# Patient Record
Sex: FEMALE | Race: WHITE | NOT HISPANIC OR LATINO | Employment: FULL TIME | URBAN - METROPOLITAN AREA
[De-identification: names, ages, dates, MRNs, and addresses within clinical notes are randomized per-mention and may not be internally consistent; named-entity substitution may affect disease eponyms.]

---

## 2017-01-09 ENCOUNTER — ALLSCRIPTS OFFICE VISIT (OUTPATIENT)
Dept: OTHER | Facility: OTHER | Age: 52
End: 2017-01-09

## 2017-01-09 DIAGNOSIS — Z12.31 ENCOUNTER FOR SCREENING MAMMOGRAM FOR MALIGNANT NEOPLASM OF BREAST: ICD-10-CM

## 2017-01-10 ENCOUNTER — GENERIC CONVERSION - ENCOUNTER (OUTPATIENT)
Dept: OTHER | Facility: OTHER | Age: 52
End: 2017-01-10

## 2017-05-15 ENCOUNTER — GENERIC CONVERSION - ENCOUNTER (OUTPATIENT)
Dept: OTHER | Facility: OTHER | Age: 52
End: 2017-05-15

## 2017-06-02 ENCOUNTER — ALLSCRIPTS OFFICE VISIT (OUTPATIENT)
Dept: OTHER | Facility: OTHER | Age: 52
End: 2017-06-02

## 2017-06-06 ENCOUNTER — GENERIC CONVERSION - ENCOUNTER (OUTPATIENT)
Dept: OTHER | Facility: OTHER | Age: 52
End: 2017-06-06

## 2017-06-06 LAB
A/G RATIO (HISTORICAL): 1.2 (CALC) (ref 1–2.5)
ALBUMIN SERPL BCP-MCNC: 3.8 G/DL (ref 3.6–5.1)
ALP SERPL-CCNC: 80 U/L (ref 33–130)
ALT SERPL W P-5'-P-CCNC: 9 U/L (ref 6–29)
AST SERPL W P-5'-P-CCNC: 11 U/L (ref 10–35)
BASOPHILS # BLD AUTO: 0.6 %
BASOPHILS # BLD AUTO: 42 CELLS/UL (ref 0–200)
BILIRUB SERPL-MCNC: 0.3 MG/DL (ref 0.2–1.2)
BUN SERPL-MCNC: 15 MG/DL (ref 7–25)
BUN/CREA RATIO (HISTORICAL): NORMAL (CALC) (ref 6–22)
CALCIUM (ADJUSTED FOR ALBUMIN) (HISTORICAL): 9.4 MG/DL (CALC) (ref 8.6–10.2)
CALCIUM SERPL-MCNC: 9 MG/DL (ref 8.6–10.4)
CHLORIDE SERPL-SCNC: 107 MMOL/L (ref 98–110)
CO2 SERPL-SCNC: 25 MMOL/L (ref 20–31)
CREAT SERPL-MCNC: 0.92 MG/DL (ref 0.5–1.05)
DEPRECATED RDW RBC AUTO: 15.2 % (ref 11–15)
EGFR AFRICAN AMERICAN (HISTORICAL): 84 ML/MIN/1.73M2
EGFR-AMERICAN CALC (HISTORICAL): 72 ML/MIN/1.73M2
EOSINOPHIL # BLD AUTO: 2.9 %
EOSINOPHIL # BLD AUTO: 203 CELLS/UL (ref 15–500)
ERYTHROCYTE SEDIMENTATION RATE (HISTORICAL): 11 MM/H
EST. AVERAGE GLUCOSE BLD GHB EST-MCNC: 100 (CALC)
EST. AVERAGE GLUCOSE BLD GHB EST-MCNC: 5.5 (CALC)
GAMMA GLOBULIN (HISTORICAL): 3.2 G/DL (CALC) (ref 1.9–3.7)
GLUCOSE (HISTORICAL): 79 MG/DL (ref 65–99)
HBA1C MFR BLD HPLC: 5.1 % OF TOTAL HGB
HCT VFR BLD AUTO: 36.3 % (ref 35–45)
HEPATITIS C ANTIBODY (HISTORICAL): NORMAL
HGB BLD-MCNC: 11.8 G/DL (ref 11.7–15.5)
LYME IGG/IGM AB (HISTORICAL): <0.9 INDEX
LYMPHOCYTES # BLD AUTO: 1939 CELLS/UL (ref 850–3900)
LYMPHOCYTES # BLD AUTO: 27.7 %
MCH RBC QN AUTO: 28.9 PG (ref 27–33)
MCHC RBC AUTO-ENTMCNC: 32.4 G/DL (ref 32–36)
MCV RBC AUTO: 89.3 FL (ref 80–100)
MONOCYTES # BLD AUTO: 378 CELLS/UL (ref 200–950)
MONOCYTES (HISTORICAL): 5.4 %
NEUTROPHILS # BLD AUTO: 4438 CELLS/UL (ref 1500–7800)
NEUTROPHILS # BLD AUTO: 63.4 %
PLATELET # BLD AUTO: 282 THOUSAND/UL (ref 140–400)
PMV BLD AUTO: 8.8 FL (ref 7.5–12.5)
POTASSIUM SERPL-SCNC: 3.7 MMOL/L (ref 3.5–5.3)
RBC # BLD AUTO: 4.06 MILLION/UL (ref 3.8–5.1)
SIGNAL TO CUT-OFF (HISTORICAL): 0.09
SODIUM SERPL-SCNC: 139 MMOL/L (ref 135–146)
TOTAL PROTEIN (HISTORICAL): 7 G/DL (ref 6.1–8.1)
TSH SERPL DL<=0.05 MIU/L-ACNC: 4.34 MIU/L
WBC # BLD AUTO: 7 THOUSAND/UL (ref 3.8–10.8)

## 2017-08-07 ENCOUNTER — ALLSCRIPTS OFFICE VISIT (OUTPATIENT)
Dept: OTHER | Facility: OTHER | Age: 52
End: 2017-08-07

## 2017-11-29 ENCOUNTER — ALLSCRIPTS OFFICE VISIT (OUTPATIENT)
Dept: OTHER | Facility: OTHER | Age: 52
End: 2017-11-29

## 2017-12-12 ENCOUNTER — GENERIC CONVERSION - ENCOUNTER (OUTPATIENT)
Dept: PERINATAL CARE | Facility: MEDICAL CENTER | Age: 52
End: 2017-12-12

## 2017-12-15 NOTE — PROGRESS NOTES
Assessment    1  Acute upper respiratory infection (465 9) (J06 9)    Plan  Acute upper respiratory infection    · LevoFLOXacin 500 MG Oral Tablet (Levaquin); TAKE 1 TABLET DAILY   · Promethazine-Codeine 6 25-10 MG/5ML Oral Syrup; TAKE 5 ML EVERY 4 TO 6  HOURS AS NEEDED FOR COUGH  Influenza vaccine needed    · Stop: Fluzone Quadrivalent Intramuscular Suspension    Discussion/Summary    41-year-old female here today for acute upper respiratory symptoms  Patient to start Levaquin once daily for 10 days for suspected acute sinusitis  Patient advised to start Mucinex DM during the day to help with mucus and cough in addition to a daily antihistamine and fluticasone nasal spray that should help to clear up postnasal drip and mucus  Promethazine with codeine prescription provided today before leaving the office to help with cough at night  Rest and fluids advised  Patient to get treatment at least a week and call the office should symptoms persist or worsen despite treatment  Possible side effects of new medications were reviewed with the patient/guardian today  The treatment plan was reviewed with the patient/guardian  The patient/guardian understands and agrees with the treatment plan      Chief Complaint  Pt presents for cough, fatigue, body aches, sob, and sweats x2d  Pt states she took Theraflu, Mucinex, Robitussin, and Tylenol w/ little relief  History of Present Illness  HPI: 46y/o female here today for 2-3 days of URI sxs  Coughing persistent, shaking, tickle in throat  Chest tightness, congestion  slight nasal congestion, PND  she has tried theraflu, robitussin, robitussin, vicks  Review of Systems    Constitutional: as noted in HPI    ENT: as noted in HPI  Cardiovascular: no complaints of slow or fast heart rate, no chest pain, no palpitations, no leg claudication or lower extremity edema  Active Problems    1  Abnormal fasting glucose (790 29) (R73 01)   2  Anxiety (300 00) (F41 9)   3  Bilateral lower extremity edema (782 3) (R60 0)   4  Changing skin lesion (709 9) (L98 9)   5  Encounter for routine gynecological examination (V72 31) (Z01 419)   6  Encounter for screening mammogram for malignant neoplasm of breast (V76 12)   (Z12 31)   7  Esophagitis, reflux (530 11) (K21 0)   8  Gastroesophageal reflux disease, esophagitis presence not specified (530 81) (K21 9)   9  Medial epicondylitis of left elbow (726 31) (M77 02)   10  Need for hepatitis C screening test (V73 89) (Z11 59)   11  Persistent headaches (784 0) (R51)   12  Screening for colon cancer (V76 51) (Z12 11)   13  Screening for malignant neoplasm of cervix (V76 2) (Z12 4)   14  Vitamin D deficiency (268 9) (E55 9)    Past Medical History    1  History of Acute lymphadenitis (683) (L04 9)   2  History of Acute sinusitis (461 9) (J01 90)   3  History of Chest pain (786 50) (R07 9)   4  History of Chest wall pain (786 52) (R07 89)   5  History of Costochondritis (733 6) (M94 0)   6  History of Cough (786 2) (R05)   7  History of Cough (786 2) (R05)   8  History of Dog bite (879 8,E906 0) (W54  0XXA)   9  History of Easy bruising (782 9) (R23 8)   10  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)   11  History of Flu vaccine need (V04 81) (Z23)   12  History of Gastroenteritis (558 9) (K52 9)   13  History of Herpes zoster (053 9) (B02 9)   14  History of acute bronchitis (V12 69) (Z87 09)   15  History of acute bronchitis (V12 69) (Z87 09)   16  History of acute sinusitis (V12 69) (Z87 09)   17  History of acute sinusitis (V12 69) (Z87 09)   18  History of chest pain (V13 89) (Z87 898)   19  History of chronic bronchitis (V12 69) (Z87 09)   20  History of fatigue (V13 89) (Z87 898)   21  History of migraine (V12 49) (Z86 69)   22  History of migraine with aura (V12 49) (Z86 69)   23  History of shortness of breath (V13 89) (Z87 898)   24  History of toxoplasmosis (V12 09) (Z86 19)   25   History of Left ear pain (388 70) (H92 02)   26  History of Left shoulder pain (719 41) (M25 512)   27  History of Multiple joint pain (719 49) (M25 50)   28  History of Poison ivy (692 6) (L23 7)   29  History of Vaginal candidiasis (112 1) (B37 3)   30  History of Visit for screening mammogram (V76 12) (Z12 31)   31  History of Visit for screening mammogram (V76 12) (Z12 31)    Family History  Family History    1  Family history of Coronary Artery Disease (V17 49)    Social History    · Never A Smoker   · Social alcohol use (Z78 9)  The social history was reviewed and updated today  Current Meds   1  ALPRAZolam 0 5 MG Oral Tablet; Take 1 tablet by mouth at bedtime; Therapy: 73WEY9250 to (Evaluate:15Mar2017)  Requested for: 01YWH9783; Last   Rx:09Jan2017 Ordered   2  Bufferin Low Dose 81 MG Oral Tablet; Take 1 tablet daily; Therapy: 37JCA3190 to Recorded   3  Citalopram Hydrobromide 10 MG Oral Tablet; Take 1 tablet daily; Therapy: 93JUS9773 to Recorded   4  Econazole Nitrate 1 % External Cream; APPLY AND GENTLY MASSAGE INTO   AFFECTED AREA(S) TWICE DAILY; Therapy: 52Nwe4605 to (Last Rx:62Qmw6030)  Requested for: 26Qhl8174 Ordered   5  Furosemide 20 MG Oral Tablet; TAKE 1 TABLET DAILY; Therapy: 79RSJ8788 to (Evaluate:81Gtd1918)  Requested for: 53Sja5088; Last   Rx:83Rqz8261 Ordered   6  Naratriptan HCl - 2 5 MG Oral Tablet; TAKE 1 TABLET AT ONSET OF HEADACHE, MAY   REPEAT ONCE IN 4 HOURS; Therapy: 76RMB6210 to (Evaluate:64Bst7895) Recorded   7  PriLOSEC OTC 20 MG Oral Tablet Delayed Release; Take 1 Capsule 30 Min Before   Beakfast daily; Therapy: 58PRH4642 to (Evaluate:19Rqk8197)  Requested for: 87Xcy5096; Last   Rx:55Tue4576 Ordered   8  Rizatriptan Benzoate 10 MG Oral Tablet; TAKE 1 TABLET AT ONSET OF HEADACHE  MAY REPEAT EVERY 2 HOURS AS NEEDED  MAXIMUM 3 TABLETS IN 24 HOURS; Therapy: (Recorded:07Jun2016) to Recorded   9  Vitamin D 2000 UNIT Oral Capsule; take 1 capsule daily;    Therapy: 23VAT1362 to Recorded    The medication list was reviewed and updated today  Allergies    1  Cephalexin CAPS   2  Imitrex TABS    3  Adhesive Tape    Vitals   Recorded: [id number] 11:44AM   Temperature 98 8 F, Tympanic   Heart Rate 89   Pulse Quality Normal   Respiration Quality Normal   Respiration 17   Systolic 532, LUE, Sitting   Diastolic 80, LUE, Sitting   Height 5 ft    Weight 214 lb 3 oz   BMI Calculated 41 83   BSA Calculated 1 92   O2 Saturation 99   LMP Menopause   Pain Scale 0     Physical Exam    Constitutional   General appearance: Abnormal   acutely ill, comfortable, overweight, appears tired and appearance reflects stated age  Eyes   Conjunctiva and lids: No swelling, erythema or discharge  Ears, Nose, Mouth, and Throat   External inspection of ears and nose: Normal     Otoscopic examination: Tympanic membranes translucent with normal light reflex  Canals patent without erythema  Nasal mucosa, septum, and turbinates: Abnormal   There was a mucoid discharge from both nares  The bilateral nasal mucosa was boggy, edematous and red  Oropharynx: Abnormal   PND  Pulmonary   Respiratory effort: Abnormal   occasional dry hacking cough  Auscultation of lungs: Clear to auscultation  Cardiovascular   Auscultation of heart: Normal rate and rhythm, normal S1 and S2, without murmurs  Lymphatic   Palpation of lymph nodes in neck: Abnormal   bilateral submandibular node enlargement  Psychiatric   Orientation to person, place, and time: Normal     Mood and affect: Normal     Additional Exam:  vitals reviewed          Signatures   Electronically signed by : Brian Reyes, [facility]; Nov 29 2017 12:45PM EST                       (Author)

## 2018-01-02 ENCOUNTER — ALLSCRIPTS OFFICE VISIT (OUTPATIENT)
Dept: OTHER | Facility: OTHER | Age: 53
End: 2018-01-02

## 2018-01-02 DIAGNOSIS — Z12.31 ENCOUNTER FOR SCREENING MAMMOGRAM FOR MALIGNANT NEOPLASM OF BREAST: ICD-10-CM

## 2018-01-02 DIAGNOSIS — J20.9 ACUTE BRONCHITIS: ICD-10-CM

## 2018-01-10 NOTE — MISCELLANEOUS
Message  Return to work or school:   Leticia Castillo is under my professional care  She was seen in my office on 11/29/2017   She is able to return to work on  11/30/2017       JEANNA Duran        Signatures   Electronically signed by : Mir Martinez MA; Nov 29 2017 12:12PM EST                       (Author)

## 2018-01-11 NOTE — MISCELLANEOUS
Message  Return to work or school:   Mario Singh is under my professional care  She was seen in my office on 1/19/16       OUT OF WORK 1/19/16  IHAB ABDELAAL DO  Signatures   Electronically signed by :  Dionisio Shelton, ; Jan 19 2016 11:17AM EST                       (Author)

## 2018-01-11 NOTE — RESULT NOTES
Verified Results  MAMMO SCREENING BILATERAL W 3D & CAD 05Ssd2643 06:54AM [place] [facility]    Order Number: [id number]   TW Order Number: [id number]     Test Name Result Flag Reference   MAMMO SCREENING BILATERAL W 3D & CAD (Report)     Patient History:   Patient is postmenopausal, has history of endometrial cancer at    age 24, and had previous chemotherapy at age 24  No known family history of cancer  Patient has never smoked  Patient's BMI is 37 4  Reason for exam: screening (asymptomatic)  Mammo Screening Bilateral W DBT and CAD: August 13, 2016 - Check    In #: [de-identified]   2D/3D Procedure   3D views: Bilateral MLO view(s) were taken  2D views: Bilateral CC view(s) were taken  Technologist: SHIRLEY Pozo (R)(M)   Prior study comparison: November 25, 2014, bilateral digital    screening mammogram performed at [address] [place] [address]  December 23, 2008, digital bilateral screening    mammogram, performed at [facility]  The breast tissue is heterogeneously dense, potentially limiting    the sensitivity of mammography  Patient risk, included in this    report, assists in determining the appropriate screening regimen    (such as 3-D mammography or the inclusion of automated breast    ultrasound or MRI)  3-D mammography may also remain indicated as    screening  A combination of mediolateral oblique 3-D tomographic slices as    well as standard two-dimensional orthogonal images were obtained  No dominant soft tissue mass, architectural distortion or    suspicious calcifications are noted in either breast   The skin    and nipple contours are within normal limits  No evidence of malignancy  No significant changes when compared with prior studies  ASSESSMENT: BiRad:1 - Negative     Recommendation:   Routine screening mammogram of both breasts in 1 year  A    reminder letter will be scheduled  8-10% of cancers will be missed on mammography  Management of a    palpable abnormality must be based on clinical grounds  Patients    will be notified of their results via letter from our facility  Accredited by [employer] of Radiology and FDA       Transcription Location: SHIRLEY Lee 98: [id number]     Risk Value(s):   Tyrer-Cuzick 10 Year: 0 969%, Tyrer-Cuzicece Lifetime: 4 069%,    Myriad Table: 1 5%, MALLIKA 5 Year: 0 8%, NCI Lifetime: 7 0%   Signed by:   Ashlie Patiño MD   8/14/16

## 2018-01-12 VITALS
WEIGHT: 210 LBS | HEART RATE: 84 BPM | OXYGEN SATURATION: 99 % | TEMPERATURE: 99.7 F | HEIGHT: 60 IN | DIASTOLIC BLOOD PRESSURE: 80 MMHG | SYSTOLIC BLOOD PRESSURE: 112 MMHG | BODY MASS INDEX: 41.23 KG/M2

## 2018-01-13 VITALS
SYSTOLIC BLOOD PRESSURE: 118 MMHG | DIASTOLIC BLOOD PRESSURE: 80 MMHG | RESPIRATION RATE: 17 BRPM | HEART RATE: 89 BPM | WEIGHT: 214.19 LBS | BODY MASS INDEX: 42.05 KG/M2 | OXYGEN SATURATION: 99 % | TEMPERATURE: 98.8 F | HEIGHT: 60 IN

## 2018-01-13 VITALS
RESPIRATION RATE: 16 BRPM | TEMPERATURE: 98.7 F | HEART RATE: 73 BPM | WEIGHT: 213.56 LBS | BODY MASS INDEX: 40.32 KG/M2 | HEIGHT: 61 IN | OXYGEN SATURATION: 98 % | DIASTOLIC BLOOD PRESSURE: 90 MMHG | SYSTOLIC BLOOD PRESSURE: 122 MMHG

## 2018-01-13 VITALS
SYSTOLIC BLOOD PRESSURE: 124 MMHG | HEART RATE: 83 BPM | WEIGHT: 212.56 LBS | OXYGEN SATURATION: 99 % | HEIGHT: 60 IN | DIASTOLIC BLOOD PRESSURE: 82 MMHG | BODY MASS INDEX: 41.73 KG/M2 | TEMPERATURE: 98.5 F | RESPIRATION RATE: 17 BRPM

## 2018-01-13 NOTE — CONSULTS
Assessment    1  Shortness of breath (786 05) (R06 02)   2  Cough (786 2) (R05)   3  Hypoxia (799 02) (R09 02)    Plan  Cough, Shortness of breath    · SPIROMETRY W/O BRONCHO- POC; Status:Active; Requested GVF:83AMF3024;    Perform: In Office; Due:10Jun2016;Ordered; Today; For:Cough, Shortness of breath; Ordered By:Andrea Vallecillo; Shortness of breath    · ECHO COMPLETE WITH CONTRAST IF INDICATED, TTE / TRANSTHORACIC;  Status:Need Information - Financial Authorization; Requested for:WED JUNE 8TH @  [facility] ;    Perform:[facility]; Order Comments:Evaluate RV and LV function and estimate systolic PA pressure for possible pulmonary hypertension ; Last Updated By:Barela, Nella Gaucher; 6/7/2016 2:15:17 PM;Ordered; For:Shortness of breath; Ordered By:Andrea Vallecillo;   · Six Minute Walk Test - POC; Status:Complete - Retrospective Authorization;   Done:  88LVC3860   Perform: In Office; Due:12Jun2016; Last Updated Jennifer Code; 6/7/2016 1:44:44 PM;Ordered; For:Shortness of breath; Ordered By:Andrea Vallecillo;   · Follow-up visit in 2 months Evaluation and Treatment  Follow-up  Status: Hold For -  Scheduling  Requested for: 95MOB7186   Ordered; For: Shortness of breath; Ordered By: Teofilo Angelucci Performed:  Due: 01RIF9297    Results/Data  Results Free Text Form [facility]:   Results   Other 6 minute walk test was done today in the office and the patient started with a heart rate of 84 bpm and O2 saturation 98%  On the fourth minute the patient's heart rate went up to one 10 bpm and O2 saturation dropped to 88%  In the fifth metatarsal heart rate went up to one 14 bpm and O2 saturation dropped to 85%  At that point patient was placed on 2 L oxygen and her oxygen saturations remained at %  Chest X-ray Chest x-ray is reviewed on the [facility] system and it showed no active pulmonary disease     PFT Results v2:     Spirometry:   Post Bronchodilator Spirometry:   Lung Volumes:   DLCO:    PFT Interpretation:   Spirometry done today in the office showed normal airflow and vital capacity  Discussion/Summary  Discussion Summary:   The patient's shortness of breath and hypoxia on exertion is concerning for possible pulmonary hypertension  Her lower extremity edema which she has noticed over the last few weeks is most likely secondary to right heart failure  This has improved with Lasix  I have ordered a 2-D echo to evaluate the RV and LV functions and estimated systolic pulmonary artery pressure  The patient has already been done scheduled for stress echo to have canceled after discussing the condition with her primary care physician Dr Drake Wilkes  Her cough most likely unrelated to the shortness of breath on exertion  Most likely it is secondary to postnasal dripping  For this reason I have started her on fexofenadine 180 mg and fluticasone nasal spray 2 sprays to each nostril once a day  Since her hypoxia was at the end of her 6 minute walk test on the patient is currently not active I will withhold ordering the oxygen until further evaluation is done  The patient will contact me after the 2-D echo is done to go over the results and then order any further diagnostics  Tentatively have scheduled her for a follow-up visit in 2 months  Active Problems    · Acute bronchitis (466 0) (J20 9)   · Anxiety (300 00) (F41 9)   · Bilateral lower extremity edema (782 3) (R60 0)   · Chest pain (786 50) (R07 9)   · Chest wall pain (786 52) (R07 89)   · Chronic bronchitis (491 9) (J42)   · Esophagitis, reflux (530 11) (K21 0)   · Medial epicondylitis of left elbow (726 31) (M77 02)   · Shortness of breath (786 05) (R06 02)   · Toxoplasmosis (130 9) (B58 9)   · Vitamin D deficiency (268 9) (E55 9)    History of Present Illness  HPI: The patient is 75-year-old woman who is here for evaluation of cough and shortness of breath   Her symptoms started about the end of November of last year when she noticed shortness of breath on exertion  The shortness of breath on exertion worsened over time  Over the last few weeks she had noticed lower extremity edema  The patient also had a cough started around the same time in November  Over time it has worsened  The Cough is random and it starts as a tickle  There is no specific triggers to it  It is mainly dry and she doesn't bring up any sputum  Denies hemoptysis  She has no chest pain or palpitations  Her cough also occurs at night  She snores but not loud  She denies any daytime hypersomnolence  She visited the emergency room about a week ago and she was prescribed albuterol  After that she saw her primary care physician who prescribed Lasix and that improved the lower extremity edema  The patient shortness of breath on exertion did not improve with the Lasix  Since the beginning of her symptoms the patient was treated with 3 different courses of antibiotics as well as different inhalers without significant change in her symptoms  The patient is  and lives with her   She never smoked  She has no pets  Review of Systems  Complete-Female - Pulm:   Constitutional: No fever, no chills, feels well, no tiredness, no recent weight gain or weight loss  Eyes: no complaints of vision problems  ENT: no rhinitis, no PND, no epistaxis  Cardiovascular: no palpitations, no chest pain  Respiratory: as noted in HPI  Gastrointestinal: no complaints of esophageal reflux, no abdominal pain  Genitourinary: no dysuria  Musculoskeletal: no arthralgias, no joint swelling, no myalgias  Integumentary: no rash, no lesions  Neurological: no headache, no fainting, no weakness  Psychiatric: no anxiety, no depression  Hematologic/Lymphatic: - no complaints of swollen glands  Past Medical History    1  History of Acute sinusitis (461 9) (J01 90)   2  History of Chest pain (786 50) (R07 9)   3  History of Costochondritis (733 6) (M94 0)   4   History of Cough (786 2) (R05)   5  History of Dog bite (879 8,E906 0) (W54  0XXA)   6  History of Easy bruising (782 9) (R23 8)   7  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)   8  History of Flu vaccine need (V04 81) (Z23)   9  History of Gastroenteritis (558 9) (K52 9)   10  History of Herpes zoster (053 9) (B02 9)   11  History of acute bronchitis (V12 69) (Z87 09)   12  History of acute sinusitis (V12 69) (Z87 09)   13  History of acute sinusitis (V12 69) (Z87 09)   14  History of fatigue (V13 89) (Z87 898)   15  History of migraine (V12 49) (Z86 69)   16  History of migraine with aura (V12 49) (Z86 69)   17  History of toxoplasmosis (V12 09) (Z86 19)   18  History of Left ear pain (388 70) (H92 02)   19  History of Left shoulder pain (719 41) (M25 512)   20  History of Multiple joint pain (719 49) (M25 50)   21  History of Poison ivy (692 6) (L23 7)   22  History of Screening for colon cancer (V76 51) (Z12 11)   23  History of Vaginal candidiasis (112 1) (B37 3)   24  History of Visit for screening mammogram (V76 12) (Z12 31)   25  History of Visit for screening mammogram (H23 39) (Z12 31)  Active Problems And Past Medical History Reviewed: The active problems and past medical history were reviewed and updated today  Family History  Family History    1  Family history of Coronary Artery Disease (V17 49)  Family History Reviewed: The family history was reviewed and updated today  Social History    · Never A Smoker   · Social alcohol use (Z78 9)  Social History Reviewed: The social history was reviewed and updated today  Current Meds   1  ALPRAZolam 0 5 MG Oral Tablet; Take 1 tablet by mouth at bedtime; Therapy: 48YEW7631 to (Evaluate:10Jun2016)  Requested for: 11Apr2016; Last   Rx:63Qgo5752 Ordered   2  Bufferin Low Dose 81 MG Oral Tablet; Take 1 tablet daily; Therapy: 91NAE4247 to Recorded   3  Citalopram Hydrobromide 10 MG Oral Tablet; TAKE 2 TABLETS DAILY;    Therapy: 15DGD4212 to (Evaluate:38Ohj6845) Recorded   4  Naratriptan HCl - 2 5 MG Oral Tablet; TAKE 1 TABLET AT ONSET OF HEADACHE, MAY   REPEAT ONCE IN 4 HOURS; Therapy: 01BCU5892 to (Evaluate:99Wrd7511) Recorded   5  PriLOSEC OTC 20 MG Oral Tablet Delayed Release; Take 1 Capsule 30 Min Before   Beakfast daily; Therapy: 42FOL0947 to (Evaluate:30Jun2016); Last Rx:20Tnu7537 Ordered   6  Rizatriptan Benzoate 10 MG Oral Tablet; TAKE 1 TABLET AT ONSET OF HEADACHE  MAY   REPEAT EVERY 2 HOURS AS NEEDED  MAXIMUM 3 TABLETS IN 24 HOURS; Therapy: (Recorded:07Jun2016) to Recorded   7  Sulfamethoxazole-Trimethoprim 800-160 MG Oral Tablet; TAKE 1 TABLET TWICE DAILY   UNTIL FINISHED with food; Therapy: 63MVY2172 to (Last Rx:15Cua6386)  Requested for: 39ZVV9315 Ordered   8  Symbicort 160-4 5 MCG/ACT Inhalation Aerosol; INHALE 2 PUFFS TWICE DAILY  RINSE   MOUTH AFTER USE; Therapy: 66UQZ6146 to (Last Rx:43Rjp3671) Ordered   9  Topiramate 100 MG Oral Tablet; TAKE 1 TABLET TWICE DAILY; Therapy: 55LID3107 to Recorded   10  Vitamin D 2000 UNIT Oral Capsule; take 1 capsule daily; Therapy: 78EAR2407 to Recorded  Medication List Reviewed: The medication list was reviewed and updated today  Allergies    1  Cephalexin CAPS   2  Imitrex TABS    3  Adhesive Tape    Vitals  Vital Signs [Data Includes: Current Encounter]    Recorded: 81ZED0152 01:02PM   Temperature 98 3 F   Heart Rate 80   Respiration 16   Systolic 283   Diastolic 80   Height 5 ft 0 2 in   Weight 208 lb 2 08 oz   BMI Calculated 40 38   BSA Calculated 1 9   O2 Saturation 98, RA     Physical Exam    Constitutional   General appearance: No acute distress, well appearing and well nourished  Ears, Nose, Mouth, and Throat   Nasal mucosa, septum, and turbinates: Normal without edema or erythema  Lips, teeth, and gums: Normal, good dentition  Oropharynx: Normal with no erythema, edema, exudate or lesions  Neck   Neck: Supple, symmetric, trachea midline, no masses      Jugular veins: Normal     Pulmonary   Auscultation of lungs: Clear to auscultation, no rales, no crackles, no wheezing  Cardiovascular   Auscultation of heart: Normal rate and rhythm, normal S1 and S2, no murmurs  Examination of extremities for edema and/or varicosities: Normal     Abdomen   Abdomen: Soft, non-tender  Lymphatic   Palpation of lymph nodes in neck: No lymphadenopathy  Musculoskeletal   Gait and station: Normal     Digits and nails: Normal without clubbing or cyanosis  Neurologic   Mental Status: Normal  Not confused, no evidence of dementia, good comprehension, good concentration  Skin   Skin and subcutaneous tissue: Limited exam shows no rash  Psychiatric   Orientation to person, place and time: Normal     Mood and affect: Normal        Signatures   Electronically signed by :  Terry Lucas MD; Jun 7 2016  2:19PM EST                       (Author)

## 2018-01-13 NOTE — RESULT NOTES
Message   Please call patient, informed her that her recent echo was normal   Please confirm that she is following up with her cardiologist   Thank you     Verified Results  ECHO STRESS TEST W CONTRAST IF INDICATED [id number] 07:53AM Aurora Arteaga Order Number: [id number]     Test Name Result Flag Reference   ECHO STRESS TEST W CONTRAST IF INDICATED (Report)     [address] [address]  [facility] [address]   [phone]     Exercise Stress Echocardiography     Study date: 2016     Patient: Lanis Bamberger   MR number: [id number]   Account number: [de-identified]   : 1965   Age: 46 years   Gender: Female   Study date: 2016   Status: Outpatient   Location: [facility]   Height: 60 in   Weight: 207 lb   BP: 124// 80 mmHg     Indications: Detection of coronary artery disease  Diagnosis: R07 9 - Chest pain, unspecified     Sonographer: SANTIAGO Torres   Primary Physician: Loco Guillaume PA-C   Referring Physician: Leggett DO Marcela   Group: [facility]   RN: Ysabel Ontiveros RN BSN   Report Prepared By: Ysabel Ontiveros RN BSN   Interpreting Physician: Joey Medrano DO     IMPRESSIONS:   Normal study after maximal exercise  No evidence of exercise induced hypoxia  Normal chamber sizes with ECG or echocardiographic evidence of myocardial   ischemia  Normal blood pressure and heart rate response to exercise  Left   ventricular systolic function was normal      SUMMARY     STRESS RESULTS:   Duration of exercise was 6 min  Maximal work rate was 7 METs  Maximal heart rate during stress was 160 bpm ( 94 % of maximal predicted heart   rate)  Target heart rate was achieved  There was no chest pain during stress  ECG CONCLUSIONS:   The stress ECG was negative for ischemia  BASELINE:   There were no regional wall motion abnormalities  Estimated left ventricular ejection fraction was 60 %        PEAK STRESS:   There were no regional wall motion abnormalities  ECHO CONCLUSIONS:   There was no echocardiographic evidence for stress-induced ischemia  HISTORY: The patient is a 46year old  female  Chest pain status:   chest pain, associated with dyspnea  Other symptoms: decreased effort   tolerance  Coronary artery disease risk factors: family history of coronary   artery disease  Cardiovascular history: none significant  Co-morbidity:   obesity  Medications: a diuretic and aspirin  PHYSICAL EXAM: Baseline physical exam screening: normal lung exam      REST ECG: Normal sinus rhythm  Normal baseline ECG  PROCEDURE: The study was performed in the echo lab  The study was performed in   the [address] [place] [address]  Treadmill exercise testing was performed,   using the Jakub protocol  Stress and rest echocardiographic evaluation was   performed from multiple acoustic windows for evaluation of ventricular function  JAKUB PROTOCOL:   HR bpm SBP mmHg DBP mmHg Symptoms   Baseline 81 124 80 none   Stage 1 129 146 80 none   Stage 2 160 152 80 severe dyspnea, fatigue   Recovery 1 108 120 82 mild dyspnea, dizziness   Recovery 2 104 110 76 none     MEDICATIONS GIVEN: No medications or fluids given  STRESS RESULTS: 02 sat at rest 99% and at peak stress 100%  Duration of   exercise was 6 min  The patient exercised to protocol stage 2  Maximal work   rate was 7 METs  Maximal heart rate during stress was 160 bpm ( 94 % of maximal   predicted heart rate)  Target heart rate was achieved  The heart rate response   to stress was normal  Maximal systolic blood pressure during stress was 152   mmHg  There was normal resting blood pressure with an appropriate response to   stress  The rate-pressure product for the peak heart rate and blood pressure   was 17750  There was no chest pain during stress   The stress test was   terminated due to achievement of maximal (symptom limited) exercise,   achievement of target heart rate, and severe dyspnea  ECG CONCLUSIONS: The stress ECG was negative for ischemia  There were no stress   arrhythmias or conduction abnormalities  STRESS 2D ECHO RESULTS:     BASELINE: There were no regional wall motion abnormalities  Left ventricular   size was normal  Overall left ventricular systolic function was normal    Estimated left ventricular ejection fraction was 60 %   PEAK STRESS: There were no regional wall motion abnormalities  There was an   appropriate reduction in left ventricular size  There was an appropriate   augmentation in LV function  ECHO CONCLUSIONS: There was no echocardiographic evidence for stress-induced   ischemia  The image quality was good       Prepared and electronically signed by     Roger Vargas DO   Signed 27-Jul-2016 13:56:16

## 2018-01-14 NOTE — RESULT NOTES
Verified Results  (Q) CBC (INCLUDES DIFF/PLT) (REFL) [id number] 07:09AM [facility]     Test Name Result Flag Reference   WHITE BLOOD CELL COUNT 7 0 Thousand/uL  3 8-10 8   RED BLOOD CELL COUNT 4 06 Million/uL  3 80-5 10   HEMOGLOBIN 11 8 g/dL  11 7-15 5   HEMATOCRIT 36 3 %  35 0-45 0   MCV 89 3 fL  80 0-100 0   MCH 28 9 pg  27 0-33 0   MCHC 32 4 g/dL  32 0-36 0   RDW 15 2 % H 11 0-15 0   PLATELET COUNT 352 Thousand/uL  140-400   MPV 8 8 fL  7 5-12 5   ABSOLUTE NEUTROPHILS 4438 cells/uL  2687-2927   ABSOLUTE LYMPHOCYTES 1939 cells/uL  850-3900   ABSOLUTE MONOCYTES 378 cells/uL  200-950   ABSOLUTE EOSINOPHILS 203 cells/uL     ABSOLUTE BASOPHILS 42 cells/uL  0-200   NEUTROPHILS 63 4 %     LYMPHOCYTES 27 7 %     MONOCYTES 5 4 %     EOSINOPHILS 2 9 %     BASOPHILS 0 6 %       (Q) COMPREHENSIVE METABOLIC PNL W/ADJUSTED CALCIUM [id number] 07:09AM [facility]     Test Name Result Flag Reference   GLUCOSE 79 mg/dL  65-99   Fasting reference interval   UREA NITROGEN (BUN) 15 mg/dL  7-25   CREATININE 0 92 mg/dL  0 50-1 05   For patients >52years of age, the reference limit  for Creatinine is approximately 13% higher for people  identified as -American  eGFR NON-AFR   AMERICAN 72 mL/min/1 73m2  > OR = 60   eGFR AFRICAN AMERICAN 84 mL/min/1 73m2  > OR = 60   BUN/CREATININE RATIO   9-70   NOT APPLICABLE (calc)   SODIUM 139 mmol/L  135-146   POTASSIUM 3 7 mmol/L  3 5-5 3   CHLORIDE 107 mmol/L     CARBON DIOXIDE 25 mmol/L  20-31   CALCIUM 9 0 mg/dL  8 6-10 4   CALCIUM (ADJUSTED FOR$ALBUMIN) 9 4 mg/dL (calc)  8 6-10 2   PROTEIN, TOTAL 7 0 g/dL  6 1-8 1   ALBUMIN 3 8 g/dL  3 6-5 1   GLOBULIN 3 2 g/dL (calc)  1 9-3 7   ALBUMIN/GLOBULIN RATIO 1 2 (calc)  1 0-2 5   BILIRUBIN, TOTAL 0 3 mg/dL  0 2-1 2   ALKALINE PHOSPHATASE 80 U/L     AST 11 U/L  10-35   ALT 9 U/L  6-29     (Q) HEMOGLOBIN A1c WITH eAG [id number] 07:09AM [facility]   REPORT COMMENT:  FASTING:YES     Test Name Result Flag Reference   HEMOGLOBIN A1c 5 1 % of total Hgb  <5 7   For the purpose of screening for the presence of  diabetes:     <5 7%       Consistent with the absence of diabetes  5 7-6 4%    Consistent with increased risk for diabetes              (prediabetes)  > or =6 5%  Consistent with diabetes     This assay result is consistent with a decreased risk  of diabetes  Currently, no consensus exists regarding use of  hemoglobin A1c for diagnosis of diabetes in children  According to American Diabetes Association (ADA)  guidelines, hemoglobin A1c <7 0% represents optimal  control in non-pregnant diabetic patients  Different  metrics may apply to specific patient populations  Standards of Medical Care in Diabetes(ADA)  eAG (mg/dL) 100 (calc)     eAG (mmol/L) 5 5 (calc)       (Q) HEPATITIS C ANTIBODY [id number] 07:09AM [facility]     Test Name Result Flag Reference   HEPATITIS C ANTIBODY NON-REACTIVE  NON-REACTIVE   SIGNAL TO CUT-OFF 0 09  <1 00     (Q) TSH, 3RD GENERATION W/REFLEX TO FT4 [id number] 07:09AM [facility]     Test Name Result Flag Reference   TSH W/REFLEX TO FT4 4 34 mIU/L     Reference Range                         > or = 20 Years  0 40-4 50                              Pregnancy Ranges            First trimester    0 26-2 66            Second trimester   0 55-2 73            Third trimester    0 43-2 91     (Q) SED RATE BY NILS [place] [id number] 07:09AM [facility]     Test Name Result Flag Reference   SED RATE BY MODIFIEDROBBI 11 mm/h  < OR = 30     (Q) LYME DISEASE AB, TOTAL W/REFL WB (IGG, IGM) [id number] 07:09AM [facility]     Test Name Result Flag Reference   LYME AB SCREEN <0 90 index     Index                Interpretation                     -----                --------------                     < 0 90               Negative                     0  90-1 09            Equivocal                     > 1 09               Positive      As recommended by the Food and Drug Administration   (FDA), all samples with positive or equivocal   results in a Borrelia burgdorferi antibody screen  will be tested using a blot method  Positive or   equivocal screening test results should not be   interpreted as truly positive until verified as such   using a supplemental assay (e g , B  burgdorferi blot)  The screening test and/or blot for B  burgdorferi   antibodies may be falsely negative in early stages  of Lyme disease, including the period when erythema   migrans is apparent

## 2018-01-16 ENCOUNTER — APPOINTMENT (OUTPATIENT)
Dept: RADIOLOGY | Facility: MEDICAL CENTER | Age: 53
End: 2018-01-16
Payer: COMMERCIAL

## 2018-01-16 ENCOUNTER — ALLSCRIPTS OFFICE VISIT (OUTPATIENT)
Dept: OTHER | Facility: OTHER | Age: 53
End: 2018-01-16

## 2018-01-16 DIAGNOSIS — J20.9 ACUTE BRONCHITIS: ICD-10-CM

## 2018-01-16 PROCEDURE — 71046 X-RAY EXAM CHEST 2 VIEWS: CPT

## 2018-01-16 NOTE — PROGRESS NOTES
Assessment    1  Migraine headache (346 90) (G43 909)   2  Acute bronchitis (466 0) (J20 9)    Plan  Acute bronchitis    · Levofloxacin 500 MG Oral Tablet; TAKE 1 TABLET DAILY   · Promethazine-Codeine 6 25-10 MG/5ML Oral Syrup; TAKE 5 ML BY MOUTH AT  BEDTIME AS NEEDED  Anxiety    · ALPRAZolam 0 5 MG Oral Tablet; Take 1 tablet by mouth at bedtime    Discussion/Summary    Pt is a 49 yo F  1  Acute Bronchitis - Start supportive care  Increase fluids and rest  Start tx with Levaquin 500mg Qd for 7 days  she was also given Promethazine with codeine to take QHS  2  Migraine HA - Sx appear stable  Pt has been f/u with Neurology  Continue with tx of Topamax and Alprazolam    The patient was counseled regarding instructions for management, patient and family education, importance of compliance with treatment  Chief Complaint    1  Cold Symptoms  Patient c/o dry cough, SOB x 1 5 months  Would also like refill of her Xanax  History of Present Illness  Cold Symptoms:   Cali Thibodeaux presents with complaints of gradual onset of constant episodes of moderate cold symptoms  Episodes started 1 month ago  Her symptoms are caused by no known event  Symptoms are not improved by cough suppressants  Associated symptoms include nasal congestion and dry cough, but no runny nose, no scratchy throat, no sore throat, no productive cough, no facial pressure, no facial pain, no plugged ear(s), no ear pain, no fatigue, no weakness, no nausea, no vomiting, no fever and no chills  Headache, Migraine (Brief): The patient is being seen for a routine clinic follow-up of migraine headache  The patient is currently asymptomatic  No associated symptoms are reported  Current treatment includes triptans and And taking Alprazolam  By report, there is good compliance with treatment, good tolerance of treatment and good symptom control  Review of Systems    Constitutional: feeling poorly and feeling tired, but no fever and no chills  ENT: no sore throat and no nasal discharge  Cardiovascular: no chest pain and no palpitations  Respiratory: cough and shortness of breath during exertion  Breasts: no breast swelling and no breast itching  Gastrointestinal: no abdominal pain, no nausea and no diarrhea  Genitourinary: no dysuria and no incontinence  Musculoskeletal: no arthralgias and no myalgias  Integumentary: no rashes and no skin lesions  Neurological: no headache and no numbness  Active Problems    1  Anxiety (300 00) (F41 9)   2  Esophagitis, reflux (530 11) (K21 0)   3  Left shoulder pain (719 41) (M25 512)   4  Medial epicondylitis of left elbow (726 31) (M77 02)   5  Migraine headache (346 90) (G43 909)   6  Toxoplasmosis (130 9) (B58 9)   7  Vitamin D deficiency (268 9) (E55 9)    Past Medical History    1  History of Acute sinusitis (461 9) (J01 90)   2  History of Chest pain (786 50) (R07 9)   3  History of Costochondritis (733 6) (M94 0)   4  History of Cough (786 2) (R05)   5  History of Cough (786 2) (R05)   6  History of Dog bite (879 8,E906 0) (T14 8,W54  0XXA)   7  History of Easy bruising (782 9) (R23 8)   8  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)   9  History of Flu vaccine need (V04 81) (Z23)   10  History of Gastroenteritis (558 9) (K52 9)   11  History of Herpes zoster (053 9) (B02 9)   12  History of acute bronchitis (V12 69) (Z87 09)   13  History of acute sinusitis (V12 69) (Z87 09)   14  History of acute sinusitis (V12 69) (Z87 09)   15  History of fatigue (V13 89) (Z87 898)   16  History of migraine with aura (V12 49) (Z86 69)   17  History of toxoplasmosis (V12 09) (Z86 19)   18  History of Left ear pain (388 70) (H92 02)   19  History of Multiple joint pain (719 49) (M25 50)   20  History of Poison ivy (692 6) (L23 7)   21  History of Vaginal candidiasis (112 1) (B37 3)   22  History of Visit for screening mammogram (V76 12) (Z12 31)   23   History of Visit for screening mammogram (V76 12) (Z12 31)  Active Problems And Past Medical History Reviewed: The active problems and past medical history were reviewed and updated today  Family History    1  Family history of Coronary Artery Disease (V17 49)  Family History Reviewed: The family history was reviewed and updated today  Social History    · Never A Smoker   · Social alcohol use (F10 99)  The social history was reviewed and updated today  The social history was reviewed and is unchanged  Surgical History  Surgical History Reviewed: The surgical history was reviewed and updated today  Current Meds   1  ALPRAZolam 0 5 MG Oral Tablet; Take 1 tablet by mouth at bedtime; Therapy: 89ZWY6615 to (Evaluate:11Jan2016)  Requested for: 58VZJ9574; Last   Rx:12Nov2015 Ordered   2  Bufferin Low Dose 81 MG Oral Tablet; Take 1 tablet daily; Therapy: 83QBD5828 to Recorded   3  Citalopram Hydrobromide 10 MG Oral Tablet; Therapy: 48SZI1660 to (Evaluate:14Xqw1911) Recorded   4  Naratriptan HCl - 2 5 MG Oral Tablet; Therapy: 31XXE1202 to (Evaluate:49Cqi7519) Recorded   5  PriLOSEC 20 MG Oral Capsule Delayed Release; TAKE 1 CAPSULE Daily; Therapy: 59GNX8280 to Recorded   6  Rizatriptan Benzoate 10 MG Oral Tablet Recorded   7  Topiramate 100 MG Oral Tablet; TAKE 1 TABLET TWICE DAILY; Therapy: 80YZE1066 to Recorded   8  Vitamin D 2000 UNIT Oral Capsule; take 1 capsule daily; Therapy: 38CAE7467 to Recorded    The medication list was reviewed and updated today  Allergies    1  Cephalexin CAPS   2  Imitrex TABS    3  Adhesive Tape    Vitals   Recorded: [id number] 10:52AM   Temperature 98 2 F   Heart Rate 82   Systolic 894   Diastolic 70   Height 5 ft    Weight 202 lb    BMI Calculated 39 45   BSA Calculated 1 86   O2 Saturation 97, RA     Physical Exam    Constitutional   General appearance: No acute distress, well appearing and well nourished  Eyes   Conjunctiva and lids: No swelling, erythema or discharge  Pupils and irises: Equal, round and reactive to light  Ears, Nose, Mouth, and Throat   External inspection of ears and nose: Normal     Otoscopic examination: Tympanic membranes translucent with normal light reflex  Canals patent without erythema  Nasal mucosa, septum, and turbinates: Normal without edema or erythema  Oropharynx: Normal with no erythema, edema, exudate or lesions  Pulmonary   Respiratory effort: No increased work of breathing or signs of respiratory distress  Auscultation of lungs: Clear to auscultation  Cardiovascular   Auscultation of heart: Normal rate and rhythm, normal S1 and S2, without murmurs  Examination of extremities for edema and/or varicosities: Normal     Abdomen   Abdomen: Non-tender, no masses  Liver and spleen: No hepatomegaly or splenomegaly  Lymphatic   Palpation of lymph nodes in neck: No lymphadenopathy  Musculoskeletal   Gait and station: Normal     Inspection/palpation of joints, bones, and muscles: Normal     Neurologic   Cranial nerves: Cranial nerves 2-12 intact  Sensation: No sensory loss  Psychiatric   Orientation to person, place, and time: Normal     Mood and affect: Normal          Signatures   Electronically signed by :  Tino Hodge DO; Jan 19 2016 11:17AM EST                       (Author)

## 2018-01-18 NOTE — RESULT NOTES
Verified Results  * XR CHEST PA & LATERAL [id number] 12:05PM Yonathan Cummings Order Number: [id number]     Test Name Result Flag Reference   XR CHEST PA & LATERAL (Report)     CHEST - DUAL ENERGY     INDICATION: Cough, congestion, trouble breathing, weakness since January 2016     COMPARISON: 7/18/2009     VIEWS: PA (including soft tissue/bone algorithms) and lateral projections; 4 images     FINDINGS:        Cardiomediastinal silhouette appears unremarkable  The lungs are clear  No pneumothorax or pleural effusion  Visualized osseous structures appear within normal limits for the patient's age  IMPRESSION:     No active pulmonary disease         Workstation performed: [id number]     Signed by:   Jean Claude Michael MD   5/23/16

## 2018-01-18 NOTE — MISCELLANEOUS
Message  Return to work or school:   Davis Morales is under my professional care  She was seen in my office on 09/28/2016   She is able to return to work on  09/29/2016 09/27-09/28/2016  Joseline Forbes        Signatures   Electronically signed by : Ray Hale, ; Sep 28 2016  3:59PM EST                       (Author)

## 2018-01-23 VITALS
TEMPERATURE: 98.3 F | WEIGHT: 211.44 LBS | HEIGHT: 60 IN | RESPIRATION RATE: 15 BRPM | BODY MASS INDEX: 41.51 KG/M2 | HEART RATE: 78 BPM | OXYGEN SATURATION: 99 % | SYSTOLIC BLOOD PRESSURE: 126 MMHG | DIASTOLIC BLOOD PRESSURE: 88 MMHG

## 2018-01-23 VITALS
BODY MASS INDEX: 40.46 KG/M2 | RESPIRATION RATE: 19 BRPM | WEIGHT: 214.31 LBS | TEMPERATURE: 98.7 F | HEIGHT: 61 IN | SYSTOLIC BLOOD PRESSURE: 138 MMHG | OXYGEN SATURATION: 99 % | DIASTOLIC BLOOD PRESSURE: 98 MMHG | HEART RATE: 100 BPM

## 2018-01-23 NOTE — MISCELLANEOUS
Message  Return to work or school:   Fred Bateman is under my professional care  She was seen in my office on 01/16/2018   She is able to return to work on  01/17/2018      Out Of Work 1/16/2018  Rick Barroso DO        Signatures   Electronically signed by : Debbie Blair, ; Jan 16 2018  4:33PM EST                       (Author)

## 2018-01-23 NOTE — PROGRESS NOTES
Assessment   1  Acute bronchitis (466 0) (J20 9)    Plan   Acute bronchitis    · Benzonatate 200 MG Oral Capsule; TAKE 1 CAPSULE 3 times daily PRN cough   · * XR CHEST PA & LATERAL; Status:Resulted - Requires Verification;   Done: 89JSF1073    05:31PM    Discussion/Summary      Patient is a 14-year-old white female with symptoms of bronchitis off and on since October  She has been on antibiotics previous to this  I gave her prescription for azithromycin and benzonatate  She does have a Symbicort inhaler  I did order a chest x-ray  Possible side effects of new medications were reviewed with the patient/guardian today  The treatment plan was reviewed with the patient/guardian  The patient/guardian understands and agrees with the treatment plan      Chief Complaint   Pt presents for a follow up to her upper respiratory infection  Pt was seen 11/29/17 and 1/2/18 in our office for this  History of Present Illness   Patient continues with cough since end of Novebmer  Was seen and treated as viral infection - cough medicine with cough, inhaler - symptoms continued and she was treated with Doxycycline and Prednisone  Review of Systems        Constitutional: as noted in HPI       ENT: as noted in HPI  Cardiovascular: No complaints of slow heart rate, no fast heart rate, no chest pain, no palpitations, no leg claudication, no lower extremity edema  Respiratory: as noted in HPI  Gastrointestinal: No complaints of abdominal pain, no constipation, no nausea or vomiting, no diarrhea, no bloody stools  Active Problems   1  Abnormal fasting glucose (790 29) (R73 01)   2  Acute upper respiratory infection (465 9) (J06 9)   3  Anxiety (300 00) (F41 9)   4  Bilateral lower extremity edema (782 3) (R60 0)   5  Cervical cancer screening (V76 2) (Z12 4)   6  Changing skin lesion (709 9) (L98 9)   7  Encounter for routine gynecological examination (V72 31) (Z01 419)   8   Encounter for screening mammogram for malignant neoplasm of breast (V76 12)     (Z12 31)   9  Esophagitis, reflux (530 11) (K21 0)   10  Gastroesophageal reflux disease, esophagitis presence not specified (530 81) (K21 9)   11  Influenza vaccine needed (V04 81) (Z23)   12  Medial epicondylitis of left elbow (726 31) (M77 02)   13  Need for hepatitis C screening test (V73 89) (Z11 59)   14  Persistent headaches (784 0) (R51)   15  Screening for colon cancer (V76 51) (Z12 11)   16  Screening for malignant neoplasm of cervix (V76 2) (Z12 4)   17  Vitamin D deficiency (268 9) (E55 9)    Past Medical History   1  History of Acute lymphadenitis (683) (L04 9)   2  History of Acute sinusitis (461 9) (J01 90)   3  History of Chest pain (786 50) (R07 9)   4  History of Chest wall pain (786 52) (R07 89)   5  History of Costochondritis (733 6) (M94 0)   6  History of Cough (786 2) (R05)   7  History of Cough (786 2) (R05)   8  History of Dog bite (879 8,E906 0) (W54  0XXA)   9  History of Easy bruising (782 9) (R23 8)   10  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)   11  History of Flu vaccine need (V04 81) (Z23)   12  History of Gastroenteritis (558 9) (K52 9)   13  History of Herpes zoster (053 9) (B02 9)   14  History of acute bronchitis (V12 69) (Z87 09)   15  History of acute bronchitis (V12 69) (Z87 09)   16  History of acute sinusitis (V12 69) (Z87 09)   17  History of acute sinusitis (V12 69) (Z87 09)   18  History of chest pain (V13 89) (Z87 898)   19  History of chronic bronchitis (V12 69) (Z87 09)   20  History of fatigue (V13 89) (Z87 898)   21  History of migraine (V12 49) (Z86 69)   22  History of migraine with aura (V12 49) (Z86 69)   23  History of shortness of breath (V13 89) (Z87 898)   24  History of toxoplasmosis (V12 09) (Z86 19)   25  History of Left ear pain (388 70) (H92 02)   26  History of Left shoulder pain (719 41) (M25 512)   27  History of Multiple joint pain (719 49) (M25 50)   28   History of Poison ivy (692 6) (L23 7)   29  History of Vaginal candidiasis (112 1) (B37 3)   30  History of Visit for screening mammogram (V76 12) (Z12 31)   31  History of Visit for screening mammogram (V76 12) (Z12 31)     The active problems and past medical history were reviewed and updated today  Family History   Family History    1  Family history of Coronary Artery Disease (V17 49)    Social History    · Never A Smoker   · Social alcohol use (Z78 9)  The social history was reviewed and updated today  The social history was reviewed and is unchanged  Current Meds    1  ALPRAZolam 0 5 MG Oral Tablet; Take 1 tablet by mouth at bedtime; Therapy: 06VGQ8938 to (Evaluate:15Mar2017)  Requested for: 69VOT6024; Last     Rx:09Jan2017 Ordered   2  Bufferin Low Dose 81 MG Oral Tablet; Take 1 tablet daily; Therapy: 13UZQ6575 to Recorded   3  Citalopram Hydrobromide 10 MG Oral Tablet; Take 1 tablet daily; Therapy: 49FAA5996 to Recorded   4  Econazole Nitrate 1 % External Cream; APPLY AND GENTLY MASSAGE INTO AFFECTED     AREA(S) TWICE DAILY; Therapy: 19Jqy9342 to (Last Rx:48Ufz6709)  Requested for: 58Ncj2397 Ordered   5  Furosemide 20 MG Oral Tablet; TAKE 1 TABLET DAILY; Therapy: 54IUQ7881 to (Evaluate:69Kuf5849)  Requested for: 90Dvh0163; Last     Rx:81Nvk0388 Ordered   6  Naratriptan HCl - 2 5 MG Oral Tablet; TAKE 1 TABLET AT ONSET OF HEADACHE, MAY     REPEAT ONCE IN 4 HOURS; Therapy: 30HXE4832 to (Evaluate:70Boo1412) Recorded   7  PriLOSEC OTC 20 MG Oral Tablet Delayed Release; Take 1 Capsule 30 Min Before     Beakfast daily; Therapy: 03QQS1267 to (Evaluate:56Zti7538)  Requested for: 90Jpb8174; Last     Rx:07Aug2017 Ordered   8  Promethazine-Codeine 6 25-10 MG/5ML Oral Syrup; TAKE 5 ML EVERY 4 TO 6 HOURS     AS NEEDED FOR COUGH; Therapy: 78DIS0271 to (Evaluate:28Hpp0308); Last Rx:29Nov2017 Ordered   9  Rizatriptan Benzoate 10 MG Oral Tablet; TAKE 1 TABLET AT ONSET OF HEADACHE   MAY     REPEAT EVERY 2 HOURS AS NEEDED  MAXIMUM 3 TABLETS IN 24 HOURS; Therapy: (Recorded:97Pjh0359) to Recorded   10  Vitamin D 2000 UNIT Oral Capsule; take 1 capsule daily; Therapy: 34QBE3501 to Recorded     The medication list was reviewed and updated today  Allergies   1  Cephalexin CAPS   2  Imitrex TABS  3  Adhesive Tape    Vitals   Vital Signs    Recorded: 13JIV2156 04:09PM   Temperature 98 7 F, Tympanic   Heart Rate 100   Respiration Quality Wheezing   Respiration 19   Systolic 990, LUE, Sitting   Diastolic 98, LUE, Sitting   Height 5 ft 1 in   Weight 214 lb 5 oz   BMI Calculated 40 49   BSA Calculated 1 95   O2 Saturation 99, RA   Pain Scale 10     Physical Exam        Ears, Nose, Mouth, and Throat      External inspection of ears and nose: Normal        Otoscopic examination: Tympanic membranes translucent with normal light reflex  Canals patent without erythema  Oropharynx: Abnormal  -- clear post nasal drainage  Pulmonary      Respiratory effort: No increased work of breathing or signs of respiratory distress  Auscultation of lungs: Abnormal  -- Coarse upper airway sounds  Cardiovascular      Auscultation of heart: Normal rate and rhythm, normal S1 and S2, without murmurs  Lymphatic      Palpation of lymph nodes in neck: No lymphadenopathy  Musculoskeletal      Gait and station: Normal        Psychiatric      Orientation to person, place, and time: Normal        Mood and affect: Normal           Results/Data   * XR CHEST PA & LATERAL 75ATC2397 05:31PM Francy Richar Order Number: [id number]      Test Name Result Flag Reference   XR CHEST PA & LATERAL (Report)     CHEST - DUAL ENERGY           INDICATION: Acute bronchitis  Patient states right anterior chest pain and cough since November             COMPARISON: 5/23/2016, report CT chest 7/18/2009           VIEWS: PA (including soft tissue/bone algorithms) and lateral projections           IMAGES: 5           FINDINGS: Cardiomediastinal silhouette appears unremarkable  The lungs are clear  No pneumothorax or pleural effusion  Degenerative changes of the spine  Surgical clips left neck and right upper quadrant  IMPRESSION:           No active pulmonary disease                  Workstation performed: [id number]           Signed by:      Micki Ramires DO      1/17/18        Signatures    Electronically signed by : Collette Lin, DO; Jan 22 2018  6:37AM EST                       (Author)

## 2018-01-23 NOTE — MISCELLANEOUS
Message  Return to work or school:   Hollie Myers is under my professional care  She was seen in my office on 01/02/2018   She is able to return to work on  01/04/2018      Out of work 01/02/218, 01/03/2018  Christi Rocha        Signatures   Electronically signed by : Elijah Sandifer, MA; Jan 2 2018 12:44PM EST                       (Author)

## 2018-01-24 NOTE — RESULT NOTES
Verified Results  * XR CHEST PA & LATERAL 71BEW6699 05:31PM Rand Done Order Number: [id number]     Test Name Result Flag Reference   XR CHEST PA & LATERAL (Report)     CHEST - DUAL ENERGY     INDICATION: Acute bronchitis  Patient states right anterior chest pain and cough since November  COMPARISON: 5/23/2016, report CT chest 7/18/2009     VIEWS: PA (including soft tissue/bone algorithms) and lateral projections     IMAGES: 5     FINDINGS:        Cardiomediastinal silhouette appears unremarkable  The lungs are clear  No pneumothorax or pleural effusion  Degenerative changes of the spine  Surgical clips left neck and right upper quadrant  IMPRESSION:     No active pulmonary disease         Workstation performed: [id number]     Signed by:   Debbie King DO   1/17/18       Plan  Acute bronchitis    · Benzonatate 200 MG Oral Capsule; TAKE 1 CAPSULE 3 times daily PRN cough   · * XR CHEST PA & LATERAL; Status:Complete;   Done: 53CKE0225 05:31PM

## 2018-02-05 ENCOUNTER — TRANSCRIBE ORDERS (OUTPATIENT)
Dept: ADMINISTRATIVE | Facility: HOSPITAL | Age: 53
End: 2018-02-05

## 2018-02-05 DIAGNOSIS — R05.9 COUGH: Primary | ICD-10-CM

## 2018-02-14 ENCOUNTER — TELEPHONE (OUTPATIENT)
Dept: FAMILY MEDICINE CLINIC | Facility: CLINIC | Age: 53
End: 2018-02-14

## 2018-02-14 NOTE — TELEPHONE ENCOUNTER
Pt would like to go back on xanax or something different  Would you write a rx for her  Her  has appt with you tomorrow and Munir Cortes will be with him She said you had a conversation with her about the xanax before  We can [facility] with your answer

## 2018-02-14 NOTE — TELEPHONE ENCOUNTER
Please call pt, since it is jackie's appt and I want to make sure we have enough time to spend with them both if anything has to be addressed, I ask that she schedule an appt to discuss further   thanks

## 2018-02-20 ENCOUNTER — OFFICE VISIT (OUTPATIENT)
Dept: FAMILY MEDICINE CLINIC | Facility: CLINIC | Age: 53
End: 2018-02-20
Payer: COMMERCIAL

## 2018-02-20 VITALS
SYSTOLIC BLOOD PRESSURE: 126 MMHG | WEIGHT: 213.06 LBS | HEART RATE: 80 BPM | BODY MASS INDEX: 40.22 KG/M2 | RESPIRATION RATE: 18 BRPM | TEMPERATURE: 98.1 F | OXYGEN SATURATION: 96 % | HEIGHT: 61 IN | DIASTOLIC BLOOD PRESSURE: 84 MMHG

## 2018-02-20 DIAGNOSIS — F41.1 GENERALIZED ANXIETY DISORDER: Primary | ICD-10-CM

## 2018-02-20 DIAGNOSIS — G47.00 INSOMNIA, UNSPECIFIED TYPE: ICD-10-CM

## 2018-02-20 DIAGNOSIS — K21.9 GASTROESOPHAGEAL REFLUX DISEASE, ESOPHAGITIS PRESENCE NOT SPECIFIED: Primary | ICD-10-CM

## 2018-02-20 PROBLEM — M54.81 BILATERAL OCCIPITAL NEURALGIA: Status: ACTIVE | Noted: 2017-05-15

## 2018-02-20 PROCEDURE — 99214 OFFICE O/P EST MOD 30 MIN: CPT | Performed by: PHYSICIAN ASSISTANT

## 2018-02-20 RX ORDER — FUROSEMIDE 20 MG/1
20 TABLET ORAL DAILY
COMMUNITY
End: 2018-04-14 | Stop reason: SDUPTHER

## 2018-02-20 RX ORDER — TOPIRAMATE 100 MG/1
1 TABLET, FILM COATED ORAL DAILY
COMMUNITY
Start: 2017-10-31 | End: 2019-10-10 | Stop reason: SDUPTHER

## 2018-02-20 RX ORDER — ALUMINUM ZIRCONIUM TRICHLOROHYDREX GLY 0.19 G/G
STICK TOPICAL
Qty: 28 TABLET | Refills: 5 | Status: SHIPPED | OUTPATIENT
Start: 2018-02-20 | End: 2018-05-22 | Stop reason: ALTCHOICE

## 2018-02-20 RX ORDER — BUTALBITAL, ACETAMINOPHEN AND CAFFEINE 50; 325; 40 MG/1; MG/1; MG/1
1 TABLET ORAL EVERY 6 HOURS
COMMUNITY
Start: 2016-09-23

## 2018-02-20 RX ORDER — MULTIVIT-MIN/IRON/FOLIC ACID/K 18-600-40
1 CAPSULE ORAL DAILY
COMMUNITY
Start: 2013-03-04

## 2018-02-20 RX ORDER — CITALOPRAM 40 MG/1
40 TABLET ORAL DAILY
Qty: 30 TABLET | Refills: 5 | Status: SHIPPED | OUTPATIENT
Start: 2018-02-20 | End: 2018-10-02 | Stop reason: SDUPTHER

## 2018-02-20 RX ORDER — CITALOPRAM 20 MG/1
20 TABLET ORAL DAILY
Qty: 14 TABLET | Refills: 0 | Status: SHIPPED | OUTPATIENT
Start: 2018-02-20 | End: 2018-05-22

## 2018-02-20 RX ORDER — OMEPRAZOLE 20 MG/1
1 TABLET, DELAYED RELEASE ORAL DAILY
COMMUNITY
Start: 2013-05-20 | End: 2018-05-22 | Stop reason: CLARIF

## 2018-02-20 RX ORDER — ALPRAZOLAM 0.5 MG/1
0.5 TABLET ORAL 2 TIMES DAILY PRN
Qty: 30 TABLET | Refills: 0 | Status: SHIPPED | OUTPATIENT
Start: 2018-02-20 | End: 2019-06-06 | Stop reason: ALTCHOICE

## 2018-02-20 NOTE — PROGRESS NOTES
Assessment/Plan:      Diagnoses and all orders for this visit:    Generalized anxiety disorder  -     citalopram (CeleXA) 20 mg tablet; Take 1 tablet (20 mg total) by mouth daily for 14 days  -     citalopram (CeleXA) 40 mg tablet; Take 1 tablet (40 mg total) by mouth daily  -     ALPRAZolam (XANAX) 0 5 mg tablet; Take 1 tablet (0 5 mg total) by mouth 2 (two) times a day as needed for anxiety or sleep    Insomnia, unspecified type    Other orders  -     aspirin 81 MG tablet; Take 81 mg by mouth daily  -     butalbital-acetaminophen-caffeine (FIORICET,ESGIC) -40 mg per tablet; Take 1 tablet by mouth every 6 (six) hours  -     Cholecalciferol 1000 units capsule; Take 1 tablet by mouth daily  -     furosemide (LASIX) 20 mg tablet; Take 20 mg by mouth daily  -     omeprazole (PRILOSEC OTC) 20 MG tablet; Take 1 tablet by mouth daily  -     Cholecalciferol (VITAMIN D) 2000 units CAPS; Take 1 capsule by mouth daily  -     topiramate (TOPAMAX) 100 mg tablet; Take 1 tablet by mouth daily        63-year-old female here today for discussion of her anxiety  She has been following with Neurology for years for headaches and a cyst on her forehead causing her pain  They are also treating her for anxiety she states with citalopram and was also giving her Xanax because she was having a hard time sleeping  She states she has been trying to get hold of Neurology for over a month and has been off of her meds for even longer  With all that she has been dealing with with her 's health and everything on her plate she has been having severe anxiety, panic attacks and insomnia  She had been doing very well on citalopram and she states even her aches  And pains were much improved  Now she also feels pain in her legs, knees and elbows    At this time I will restart her on citalopram 20 mg for 2 weeks and increase to 40 mg  I will also restart her on her Xanax until her citalopram improves though I did educate patient on the appropriate use of Xanax and that it should be used as needed and not daily  My hope is patient will not need to rely on it as much as the citalopram starts working better for her  I also discussed with her the possibility of seeing a therapist which I do think may help especially since she states that her  has been very demanding and not easy to talk to with his own health problems going on  I gave her contact information for local therapist Una Youngblood and she states that she will definitely consider  She has also been having a lot of aches and pains that I am uncertain if it is arthritis  She states that she used to see Orthopedic in the past because her bones were not growing correctly in her legs  She does have tenderness in her right knee  I offered her to see Orthopedics though she states that being that her pain had been much improved when her anxiety was better on citalopram she wishes to wait to see how she does  I will see her back in about 3 months time for recheck, she will call sooner with any problems  Chief Complaint   Patient presents with    Anxiety     panic attacks x 2 weeks      Subjective:     Patient ID: Charlie Rivera is a 46 y o  female     46y/o female here today for anxiety  She states her anxiety has been severe and she ran out of celexa  Was prescribed by neuro but cannot get a hold of them  She has bene off of med for past month  She was also given xanax that she took prn  Her anxiety is much worse with her 's condition and she is taking a lot on  She is getting more headaches  Review of Systems   Constitutional: Negative  Respiratory: Negative  Cardiovascular: Negative  Neurological: Positive for headaches  Psychiatric/Behavioral:        As in HPI         Objective:     Physical Exam   Constitutional: She is oriented to person, place, and time  She appears well-developed  obese   Neck: Neck supple     Cardiovascular: Normal rate, regular rhythm, normal heart sounds and intact distal pulses  Pulmonary/Chest: Effort normal and breath sounds normal    Neurological: She is alert and oriented to person, place, and time  Psychiatric: Her mood appears anxious  Her affect is angry  She is not aggressive  Thought content is not paranoid and not delusional  She exhibits a depressed mood  She expresses no homicidal and no suicidal ideation         Vitals:    02/20/18 1647   BP: 126/84   BP Location: Left arm   Patient Position: Sitting   Cuff Size: Standard   Pulse: 80   Resp: 18   Temp: 98 1 °F (36 7 °C)   TempSrc: Tympanic   SpO2: 96%   Weight: 96 6 kg (213 lb 1 oz)   Height: 5' 1" (1 549 m)

## 2018-04-14 DIAGNOSIS — R60.0 BILATERAL LOWER EXTREMITY EDEMA: Primary | ICD-10-CM

## 2018-04-16 RX ORDER — FUROSEMIDE 20 MG/1
TABLET ORAL
Qty: 30 TABLET | Refills: 5 | Status: SHIPPED | OUTPATIENT
Start: 2018-04-16 | End: 2018-05-29 | Stop reason: SDUPTHER

## 2018-05-18 RX ORDER — OMEPRAZOLE 20 MG/1
CAPSULE, DELAYED RELEASE ORAL
COMMUNITY
Start: 2018-03-25 | End: 2019-07-08 | Stop reason: SDUPTHER

## 2018-05-18 RX ORDER — RIZATRIPTAN BENZOATE 10 MG/1
10 TABLET ORAL ONCE AS NEEDED
COMMUNITY
Start: 2018-04-05 | End: 2019-09-03 | Stop reason: SDUPTHER

## 2018-05-22 ENCOUNTER — OFFICE VISIT (OUTPATIENT)
Dept: FAMILY MEDICINE CLINIC | Facility: CLINIC | Age: 53
End: 2018-05-22
Payer: COMMERCIAL

## 2018-05-22 VITALS
SYSTOLIC BLOOD PRESSURE: 124 MMHG | HEART RATE: 84 BPM | RESPIRATION RATE: 18 BRPM | BODY MASS INDEX: 40.72 KG/M2 | WEIGHT: 215.7 LBS | OXYGEN SATURATION: 99 % | TEMPERATURE: 97.4 F | HEIGHT: 61 IN | DIASTOLIC BLOOD PRESSURE: 72 MMHG

## 2018-05-22 DIAGNOSIS — G47.00 INSOMNIA, UNSPECIFIED TYPE: ICD-10-CM

## 2018-05-22 DIAGNOSIS — R11.0 NAUSEA: ICD-10-CM

## 2018-05-22 DIAGNOSIS — Z13.0 SCREENING FOR DEFICIENCY ANEMIA: ICD-10-CM

## 2018-05-22 DIAGNOSIS — Z13.29 SCREENING FOR THYROID DISORDER: ICD-10-CM

## 2018-05-22 DIAGNOSIS — Z13.220 SCREENING FOR LIPID DISORDERS: ICD-10-CM

## 2018-05-22 DIAGNOSIS — R60.0 BILATERAL LOWER EXTREMITY EDEMA: ICD-10-CM

## 2018-05-22 DIAGNOSIS — Z13.1 SCREENING FOR DIABETES MELLITUS (DM): ICD-10-CM

## 2018-05-22 DIAGNOSIS — R53.83 FATIGUE, UNSPECIFIED TYPE: ICD-10-CM

## 2018-05-22 DIAGNOSIS — F41.1 GENERALIZED ANXIETY DISORDER: Primary | ICD-10-CM

## 2018-05-22 DIAGNOSIS — R55 SYNCOPE, UNSPECIFIED SYNCOPE TYPE: ICD-10-CM

## 2018-05-22 PROCEDURE — 99214 OFFICE O/P EST MOD 30 MIN: CPT | Performed by: PHYSICIAN ASSISTANT

## 2018-05-22 NOTE — PROGRESS NOTES
Assessment/Plan:    Gastroesophageal reflux disease  Stable on omeprazole, though c/o nausea after meals  Advised GI f/u - she will call    Generalized anxiety disorder  Much improved since being restarted in citalopram 40mg, now rarely needing xanax  Insomnia  Stable on Topamax, improved sleep with citalopram    Bilateral lower extremity edema  Stable on lasix  Diagnoses and all orders for this visit:    Generalized anxiety disorder    Insomnia, unspecified type    Screening for diabetes mellitus (DM)  -     Comprehensive metabolic panel; Future  -     Comprehensive metabolic panel    Screening for deficiency anemia  -     CBC and differential; Future  -     Iron; Future  -     CBC and differential  -     Iron    Screening for lipid disorders  -     Lipid panel; Future  -     Lipid panel    Fatigue, unspecified type  -     CBC and differential; Future  -     Comprehensive metabolic panel; Future  -     Lipid panel; Future  -     TSH, 3rd generation with T4 reflex; Future  -     Iron; Future  -     CBC and differential  -     Comprehensive metabolic panel  -     Lipid panel  -     TSH, 3rd generation with T4 reflex  -     Iron    Syncope, unspecified syncope type  -     CBC and differential; Future  -     Comprehensive metabolic panel; Future  -     Lipid panel; Future  -     TSH, 3rd generation with T4 reflex; Future  -     Iron; Future  -     CBC and differential  -     Comprehensive metabolic panel  -     Lipid panel  -     TSH, 3rd generation with T4 reflex  -     Iron    Screening for thyroid disorder  -     TSH, 3rd generation with T4 reflex; Future  -     TSH, 3rd generation with T4 reflex    Bilateral lower extremity edema    Nausea        80-year-old female presenting today for anxiety follow-up  Since starting the citalopram again she has noticed significant improvement in her mood  She has rarely needed the Xanax and is tolerating citalopram well    She unfortunately has reported nausea for a while now that is mainly after eating  No vomiting and no reflux type symptoms  She has no abdominal pain  She will continue PPI that she has been compliant taking but I did advise she follow-up with her gastroenterologist for this for further discussion and evaluation, possibly EGD  She also noted an episode of syncope after pass sometime in April  She has had 2 other episode of dizziness with change in position and resolves after sitting  Unclear any allergy, blood pressure and pulse are fine  Uncertain if it could be from low blood sugar dehydration or from the heat  She is overdue for fasting blood work so I gave her a script for CBC as well as iron to rule out anemia, CMP and lipids in addition to a TSH for fatigue but she also reports  She will continue all medications as prescribed  We will see her back in a few months for recheck but will call her with blood work sooner when available  I also discussed unhealthy weight and advise increasing water intake as well as routine exercise and frequent, small meals high in protein with lots of fruits and vegetables  Chief Complaint   Patient presents with    Follow-up       Subjective:      Patient ID: Yonatan Chavis is a 46 y o  female     46y/o female here today for f/u to anxiety  She states citalopram has been helping her significantly since starting, doesn't feel as anxious, not crying all the time, doesn't feel boxed in  Less emotional and irritable  She is sleeping well  She is having some head pain near where her cyst is, following neurology  She uses xanax 1-2 x since last appt 3 months ago  She states she had an episode of syncope about 2 months ago, came to a few seconds after passing out, had some amnesia  Cannot attribute to any triggers  States was cutting grass a few minutes prior to syncope  Has had 2 episodes of dizziness and lightheadedness since but no syncope       She also reports some nausea that has been for a while, noted after her meals  She is taking her PPI daily  No vomiting  No reflux or heartburn noted  No abdominal pain  The following portions of the patient's history were reviewed and updated as appropriate: allergies, current medications, past family history, past medical history, past social history, past surgical history and problem list     Review of Systems   Constitutional: Negative  Respiratory: Negative  Cardiovascular: Negative  Gastrointestinal:        As in HPI   Genitourinary: Negative  Neurological: Positive for syncope  Psychiatric/Behavioral:        Improvement with emotions, anxiety and depression         Objective:      /72 (BP Location: Left arm, Patient Position: Sitting, Cuff Size: Large)   Pulse 84   Temp (!) 97 4 °F (36 3 °C) (Tympanic)   Resp 18   Ht 5' 0 5" (1 537 m)   Wt 97 8 kg (215 lb 11 2 oz)   SpO2 99%   Breastfeeding? No   BMI 41 43 kg/m²          Physical Exam   Constitutional: She is oriented to person, place, and time  She appears well-developed  Morbid obesity   Neck: Neck supple  Normal carotid pulses present  Carotid bruit is not present  Cardiovascular: Normal rate, regular rhythm, normal heart sounds and normal pulses  Pulmonary/Chest: Effort normal and breath sounds normal    Musculoskeletal:   Gross normal movement and appearance of joints and muscles   Neurological: She is alert and oriented to person, place, and time  Skin: Skin is intact  Psychiatric: She has a normal mood and affect  Vitals reviewed

## 2018-05-29 ENCOUNTER — OFFICE VISIT (OUTPATIENT)
Dept: FAMILY MEDICINE CLINIC | Facility: CLINIC | Age: 53
End: 2018-05-29
Payer: COMMERCIAL

## 2018-05-29 VITALS
HEART RATE: 83 BPM | BODY MASS INDEX: 40.55 KG/M2 | DIASTOLIC BLOOD PRESSURE: 86 MMHG | TEMPERATURE: 98.3 F | SYSTOLIC BLOOD PRESSURE: 130 MMHG | WEIGHT: 214.8 LBS | HEIGHT: 61 IN | OXYGEN SATURATION: 99 %

## 2018-05-29 DIAGNOSIS — R60.0 BILATERAL LOWER EXTREMITY EDEMA: ICD-10-CM

## 2018-05-29 PROCEDURE — 99213 OFFICE O/P EST LOW 20 MIN: CPT | Performed by: PHYSICIAN ASSISTANT

## 2018-05-29 RX ORDER — FUROSEMIDE 40 MG/1
40 TABLET ORAL DAILY
Qty: 14 TABLET | Refills: 0 | Status: SHIPPED | OUTPATIENT
Start: 2018-05-29 | End: 2018-07-06 | Stop reason: SDUPTHER

## 2018-05-29 RX ORDER — DIPHENOXYLATE HYDROCHLORIDE AND ATROPINE SULFATE 2.5; .025 MG/1; MG/1
1 TABLET ORAL DAILY
COMMUNITY

## 2018-05-29 NOTE — PROGRESS NOTES
Assessment/Plan:      Diagnoses and all orders for this visit:    Bilateral lower extremity edema  -     furosemide (LASIX) 40 mg tablet; Take 1 tablet (40 mg total) by mouth daily for 14 days Then restart 20mg tablets    Other orders  -     multivitamin (THERAGRAN) TABS; Take 1 tablet by mouth daily        51-year-old female presenting today for worsen swelling in her feet and ankles over the weekend  She has no other symptoms and I do believe this most likely is just straight forward edema  She has been taking 20 mg of Lasix  She did see improvement in the swelling from the weekend and I did   See comparison pictures and indeed it has  She has not gained any weight since last appointment and is actually lost a lb  Her exam is relatively unremarkable aside from some nonpitting edema in her pretibial regions but her feet and ankles have improved  Her pedal pulses are palpable bilaterally  I will increase her Lasix to 40 mg once daily for 1-2 weeks and then she is to return to the 20 mg  I educated patient about the importance of improving circulation and preventing prolonged sedentary standing or sitting that can cause a buildup of fluid in her legs and feet  I also advised trying to refrain from being in heat and humidity for long periods in addition to watching her sodium intake which both can also provoke fluid retention  We will see how she does through the course of the rest of the week and she is to call or follow up with any new, persistent or worsening symptoms  Chief Complaint   Patient presents with    Leg Swelling     x 4 days    Hand Swelling       Subjective:     Patient ID: Glenda Canavan is a 46 y o  female     48y/o female here today for worsening swelling in ankles and feet over weekend  No ETOH  States did travel a lot over weekend  States is slightly better, ankles still sore  No redness or discoloration of skin  No CP or SOB   She has been taking 20mg lasix compliantly  Review of Systems   Constitutional: Negative  Respiratory: Negative  Cardiovascular: Positive for leg swelling  Negative for chest pain  Gastrointestinal: Negative  Genitourinary: Negative  Neurological: Negative  Psychiatric/Behavioral: Negative  The following portions of the patient's history were reviewed and updated as appropriate: allergies, current medications, past family history, past medical history, past social history, past surgical history and problem list       Objective:     Physical Exam   Constitutional: She is oriented to person, place, and time  She appears well-developed  Morbidly obese   Neck: Neck supple  Normal carotid pulses present  Carotid bruit is not present  Cardiovascular: Normal rate, regular rhythm, normal heart sounds and normal pulses  B/L LE's appearing mildly swollen at feet and ankles, slightly worse pretibial regions without pitting  Areas are sore  Pedal pulses are palpable B/L  Pt showed me picture of initial swelling and current findings seem improved compared to pictures  Neurological: She is alert and oriented to person, place, and time  Psychiatric: She has a normal mood and affect  Vitals reviewed        Vitals:    05/29/18 1008   BP: 130/86   BP Location: Left arm   Patient Position: Sitting   Cuff Size: Large   Pulse: 83   Temp: 98 3 °F (36 8 °C)   TempSrc: Tympanic   SpO2: 99%   Weight: 97 4 kg (214 lb 12 8 oz)   Height: 5' 1" (1 549 m)

## 2018-05-29 NOTE — LETTER
May 29, 2018     Patient: Ji Rivas   YOB: 1965   Date of Visit: 5/29/2018       To Whom it May Concern:    Mecca Piper is under my professional care  She was seen in my office on 5/29/2018  She may return to work on 5/30/18  If you have any questions or concerns, please don't hesitate to call           Sincerely,          Otis Tiwari PA-C        CC: No Recipients

## 2018-06-12 ENCOUNTER — TELEPHONE (OUTPATIENT)
Dept: FAMILY MEDICINE CLINIC | Facility: CLINIC | Age: 53
End: 2018-06-12

## 2018-06-12 NOTE — TELEPHONE ENCOUNTER
Pt came into the office and dropped off FMLA paper work for you to fill out  Pt did review and sign the disability form policy  I placed this with the paper work  She also requested a copy of the paper work be mailed to her  Thank you!

## 2018-06-16 LAB
ALBUMIN SERPL-MCNC: 3.7 G/DL (ref 3.6–5.1)
ALBUMIN/GLOB SERPL: 1.1 (CALC) (ref 1–2.5)
ALP SERPL-CCNC: 78 U/L (ref 33–130)
ALT SERPL-CCNC: 9 U/L (ref 6–29)
AST SERPL-CCNC: 14 U/L (ref 10–35)
BASOPHILS # BLD AUTO: 38 CELLS/UL (ref 0–200)
BASOPHILS NFR BLD AUTO: 0.5 %
BILIRUB SERPL-MCNC: 0.3 MG/DL (ref 0.2–1.2)
BUN SERPL-MCNC: 12 MG/DL (ref 7–25)
BUN/CREAT SERPL: NORMAL (CALC) (ref 6–22)
CALCIUM SERPL-MCNC: 9.1 MG/DL (ref 8.6–10.4)
CHLORIDE SERPL-SCNC: 106 MMOL/L (ref 98–110)
CHOLEST SERPL-MCNC: 221 MG/DL
CHOLEST/HDLC SERPL: 2.6 (CALC)
CO2 SERPL-SCNC: 24 MMOL/L (ref 20–31)
CREAT SERPL-MCNC: 0.98 MG/DL (ref 0.5–1.05)
EOSINOPHIL # BLD AUTO: 113 CELLS/UL (ref 15–500)
EOSINOPHIL NFR BLD AUTO: 1.5 %
ERYTHROCYTE [DISTWIDTH] IN BLOOD BY AUTOMATED COUNT: 13.7 % (ref 11–15)
GLOBULIN SER CALC-MCNC: 3.4 G/DL (CALC) (ref 1.9–3.7)
GLUCOSE SERPL-MCNC: 78 MG/DL (ref 65–99)
HCT VFR BLD AUTO: 35.7 % (ref 35–45)
HDLC SERPL-MCNC: 85 MG/DL
HGB BLD-MCNC: 12 G/DL (ref 11.7–15.5)
IRON SERPL-MCNC: 76 MCG/DL (ref 45–160)
LDLC SERPL CALC-MCNC: 120 MG/DL (CALC)
LYMPHOCYTES # BLD AUTO: 2198 CELLS/UL (ref 850–3900)
LYMPHOCYTES NFR BLD AUTO: 29.3 %
MCH RBC QN AUTO: 30.2 PG (ref 27–33)
MCHC RBC AUTO-ENTMCNC: 33.6 G/DL (ref 32–36)
MCV RBC AUTO: 89.7 FL (ref 80–100)
MONOCYTES # BLD AUTO: 585 CELLS/UL (ref 200–950)
MONOCYTES NFR BLD AUTO: 7.8 %
NEUTROPHILS # BLD AUTO: 4568 CELLS/UL (ref 1500–7800)
NEUTROPHILS NFR BLD AUTO: 60.9 %
NONHDLC SERPL-MCNC: 136 MG/DL (CALC)
PLATELET # BLD AUTO: 320 THOUSAND/UL (ref 140–400)
PMV BLD REES-ECKER: 10.7 FL (ref 7.5–12.5)
POTASSIUM SERPL-SCNC: 4.6 MMOL/L (ref 3.5–5.3)
PROT SERPL-MCNC: 7.1 G/DL (ref 6.1–8.1)
RBC # BLD AUTO: 3.98 MILLION/UL (ref 3.8–5.1)
SL AMB EGFR AFRICAN AMERICAN: 77 ML/MIN/1.73M2
SL AMB EGFR NON AFRICAN AMERICAN: 66 ML/MIN/1.73M2
SODIUM SERPL-SCNC: 137 MMOL/L (ref 135–146)
TRIGL SERPL-MCNC: 67 MG/DL
TSH SERPL-ACNC: 3.67 MIU/L
WBC # BLD AUTO: 7.5 THOUSAND/UL (ref 3.8–10.8)

## 2018-07-06 ENCOUNTER — TELEPHONE (OUTPATIENT)
Dept: FAMILY MEDICINE CLINIC | Facility: CLINIC | Age: 53
End: 2018-07-06

## 2018-07-06 DIAGNOSIS — R60.0 BILATERAL LOWER EXTREMITY EDEMA: ICD-10-CM

## 2018-07-06 RX ORDER — FUROSEMIDE 40 MG/1
40 TABLET ORAL DAILY
Qty: 14 TABLET | Refills: 0 | Status: SHIPPED | OUTPATIENT
Start: 2018-07-06 | End: 2018-11-27

## 2018-07-06 NOTE — TELEPHONE ENCOUNTER
Patient came in stating that she would like to go back on Lasix 40 mg tablet, she said she feels her hands and legs swelling up more, now that she is back on the 20 mg   If it is possible she would like the prescription to go to [facility]

## 2018-07-06 NOTE — TELEPHONE ENCOUNTER
Please tell pt 40mg sent into pharm once daily for 2 weeks  Swelling is common with heat and humidity  She needs to reduce salt intake as well  Elevate legs when she can  If she is on her feet all day should consider compression stockings

## 2018-07-31 ENCOUNTER — OFFICE VISIT (OUTPATIENT)
Dept: FAMILY MEDICINE CLINIC | Facility: CLINIC | Age: 53
End: 2018-07-31
Payer: COMMERCIAL

## 2018-07-31 VITALS
HEART RATE: 88 BPM | WEIGHT: 217.8 LBS | TEMPERATURE: 98 F | DIASTOLIC BLOOD PRESSURE: 84 MMHG | BODY MASS INDEX: 41.15 KG/M2 | SYSTOLIC BLOOD PRESSURE: 132 MMHG | OXYGEN SATURATION: 99 %

## 2018-07-31 DIAGNOSIS — S29.011A MUSCLE STRAIN OF CHEST WALL, INITIAL ENCOUNTER: ICD-10-CM

## 2018-07-31 DIAGNOSIS — R07.89 ATYPICAL CHEST PAIN: Primary | ICD-10-CM

## 2018-07-31 DIAGNOSIS — S29.012A STRAIN OF MID-BACK, INITIAL ENCOUNTER: ICD-10-CM

## 2018-07-31 PROCEDURE — 99214 OFFICE O/P EST MOD 30 MIN: CPT | Performed by: PHYSICIAN ASSISTANT

## 2018-07-31 PROCEDURE — 93000 ELECTROCARDIOGRAM COMPLETE: CPT | Performed by: PHYSICIAN ASSISTANT

## 2018-07-31 RX ORDER — FUROSEMIDE 20 MG/1
TABLET ORAL
COMMUNITY
Start: 2018-07-16 | End: 2019-03-26 | Stop reason: SDUPTHER

## 2018-07-31 RX ORDER — CYCLOBENZAPRINE HCL 10 MG
10 TABLET ORAL 3 TIMES DAILY PRN
Qty: 30 TABLET | Refills: 0 | Status: SHIPPED | OUTPATIENT
Start: 2018-07-31 | End: 2018-11-27

## 2018-07-31 RX ORDER — MELOXICAM 15 MG/1
15 TABLET ORAL DAILY
Qty: 14 TABLET | Refills: 0 | Status: SHIPPED | OUTPATIENT
Start: 2018-07-31 | End: 2018-11-27

## 2018-07-31 RX ORDER — TOPIRAMATE 100 MG/1
TABLET, FILM COATED ORAL
COMMUNITY
Start: 2018-07-16 | End: 2018-07-31 | Stop reason: SDUPTHER

## 2018-07-31 RX ORDER — LORATADINE 10 MG/1
10 TABLET ORAL DAILY
COMMUNITY
End: 2019-03-26 | Stop reason: SDUPTHER

## 2018-07-31 NOTE — PROGRESS NOTES
Assessment/Plan:      Diagnoses and all orders for this visit:    Atypical chest pain  -     POCT ECG    Muscle strain of chest wall, initial encounter  -     cyclobenzaprine (FLEXERIL) 10 mg tablet; Take 1 tablet (10 mg total) by mouth 3 (three) times a day as needed for muscle spasms  -     meloxicam (MOBIC) 15 mg tablet; Take 1 tablet (15 mg total) by mouth daily With food    Strain of mid-back, initial encounter  -     cyclobenzaprine (FLEXERIL) 10 mg tablet; Take 1 tablet (10 mg total) by mouth 3 (three) times a day as needed for muscle spasms  -     meloxicam (MOBIC) 15 mg tablet; Take 1 tablet (15 mg total) by mouth daily With food    Other orders  -     loratadine (CLARITIN) 10 mg tablet; Take 10 mg by mouth daily        Patient is a 80-year-old female presenting today for right-sided chest wall and right-sided lower rib pain into the right back as well as some wheezing in her throat and chest pressure for the past several hours  Started this morning after she was getting up and eating  She has no GI symptoms  Pain seems to be exacerbated with deep breaths or movement  No specific trauma but was cutting grass  The day prior  The pain is once again reproducible with movement, deep breaths on auscultation of lungs as well as even with light palpation suspecting a musculoskeletal cause  An EKG was performed in the office and was normal   Her respiratory, cardiovascular and GI exams were all benign  At this time I will have patient rest   I will start her on meloxicam once a day with food in addition to Flexeril t i d  With caution as it may cause drowsiness  She was advised to apply moist heat to the affected areas to relax muscles    I did advise that she monitor her symptoms closely and if any symptoms worsen, she become short of breath or any other concerning symptoms arise she should go to the ER call the office/ follow-up immediately for further evaluation and workup  Chief Complaint   Patient presents with    Chest Pain     R side, radiates into back    Fatigue     x 1 day       Subjective:     Patient ID: Chary Lainez is a 48 y o  female     48y/o female here today for multiple sxs since this AM  She started with right anterior torso pain this Am after getting ready for work and eating piece of toast  Pain located under right breast and into her right mid back, right chest as well as into her right neck, feels squeezing  Extremely painful to move  The more she moves the more it hurts  Pain is better leaning back on hair  No pain directly in chest but feels tight on the lung, she states  Feels wheezing in throat  Cough just started, dry  Feels tired  She did mow the lawn yesterday  Review of Systems   Constitutional: Positive for fatigue  HENT: Negative  Respiratory:        As in HPI   Cardiovascular: Positive for chest pain (as described in HPI)  Gastrointestinal: Negative  Genitourinary: Negative  Musculoskeletal:        As in HPI   Skin: Negative  Neurological: Negative  Psychiatric/Behavioral: Negative  The following portions of the patient's history were reviewed and updated as appropriate: allergies, current medications, past family history, past medical history, past social history, past surgical history and problem list       Objective:     Physical Exam   Constitutional: She is oriented to person, place, and time  She appears well-developed  She appears distressed (appears comfortable at rest, appears uncomfortable with movement)  HENT:   Right Ear: Tympanic membrane and ear canal normal    Left Ear: Tympanic membrane and ear canal normal    Nose: Nose normal    Mouth/Throat: Oropharynx is clear and moist    Neck: Neck supple  Normal carotid pulses present  Muscular tenderness (right) present   Carotid bruit is not present  Cardiovascular: Normal rate, regular rhythm and normal heart sounds  Pulmonary/Chest: Effort normal and breath sounds normal    Pt experiencing right sided right chest pain with deep inspiration   Abdominal:   Abdominal obesity   Musculoskeletal:   Severe chest wall tenderness even to light palpation mainly right side under breast, right upper chest wall and right mid back  No palpable abnormalities  Chest and lower rib pain on right also exacerbated with any movement   Neurological: She is alert and oriented to person, place, and time  Skin: Skin is intact  Psychiatric: She has a normal mood and affect  Vitals reviewed        Vitals:    07/31/18 1441   BP: 132/84   BP Location: Left arm   Patient Position: Sitting   Pulse: 88   Temp: 98 °F (36 7 °C)   TempSrc: Tympanic   SpO2: 99%   Weight: 98 8 kg (217 lb 12 8 oz)

## 2018-09-13 ENCOUNTER — TELEPHONE (OUTPATIENT)
Dept: FAMILY MEDICINE CLINIC | Facility: CLINIC | Age: 53
End: 2018-09-13

## 2018-09-13 NOTE — TELEPHONE ENCOUNTER
Pt called the office she is requesting if you could please increase her water pill  She is having a hard time walking and breathing due to she feels bloated in her chest and she feels like she has water gain  No active chest pain or tightness  Pt is asking if you can please send her script to the [facility] in [place]  Pt is at work please call [phone] extension [phone] once rx is sent

## 2018-09-13 NOTE — TELEPHONE ENCOUNTER
She is already on a higher dose and I do not feel that would be an appropriate solution to her sxs   She needs a 30min appt to assess her sxs with someone

## 2018-09-13 NOTE — TELEPHONE ENCOUNTER
I called and left a detailed message on her cell phone consent ok advising her that Shaka Flowers recommends she come in and be seen for an appointment  At this time she is on a high dose and feels that is not a appropriate solution

## 2018-09-17 ENCOUNTER — TRANSCRIBE ORDERS (OUTPATIENT)
Dept: ADMINISTRATIVE | Facility: HOSPITAL | Age: 53
End: 2018-09-17

## 2018-09-17 DIAGNOSIS — R10.13 ABDOMINAL PAIN, EPIGASTRIC: ICD-10-CM

## 2018-09-17 DIAGNOSIS — R10.31 ABDOMINAL PAIN, RIGHT LOWER QUADRANT: ICD-10-CM

## 2018-09-17 DIAGNOSIS — R10.11 ABDOMINAL PAIN, RIGHT UPPER QUADRANT: Primary | ICD-10-CM

## 2018-09-17 DIAGNOSIS — R14.0 ABDOMINAL DISTENTION: ICD-10-CM

## 2018-09-17 DIAGNOSIS — E66.3 SEVERELY OVERWEIGHT: ICD-10-CM

## 2018-09-17 DIAGNOSIS — R10.12 ABDOMINAL PAIN, LEFT UPPER QUADRANT: ICD-10-CM

## 2018-09-17 DIAGNOSIS — K21.9 GASTROESOPHAGEAL REFLUX DISEASE, ESOPHAGITIS PRESENCE NOT SPECIFIED: ICD-10-CM

## 2018-09-20 ENCOUNTER — HOSPITAL ENCOUNTER (OUTPATIENT)
Dept: ULTRASOUND IMAGING | Facility: HOSPITAL | Age: 53
Discharge: HOME | End: 2018-09-20
Payer: COMMERCIAL

## 2018-09-20 DIAGNOSIS — E66.3 SEVERELY OVERWEIGHT: ICD-10-CM

## 2018-09-20 DIAGNOSIS — R14.0 ABDOMINAL DISTENTION: ICD-10-CM

## 2018-09-20 DIAGNOSIS — R10.12 ABDOMINAL PAIN, LEFT UPPER QUADRANT: ICD-10-CM

## 2018-09-20 DIAGNOSIS — R10.11 ABDOMINAL PAIN, RIGHT UPPER QUADRANT: ICD-10-CM

## 2018-09-20 DIAGNOSIS — R10.13 ABDOMINAL PAIN, EPIGASTRIC: ICD-10-CM

## 2018-09-20 DIAGNOSIS — R10.31 ABDOMINAL PAIN, RIGHT LOWER QUADRANT: ICD-10-CM

## 2018-09-20 DIAGNOSIS — K21.9 GASTROESOPHAGEAL REFLUX DISEASE, ESOPHAGITIS PRESENCE NOT SPECIFIED: ICD-10-CM

## 2018-09-20 PROCEDURE — 76830 TRANSVAGINAL US NON-OB: CPT

## 2018-09-20 PROCEDURE — 76856 US EXAM PELVIC COMPLETE: CPT

## 2018-09-20 PROCEDURE — 76700 US EXAM ABDOM COMPLETE: CPT

## 2018-09-24 DIAGNOSIS — K21.9 GASTROESOPHAGEAL REFLUX DISEASE, ESOPHAGITIS PRESENCE NOT SPECIFIED: ICD-10-CM

## 2018-09-24 RX ORDER — OMEPRAZOLE 20 MG/1
CAPSULE, DELAYED RELEASE ORAL
Qty: 28 CAPSULE | Refills: 5 | Status: SHIPPED | OUTPATIENT
Start: 2018-09-24 | End: 2018-11-27 | Stop reason: SDUPTHER

## 2018-10-02 DIAGNOSIS — F41.1 GENERALIZED ANXIETY DISORDER: ICD-10-CM

## 2018-10-02 RX ORDER — CITALOPRAM 40 MG/1
40 TABLET ORAL DAILY
Qty: 30 TABLET | Refills: 5 | Status: SHIPPED | OUTPATIENT
Start: 2018-10-02 | End: 2019-07-08 | Stop reason: SDUPTHER

## 2018-10-17 ENCOUNTER — OFFICE VISIT (OUTPATIENT)
Dept: FAMILY MEDICINE CLINIC | Facility: CLINIC | Age: 53
End: 2018-10-17
Payer: COMMERCIAL

## 2018-10-17 VITALS
SYSTOLIC BLOOD PRESSURE: 126 MMHG | OXYGEN SATURATION: 98 % | WEIGHT: 221.1 LBS | HEART RATE: 87 BPM | HEIGHT: 60 IN | RESPIRATION RATE: 17 BRPM | BODY MASS INDEX: 43.41 KG/M2 | TEMPERATURE: 98.4 F | DIASTOLIC BLOOD PRESSURE: 86 MMHG

## 2018-10-17 DIAGNOSIS — L02.92 BOIL: ICD-10-CM

## 2018-10-17 DIAGNOSIS — L91.8 INFLAMED SKIN TAG: Primary | ICD-10-CM

## 2018-10-17 PROCEDURE — 3008F BODY MASS INDEX DOCD: CPT | Performed by: PHYSICIAN ASSISTANT

## 2018-10-17 PROCEDURE — 99213 OFFICE O/P EST LOW 20 MIN: CPT | Performed by: PHYSICIAN ASSISTANT

## 2018-10-17 PROCEDURE — 11300 SHAVE SKIN LESION 0.5 CM/<: CPT | Performed by: PHYSICIAN ASSISTANT

## 2018-10-17 RX ORDER — SULFAMETHOXAZOLE AND TRIMETHOPRIM 800; 160 MG/1; MG/1
1 TABLET ORAL EVERY 12 HOURS SCHEDULED
Qty: 20 TABLET | Refills: 0 | Status: SHIPPED | OUTPATIENT
Start: 2018-10-17 | End: 2018-10-27

## 2018-10-17 NOTE — PROGRESS NOTES
Assessment/Plan:      Diagnoses and all orders for this visit:    Inflamed skin tag    Boil  -     sulfamethoxazole-trimethoprim (BACTRIM DS) 800-160 mg per tablet; Take 1 tablet by mouth every 12 (twelve) hours for 10 days      54y/o female here today for 2 skin lesions she is concerned with  She has an inflamed skin lesion appearing as a skin tag  With verbal consent, risk/benefits discussed  Procedure performed as below, prepped with betadine to cleanse, using sterile 15 blade and foreps  Pt tolerated procedure well  Area was cleaned, silver nitrate used to cauterize  Still with some bleeding - pt on aspirin and no lido w/ epi available  Area covered with triple abx ointment, gauze and large bandaid  Wound care instructions discussed  F/u if needed  No bx sent since skin tag suspected  Pt also has a dime sized red raised area just under left axilla, appearing as a boil  Will have pt continue warm moist compresses and will add 10 days of bactrim  She can f/u at our office or with derm if sxs persist     Chief Complaint   Patient presents with    Mass     Pt has 2 lumps one under L armpit and the other on L side back for about 1 month        Subjective:     Patient ID: Darwin Valero is a 48 y o  female     48y/o female here today for 2 skin lesions:    #1: inflamed ski tag middle of back, red, painful/itchy, gets irritated when clothing rubs  #2: red lump under left axilla  Presents x 1 week  Red, painful  Did drain some white discharge earlier in onset  Tried warm compresses, didn't help  Review of Systems   Constitutional: Negative  Skin:        As in HPI         The following portions of the patient's history were reviewed and updated as appropriate: allergies, current medications, past family history, past medical history, past social history, past surgical history and problem list       Objective:     Physical Exam   Constitutional: She appears well-developed  No distress     Skin: Lesion #1: mid back with red, swollen skin tag appearing lesion with slight redness surrounding  Lesion #2: inferior to left axilla with dime sized red, raised soft lump, tender, slightly warm to touch  No induration  No inflammation or hardness palpated below the lesion  Vitals reviewed        Vitals:    10/17/18 1333   BP: 126/86   BP Location: Left arm   Patient Position: Sitting   Cuff Size: Large   Pulse: 87   Resp: 17   Temp: 98 4 °F (36 9 °C)   TempSrc: Temporal   SpO2: 98%   Weight: 100 kg (221 lb 1 6 oz)   Height: 5' (1 524 m)     Shave lesion  Date/Time: 10/17/2018 2:53 PM  Performed by: Meagan Penaloza  Authorized by: Maegan Penaloza     Number of Lesions: 1  Lesion 1:     Body area: trunk    Trunk location: back    Initial size (mm): 4    Final defect size (mm): 4    Malignancy: benign lesion      Destruction method: shave removal      Cosmetic: no       Comments:  Suspected inflamed skin tag

## 2018-11-27 ENCOUNTER — OFFICE VISIT (OUTPATIENT)
Dept: FAMILY MEDICINE CLINIC | Facility: CLINIC | Age: 53
End: 2018-11-27
Payer: COMMERCIAL

## 2018-11-27 VITALS
HEIGHT: 60 IN | DIASTOLIC BLOOD PRESSURE: 82 MMHG | HEART RATE: 87 BPM | WEIGHT: 221.6 LBS | SYSTOLIC BLOOD PRESSURE: 128 MMHG | BODY MASS INDEX: 43.51 KG/M2 | TEMPERATURE: 98 F | RESPIRATION RATE: 17 BRPM | OXYGEN SATURATION: 97 %

## 2018-11-27 DIAGNOSIS — G43.909 MIGRAINE WITHOUT STATUS MIGRAINOSUS, NOT INTRACTABLE, UNSPECIFIED MIGRAINE TYPE: ICD-10-CM

## 2018-11-27 DIAGNOSIS — G89.29 CHRONIC LEFT EAR PAIN: ICD-10-CM

## 2018-11-27 DIAGNOSIS — H92.02 CHRONIC LEFT EAR PAIN: ICD-10-CM

## 2018-11-27 DIAGNOSIS — K21.9 GASTROESOPHAGEAL REFLUX DISEASE, ESOPHAGITIS PRESENCE NOT SPECIFIED: Primary | ICD-10-CM

## 2018-11-27 DIAGNOSIS — R60.0 BILATERAL LOWER EXTREMITY EDEMA: ICD-10-CM

## 2018-11-27 DIAGNOSIS — E55.9 VITAMIN D DEFICIENCY: ICD-10-CM

## 2018-11-27 DIAGNOSIS — H93.12 TINNITUS OF LEFT EAR: ICD-10-CM

## 2018-11-27 DIAGNOSIS — F41.1 GENERALIZED ANXIETY DISORDER: ICD-10-CM

## 2018-11-27 DIAGNOSIS — M62.838 MUSCLE SPASM OF BOTH LOWER LEGS: ICD-10-CM

## 2018-11-27 PROCEDURE — 99214 OFFICE O/P EST MOD 30 MIN: CPT | Performed by: PHYSICIAN ASSISTANT

## 2018-11-27 RX ORDER — CYCLOBENZAPRINE HCL 10 MG
10 TABLET ORAL 3 TIMES DAILY PRN
Qty: 30 TABLET | Refills: 2 | Status: SHIPPED | OUTPATIENT
Start: 2018-11-27 | End: 2019-06-06 | Stop reason: ALTCHOICE

## 2018-11-27 NOTE — ASSESSMENT & PLAN NOTE
She has not had any issues with migraine lately, doing well on topiramate 100mg  Tried lower dose but migraines came back  fioricet and maxalt prn  Sees dr Fela Tafoya neurology

## 2018-11-27 NOTE — ASSESSMENT & PLAN NOTE
Stable on lasix daily  Swelling stable, slightly worse in RLE   Does not wear compression stockings, advised she start

## 2018-11-27 NOTE — PROGRESS NOTES
Assessment/Plan:    Gastroesophageal reflux disease  Symptomatic  She has EGD and colonoscopy scheduled for 11/30  Migraine  She has not had any issues with migraine lately, doing well on topiramate 100mg  Tried lower dose but migraines came back  fioricet and maxalt prn  Sees dr Rosa Hair neurology  Vitamin D deficiency  Takes supplement 2000units daily    Bilateral lower extremity edema  Stable on lasix daily  Swelling stable, slightly worse in RLE  Does not wear compression stockings, advised she start    Generalized anxiety disorder  Stable on celexa daily and xanax prn       Diagnoses and all orders for this visit:    Gastroesophageal reflux disease, esophagitis presence not specified    Migraine without status migrainosus, not intractable, unspecified migraine type    Vitamin D deficiency    Muscle spasm of both lower legs  -     cyclobenzaprine (FLEXERIL) 10 mg tablet; Take 1 tablet (10 mg total) by mouth 3 (three) times a day as needed for muscle spasms    Chronic left ear pain  -     Ambulatory Referral to Otolaryngology; Future    Tinnitus of left ear  -     Ambulatory Referral to Otolaryngology; Future    Bilateral lower extremity edema    Generalized anxiety disorder        Patient is a 66-year-old female presenting today for follow-up of her chronic conditions as listed above with plan  Her GERD has been uncontrolled she has been having abdominal pain so she will have an EGD performed within the upcoming weeks  Her migraines have been stable on Topamax daily and Maxalt/ butalbital as needed, followed by [facility]  She continues on vitamin-D supplementation as well  She does report some leg muscle spasms which do not sound neurological and do not sound consistent with claudication  I will try her on Flexeril as needed and see how she does though I did advise weight loss as well as gentle stretches to the legs as it seems to be related from standing on her feet a lot during the day  She does report chronic ear pain and ringing in her left ear for a while now without any visible abnormality on the exam   I will refer her to ENT for further evaluation  Edema stable on 20 mg of Lasix  She will follow up with me in 4-6 months, sooner with any concerns  Chief Complaint   Patient presents with    Follow-up     6 MONTH CHECK     Lethargy     X 1 month pt can sleep all day   Leg Pain     pt still having leg pain        Subjective:      Patient ID: Neal Goodwin is a 48 y o  female     50y/o female here today for med check of her chronic conditions  She has an EGD scheduled for ongoing issues with abdominal pain and GERD despite medication  She continues with some cramping and spasm in her lower legs  She gets this a lot after work specially with being on her feet for a while  She also has ringing in her left ear which is an ongoing issue  She is taking her vitamin supplements for vitamin D  She continues following with [facility] for her migraines which are stable on Topamax  Maxalt or butalbital as needed  Anxiety stable   On Celexa daily and Xanax p r n  Rigo Po She has heard some good news about her 's health and moving for with his treatments so that certainly does help her stress in a positive manner  Her edema is much better than  What it was and she is back on Lasix 20 mg  The following portions of the patient's history were reviewed and updated as appropriate: allergies, current medications, past family history, past medical history, past social history, past surgical history and problem list     Review of Systems   Constitutional: Negative  HENT: Positive for tinnitus  Respiratory: Negative  Cardiovascular:        As in HPI   Gastrointestinal:        As in HPI   Genitourinary: Negative  Neurological: Positive for headaches (stable)  Psychiatric/Behavioral: Negative            Objective:      /82 (BP Location: Left arm, Patient Position: Sitting, Cuff Size: Large)   Pulse 87   Temp 98 °F (36 7 °C) (Tympanic)   Resp 17   Ht 5' (1 524 m)   Wt 101 kg (221 lb 9 6 oz)   SpO2 97%   Breastfeeding? No   BMI 43 28 kg/m²          Physical Exam   Constitutional: She is oriented to person, place, and time  She appears well-developed  No distress  Morbid obesity BMI 43   HENT:   Right Ear: Hearing, tympanic membrane and ear canal normal    Left Ear: Hearing, tympanic membrane and ear canal normal    Neck: Neck supple  Normal carotid pulses present  Carotid bruit is not present  Cardiovascular: Normal rate, regular rhythm and normal heart sounds  Pulses:       Dorsalis pedis pulses are 1+ on the right side, and 1+ on the left side  1+ ankle edema B/L nonpitting   Pulmonary/Chest: Effort normal and breath sounds normal    Abdominal: Normal appearance and bowel sounds are normal  There is no tenderness  abd obesity   Musculoskeletal:    Lower extremities appearing grossly normal   Normal movements without any range of motion limitation or obvious pain  No visible swelling of the joints  Lymphadenopathy:     She has no cervical adenopathy  Neurological: She is alert and oriented to person, place, and time  Psychiatric: She has a normal mood and affect  Vitals reviewed

## 2018-11-29 ENCOUNTER — ANESTHESIA EVENT (OUTPATIENT)
Dept: GASTROENTEROLOGY | Facility: HOSPITAL | Age: 53
End: 2018-11-29
Payer: COMMERCIAL

## 2018-11-30 ENCOUNTER — HOSPITAL ENCOUNTER (OUTPATIENT)
Facility: HOSPITAL | Age: 53
Setting detail: OUTPATIENT SURGERY
Discharge: HOME | End: 2018-11-30
Attending: INTERNAL MEDICINE | Admitting: INTERNAL MEDICINE
Payer: COMMERCIAL

## 2018-11-30 ENCOUNTER — ANESTHESIA (OUTPATIENT)
Dept: GASTROENTEROLOGY | Facility: HOSPITAL | Age: 53
End: 2018-11-30
Payer: COMMERCIAL

## 2018-11-30 ENCOUNTER — TELEPHONE (OUTPATIENT)
Dept: FAMILY MEDICINE CLINIC | Facility: CLINIC | Age: 53
End: 2018-11-30

## 2018-11-30 ENCOUNTER — APPOINTMENT (OUTPATIENT)
Dept: RADIOLOGY | Facility: HOSPITAL | Age: 53
End: 2018-11-30
Payer: COMMERCIAL

## 2018-11-30 VITALS
OXYGEN SATURATION: 95 % | WEIGHT: 221 LBS | DIASTOLIC BLOOD PRESSURE: 90 MMHG | RESPIRATION RATE: 24 BRPM | BODY MASS INDEX: 43.39 KG/M2 | HEIGHT: 60 IN | SYSTOLIC BLOOD PRESSURE: 134 MMHG | TEMPERATURE: 99.1 F | HEART RATE: 86 BPM

## 2018-11-30 PROCEDURE — 94640 AIRWAY INHALATION TREATMENT: CPT

## 2018-11-30 PROCEDURE — 71045 X-RAY EXAM CHEST 1 VIEW: CPT

## 2018-11-30 RX ORDER — LIDOCAINE HYDROCHLORIDE 20 MG/ML
INJECTION, SOLUTION EPIDURAL; INFILTRATION; INTRACAUDAL; PERINEURAL AS NEEDED
Status: DISCONTINUED | OUTPATIENT
Start: 2018-11-30 | End: 2018-11-30 | Stop reason: SURG

## 2018-11-30 RX ORDER — PROPOFOL 10 MG/ML
INJECTION, EMULSION INTRAVENOUS AS NEEDED
Status: DISCONTINUED | OUTPATIENT
Start: 2018-11-30 | End: 2018-11-30 | Stop reason: SURG

## 2018-11-30 RX ORDER — ALBUTEROL SULFATE 2.5 MG/3ML
2.5 SOLUTION RESPIRATORY (INHALATION) ONCE
Status: COMPLETED | OUTPATIENT
Start: 2018-11-30 | End: 2018-11-30

## 2018-11-30 RX ORDER — SODIUM CHLORIDE 9 MG/ML
125 INJECTION, SOLUTION INTRAVENOUS CONTINUOUS
Status: DISCONTINUED | OUTPATIENT
Start: 2018-11-30 | End: 2018-11-30 | Stop reason: HOSPADM

## 2018-11-30 RX ORDER — KETOROLAC TROMETHAMINE 30 MG/ML
15 INJECTION, SOLUTION INTRAMUSCULAR; INTRAVENOUS ONCE
Status: COMPLETED | OUTPATIENT
Start: 2018-11-30 | End: 2018-11-30

## 2018-11-30 RX ORDER — ALBUTEROL SULFATE 2.5 MG/3ML
2.5 SOLUTION RESPIRATORY (INHALATION) ONCE AS NEEDED
Status: DISCONTINUED | OUTPATIENT
Start: 2018-11-30 | End: 2018-11-30 | Stop reason: HOSPADM

## 2018-11-30 RX ORDER — IPRATROPIUM BROMIDE AND ALBUTEROL SULFATE 2.5; .5 MG/3ML; MG/3ML
3 SOLUTION RESPIRATORY (INHALATION) ONCE
Status: DISCONTINUED | OUTPATIENT
Start: 2018-11-30 | End: 2018-11-30

## 2018-11-30 RX ADMIN — LIDOCAINE HYDROCHLORIDE 3 ML: 20 INJECTION, SOLUTION EPIDURAL; INFILTRATION; INTRACAUDAL; PERINEURAL at 10:39

## 2018-11-30 RX ADMIN — SODIUM CHLORIDE 125 ML/HR: 0.9 INJECTION, SOLUTION INTRAVENOUS at 12:16

## 2018-11-30 RX ADMIN — PROPOFOL 50 MG: 10 INJECTION, EMULSION INTRAVENOUS at 10:42

## 2018-11-30 RX ADMIN — KETOROLAC TROMETHAMINE 15 MG: 30 INJECTION, SOLUTION INTRAMUSCULAR at 11:55

## 2018-11-30 RX ADMIN — SODIUM CHLORIDE 125 ML/HR: 0.9 INJECTION, SOLUTION INTRAVENOUS at 10:01

## 2018-11-30 RX ADMIN — PROPOFOL 50 MG: 10 INJECTION, EMULSION INTRAVENOUS at 10:40

## 2018-11-30 RX ADMIN — PROPOFOL 200 MG: 10 INJECTION, EMULSION INTRAVENOUS at 10:39

## 2018-11-30 RX ADMIN — ALBUTEROL SULFATE 2.5 MG: 2.5 SOLUTION RESPIRATORY (INHALATION) at 11:23

## 2018-11-30 NOTE — ANESTHESIA POSTPROCEDURE EVALUATION
Post-Op Assessment Note      CV Status:  Stable    Mental Status:  Alert and awake    Hydration Status:  Euvolemic    PONV Controlled:  Controlled    Airway Patency:  Patent    Post Op Vitals Reviewed: Yes          Staff: Anesthesiologist       Comments: pts breathing greatly improved  Vitals stable             BP      Temp      Pulse     Resp     SpO2

## 2018-11-30 NOTE — ANESTHESIA PREPROCEDURE EVALUATION
Review of Systems/Medical History  Patient summary reviewed    No history of anesthetic complications     Cardiovascular  Exercise tolerance (METS): >4,     Pulmonary  Negative pulmonary ROS        GI/Hepatic    GERD poorly controlled,        Negative  ROS        Endo/Other  Negative endo/other ROS      GYN    Hysterectomy,        Hematology  Negative hematology ROS      Musculoskeletal    Comment: Bilateral lower extremity swelling      Neurology    Headaches (migraines),    Psychology   Anxiety,              Physical Exam    Airway    Mallampati score: II  TM Distance: >3 FB  Neck ROM: full     Dental   No notable dental hx     Cardiovascular  Rhythm: regular, Rate: normal,     Pulmonary  Breath sounds clear to auscultation,     Other Findings        Anesthesia Plan  ASA Score- 2     Anesthesia Type- IV sedation with anesthesia with ASA Monitors  Additional Monitors:   Airway Plan:         Plan Factors-Patient not instructed to abstain from smoking on day of procedure  Patient did not smoke on day of surgery  Induction- intravenous  Postoperative Plan-     Informed Consent- Anesthetic plan and risks discussed with patient

## 2018-11-30 NOTE — TELEPHONE ENCOUNTER
Patient called the office, patient states that she went to have a colonoscopy and some things went wrong  Patient would really appreciate a call from you

## 2018-11-30 NOTE — DISCHARGE INSTRUCTIONS
Rest today  Avoid anything requiring mental alertness (for example,using machinery, ladders, making important decisions or signing documents)  No alcohol or driving for 12 hours  Call our office at [phone] with any problems--such as unexpected abdomenal pain or bleeding-- or any questions or concerns  Results as discussed    Followup in our office as planned  We will review what options are available to us now  Call before then in the event of any problems  Upper Endoscopy   WHAT YOU NEED TO KNOW:   An upper endoscopy is also called an upper gastrointestinal (GI) endoscopy, or an esophagogastroduodenoscopy (EGD)  You may feel bloated, gassy, or have some abdominal discomfort after your procedure  Your throat may be sore for 24 to 36 hours  You may burp or pass gas from air that is still inside your body  DISCHARGE INSTRUCTIONS:   Call 911 if:   · You have sudden chest pain or trouble breathing  Seek care immediately if:   · You feel dizzy or faint  · You have trouble swallowing  · You have severe throat pain  · Your bowel movements are very dark or black  · Your abdomen is hard and firm and you have severe pain  · You vomit blood  Contact your healthcare provider if:   · You feel full or bloated and cannot burp or pass gas  · You have not had a bowel movement for 3 days after your procedure  · You have neck pain  · You have a fever or chills  · You have nausea or are vomiting  · You have a rash or hives  · You have questions or concerns about your endoscopy  Relieve a sore throat:  Suck on throat lozenges or crushed ice  Gargle with a small amount of warm salt water  Mix 1 teaspoon of salt and 1 cup of warm water to make salt water  Relieve gas and discomfort from bloating:  Lie on your right side with a heating pad on your abdomen  Take short walks to help pass gas  Eat small meals until bloating is relieved    Rest after your procedure:  Do not drive or make important decisions until the day after your procedure  Return to your normal activity as directed  You can usually return to work the day after your procedure  Follow up with your healthcare provider as directed:  Write down your questions so you remember to ask them during your visits  © 2017 [employer] Information is for End User's use only and may not be sold, redistributed or otherwise used for commercial purposes  All illustrations and images included in CareNotes® are the copyrighted property of [employer] , [employer]  or [employer] Miner  The above information is an  only  It is not intended as medical advice for individual conditions or treatments  Talk to your doctor, nurse or pharmacist before following any medical regimen to see if it is safe and effective for you

## 2018-11-30 NOTE — PROGRESS NOTES
Chest X-ray results reviewed and discussed with the patient  No acute pulmonary process noted  Pt reports breathing as improved  Tolerated PO  Discussed the event with the patient  Pt reports that she has noticed worsening of shortness of breath with ambulation, going up stairs  She does take lasix for leg swelling  Discussed with the patient making an appointment with her primary care doctor to discuss this change in symptoms  Discussed with the patient that if patient starts to feel worsening shortness of breath, increased leg swelling, fevers, chills, chest discomfort or any other concerning symptoms to call her PCP and/or go to an emergency room for further evaluation  Pt and her spouse expressed understanding  Vitals stable  Dr Any Adams

## 2018-11-30 NOTE — OP NOTE
OPERATIVE REPORT  PATIENT NAME: Aida De La Cruz    :  1965  MRN: [id number]  Pt Location: [facility] RO[facility] 01    SURGERY DATE: 2018    Surgeon(s) and Role:     * Kyleigh Snell MD - Primary    Preop Diagnosis:  Encounter for screening for malignant neoplasm of colon [Z12 11]  Abdominal distension (gaseous) [R14 0]  Gastro-esophageal reflux disease without esophagitis [K21 9]  Right upper quadrant pain [R10 11]  Left upper quadrant pain [R10 12]    Post-Op Diagnosis Codes:     * Encounter for screening for malignant neoplasm of colon [Z12 11]     * Abdominal distension (gaseous) [R14 0]     * Gastro-esophageal reflux disease without esophagitis [K21 9]     * Right upper quadrant pain [R10 11]     * Left upper quadrant pain [R10 12]    Procedure(s) (LRB):  EGD (N/A)    Specimen(s):  * No specimens in log *    Estimated Blood Loss:   Minimal    Drains:       Anesthesia Type:   IV Sedation with Anesthesia    Operative Indications:  Encounter for screening for malignant neoplasm of colon [Z12 11]  Abdominal distension (gaseous) [R14 0]  Gastro-esophageal reflux disease without esophagitis [K21 9]  Right upper quadrant pain [R10 11]  Left upper quadrant pain [R10 12]      Operative Findings:  See below    Complications:   None    Procedure and Technique:  The gastroscope was introduced without difficulty  It passed with ease into the stomach  Initially examination of the duodenum was entirely normal bulb and second portion  Most of the stomach was examined and was normal   However at this point she began to have respiratory coughing and anesthesia requested the endoscope be removed so close examination of the esophagus and the rest of the stomach was not able to be done  Anesthesia also felt the colonoscopy should not be done today even without further anesthesia  As this event was not related directly to the endoscopies it is not listed as a postoperative complication  Anesthesia plans further care now to include chest xray and any other studies needed     I was present for the entire procedure    Patient Disposition:  hemodynamically stable    SIGNATURE: Hugo Gimenez MD  DATE: November 30, 2018  TIME: 11:06 AM

## 2018-11-30 NOTE — DISCHARGE SUMMARY
Discharge Summary - Kristin Figueroa 48 y o  female MRN: [id number]    Unit/Bed#: [facility] Encounter: [id number]    Admission Date: 11/30/2018     Admitting Diagnosis: Encounter for screening for malignant neoplasm of colon [Z12 11]  Abdominal distension (gaseous) [R14 0]  Gastro-esophageal reflux disease without esophagitis [K21 9]  Right upper quadrant pain [R10 11]  Left upper quadrant pain [R10 12]        Procedures Performed: No orders of the defined types were placed in this encounter  Complications: none    Discharge Diagnosis: uncertain    Condition at Discharge: good     Discharge instructions/Information to patient and family:   See after visit summary for information provided to patient and family  Provisions for Follow-Up Care:  See after visit summary for information related to follow-up care and any pertinent home health orders  Disposition: See After Visit Summary for discharge disposition information  Planned Readmission: No      Discharge Medications:  See after visit summary for reconciled discharge medications provided to patient and family

## 2018-11-30 NOTE — TELEPHONE ENCOUNTER
I called spoke to patient  She states that they told her during the EGD she aspirated and they stop the EGD  She had a lot of coughing and wheezing  Patient became frustrated and then put her  on the phone when I talked to  States she had a chest x-ray which did not show any pneumonia and the anesthesiologist had mentioned CHF because  They mention that she was on Lasix  I am not aware of patient having CHF so I am uncertain as to why the would mention that  Patient's  states that they asked if they could help her and why they could not take care of it since they are doctors but they told patient and  to contact PCP who could direct admit her  I reviewed the anesthesiologist note who documented that she was stable with normal vitals  Patient's  states that she coughed 1 since being home but otherwise has been okay aside from being very groggy  I do not see any evidence in the anesthesiologist report that there is concerned that she needs to be direct admitted so I am very confused by this  I discussed the case with Dr Vilma Cazares who will be on tomorrow and I will have her be seen by him at 12 noon   and patient are aware of the appointment tomorrow  I discussed  With them the need to immediately go to the ER with any worsening symptoms since we are not quite sure the extent of what is going on since we had no communication with anesthesia or GI on this issue  They expressed full understanding

## 2018-12-01 ENCOUNTER — OFFICE VISIT (OUTPATIENT)
Dept: FAMILY MEDICINE CLINIC | Facility: CLINIC | Age: 53
End: 2018-12-01
Payer: COMMERCIAL

## 2018-12-01 VITALS
WEIGHT: 219.5 LBS | TEMPERATURE: 98.7 F | OXYGEN SATURATION: 98 % | BODY MASS INDEX: 43.09 KG/M2 | RESPIRATION RATE: 16 BRPM | DIASTOLIC BLOOD PRESSURE: 86 MMHG | HEART RATE: 74 BPM | HEIGHT: 60 IN | SYSTOLIC BLOOD PRESSURE: 138 MMHG

## 2018-12-01 DIAGNOSIS — R06.00 DYSPNEA, UNSPECIFIED TYPE: ICD-10-CM

## 2018-12-01 DIAGNOSIS — G47.30 SLEEP APNEA, UNSPECIFIED TYPE: ICD-10-CM

## 2018-12-01 DIAGNOSIS — T17.908D ASPIRATION INTO RESPIRATORY TRACT, SUBSEQUENT ENCOUNTER: Primary | ICD-10-CM

## 2018-12-01 PROCEDURE — 1036F TOBACCO NON-USER: CPT | Performed by: FAMILY MEDICINE

## 2018-12-01 PROCEDURE — 99214 OFFICE O/P EST MOD 30 MIN: CPT | Performed by: FAMILY MEDICINE

## 2018-12-01 PROCEDURE — 3008F BODY MASS INDEX DOCD: CPT | Performed by: FAMILY MEDICINE

## 2018-12-02 NOTE — PROGRESS NOTES
Assessment/Plan:    Patient appears to have suffered no ongoing injury from her aspiration event yesterday during her EGD  She does however have symptoms which predates her aspiration which include unexplained dyspnea  We will check an echocardiogram to evaluate her for cardiac source  She also does have snoring and apneic episodes  She relates in fact that the anesthesiologist noted to her that she appeared to have apneic episodes when placed under sedation for her EGD  Will check a sleep study to rule out sleep apnea  Diagnoses and all orders for this visit:    Aspiration into respiratory tract, subsequent encounter    Sleep apnea, unspecified type  -     Home Study; Future    Dyspnea, unspecified type  -     Echo complete with contrast if indicated; Future          Subjective:      Patient ID: Ever Giles is a 48 y o  female  Patient presents in follow-up from an episode which occurred yesterday during an elective EGD  Apparently while under anesthesia for the procedure the patient had an aspiration episode  Procedure was aborted  She was placed on oxygen had chest x-ray and given IV fluids  Her x-ray chest x-ray was normal and aside from throat irritation and coughing she had no symptoms and was discharged home  She returns to the office today in follow-up from those events  Overall she denies fever chills or excessive coughing  She does relate however that she had been having some coughing and chest symptoms 4 weeks preceding her EGD  She had dyspnea of unexplained origin  She denies wheezing or chest pain  The following portions of the patient's history were reviewed and updated as appropriate: allergies, current medications, past family history, past medical history, past social history, past surgical history and problem list     Review of Systems   Constitutional: Negative  Respiratory: Positive for cough and shortness of breath  Cardiovascular: Negative  Gastrointestinal: Negative  Genitourinary: Negative  Musculoskeletal: Negative  Psychiatric/Behavioral: Negative  Objective:      /86 (BP Location: Left arm, Patient Position: Sitting, Cuff Size: Adult)   Pulse 74   Temp 98 7 °F (37 1 °C) (Tympanic)   Resp 16   Ht 5' (1 524 m)   Wt 99 6 kg (219 lb 8 oz)   SpO2 98%   BMI 42 87 kg/m²          Physical Exam   Constitutional: She is oriented to person, place, and time  She appears well-developed and well-nourished  No distress  HENT:   Head: Normocephalic and atraumatic  Eyes: Pupils are equal, round, and reactive to light  Conjunctivae are normal  Right eye exhibits no discharge  Neck: Normal range of motion  No thyromegaly present  Cardiovascular: Normal rate and regular rhythm  Pulmonary/Chest: Effort normal and breath sounds normal  No respiratory distress  Lymphadenopathy:     She has no cervical adenopathy  Neurological: She is alert and oriented to person, place, and time  Skin: Skin is warm and dry  She is not diaphoretic  Psychiatric: She has a normal mood and affect  Her behavior is normal  Judgment and thought content normal    Nursing note and vitals reviewed

## 2018-12-03 ENCOUNTER — DOCUMENTATION (OUTPATIENT)
Dept: FAMILY MEDICINE CLINIC | Facility: CLINIC | Age: 53
End: 2018-12-03

## 2018-12-03 DIAGNOSIS — R06.00 DYSPNEA, UNSPECIFIED TYPE: Primary | ICD-10-CM

## 2018-12-03 NOTE — PROGRESS NOTES
STARTING 1/1/9 [facility] WILL NEED PRIOR AUTH  I WOULD NEED TO CHECK AND SEE IF HOME STUDIES NEED APPROVAL AT THAT TIME ALSO  I LM FOR PT TO TRY AND GET APPTS BEFORE January

## 2018-12-04 ENCOUNTER — TELEPHONE (OUTPATIENT)
Dept: FAMILY MEDICINE CLINIC | Facility: CLINIC | Age: 53
End: 2018-12-04

## 2018-12-04 NOTE — TELEPHONE ENCOUNTER
Pt stopped in the ofc to let us know about up coming appts she is going to have her echo 12/13/18  before next gastro appt 12/20/18  But her sleep study wont be until next year 1/30/19 is that ok to wait that long

## 2018-12-05 ENCOUNTER — TELEPHONE (OUTPATIENT)
Dept: SLEEP CENTER | Facility: CLINIC | Age: 53
End: 2018-12-05

## 2018-12-05 NOTE — TELEPHONE ENCOUNTER
----- Message from Carrie Rosales DO sent at 12/5/2018 11:30 AM EST -----  Chart reviewed  Study approved  Schedule HST   ----- Message -----  From: Bakari Parker  Sent: 12/4/2018   8:24 AM  To: Carrie Rosales DO    This sleep study needs approval      If approved please sign and return to clerical pool  If denied please include reasons why  Also provide alternative testing if warranted  Please sign and return to clerical pool

## 2018-12-07 DIAGNOSIS — R60.0 BILATERAL LOWER EXTREMITY EDEMA: ICD-10-CM

## 2018-12-07 RX ORDER — FUROSEMIDE 20 MG/1
TABLET ORAL
Qty: 30 TABLET | Refills: 5 | Status: SHIPPED | OUTPATIENT
Start: 2018-12-07 | End: 2019-10-02 | Stop reason: SDUPTHER

## 2018-12-13 ENCOUNTER — HOSPITAL ENCOUNTER (OUTPATIENT)
Dept: NON INVASIVE DIAGNOSTICS | Facility: HOSPITAL | Age: 53
Discharge: HOME | End: 2018-12-13
Payer: COMMERCIAL

## 2018-12-13 DIAGNOSIS — R06.00 DYSPNEA, UNSPECIFIED TYPE: ICD-10-CM

## 2018-12-13 PROCEDURE — 93306 TTE W/DOPPLER COMPLETE: CPT | Performed by: INTERNAL MEDICINE

## 2018-12-13 PROCEDURE — 93306 TTE W/DOPPLER COMPLETE: CPT

## 2019-01-08 ENCOUNTER — TELEPHONE (OUTPATIENT)
Dept: FAMILY MEDICINE CLINIC | Facility: CLINIC | Age: 54
End: 2019-01-08

## 2019-01-15 ENCOUNTER — HOSPITAL ENCOUNTER (OUTPATIENT)
Dept: MAMMOGRAPHY | Facility: MEDICAL CENTER | Age: 54
Discharge: HOME | End: 2019-01-15
Payer: COMMERCIAL

## 2019-01-15 ENCOUNTER — TELEPHONE (OUTPATIENT)
Dept: FAMILY MEDICINE CLINIC | Facility: CLINIC | Age: 54
End: 2019-01-15

## 2019-01-15 VITALS — BODY MASS INDEX: 43.34 KG/M2 | WEIGHT: 215 LBS | HEIGHT: 59 IN

## 2019-01-15 DIAGNOSIS — Z12.31 ENCOUNTER FOR SCREENING MAMMOGRAM FOR MALIGNANT NEOPLASM OF BREAST: ICD-10-CM

## 2019-01-15 PROCEDURE — 77063 BREAST TOMOSYNTHESIS BI: CPT

## 2019-01-15 PROCEDURE — 77067 SCR MAMMO BI INCL CAD: CPT

## 2019-01-15 NOTE — TELEPHONE ENCOUNTER
Jj Naranjo called she is scheduled for her mammogram tonight she requested we fax over her script/ order and or she could pick it up on her way over  I informed pt whatever is easier on her  Pt prefers that we print it out and she swing into the office before her appointment  I did inform pt it is up front she just needs to let us know that she is picking up her mammo order has it scheduled for tonight  Patient's mammogram script is up front ready for  alphabetized by her last name

## 2019-03-13 ENCOUNTER — OFFICE VISIT (OUTPATIENT)
Dept: FAMILY MEDICINE CLINIC | Facility: CLINIC | Age: 54
End: 2019-03-13
Payer: COMMERCIAL

## 2019-03-13 VITALS
WEIGHT: 221 LBS | HEART RATE: 99 BPM | OXYGEN SATURATION: 99 % | RESPIRATION RATE: 18 BRPM | DIASTOLIC BLOOD PRESSURE: 84 MMHG | SYSTOLIC BLOOD PRESSURE: 122 MMHG | BODY MASS INDEX: 44.55 KG/M2 | TEMPERATURE: 101.4 F | HEIGHT: 59 IN

## 2019-03-13 DIAGNOSIS — J40 BRONCHITIS: Primary | ICD-10-CM

## 2019-03-13 DIAGNOSIS — J11.1 INFLUENZA: ICD-10-CM

## 2019-03-13 PROCEDURE — 99213 OFFICE O/P EST LOW 20 MIN: CPT | Performed by: FAMILY MEDICINE

## 2019-03-13 PROCEDURE — 3008F BODY MASS INDEX DOCD: CPT | Performed by: FAMILY MEDICINE

## 2019-03-13 PROCEDURE — 1036F TOBACCO NON-USER: CPT | Performed by: FAMILY MEDICINE

## 2019-03-13 RX ORDER — OSELTAMIVIR PHOSPHATE 75 MG/1
75 CAPSULE ORAL EVERY 12 HOURS SCHEDULED
Qty: 10 CAPSULE | Refills: 0 | Status: SHIPPED | OUTPATIENT
Start: 2019-03-13 | End: 2019-03-18

## 2019-03-13 RX ORDER — DOXYCYCLINE HYCLATE 100 MG/1
100 CAPSULE ORAL EVERY 12 HOURS SCHEDULED
Qty: 20 CAPSULE | Refills: 0 | Status: SHIPPED | OUTPATIENT
Start: 2019-03-13 | End: 2019-03-23

## 2019-03-13 RX ORDER — PROMETHAZINE HYDROCHLORIDE AND CODEINE PHOSPHATE 6.25; 1 MG/5ML; MG/5ML
5 SYRUP ORAL EVERY 4 HOURS PRN
Qty: 120 ML | Refills: 0 | Status: SHIPPED | OUTPATIENT
Start: 2019-03-13 | End: 2019-03-26

## 2019-03-13 NOTE — PROGRESS NOTES
[facility]      NAME: Neal Goodwin  AGE: 48 y o  SEX: female  : 1965   MRN: [id number]    DATE: 3/13/2019  TIME: 6:56 PM    Assessment and Plan     Problem List Items Addressed This Visit     None      Visit Diagnoses     Bronchitis    -  Primary    Relevant Medications    promethazine-codeine (PHENERGAN WITH CODEINE) 6 25-10 mg/5 mL syrup    doxycycline hyclate (VIBRAMYCIN) 100 mg capsule    Influenza        Relevant Medications    oseltamivir (TAMIFLU) 75 mg capsule              Return to office in:  P r n  Chief Complaint     Chief Complaint   Patient presents with    Cold Like Symptoms     cough,fever, HA, earache, bodyache x 2 days       History of Present Illness     Patient complains of upper respiratory symptoms including severe cough headache bodyaches fever and chills which began approximately 24 hours ago  She is taking over-the-counter cold remedies and ibuprofen for her symptoms  The following portions of the patient's history were reviewed and updated as appropriate: allergies, current medications, past family history, past medical history, past social history, past surgical history and problem list     Review of Systems   Review of Systems   Constitutional: Positive for fever  HENT: Positive for congestion, sinus pressure and sinus pain  Respiratory: Positive for cough  Cardiovascular: Negative  Gastrointestinal: Negative  Genitourinary: Negative  Musculoskeletal: Positive for myalgias  Psychiatric/Behavioral: Negative          Active Problem List     Patient Active Problem List   Diagnosis    Bilateral lower extremity edema    Bilateral occipital neuralgia    Fibromyalgia    Gastroesophageal reflux disease    Insomnia    Migraine    Vitamin D deficiency    Generalized anxiety disorder       Objective   /84 (BP Location: Left arm, Patient Position: Sitting, Cuff Size: Adult)   Pulse 99   Temp (!) 101 4 °F (38 6 °C) (Tympanic) Resp 18   Ht 4' 11 06" (1 5 m)   Wt 100 kg (221 lb)   SpO2 99%   BMI 44 55 kg/m²     Physical Exam   Constitutional: She is oriented to person, place, and time  She appears well-developed and well-nourished  No distress  HENT:   Head: Normocephalic and atraumatic  Eyes: Pupils are equal, round, and reactive to light  Conjunctivae are normal  Right eye exhibits no discharge  Neck: Normal range of motion  No thyromegaly present  Cardiovascular: Normal rate and regular rhythm  Pulmonary/Chest: Effort normal  No respiratory distress  Coarse breath sounds with few scattered rhonchi   Lymphadenopathy:     She has no cervical adenopathy  Neurological: She is alert and oriented to person, place, and time  Skin: Skin is warm  She is diaphoretic  Psychiatric: She has a normal mood and affect  Her behavior is normal  Judgment and thought content normal    Nursing note and vitals reviewed          Current Medications     Current Outpatient Medications:     ALPRAZolam (XANAX) 0 5 mg tablet, Take 1 tablet (0 5 mg total) by mouth 2 (two) times a day as needed for anxiety or sleep, Disp: 30 tablet, Rfl: 0    aspirin 81 MG tablet, Take 81 mg by mouth daily, Disp: , Rfl:     butalbital-acetaminophen-caffeine (FIORICET,ESGIC) -40 mg per tablet, Take 1 tablet by mouth every 6 (six) hours, Disp: , Rfl:     Cholecalciferol (VITAMIN D) 2000 units CAPS, Take 1 capsule by mouth daily, Disp: , Rfl:     citalopram (CeleXA) 40 mg tablet, Take 1 tablet (40 mg total) by mouth daily, Disp: 30 tablet, Rfl: 5    cyclobenzaprine (FLEXERIL) 10 mg tablet, Take 1 tablet (10 mg total) by mouth 3 (three) times a day as needed for muscle spasms, Disp: 30 tablet, Rfl: 2    furosemide (LASIX) 20 mg tablet, , Disp: , Rfl:     furosemide (LASIX) 20 mg tablet, take 1 tablet by mouth once daily, Disp: 30 tablet, Rfl: 5    loratadine (CLARITIN) 10 mg tablet, Take 10 mg by mouth daily, Disp: , Rfl:     multivitamin (THERAGRAN) TABS, Take 1 tablet by mouth daily, Disp: , Rfl:     omeprazole (PriLOSEC) 20 mg delayed release capsule, , Disp: , Rfl:     rizatriptan (MAXALT) 10 MG tablet, , Disp: , Rfl:     topiramate (TOPAMAX) 100 mg tablet, Take 1 tablet by mouth daily, Disp: , Rfl:     doxycycline hyclate (VIBRAMYCIN) 100 mg capsule, Take 1 capsule (100 mg total) by mouth every 12 (twelve) hours for 10 days, Disp: 20 capsule, Rfl: 0    oseltamivir (TAMIFLU) 75 mg capsule, Take 1 capsule (75 mg total) by mouth every 12 (twelve) hours for 5 days, Disp: 10 capsule, Rfl: 0    promethazine-codeine (PHENERGAN WITH CODEINE) 6 25-10 mg/5 mL syrup, Take 5 mL by mouth every 4 (four) hours as needed for cough, Disp: 120 mL, Rfl: 0    Health Maintenance     Health Maintenance   Topic Date Due    BMI: Followup Plan  07/10/1983    Pneumococcal PPSV23 Highest Risk Adult (1 of 3 - PCV13) 07/10/1984    DTaP,Tdap,and Td Vaccines (1 - Tdap) 06/30/2014    INFLUENZA VACCINE  09/01/2019 (Originally 7/1/2018)    PAP SMEAR  05/22/2019    Depression Screening PHQ  07/31/2019    BMI: Adult  01/15/2020    CRC Screening: Colonoscopy  11/30/2023    Hepatitis C Screening  Completed    HEPATITIS B VACCINES  Aged Out     Immunization History   Administered Date(s) Administered    Td (adult), adsorbed 06/29/2014       [facility]

## 2019-03-13 NOTE — LETTER
March 13, 2019     Patient: Dona Adams   YOB: 1965   Date of Visit: 3/13/2019       To Whom it May Concern:    Enrike Willett is under my professional care  She was seen in my office on 3/13/2019  She may return to work on 3/18  If you have any questions or concerns, please don't hesitate to call           Sincerely,          Yousuf Jones DO        CC: No Recipients

## 2019-03-26 ENCOUNTER — OFFICE VISIT (OUTPATIENT)
Dept: FAMILY MEDICINE CLINIC | Facility: CLINIC | Age: 54
End: 2019-03-26
Payer: COMMERCIAL

## 2019-03-26 VITALS
RESPIRATION RATE: 16 BRPM | OXYGEN SATURATION: 96 % | HEART RATE: 84 BPM | TEMPERATURE: 97.8 F | HEIGHT: 59 IN | BODY MASS INDEX: 44.47 KG/M2 | DIASTOLIC BLOOD PRESSURE: 80 MMHG | WEIGHT: 220.6 LBS | SYSTOLIC BLOOD PRESSURE: 124 MMHG

## 2019-03-26 DIAGNOSIS — J30.9 ALLERGIC RHINITIS, UNSPECIFIED SEASONALITY, UNSPECIFIED TRIGGER: ICD-10-CM

## 2019-03-26 DIAGNOSIS — J20.9 ACUTE BRONCHITIS, UNSPECIFIED ORGANISM: Primary | ICD-10-CM

## 2019-03-26 PROCEDURE — 94640 AIRWAY INHALATION TREATMENT: CPT | Performed by: PHYSICIAN ASSISTANT

## 2019-03-26 PROCEDURE — 99213 OFFICE O/P EST LOW 20 MIN: CPT | Performed by: PHYSICIAN ASSISTANT

## 2019-03-26 PROCEDURE — 3008F BODY MASS INDEX DOCD: CPT | Performed by: PHYSICIAN ASSISTANT

## 2019-03-26 PROCEDURE — 1036F TOBACCO NON-USER: CPT | Performed by: PHYSICIAN ASSISTANT

## 2019-03-26 RX ORDER — LORATADINE 10 MG/1
10 TABLET ORAL DAILY
Qty: 30 TABLET | Refills: 5 | Status: SHIPPED | OUTPATIENT
Start: 2019-03-26 | End: 2020-02-03 | Stop reason: SDUPTHER

## 2019-03-26 RX ORDER — ALBUTEROL SULFATE 90 UG/1
2 AEROSOL, METERED RESPIRATORY (INHALATION) EVERY 6 HOURS PRN
Qty: 1 INHALER | Refills: 0 | Status: SHIPPED | OUTPATIENT
Start: 2019-03-26

## 2019-03-26 RX ORDER — PREDNISONE 10 MG/1
TABLET ORAL
Qty: 21 TABLET | Refills: 0 | Status: SHIPPED | OUTPATIENT
Start: 2019-03-26 | End: 2019-06-06 | Stop reason: ALTCHOICE

## 2019-03-26 RX ORDER — ALBUTEROL SULFATE 2.5 MG/3ML
2.5 SOLUTION RESPIRATORY (INHALATION) ONCE
Status: COMPLETED | OUTPATIENT
Start: 2019-03-26 | End: 2019-03-26

## 2019-03-26 RX ADMIN — ALBUTEROL SULFATE 2.5 MG: 2.5 SOLUTION RESPIRATORY (INHALATION) at 09:35

## 2019-03-26 NOTE — LETTER
March 26, 2019     Patient: Meron Mock   YOB: 1965   Date of Visit: 3/26/2019       To Whom it May Concern:    London Hood is under my professional care  She was seen in my office on 3/26/2019  She may return to work on 3/26/19  If you have any questions or concerns, please don't hesitate to call           Sincerely,          Noreen Montoya PA-C        CC: No Recipients

## 2019-03-26 NOTE — PROGRESS NOTES
Assessment/Plan:      Diagnoses and all orders for this visit:    Acute bronchitis, unspecified organism  -     predniSONE 10 mg tablet; 6,5,4,3,2,1  -     albuterol (VENTOLIN HFA) 90 mcg/act inhaler; Inhale 2 puffs every 6 (six) hours as needed for wheezing or shortness of breath  -     albuterol inhalation solution 2 5 mg    Allergic rhinitis, unspecified seasonality, unspecified trigger  -     loratadine (CLARITIN) 10 mg tablet; Take 1 tablet (10 mg total) by mouth daily    Other orders  -     Mini neb      patient is a 80-year-old female presenting today for persistent lower respiratory symptoms  She was recently treated a few weeks ago with a course of doxycycline as well as promethazine with codeine for bronchitis  She states she is feeling better but still with tightness in her chest, shortness of breath and a persistent cough and raspy voice  Patient was given a nebulizer treatment in the office today with slight improvement reported by patient subjectively and it seems as though it has loosened up some of the phlegm with slightly louder breath sounds, less wheezing  I will treat symptoms today with a course of prednisone with potential side effects discussed as well as Ventolin rescue inhaler  She was instructed to continue Ventolin and should restart her loratadine as well  I do not feel another antibiotic is necessary at present but patient was advised to monitor symptoms and should call or follow up with persistent or worsening symptoms despite treatment  Chief Complaint   Patient presents with   Amna Kristan Like Symptoms     patient present c/o cough, sore throat, nasal congestion, SOB for almost a month       Subjective:     Patient ID: Verna Churchill is a 48 y o  female     48y/o female here today for persistent Uri sxs  Reports feeling better overall, was on doxycycline 3/13 and prometh w/ codeine  States she had chest tightness, chest congestion and coughing fits   She does have some PND, no nasal sxs or earache  Sore throat and laryngitis  Review of Systems   Constitutional: Positive for fatigue  Negative for fever  HENT: Positive for postnasal drip  Respiratory: Positive for cough, chest tightness and shortness of breath  Negative for wheezing  Cardiovascular: Negative  Gastrointestinal: Positive for diarrhea  Genitourinary: Negative  Neurological: Negative  Psychiatric/Behavioral: Negative  The following portions of the patient's history were reviewed and updated as appropriate: allergies, current medications, past family history, past medical history, past social history, past surgical history and problem list       Objective:     Physical Exam   Constitutional: She is oriented to person, place, and time  She appears well-developed  No distress  Obesity BMI 43 89   HENT:   Head: Normocephalic  Right Ear: Tympanic membrane and ear canal normal    Left Ear: Tympanic membrane and ear canal normal    Nose: Nose normal    Mouth/Throat: Oropharynx is clear and moist    Neck: Neck supple  Cardiovascular: Normal rate, regular rhythm and normal heart sounds  Pulmonary/Chest: Effort normal  She has decreased breath sounds  She has wheezes  Lymphadenopathy:     She has no cervical adenopathy  Neurological: She is alert and oriented to person, place, and time  Psychiatric: She has a normal mood and affect  Vitals reviewed        Vitals:    03/26/19 0834   BP: 124/80   BP Location: Left arm   Patient Position: Sitting   Cuff Size: Large   Pulse: 84   Resp: 16   Temp: 97 8 °F (36 6 °C)   TempSrc: Tympanic   SpO2: 96%   Weight: 100 kg (220 lb 9 6 oz)   Height: 4' 11 45" (1 51 m)     Mini neb  Performed by: Dory Leventhal, PA-C  Authorized by: Dory Leventhal, PA-C     Number of treatments:  1  Treatment 1:   Pre-Procedure     Symptoms:  Shortness of breath and cough    Lung Sounds:  Decreased, wheezing    HR:  84    RR:  16    SP02:  96% on RA    Medication Administered:  Albuterol 2 5 mg  Post-Procedure     Symptoms:  Cough    Lung sounds:  Still with faint exp wheezing but BS louder, loose cough     HR:  Not assessed    RR:  No assessed    SP02:  Not assessed

## 2019-06-06 ENCOUNTER — OFFICE VISIT (OUTPATIENT)
Dept: FAMILY MEDICINE CLINIC | Facility: CLINIC | Age: 54
End: 2019-06-06
Payer: COMMERCIAL

## 2019-06-06 VITALS
TEMPERATURE: 98.8 F | OXYGEN SATURATION: 98 % | DIASTOLIC BLOOD PRESSURE: 70 MMHG | HEIGHT: 59 IN | HEART RATE: 89 BPM | BODY MASS INDEX: 44.03 KG/M2 | WEIGHT: 218.4 LBS | SYSTOLIC BLOOD PRESSURE: 122 MMHG

## 2019-06-06 DIAGNOSIS — K21.9 GASTROESOPHAGEAL REFLUX DISEASE, ESOPHAGITIS PRESENCE NOT SPECIFIED: Primary | ICD-10-CM

## 2019-06-06 DIAGNOSIS — H57.12 EYE PAIN, LEFT: ICD-10-CM

## 2019-06-06 DIAGNOSIS — R32 URINARY INCONTINENCE, UNSPECIFIED TYPE: ICD-10-CM

## 2019-06-06 DIAGNOSIS — Z13.6 SCREENING FOR CARDIOVASCULAR CONDITION: ICD-10-CM

## 2019-06-06 DIAGNOSIS — G43.909 MIGRAINE WITHOUT STATUS MIGRAINOSUS, NOT INTRACTABLE, UNSPECIFIED MIGRAINE TYPE: ICD-10-CM

## 2019-06-06 DIAGNOSIS — F41.1 GENERALIZED ANXIETY DISORDER: ICD-10-CM

## 2019-06-06 DIAGNOSIS — R60.0 BILATERAL LOWER EXTREMITY EDEMA: ICD-10-CM

## 2019-06-06 DIAGNOSIS — Z13.1 SCREENING FOR DIABETES MELLITUS: ICD-10-CM

## 2019-06-06 DIAGNOSIS — R10.2 VAGINAL PAIN: ICD-10-CM

## 2019-06-06 DIAGNOSIS — Z13.220 SCREENING FOR LIPID DISORDERS: ICD-10-CM

## 2019-06-06 DIAGNOSIS — Z13.29 SCREENING FOR THYROID DISORDER: ICD-10-CM

## 2019-06-06 PROCEDURE — 3008F BODY MASS INDEX DOCD: CPT | Performed by: NURSE PRACTITIONER

## 2019-06-06 PROCEDURE — 99214 OFFICE O/P EST MOD 30 MIN: CPT | Performed by: NURSE PRACTITIONER

## 2019-06-12 ENCOUNTER — APPOINTMENT (OUTPATIENT)
Dept: LAB | Facility: CLINIC | Age: 54
End: 2019-06-12
Payer: COMMERCIAL

## 2019-06-12 DIAGNOSIS — Z13.1 SCREENING FOR DIABETES MELLITUS: ICD-10-CM

## 2019-06-12 DIAGNOSIS — Z13.220 SCREENING FOR LIPID DISORDERS: ICD-10-CM

## 2019-06-12 DIAGNOSIS — Z13.6 SCREENING FOR CARDIOVASCULAR CONDITION: ICD-10-CM

## 2019-06-12 DIAGNOSIS — Z13.29 SCREENING FOR THYROID DISORDER: ICD-10-CM

## 2019-06-12 LAB
ALBUMIN SERPL BCP-MCNC: 3.4 G/DL (ref 3.5–5)
ALP SERPL-CCNC: 81 U/L (ref 46–116)
ALT SERPL W P-5'-P-CCNC: 22 U/L (ref 12–78)
ANION GAP SERPL CALCULATED.3IONS-SCNC: 5 MMOL/L (ref 4–13)
AST SERPL W P-5'-P-CCNC: 15 U/L (ref 5–45)
BASOPHILS # BLD AUTO: 0.03 THOUSANDS/ΜL (ref 0–0.1)
BASOPHILS NFR BLD AUTO: 0 % (ref 0–1)
BILIRUB SERPL-MCNC: 0.18 MG/DL (ref 0.2–1)
BUN SERPL-MCNC: 14 MG/DL (ref 5–25)
CALCIUM SERPL-MCNC: 8.8 MG/DL (ref 8.3–10.1)
CHLORIDE SERPL-SCNC: 111 MMOL/L (ref 100–108)
CHOLEST SERPL-MCNC: 195 MG/DL (ref 50–200)
CO2 SERPL-SCNC: 26 MMOL/L (ref 21–32)
CREAT SERPL-MCNC: 0.86 MG/DL (ref 0.6–1.3)
EOSINOPHIL # BLD AUTO: 0.18 THOUSAND/ΜL (ref 0–0.61)
EOSINOPHIL NFR BLD AUTO: 3 % (ref 0–6)
ERYTHROCYTE [DISTWIDTH] IN BLOOD BY AUTOMATED COUNT: 14.4 % (ref 11.6–15.1)
GFR SERPL CREATININE-BSD FRML MDRD: 77 ML/MIN/1.73SQ M
GLUCOSE P FAST SERPL-MCNC: 67 MG/DL (ref 65–99)
HCT VFR BLD AUTO: 37.8 % (ref 34.8–46.1)
HDLC SERPL-MCNC: 71 MG/DL (ref 40–60)
HGB BLD-MCNC: 12 G/DL (ref 11.5–15.4)
IMM GRANULOCYTES # BLD AUTO: 0.02 THOUSAND/UL (ref 0–0.2)
IMM GRANULOCYTES NFR BLD AUTO: 0 % (ref 0–2)
LDLC SERPL CALC-MCNC: 114 MG/DL (ref 0–100)
LYMPHOCYTES # BLD AUTO: 2.47 THOUSANDS/ΜL (ref 0.6–4.47)
LYMPHOCYTES NFR BLD AUTO: 35 % (ref 14–44)
MCH RBC QN AUTO: 29.8 PG (ref 26.8–34.3)
MCHC RBC AUTO-ENTMCNC: 31.7 G/DL (ref 31.4–37.4)
MCV RBC AUTO: 94 FL (ref 82–98)
MONOCYTES # BLD AUTO: 0.52 THOUSAND/ΜL (ref 0.17–1.22)
MONOCYTES NFR BLD AUTO: 7 % (ref 4–12)
NEUTROPHILS # BLD AUTO: 3.93 THOUSANDS/ΜL (ref 1.85–7.62)
NEUTS SEG NFR BLD AUTO: 55 % (ref 43–75)
NONHDLC SERPL-MCNC: 124 MG/DL
NRBC BLD AUTO-RTO: 0 /100 WBCS
PLATELET # BLD AUTO: 290 THOUSANDS/UL (ref 149–390)
PMV BLD AUTO: 11.1 FL (ref 8.9–12.7)
POTASSIUM SERPL-SCNC: 4.3 MMOL/L (ref 3.5–5.3)
PROT SERPL-MCNC: 7.3 G/DL (ref 6.4–8.2)
RBC # BLD AUTO: 4.03 MILLION/UL (ref 3.81–5.12)
SODIUM SERPL-SCNC: 142 MMOL/L (ref 136–145)
T4 FREE SERPL-MCNC: 0.76 NG/DL (ref 0.76–1.46)
TRIGL SERPL-MCNC: 52 MG/DL
TSH SERPL DL<=0.05 MIU/L-ACNC: 4.72 UIU/ML (ref 0.36–3.74)
WBC # BLD AUTO: 7.15 THOUSAND/UL (ref 4.31–10.16)

## 2019-06-12 PROCEDURE — 84443 ASSAY THYROID STIM HORMONE: CPT

## 2019-06-12 PROCEDURE — 80061 LIPID PANEL: CPT

## 2019-06-12 PROCEDURE — 36415 COLL VENOUS BLD VENIPUNCTURE: CPT

## 2019-06-12 PROCEDURE — 80053 COMPREHEN METABOLIC PANEL: CPT

## 2019-06-12 PROCEDURE — 85025 COMPLETE CBC W/AUTO DIFF WBC: CPT

## 2019-06-12 PROCEDURE — 84439 ASSAY OF FREE THYROXINE: CPT

## 2019-07-08 DIAGNOSIS — K21.9 GASTROESOPHAGEAL REFLUX DISEASE, ESOPHAGITIS PRESENCE NOT SPECIFIED: Primary | ICD-10-CM

## 2019-07-08 DIAGNOSIS — R60.0 BILATERAL LOWER EXTREMITY EDEMA: ICD-10-CM

## 2019-07-08 DIAGNOSIS — F41.1 GENERALIZED ANXIETY DISORDER: ICD-10-CM

## 2019-07-08 RX ORDER — CITALOPRAM 40 MG/1
40 TABLET ORAL DAILY
Qty: 30 TABLET | Refills: 5 | Status: SHIPPED | OUTPATIENT
Start: 2019-07-08 | End: 2019-10-10 | Stop reason: SDUPTHER

## 2019-07-08 RX ORDER — OMEPRAZOLE 20 MG/1
20 CAPSULE, DELAYED RELEASE ORAL DAILY
Qty: 30 CAPSULE | Refills: 1 | Status: SHIPPED | OUTPATIENT
Start: 2019-07-08 | End: 2019-10-02 | Stop reason: SDUPTHER

## 2019-09-03 ENCOUNTER — OFFICE VISIT (OUTPATIENT)
Dept: FAMILY MEDICINE CLINIC | Facility: CLINIC | Age: 54
End: 2019-09-03
Payer: COMMERCIAL

## 2019-09-03 VITALS
TEMPERATURE: 98.5 F | BODY MASS INDEX: 44.07 KG/M2 | OXYGEN SATURATION: 98 % | HEART RATE: 96 BPM | SYSTOLIC BLOOD PRESSURE: 128 MMHG | HEIGHT: 59 IN | WEIGHT: 218.6 LBS | DIASTOLIC BLOOD PRESSURE: 90 MMHG | RESPIRATION RATE: 16 BRPM

## 2019-09-03 DIAGNOSIS — H00.015 HORDEOLUM EXTERNUM LEFT LOWER EYELID: Primary | ICD-10-CM

## 2019-09-03 DIAGNOSIS — H10.13 ALLERGIC CONJUNCTIVITIS OF BOTH EYES: ICD-10-CM

## 2019-09-03 DIAGNOSIS — G43.909 MIGRAINE WITHOUT STATUS MIGRAINOSUS, NOT INTRACTABLE, UNSPECIFIED MIGRAINE TYPE: ICD-10-CM

## 2019-09-03 PROCEDURE — 99213 OFFICE O/P EST LOW 20 MIN: CPT | Performed by: PHYSICIAN ASSISTANT

## 2019-09-03 PROCEDURE — 3008F BODY MASS INDEX DOCD: CPT | Performed by: PHYSICIAN ASSISTANT

## 2019-09-03 RX ORDER — CROMOLYN SODIUM 40 MG/ML
1 SOLUTION/ DROPS OPHTHALMIC 4 TIMES DAILY
Qty: 10 ML | Refills: 1 | Status: SHIPPED | OUTPATIENT
Start: 2019-09-03

## 2019-09-03 RX ORDER — RIZATRIPTAN BENZOATE 10 MG/1
10 TABLET ORAL ONCE AS NEEDED
Qty: 9 TABLET | Refills: 1 | Status: SHIPPED | OUTPATIENT
Start: 2019-09-03 | End: 2020-02-10 | Stop reason: SDUPTHER

## 2019-09-03 RX ORDER — OFLOXACIN 3 MG/ML
1 SOLUTION/ DROPS OPHTHALMIC 4 TIMES DAILY
Qty: 5 ML | Refills: 0 | Status: SHIPPED | OUTPATIENT
Start: 2019-09-03 | End: 2021-04-13 | Stop reason: ALTCHOICE

## 2019-09-03 NOTE — PROGRESS NOTES
Assessment/Plan:    1  Hordeolum externum left lower eyelid  Warm compresses throughout the day  Use ofloxacin for 5 days  Strongly encouraged patient to follow up with her eye doctor  Patient agreeable  Follow-up as needed  - ofloxacin (OCUFLOX) 0 3 % ophthalmic solution; Administer 1 drop into the left eye 4 (four) times a day  Dispense: 5 mL; Refill: 0    2  Allergic conjunctivitis of both eyes  Possibly allergy related  Start cromolyn eyedrops  Avoid irritants  - cromolyn (OPTICROM) 4 % ophthalmic solution; Administer 1 drop to both eyes 4 (four) times a day  Dispense: 10 mL; Refill: 1    3  Migraine without status migrainosus, not intractable, unspecified migraine type  Refill given  - rizatriptan (MAXALT) 10 MG tablet; Take 1 tablet (10 mg total) by mouth once as needed for migraine  Dispense: 9 tablet; Refill: 1    No problem-specific Assessment & Plan notes found for this encounter  Patient Active Problem List   Diagnosis    Bilateral lower extremity edema    Bilateral occipital neuralgia    Fibromyalgia    Gastroesophageal reflux disease    Insomnia    Migraine    Restless leg    Vitamin D deficiency    Generalized anxiety disorder     Subjective:      Patient ID: Josette Cabot is a 47 y o  female  Patient is here complaining of left eye irritation for 2 weeks  States it is gradually getting worse  Admits to swelling of her lower lid  States she does have a stye of her upper lid  States that 1 is small and no longer painful but did not go away completely  Her eye does get watery  Admits to itching and scratching  Admits to redness  Denies discharge  Admits to watery eyes bilateral   She states this has been ongoing for 2-3 months  Sometimes she did wake up with crusted eyelids  She wears glasses but no contacts  Denies any visual changes  Denies double vision  Denies blurry vision  She has not been to the eye doctor in approximately 2 years    Denies cold or flu-like symptoms  She does state she has seasonal allergies  Currently taking Claritin  The following portions of the patient's history were reviewed and updated as appropriate: allergies, current medications, past family history, past medical history, past social history, past surgical history and problem list     Review of Systems   Constitutional: Negative for appetite change, chills, fatigue and fever  HENT: Positive for congestion and sneezing  Negative for ear discharge, ear pain, nosebleeds, postnasal drip, rhinorrhea, sinus pressure, sinus pain, sore throat and trouble swallowing  Eyes: Positive for redness and itching  Negative for photophobia, pain, discharge and visual disturbance  Respiratory: Negative for cough, chest tightness, shortness of breath and wheezing  Cardiovascular: Negative for chest pain, palpitations and leg swelling  Gastrointestinal: Negative for abdominal pain, constipation, diarrhea, nausea and vomiting  Musculoskeletal: Negative for arthralgias and myalgias  Skin: Negative for rash  Allergic/Immunologic: Positive for environmental allergies  Neurological: Negative for dizziness, light-headedness and headaches  Hematological: Negative for adenopathy  Objective:      /90 (BP Location: Left arm, Patient Position: Sitting, Cuff Size: Large)   Pulse 96   Temp 98 5 °F (36 9 °C) (Tympanic)   Resp 16   Ht 4' 11 45" (1 51 m)   Wt 99 2 kg (218 lb 9 6 oz)   LMP 03/18/1997 (Exact Date)   SpO2 98%   BMI 43 49 kg/m²          Physical Exam   Constitutional: She is oriented to person, place, and time  She appears well-developed and well-nourished  HENT:   Head: Normocephalic and atraumatic     Right Ear: Hearing, tympanic membrane, external ear and ear canal normal    Left Ear: Hearing, tympanic membrane, external ear and ear canal normal    Nose: Nose normal    Mouth/Throat: Uvula is midline, oropharynx is clear and moist and mucous membranes are normal  Eyes: Pupils are equal, round, and reactive to light  EOM are normal  Left eye exhibits hordeolum (Lower)  Right conjunctiva is not injected  Right conjunctiva has no hemorrhage  Left conjunctiva is injected  Left conjunctiva has no hemorrhage  Small pedunculated papule left upper eyelid and eyelashes   Neck: Normal range of motion  Neck supple  Cardiovascular: Normal rate, regular rhythm, normal heart sounds and intact distal pulses  Pulmonary/Chest: Effort normal and breath sounds normal    Musculoskeletal: Normal range of motion  Neurological: She is alert and oriented to person, place, and time  Skin: Skin is warm and dry  Psychiatric: She has a normal mood and affect  Her behavior is normal  Judgment and thought content normal    Nursing note and vitals reviewed        Current Outpatient Medications on File Prior to Visit   Medication Sig Dispense Refill    albuterol (VENTOLIN HFA) 90 mcg/act inhaler Inhale 2 puffs every 6 (six) hours as needed for wheezing or shortness of breath 1 Inhaler 0    aspirin 81 MG tablet Take 81 mg by mouth daily      Cholecalciferol (VITAMIN D) 2000 units CAPS Take 1 capsule by mouth daily      citalopram (CeleXA) 40 mg tablet Take 1 tablet (40 mg total) by mouth daily 30 tablet 5    furosemide (LASIX) 20 mg tablet take 1 tablet by mouth once daily 30 tablet 5    loratadine (CLARITIN) 10 mg tablet Take 1 tablet (10 mg total) by mouth daily 30 tablet 5    multivitamin (THERAGRAN) TABS Take 1 tablet by mouth daily      omeprazole (PriLOSEC) 20 mg delayed release capsule Take 1 capsule (20 mg total) by mouth daily 30 capsule 1    topiramate (TOPAMAX) 100 mg tablet Take 1 tablet by mouth daily      [DISCONTINUED] rizatriptan (MAXALT) 10 MG tablet Take 10 mg by mouth once as needed for migraine       butalbital-acetaminophen-caffeine (FIORICET,ESGIC) -40 mg per tablet Take 1 tablet by mouth every 6 (six) hours       No current facility-administered medications on file prior to visit

## 2019-10-02 DIAGNOSIS — R60.0 BILATERAL LOWER EXTREMITY EDEMA: ICD-10-CM

## 2019-10-02 DIAGNOSIS — K21.9 GASTROESOPHAGEAL REFLUX DISEASE, ESOPHAGITIS PRESENCE NOT SPECIFIED: ICD-10-CM

## 2019-10-02 RX ORDER — FUROSEMIDE 20 MG/1
20 TABLET ORAL DAILY
Qty: 30 TABLET | Refills: 5 | Status: SHIPPED | OUTPATIENT
Start: 2019-10-02 | End: 2020-02-17 | Stop reason: SDUPTHER

## 2019-10-02 RX ORDER — OMEPRAZOLE 20 MG/1
20 CAPSULE, DELAYED RELEASE ORAL DAILY
Qty: 30 CAPSULE | Refills: 1 | Status: SHIPPED | OUTPATIENT
Start: 2019-10-02 | End: 2020-01-03

## 2019-10-08 ENCOUNTER — TELEPHONE (OUTPATIENT)
Dept: FAMILY MEDICINE CLINIC | Facility: CLINIC | Age: 54
End: 2019-10-08

## 2019-10-08 NOTE — TELEPHONE ENCOUNTER
Patient stopped into the office    Her rx's for Prilosec and Lasix were called in to [facility], but she also needs :    topiramate (TOPAMAX) 100 mg tablet, take 1 tablet by mouth daily    Citalopram (celexa) 40 mg tablet, take 1 tablet by mouth daily  30 tablets    [id number]    Thanks      **I don't see Topamax in her chart since 2017, but she said she has been on it forever

## 2019-10-10 DIAGNOSIS — F41.1 GENERALIZED ANXIETY DISORDER: ICD-10-CM

## 2019-10-10 DIAGNOSIS — G43.909 MIGRAINE WITHOUT STATUS MIGRAINOSUS, NOT INTRACTABLE, UNSPECIFIED MIGRAINE TYPE: Primary | ICD-10-CM

## 2019-10-10 RX ORDER — CITALOPRAM 40 MG/1
40 TABLET ORAL DAILY
Qty: 30 TABLET | Refills: 5 | Status: SHIPPED | OUTPATIENT
Start: 2019-10-10 | End: 2020-02-17 | Stop reason: SDUPTHER

## 2019-10-10 RX ORDER — TOPIRAMATE 100 MG/1
100 TABLET, FILM COATED ORAL DAILY
Qty: 30 TABLET | Refills: 5 | Status: SHIPPED | OUTPATIENT
Start: 2019-10-10 | End: 2020-06-07

## 2019-11-14 ENCOUNTER — TELEPHONE (OUTPATIENT)
Dept: FAMILY MEDICINE CLINIC | Facility: CLINIC | Age: 54
End: 2019-11-14

## 2019-11-14 NOTE — TELEPHONE ENCOUNTER
LMOM for patient to call the office and let us know if she either went outside of [facility] for [place] or no longer needs order to be removed

## 2020-01-02 ENCOUNTER — APPOINTMENT (OUTPATIENT)
Dept: RADIOLOGY | Facility: CLINIC | Age: 55
End: 2020-01-02
Payer: COMMERCIAL

## 2020-01-02 ENCOUNTER — OFFICE VISIT (OUTPATIENT)
Dept: FAMILY MEDICINE CLINIC | Facility: CLINIC | Age: 55
End: 2020-01-02
Payer: COMMERCIAL

## 2020-01-02 VITALS
DIASTOLIC BLOOD PRESSURE: 78 MMHG | OXYGEN SATURATION: 99 % | BODY MASS INDEX: 43.57 KG/M2 | RESPIRATION RATE: 17 BRPM | HEART RATE: 79 BPM | WEIGHT: 219 LBS | TEMPERATURE: 97.1 F | SYSTOLIC BLOOD PRESSURE: 126 MMHG

## 2020-01-02 DIAGNOSIS — Z13.220 SCREENING FOR LIPID DISORDERS: ICD-10-CM

## 2020-01-02 DIAGNOSIS — Z13.6 SCREENING FOR CARDIOVASCULAR CONDITION: ICD-10-CM

## 2020-01-02 DIAGNOSIS — G89.29 CHRONIC PAIN OF RIGHT KNEE: Primary | ICD-10-CM

## 2020-01-02 DIAGNOSIS — M25.561 CHRONIC PAIN OF RIGHT KNEE: ICD-10-CM

## 2020-01-02 DIAGNOSIS — R79.89 ELEVATED TSH: ICD-10-CM

## 2020-01-02 DIAGNOSIS — M25.561 CHRONIC PAIN OF RIGHT KNEE: Primary | ICD-10-CM

## 2020-01-02 DIAGNOSIS — G89.29 CHRONIC PAIN OF RIGHT KNEE: ICD-10-CM

## 2020-01-02 DIAGNOSIS — Z13.1 SCREENING FOR DIABETES MELLITUS: ICD-10-CM

## 2020-01-02 PROCEDURE — 99213 OFFICE O/P EST LOW 20 MIN: CPT | Performed by: NURSE PRACTITIONER

## 2020-01-02 PROCEDURE — 73564 X-RAY EXAM KNEE 4 OR MORE: CPT

## 2020-01-02 RX ORDER — LIDOCAINE 50 MG/G
1 PATCH TOPICAL DAILY
Qty: 30 PATCH | Refills: 0 | Status: SHIPPED | OUTPATIENT
Start: 2020-01-02

## 2020-01-02 RX ORDER — MELOXICAM 15 MG/1
15 TABLET ORAL DAILY
Qty: 30 TABLET | Refills: 1 | Status: SHIPPED | OUTPATIENT
Start: 2020-01-02 | End: 2021-04-13 | Stop reason: ALTCHOICE

## 2020-01-02 NOTE — PROGRESS NOTES
[facility]    ASSESSMENT AND PLAN     1  Chronic pain of right knee  C/O ongoing chronic right knee pain  Worsening over the last 2-3 weeks  Assessment today as below  Obtain x-ray  Rx today for meloxicam and Lidoderm patches  Dose and use reviewed  Avoid other NSAIDs  Continue ice/heat  Wrap for stability  Contact once x-ray resulted with further plan/treatment as indicated  Possible candidate for joint injection  And/or referral to Ortho    - XR knee 4+ vw right injury; Future  - meloxicam (MOBIC) 15 mg tablet; Take 1 tablet (15 mg total) by mouth daily  Dispense: 30 tablet; Refill: 1  - lidocaine (LIDODERM) 5 %; Apply 1 patch topically daily Apply in am and  remove in pm  12 hour on, 12 hour off  Dispense: 30 patch; Refill: 0      NOTE:  Due for lab work and 6 month checkup  Lab orders placed today  SUBJECTIVE       Patient ID: Marleni Garcia is a 47 y o  female  Chief Complaint   Patient presents with    R knee pain     x 3 weeks  States pain is getting worse  Has taken Motrin,Aleve and heating pad with no relief       HISTORY OF PRESENT ILLNESS    Presents today with complaint of knee pain  She has had the symptoms intermittently for the last several years, but notes it worsening for the last few weeks  Pain is aching in quanlity  Sharp and shooting at times  Admits to having arthritis in both knees  She did have a steroid injection years ago  States it did relieve the pain for quite some time  The following portions of the patient's history were reviewed and updated as appropriate: allergies, current medications, past family history, past medical history, past social history, past surgical history and problem list     REVIEW OF SYSTEMS  Review of Systems   Musculoskeletal: Positive for arthralgias (Right knee), gait problem ( secondary to right knee pain) and joint swelling         OBJECTIVE      VITAL SIGNS  /78 (BP Location: Left arm, Patient Position: Sitting, Cuff Size: Adult)   Pulse 79   Temp (!) 97 1 °F (36 2 °C) (Tympanic)   Resp 17   Wt 99 3 kg (219 lb)   LMP 03/18/1997 (Exact Date)   SpO2 99%   Breastfeeding?  No   BMI 43 57 kg/m²     CURRENT MEDICATIONS    Current Outpatient Medications:     albuterol (VENTOLIN HFA) 90 mcg/act inhaler, Inhale 2 puffs every 6 (six) hours as needed for wheezing or shortness of breath, Disp: 1 Inhaler, Rfl: 0    aspirin 81 MG tablet, Take 81 mg by mouth daily, Disp: , Rfl:     butalbital-acetaminophen-caffeine (FIORICET,ESGIC) -40 mg per tablet, Take 1 tablet by mouth every 6 (six) hours, Disp: , Rfl:     Cholecalciferol (VITAMIN D) 2000 units CAPS, Take 1 capsule by mouth daily, Disp: , Rfl:     citalopram (CeleXA) 40 mg tablet, Take 1 tablet (40 mg total) by mouth daily, Disp: 30 tablet, Rfl: 5    cromolyn (OPTICROM) 4 % ophthalmic solution, Administer 1 drop to both eyes 4 (four) times a day, Disp: 10 mL, Rfl: 1    furosemide (LASIX) 20 mg tablet, Take 1 tablet (20 mg total) by mouth daily, Disp: 30 tablet, Rfl: 5    loratadine (CLARITIN) 10 mg tablet, Take 1 tablet (10 mg total) by mouth daily, Disp: 30 tablet, Rfl: 5    multivitamin (THERAGRAN) TABS, Take 1 tablet by mouth daily, Disp: , Rfl:     ofloxacin (OCUFLOX) 0 3 % ophthalmic solution, Administer 1 drop into the left eye 4 (four) times a day, Disp: 5 mL, Rfl: 0    omeprazole (PriLOSEC) 20 mg delayed release capsule, Take 1 capsule (20 mg total) by mouth daily, Disp: 30 capsule, Rfl: 1    rizatriptan (MAXALT) 10 MG tablet, Take 1 tablet (10 mg total) by mouth once as needed for migraine, Disp: 9 tablet, Rfl: 1    topiramate (TOPAMAX) 100 mg tablet, Take 1 tablet (100 mg total) by mouth daily, Disp: 30 tablet, Rfl: 5    lidocaine (LIDODERM) 5 %, Apply 1 patch topically daily Apply in am and  remove in pm  12 hour on, 12 hour off, Disp: 30 patch, Rfl: 0    meloxicam (MOBIC) 15 mg tablet, Take 1 tablet (15 mg total) by mouth daily, Disp: 30 tablet, Rfl: 1      PHYSICAL EXAMINATION   Physical Exam   Constitutional: She is oriented to person, place, and time  Vital signs are normal  She appears well-developed and well-nourished  Musculoskeletal:        Right knee: She exhibits decreased range of motion and swelling  She exhibits no ecchymosis and no erythema  Tenderness found  Lateral joint line tenderness noted  Distal pulses intact  Neurological: She is alert and oriented to person, place, and time  Psychiatric: She has a normal mood and affect  Thought content normal    Nursing note and vitals reviewed

## 2020-01-03 DIAGNOSIS — K21.9 GASTROESOPHAGEAL REFLUX DISEASE, ESOPHAGITIS PRESENCE NOT SPECIFIED: ICD-10-CM

## 2020-01-03 RX ORDER — OMEPRAZOLE 20 MG/1
CAPSULE, DELAYED RELEASE ORAL
Qty: 30 CAPSULE | Refills: 0 | Status: SHIPPED | OUTPATIENT
Start: 2020-01-03 | End: 2020-01-31

## 2020-01-09 ENCOUNTER — TELEPHONE (OUTPATIENT)
Dept: FAMILY MEDICINE CLINIC | Facility: CLINIC | Age: 55
End: 2020-01-09

## 2020-01-09 NOTE — TELEPHONE ENCOUNTER
[facility],  [address] DENIED THROUGH [facility]  DX USED IS NOT AN FDA COVERED DIAGNOSIS  I DID LM FOR PT TO CALL BACK IF SHE NEEDS SOMETHING ELSE SENT TO THE PHARMACY

## 2020-01-31 DIAGNOSIS — K21.9 GASTROESOPHAGEAL REFLUX DISEASE, ESOPHAGITIS PRESENCE NOT SPECIFIED: ICD-10-CM

## 2020-01-31 RX ORDER — OMEPRAZOLE 20 MG/1
CAPSULE, DELAYED RELEASE ORAL
Qty: 30 CAPSULE | Refills: 0 | Status: SHIPPED | OUTPATIENT
Start: 2020-01-31 | End: 2020-02-17 | Stop reason: SDUPTHER

## 2020-02-03 DIAGNOSIS — J30.9 ALLERGIC RHINITIS, UNSPECIFIED SEASONALITY, UNSPECIFIED TRIGGER: ICD-10-CM

## 2020-02-03 RX ORDER — LORATADINE 10 MG/1
10 TABLET ORAL DAILY
Qty: 30 TABLET | Refills: 1 | Status: SHIPPED | OUTPATIENT
Start: 2020-02-03 | End: 2020-04-14 | Stop reason: SDUPTHER

## 2020-02-03 NOTE — TELEPHONE ENCOUNTER
LOV 6/6/19    No future visits I did call pt to schedule a 6m check with our office  Last fill 1/3/20  ra loratadine 10 mg tab  Take 1 tab by mouth daily    #30/R-5

## 2020-02-10 DIAGNOSIS — G43.909 MIGRAINE WITHOUT STATUS MIGRAINOSUS, NOT INTRACTABLE, UNSPECIFIED MIGRAINE TYPE: ICD-10-CM

## 2020-02-10 RX ORDER — RIZATRIPTAN BENZOATE 10 MG/1
10 TABLET ORAL ONCE AS NEEDED
Qty: 9 TABLET | Refills: 1 | Status: SHIPPED | OUTPATIENT
Start: 2020-02-10 | End: 2020-06-07

## 2020-02-10 NOTE — TELEPHONE ENCOUNTER
Pt needs a new script for    Rizatriptan (maxalt) 10 mg tab   Dose 10 mg  Qty 9 tab       [facility]

## 2020-02-17 ENCOUNTER — OFFICE VISIT (OUTPATIENT)
Dept: FAMILY MEDICINE CLINIC | Facility: CLINIC | Age: 55
End: 2020-02-17
Payer: COMMERCIAL

## 2020-02-17 VITALS
SYSTOLIC BLOOD PRESSURE: 118 MMHG | OXYGEN SATURATION: 98 % | BODY MASS INDEX: 43.09 KG/M2 | TEMPERATURE: 98.6 F | DIASTOLIC BLOOD PRESSURE: 76 MMHG | WEIGHT: 219.5 LBS | HEIGHT: 60 IN | RESPIRATION RATE: 17 BRPM | HEART RATE: 85 BPM

## 2020-02-17 DIAGNOSIS — R60.0 BILATERAL LOWER EXTREMITY EDEMA: ICD-10-CM

## 2020-02-17 DIAGNOSIS — F41.1 GENERALIZED ANXIETY DISORDER: Primary | ICD-10-CM

## 2020-02-17 DIAGNOSIS — G43.909 MIGRAINE WITHOUT STATUS MIGRAINOSUS, NOT INTRACTABLE, UNSPECIFIED MIGRAINE TYPE: ICD-10-CM

## 2020-02-17 DIAGNOSIS — L72.9 SCALP CYST: ICD-10-CM

## 2020-02-17 DIAGNOSIS — K21.9 GASTROESOPHAGEAL REFLUX DISEASE, ESOPHAGITIS PRESENCE NOT SPECIFIED: ICD-10-CM

## 2020-02-17 DIAGNOSIS — G25.81 RESTLESS LEG: ICD-10-CM

## 2020-02-17 DIAGNOSIS — Z12.31 ENCOUNTER FOR SCREENING MAMMOGRAM FOR BREAST CANCER: ICD-10-CM

## 2020-02-17 PROCEDURE — 99214 OFFICE O/P EST MOD 30 MIN: CPT | Performed by: NURSE PRACTITIONER

## 2020-02-17 PROCEDURE — 3008F BODY MASS INDEX DOCD: CPT | Performed by: NURSE PRACTITIONER

## 2020-02-17 PROCEDURE — 1036F TOBACCO NON-USER: CPT | Performed by: NURSE PRACTITIONER

## 2020-02-17 RX ORDER — ROPINIROLE 0.25 MG/1
0.25 TABLET, FILM COATED ORAL
Qty: 60 TABLET | Refills: 0 | Status: SHIPPED | OUTPATIENT
Start: 2020-02-17 | End: 2021-04-13 | Stop reason: ALTCHOICE

## 2020-02-17 RX ORDER — FUROSEMIDE 20 MG/1
20 TABLET ORAL DAILY
Qty: 30 TABLET | Refills: 5 | Status: SHIPPED | OUTPATIENT
Start: 2020-02-17 | End: 2020-11-04 | Stop reason: SDUPTHER

## 2020-02-17 RX ORDER — CITALOPRAM 40 MG/1
40 TABLET ORAL DAILY
Qty: 30 TABLET | Refills: 5 | Status: SHIPPED | OUTPATIENT
Start: 2020-02-17 | End: 2020-07-13 | Stop reason: SDUPTHER

## 2020-02-17 RX ORDER — OMEPRAZOLE 20 MG/1
20 CAPSULE, DELAYED RELEASE ORAL DAILY
Qty: 30 CAPSULE | Refills: 5 | Status: SHIPPED | OUTPATIENT
Start: 2020-02-17 | End: 2020-11-04 | Stop reason: SDUPTHER

## 2020-02-17 NOTE — PROGRESS NOTES
[address]    Patient presents today for six-month checkup  Physical assessment and plan today as below  Health maintenance reviewed  She is due for her 6 month lab work  Orders have been in place since January  Encouraged compliance  Up-to-date with dental and vision  Overdue for gyn  Note history of endometrial adenocarcinoma  S/p chemo  Due for mammo  Last 01/2019  Patient states she does not have time to take care of herself, as she takes care of her  all of his health needs  Long discussion today  She appears overwhelmed at times  Discussed therapy, as an outlet for her  She declines at this time  Will continue to monitor closely  Will contact her once her labs are resulted with further plan/treatment as indicated  Otherwise return in 6 months  Sooner if needed    ASSESSMENT AND PLAN       1  Generalized anxiety disorder  Appears to be doing relatively well on citalopram 40 daily  States she does get overwhelmed at times with the dizziness of her day-to-day life  Her  has many chronic conditions, and she needs to do most of the house responsibilities as well as assist him with his healthcare needs  We did discuss she may benefit from therapy  She declines at this time, but we will continue to revisit at her subsequent checkups  - citalopram (CeleXA) 40 mg tablet; Take 1 tablet (40 mg total) by mouth daily  Dispense: 30 tablet; Refill: 5    2  Gastroesophageal reflux disease, esophagitis presence not specified  Appears well controlled today on omeprazole 20 mg daily  No change to treatment at this time    - omeprazole (PriLOSEC) 20 mg delayed release capsule; Take 1 capsule (20 mg total) by mouth daily  Dispense: 30 capsule; Refill: 5    3  Bilateral lower extremity edema  Continues with Lasix 20 mg  Admits to not taking daily  Appears to be controlling her lower extremity edema  Will continue same treatment at this time    She is overdue for CMP to assess electrolytes  Encouraged compliance    - furosemide (LASIX) 20 mg tablet; Take 1 tablet (20 mg total) by mouth daily  Dispense: 30 tablet; Refill: 5    4  Scalp cyst  Cyst noted on scalp, left parietal region  States it has been getting larger  Denies pain but does complain of occasional itching  Recommend to Dermatology for possible biopsy    - Ambulatory referral to Dermatology; Future    5  Restless leg  Symptoms consistent with restless leg  Options reviewed  Rx for Requip, low-dose at bedtime  Trial for symptom relief  Patient to let me know its effectiveness    - rOPINIRole (REQUIP) 0 25 mg tablet; Take 1 tablet (0 25 mg total) by mouth daily at bedtime  Dispense: 60 tablet; Refill: 0    6  Encounter for screening mammogram for breast cancer  Order placed    - Mammo screening bilateral w 3d & cad; Future      7  Migraine without status migrainosus, not intractable, unspecified migraine type  Appears to be well managed on Topamax  Last Neurology 2018  Continue same treatment without change at this time       SUBJECTIVE       Patient ID: Felecia Saavedra is a 47 y o  female  Chief Complaint   Patient presents with    Follow-up     6m check up to chronic conditions with no recent labs        HISTORY OF PRESENT ILLNESS    Presents today for six-month checkup  The following portions of the patient's history were reviewed and updated as appropriate: allergies, current medications, past family history, past medical history, past social history, past surgical history and problem list     REVIEW OF SYSTEMS  Review of Systems   Constitutional: Negative  HENT: Negative  Respiratory: Negative  Cardiovascular: Negative  Gastrointestinal: Negative  Genitourinary: Negative  Musculoskeletal:        Restless legs at HS   Neurological: Negative  Psychiatric/Behavioral: Negative          OBJECTIVE      VITAL SIGNS  /76 (BP Location: Left arm, Patient Position: Sitting, Cuff Size: Large)   Pulse 85   Temp 98 6 °F (37 °C) (Tympanic)   Resp 17   Ht 5' 0 24" (1 53 m)   Wt 99 6 kg (219 lb 8 oz)   LMP 03/18/1997 (Exact Date)   SpO2 98%   Breastfeeding No   BMI 42 53 kg/m²     CURRENT MEDICATIONS    Current Outpatient Medications:     albuterol (VENTOLIN HFA) 90 mcg/act inhaler, Inhale 2 puffs every 6 (six) hours as needed for wheezing or shortness of breath, Disp: 1 Inhaler, Rfl: 0    aspirin 81 MG tablet, Take 81 mg by mouth daily, Disp: , Rfl:     butalbital-acetaminophen-caffeine (FIORICET,ESGIC) -40 mg per tablet, Take 1 tablet by mouth every 6 (six) hours, Disp: , Rfl:     Cholecalciferol (VITAMIN D) 2000 units CAPS, Take 1 capsule by mouth daily, Disp: , Rfl:     citalopram (CeleXA) 40 mg tablet, Take 1 tablet (40 mg total) by mouth daily, Disp: 30 tablet, Rfl: 5    cromolyn (OPTICROM) 4 % ophthalmic solution, Administer 1 drop to both eyes 4 (four) times a day, Disp: 10 mL, Rfl: 1    furosemide (LASIX) 20 mg tablet, Take 1 tablet (20 mg total) by mouth daily, Disp: 30 tablet, Rfl: 5    lidocaine (LIDODERM) 5 %, Apply 1 patch topically daily Apply in am and  remove in pm  12 hour on, 12 hour off, Disp: 30 patch, Rfl: 0    loratadine (CLARITIN) 10 mg tablet, Take 1 tablet (10 mg total) by mouth daily, Disp: 30 tablet, Rfl: 1    meloxicam (MOBIC) 15 mg tablet, Take 1 tablet (15 mg total) by mouth daily, Disp: 30 tablet, Rfl: 1    multivitamin (THERAGRAN) TABS, Take 1 tablet by mouth daily, Disp: , Rfl:     ofloxacin (OCUFLOX) 0 3 % ophthalmic solution, Administer 1 drop into the left eye 4 (four) times a day, Disp: 5 mL, Rfl: 0    omeprazole (PriLOSEC) 20 mg delayed release capsule, Take 1 capsule (20 mg total) by mouth daily, Disp: 30 capsule, Rfl: 5    rizatriptan (MAXALT) 10 MG tablet, Take 1 tablet (10 mg total) by mouth once as needed for migraine, Disp: 9 tablet, Rfl: 1    topiramate (TOPAMAX) 100 mg tablet, Take 1 tablet (100 mg total) by mouth daily, Disp: 30 tablet, Rfl: 5    rOPINIRole (REQUIP) 0 25 mg tablet, Take 1 tablet (0 25 mg total) by mouth daily at bedtime, Disp: 60 tablet, Rfl: 0      PHYSICAL EXAMINATION   Physical Exam   Constitutional: She is oriented to person, place, and time  Vital signs are normal  She appears well-developed and well-nourished  HENT:   Head:       Right Ear: Tympanic membrane and ear canal normal    Left Ear: Tympanic membrane and ear canal normal    Nose: Nose normal    Neck: Carotid bruit is not present  Cardiovascular: Normal rate, regular rhythm and normal heart sounds  Negative lower extremity edema today   Pulmonary/Chest: Effort normal and breath sounds normal  No respiratory distress  Lymphadenopathy:        Head (right side): No submandibular and no tonsillar adenopathy present  Head (left side): No submandibular and no tonsillar adenopathy present  She has no cervical adenopathy  Neurological: She is alert and oriented to person, place, and time  Psychiatric: She has a normal mood and affect  Thought content normal    Nursing note and vitals reviewed

## 2020-04-14 DIAGNOSIS — J30.9 ALLERGIC RHINITIS, UNSPECIFIED SEASONALITY, UNSPECIFIED TRIGGER: ICD-10-CM

## 2020-04-14 RX ORDER — LORATADINE 10 MG/1
10 TABLET ORAL DAILY
Qty: 30 TABLET | Refills: 1 | Status: SHIPPED | OUTPATIENT
Start: 2020-04-14 | End: 2020-07-13 | Stop reason: SDUPTHER

## 2020-06-07 DIAGNOSIS — G43.909 MIGRAINE WITHOUT STATUS MIGRAINOSUS, NOT INTRACTABLE, UNSPECIFIED MIGRAINE TYPE: ICD-10-CM

## 2020-06-07 RX ORDER — RIZATRIPTAN BENZOATE 10 MG/1
TABLET ORAL
Qty: 9 TABLET | Refills: 1 | Status: SHIPPED | OUTPATIENT
Start: 2020-06-07 | End: 2020-11-24 | Stop reason: SDUPTHER

## 2020-06-07 RX ORDER — TOPIRAMATE 100 MG/1
TABLET, FILM COATED ORAL
Qty: 30 TABLET | Refills: 5 | Status: SHIPPED | OUTPATIENT
Start: 2020-06-07 | End: 2021-01-26

## 2020-07-13 DIAGNOSIS — F41.1 GENERALIZED ANXIETY DISORDER: ICD-10-CM

## 2020-07-13 DIAGNOSIS — J30.9 ALLERGIC RHINITIS, UNSPECIFIED SEASONALITY, UNSPECIFIED TRIGGER: ICD-10-CM

## 2020-07-14 RX ORDER — LORATADINE 10 MG/1
10 TABLET ORAL DAILY
Qty: 30 TABLET | Refills: 1 | Status: SHIPPED | OUTPATIENT
Start: 2020-07-14

## 2020-07-14 RX ORDER — CITALOPRAM 40 MG/1
40 TABLET ORAL DAILY
Qty: 30 TABLET | Refills: 5 | Status: SHIPPED | OUTPATIENT
Start: 2020-07-14 | End: 2021-03-01 | Stop reason: SDUPTHER

## 2020-10-01 ENCOUNTER — DOCUMENTATION (OUTPATIENT)
Dept: FAMILY MEDICINE CLINIC | Facility: CLINIC | Age: 55
End: 2020-10-01

## 2020-11-04 DIAGNOSIS — K21.9 GASTROESOPHAGEAL REFLUX DISEASE: ICD-10-CM

## 2020-11-04 DIAGNOSIS — R60.0 BILATERAL LOWER EXTREMITY EDEMA: ICD-10-CM

## 2020-11-04 RX ORDER — OMEPRAZOLE 20 MG/1
20 CAPSULE, DELAYED RELEASE ORAL DAILY
Qty: 30 CAPSULE | Refills: 5 | Status: SHIPPED | OUTPATIENT
Start: 2020-11-04 | End: 2021-06-16

## 2020-11-04 RX ORDER — FUROSEMIDE 20 MG/1
20 TABLET ORAL DAILY
Qty: 30 TABLET | Refills: 5 | Status: SHIPPED | OUTPATIENT
Start: 2020-11-04 | End: 2021-06-16

## 2020-11-05 ENCOUNTER — TELEMEDICINE (OUTPATIENT)
Dept: FAMILY MEDICINE CLINIC | Facility: CLINIC | Age: 55
End: 2020-11-05
Payer: COMMERCIAL

## 2020-11-05 DIAGNOSIS — J06.9 UPPER RESPIRATORY TRACT INFECTION, UNSPECIFIED TYPE: Primary | ICD-10-CM

## 2020-11-05 PROCEDURE — 99213 OFFICE O/P EST LOW 20 MIN: CPT | Performed by: NURSE PRACTITIONER

## 2020-11-05 PROCEDURE — 1036F TOBACCO NON-USER: CPT | Performed by: NURSE PRACTITIONER

## 2020-11-05 RX ORDER — DOXYCYCLINE HYCLATE 100 MG/1
100 CAPSULE ORAL EVERY 12 HOURS SCHEDULED
Qty: 20 CAPSULE | Refills: 0 | Status: SHIPPED | OUTPATIENT
Start: 2020-11-05 | End: 2020-11-15

## 2020-11-24 DIAGNOSIS — G43.909 MIGRAINE WITHOUT STATUS MIGRAINOSUS, NOT INTRACTABLE, UNSPECIFIED MIGRAINE TYPE: ICD-10-CM

## 2020-11-24 RX ORDER — RIZATRIPTAN BENZOATE 10 MG/1
10 TABLET ORAL ONCE AS NEEDED
Qty: 9 TABLET | Refills: 1 | Status: SHIPPED | OUTPATIENT
Start: 2020-11-24 | End: 2021-03-29 | Stop reason: SDUPTHER

## 2021-01-26 DIAGNOSIS — G43.909 MIGRAINE WITHOUT STATUS MIGRAINOSUS, NOT INTRACTABLE, UNSPECIFIED MIGRAINE TYPE: ICD-10-CM

## 2021-01-26 RX ORDER — TOPIRAMATE 100 MG/1
TABLET, FILM COATED ORAL
Qty: 30 TABLET | Refills: 5 | Status: SHIPPED | OUTPATIENT
Start: 2021-01-26 | End: 2021-08-25

## 2021-03-01 DIAGNOSIS — F41.1 GENERALIZED ANXIETY DISORDER: ICD-10-CM

## 2021-03-01 RX ORDER — CITALOPRAM 40 MG/1
40 TABLET ORAL DAILY
Qty: 30 TABLET | Refills: 5 | Status: SHIPPED | OUTPATIENT
Start: 2021-03-01 | End: 2021-10-08 | Stop reason: SDUPTHER

## 2021-03-29 DIAGNOSIS — G43.909 MIGRAINE WITHOUT STATUS MIGRAINOSUS, NOT INTRACTABLE, UNSPECIFIED MIGRAINE TYPE: ICD-10-CM

## 2021-03-29 RX ORDER — RIZATRIPTAN BENZOATE 10 MG/1
10 TABLET ORAL ONCE AS NEEDED
Qty: 9 TABLET | Refills: 1 | Status: SHIPPED | OUTPATIENT
Start: 2021-03-29 | End: 2021-08-25 | Stop reason: SDUPTHER

## 2021-04-13 ENCOUNTER — OFFICE VISIT (OUTPATIENT)
Dept: FAMILY MEDICINE CLINIC | Facility: CLINIC | Age: 56
End: 2021-04-13
Payer: COMMERCIAL

## 2021-04-13 VITALS
HEIGHT: 59 IN | HEART RATE: 85 BPM | SYSTOLIC BLOOD PRESSURE: 138 MMHG | TEMPERATURE: 99.5 F | OXYGEN SATURATION: 99 % | DIASTOLIC BLOOD PRESSURE: 92 MMHG | WEIGHT: 230.4 LBS | BODY MASS INDEX: 46.45 KG/M2

## 2021-04-13 DIAGNOSIS — L72.9 SCALP CYST: Primary | ICD-10-CM

## 2021-04-13 DIAGNOSIS — F41.1 GENERALIZED ANXIETY DISORDER: ICD-10-CM

## 2021-04-13 DIAGNOSIS — G43.909 MIGRAINE WITHOUT STATUS MIGRAINOSUS, NOT INTRACTABLE, UNSPECIFIED MIGRAINE TYPE: ICD-10-CM

## 2021-04-13 DIAGNOSIS — Z13.6 SCREENING FOR CARDIOVASCULAR CONDITION: ICD-10-CM

## 2021-04-13 DIAGNOSIS — Z12.31 ENCOUNTER FOR SCREENING MAMMOGRAM FOR BREAST CANCER: ICD-10-CM

## 2021-04-13 DIAGNOSIS — L98.9 SKIN LESION: ICD-10-CM

## 2021-04-13 DIAGNOSIS — Z13.220 SCREENING FOR LIPID DISORDERS: ICD-10-CM

## 2021-04-13 DIAGNOSIS — R79.89 ELEVATED TSH: ICD-10-CM

## 2021-04-13 DIAGNOSIS — Z13.1 SCREENING FOR DIABETES MELLITUS: ICD-10-CM

## 2021-04-13 DIAGNOSIS — R60.0 BILATERAL LOWER EXTREMITY EDEMA: ICD-10-CM

## 2021-04-13 DIAGNOSIS — K21.9 GASTROESOPHAGEAL REFLUX DISEASE, UNSPECIFIED WHETHER ESOPHAGITIS PRESENT: ICD-10-CM

## 2021-04-13 PROCEDURE — 99214 OFFICE O/P EST MOD 30 MIN: CPT | Performed by: NURSE PRACTITIONER

## 2021-04-13 PROCEDURE — 11102 TANGNTL BX SKIN SINGLE LES: CPT | Performed by: NURSE PRACTITIONER

## 2021-04-13 PROCEDURE — 88304 TISSUE EXAM BY PATHOLOGIST: CPT | Performed by: PATHOLOGY

## 2021-04-13 PROCEDURE — 3725F SCREEN DEPRESSION PERFORMED: CPT | Performed by: NURSE PRACTITIONER

## 2021-04-14 NOTE — PROGRESS NOTES
[facility]    ASSESSMENT AND PLAN     1  Scalp cyst  Large cyst, forehead  Present for years  Referral to Derm in 2020  She did not follow up at that time, but would like to now  C/O occasional "tingling" feeling  Pain to touch at times  Referral given today  Encouraged follow up  - Ambulatory referral to Dermatology; Future    2  Encounter for screening mammogram for breast cancer  Over due  (2019) Encouraged compliance    - Mammo screening bilateral w 3d & cad; Future    3  Migraine without status migrainosus, not intractable, unspecified migraine type  Stable  Managed with Topamax and prn   Rizatriptan  Does not need refills today  Continue to monitor    4  Screening for cardiovascular condition   overdue for routine lab work  Last in 2019  Fasting orders placed today  Encouraged compliance    - CBC and differential; Future    5  Screening for lipid disorders    - Lipid panel; Future    6  Screening for diabetes mellitus    - Comprehensive metabolic panel; Future    7  Elevated TSH    - TSH, 3rd generation with Free T4 reflex; Future    8  BMI 45 0-49 9, adult (HCC)   diet and exercise reviewedBMI Counseling: Body mass index is 45 83 kg/m²  The BMI is above normal  Nutrition recommendations include decreasing portion sizes, decreasing fast food intake, consuming healthier snacks, increasing intake of lean protein and reducing intake of saturated and trans fat  Exercise recommendations include exercising 3-5 times per week  Depression Screening and Follow-up Plan: Patient's depression screening was positive with a PHQ-2 score of 1  Clincally patient does not have depression  No treatment is required  9  Skin lesion   lesion consistent with skin tag removed from right anterior torso  Tolerated well without issue  See below  Pt aware insurance may not pay    - Tissue Exam    10  Bilateral lower extremity edema    Continues with Lasix 20 p r n  Corinda Husain for lower extremity edema  Well overdue for CMP/potassium check  Encouraged today  No refill needed    11  Generalized anxiety disorder     Stable  Continues on Celexa 40  No change in treatment at this time  Denies depression    12  Gastroesophageal reflux disease, unspecified whether esophagitis present   stable  Appears well controlled on omeprazole 20  No refill needed today  Will continue to monitor    Due for annual physical with next follow up 6 months    SUBJECTIVE       Patient ID: Crow Swann is a 54 y o  female  Chief Complaint   Patient presents with    Cyst     Cyst on forehead- she states it is painful and is causing numbness    Skin Problem     Interested in having skin tags removed from right torso       HISTORY OF PRESENT ILLNESS     Presents today for routine checkup  Overdue  Skin concerns today: Cyst on forehead  Itchy skin tag on right torso        The following portions of the patient's history were reviewed and updated as appropriate: allergies, current medications, past family history, past medical history, past social history, past surgical history and problem list     REVIEW OF SYSTEMS  Review of Systems   Constitutional: Negative  HENT: Negative  Respiratory: Negative  Cardiovascular: Negative  Gastrointestinal: Negative  Genitourinary: Negative  Skin:        Cyst  Skin tags   Neurological: Negative  Psychiatric/Behavioral: Negative          OBJECTIVE      VITAL SIGNS  /92 (BP Location: Left arm, Patient Position: Sitting, Cuff Size: Adult)   Pulse 85   Temp 99 5 °F (37 5 °C)   Ht 4' 11 45" (1 51 m)   Wt 105 kg (230 lb 6 4 oz)   LMP 03/18/1997 (Exact Date)   SpO2 99%   BMI 45 83 kg/m²     CURRENT MEDICATIONS    Current Outpatient Medications:     albuterol (VENTOLIN HFA) 90 mcg/act inhaler, Inhale 2 puffs every 6 (six) hours as needed for wheezing or shortness of breath, Disp: 1 Inhaler, Rfl: 0    aspirin 81 MG tablet, Take 81 mg by mouth daily, Disp: , Rfl:   Cholecalciferol (VITAMIN D) 2000 units CAPS, Take 1 capsule by mouth daily, Disp: , Rfl:     citalopram (CeleXA) 40 mg tablet, Take 1 tablet (40 mg total) by mouth daily, Disp: 30 tablet, Rfl: 5    furosemide (LASIX) 20 mg tablet, Take 1 tablet (20 mg total) by mouth daily, Disp: 30 tablet, Rfl: 5    loratadine (CLARITIN) 10 mg tablet, Take 1 tablet (10 mg total) by mouth daily, Disp: 30 tablet, Rfl: 1    multivitamin (THERAGRAN) TABS, Take 1 tablet by mouth daily, Disp: , Rfl:     omeprazole (PriLOSEC) 20 mg delayed release capsule, Take 1 capsule (20 mg total) by mouth daily, Disp: 30 capsule, Rfl: 5    rizatriptan (MAXALT) 10 MG tablet, Take 1 tablet (10 mg total) by mouth once as needed for migraine for up to 1 dose May repeat in 2 hours if needed, Disp: 9 tablet, Rfl: 1    topiramate (TOPAMAX) 100 mg tablet, take 1 tablet by mouth once daily, Disp: 30 tablet, Rfl: 5    butalbital-acetaminophen-caffeine (FIORICET,ESGIC) -40 mg per tablet, Take 1 tablet by mouth every 6 (six) hours, Disp: , Rfl:     cromolyn (OPTICROM) 4 % ophthalmic solution, Administer 1 drop to both eyes 4 (four) times a day (Patient not taking: Reported on 4/13/2021), Disp: 10 mL, Rfl: 1    lidocaine (LIDODERM) 5 %, Apply 1 patch topically daily Apply in am and  remove in pm  12 hour on, 12 hour off (Patient not taking: Reported on 4/13/2021), Disp: 30 patch, Rfl: 0      PHYSICAL EXAMINATION   Physical Exam  Vitals signs and nursing note reviewed  Constitutional:       General: She is not in acute distress  Appearance: Normal appearance  She is well-developed  She is obese  She is not ill-appearing  HENT:      Head: Normocephalic          Right Ear: Tympanic membrane, ear canal and external ear normal       Left Ear: Tympanic membrane, ear canal and external ear normal       Nose: Nose normal       Mouth/Throat:      Mouth: Mucous membranes are moist    Eyes:      Conjunctiva/sclera: Conjunctivae normal  Pupils: Pupils are equal, round, and reactive to light  Neck:      Vascular: No carotid bruit  Cardiovascular:      Rate and Rhythm: Normal rate and regular rhythm  Pulmonary:      Effort: Pulmonary effort is normal  No respiratory distress  Breath sounds: Normal breath sounds and air entry  Abdominal:       Musculoskeletal:      Right lower leg: No edema  Left lower leg: No edema  Lymphadenopathy:      Cervical: No cervical adenopathy  Skin:     General: Skin is warm and dry  Neurological:      General: No focal deficit present  Mental Status: She is alert and oriented to person, place, and time  Psychiatric:         Attention and Perception: Attention normal          Mood and Affect: Mood normal          Speech: Speech normal          Behavior: Behavior normal          Thought Content: Thought content normal          Lesion Destruction    Date/Time: 4/13/2021 5:48 PM  Performed by: PASHA Pope  Authorized by: PASHA Pope   Universal Protocol:  Consent: Verbal consent obtained  Procedure Details - Lesion Destruction:     Number of Lesions:  1  Lesion 1:     Body area:  Trunk    Trunk location:  Abdomen    Initial size (mm):  0 5    Final defect size (mm):  0    Malignancy: benign lesion      Destruction method: surgical removal       Cleansed with betadine  Lidocaine/epi injected  Shave biopsy removal of skin lesion  Minimal bleeding  Silver stick  Abx oint/bandaide applied  Tolerated well with no issue    Bx: skin tag

## 2021-04-15 ENCOUNTER — APPOINTMENT (OUTPATIENT)
Dept: LAB | Facility: CLINIC | Age: 56
End: 2021-04-15
Payer: COMMERCIAL

## 2021-04-15 ENCOUNTER — TELEPHONE (OUTPATIENT)
Dept: FAMILY MEDICINE CLINIC | Facility: CLINIC | Age: 56
End: 2021-04-15

## 2021-04-15 DIAGNOSIS — Z13.1 SCREENING FOR DIABETES MELLITUS: ICD-10-CM

## 2021-04-15 DIAGNOSIS — Z13.6 SCREENING FOR CARDIOVASCULAR CONDITION: ICD-10-CM

## 2021-04-15 DIAGNOSIS — R79.89 ELEVATED TSH: ICD-10-CM

## 2021-04-15 DIAGNOSIS — Z13.220 SCREENING FOR LIPID DISORDERS: ICD-10-CM

## 2021-04-15 LAB
ALBUMIN SERPL BCP-MCNC: 3.4 G/DL (ref 3.5–5)
ALP SERPL-CCNC: 93 U/L (ref 46–116)
ALT SERPL W P-5'-P-CCNC: 16 U/L (ref 12–78)
ANION GAP SERPL CALCULATED.3IONS-SCNC: 5 MMOL/L (ref 4–13)
AST SERPL W P-5'-P-CCNC: 12 U/L (ref 5–45)
BASOPHILS # BLD AUTO: 0.04 THOUSANDS/ΜL (ref 0–0.1)
BASOPHILS NFR BLD AUTO: 1 % (ref 0–1)
BILIRUB SERPL-MCNC: 0.54 MG/DL (ref 0.2–1)
BUN SERPL-MCNC: 14 MG/DL (ref 5–25)
CALCIUM ALBUM COR SERPL-MCNC: 9.5 MG/DL (ref 8.3–10.1)
CALCIUM SERPL-MCNC: 9 MG/DL (ref 8.3–10.1)
CHLORIDE SERPL-SCNC: 107 MMOL/L (ref 100–108)
CHOLEST SERPL-MCNC: 205 MG/DL (ref 50–200)
CO2 SERPL-SCNC: 26 MMOL/L (ref 21–32)
CREAT SERPL-MCNC: 1 MG/DL (ref 0.6–1.3)
EOSINOPHIL # BLD AUTO: 0.14 THOUSAND/ΜL (ref 0–0.61)
EOSINOPHIL NFR BLD AUTO: 2 % (ref 0–6)
ERYTHROCYTE [DISTWIDTH] IN BLOOD BY AUTOMATED COUNT: 14.4 % (ref 11.6–15.1)
GFR SERPL CREATININE-BSD FRML MDRD: 64 ML/MIN/1.73SQ M
GLUCOSE P FAST SERPL-MCNC: 70 MG/DL (ref 65–99)
HCT VFR BLD AUTO: 37 % (ref 34.8–46.1)
HDLC SERPL-MCNC: 74 MG/DL
HGB BLD-MCNC: 11.9 G/DL (ref 11.5–15.4)
IMM GRANULOCYTES # BLD AUTO: 0.03 THOUSAND/UL (ref 0–0.2)
IMM GRANULOCYTES NFR BLD AUTO: 0 % (ref 0–2)
LDLC SERPL CALC-MCNC: 118 MG/DL (ref 0–100)
LYMPHOCYTES # BLD AUTO: 2.69 THOUSANDS/ΜL (ref 0.6–4.47)
LYMPHOCYTES NFR BLD AUTO: 35 % (ref 14–44)
MCH RBC QN AUTO: 29.7 PG (ref 26.8–34.3)
MCHC RBC AUTO-ENTMCNC: 32.2 G/DL (ref 31.4–37.4)
MCV RBC AUTO: 92 FL (ref 82–98)
MONOCYTES # BLD AUTO: 0.57 THOUSAND/ΜL (ref 0.17–1.22)
MONOCYTES NFR BLD AUTO: 7 % (ref 4–12)
NEUTROPHILS # BLD AUTO: 4.26 THOUSANDS/ΜL (ref 1.85–7.62)
NEUTS SEG NFR BLD AUTO: 55 % (ref 43–75)
NONHDLC SERPL-MCNC: 131 MG/DL
NRBC BLD AUTO-RTO: 0 /100 WBCS
PLATELET # BLD AUTO: 272 THOUSANDS/UL (ref 149–390)
PMV BLD AUTO: 11.1 FL (ref 8.9–12.7)
POTASSIUM SERPL-SCNC: 3.9 MMOL/L (ref 3.5–5.3)
PROT SERPL-MCNC: 7.4 G/DL (ref 6.4–8.2)
RBC # BLD AUTO: 4.01 MILLION/UL (ref 3.81–5.12)
SODIUM SERPL-SCNC: 138 MMOL/L (ref 136–145)
TRIGL SERPL-MCNC: 64 MG/DL
TSH SERPL DL<=0.05 MIU/L-ACNC: 3.68 UIU/ML (ref 0.36–3.74)
WBC # BLD AUTO: 7.73 THOUSAND/UL (ref 4.31–10.16)

## 2021-04-15 PROCEDURE — 36415 COLL VENOUS BLD VENIPUNCTURE: CPT

## 2021-04-15 PROCEDURE — 85025 COMPLETE CBC W/AUTO DIFF WBC: CPT

## 2021-04-15 PROCEDURE — 80053 COMPREHEN METABOLIC PANEL: CPT

## 2021-04-15 PROCEDURE — 84443 ASSAY THYROID STIM HORMONE: CPT

## 2021-04-15 PROCEDURE — 80061 LIPID PANEL: CPT

## 2021-04-15 NOTE — TELEPHONE ENCOUNTER
----- Message from [address] [place] [address] sent at 4/15/2021 12:57 PM EDT -----  Please call patient regarding recent lab work  Within normal limits    Would recommend recheck at six-month follow-up

## 2021-04-20 ENCOUNTER — OFFICE VISIT (OUTPATIENT)
Dept: FAMILY MEDICINE CLINIC | Facility: CLINIC | Age: 56
End: 2021-04-20
Payer: COMMERCIAL

## 2021-04-20 VITALS
OXYGEN SATURATION: 99 % | DIASTOLIC BLOOD PRESSURE: 92 MMHG | BODY MASS INDEX: 46 KG/M2 | SYSTOLIC BLOOD PRESSURE: 118 MMHG | HEIGHT: 59 IN | TEMPERATURE: 98.2 F | HEART RATE: 79 BPM | WEIGHT: 228.2 LBS

## 2021-04-20 DIAGNOSIS — L08.9 SKIN INFECTION: Primary | ICD-10-CM

## 2021-04-20 DIAGNOSIS — G47.00 INSOMNIA, UNSPECIFIED TYPE: ICD-10-CM

## 2021-04-20 PROCEDURE — 3008F BODY MASS INDEX DOCD: CPT | Performed by: NURSE PRACTITIONER

## 2021-04-20 PROCEDURE — 99213 OFFICE O/P EST LOW 20 MIN: CPT | Performed by: NURSE PRACTITIONER

## 2021-04-20 PROCEDURE — 1036F TOBACCO NON-USER: CPT | Performed by: NURSE PRACTITIONER

## 2021-04-20 RX ORDER — DOXYCYCLINE HYCLATE 100 MG/1
100 CAPSULE ORAL EVERY 12 HOURS SCHEDULED
Qty: 14 CAPSULE | Refills: 0 | Status: SHIPPED | OUTPATIENT
Start: 2021-04-20 | End: 2021-04-27

## 2021-04-20 NOTE — PROGRESS NOTES
[facility]    ASSESSMENT AND PLAN     1  Skin infection  Will treat today with course doxy  Prophylactic  Local wound care: cleanse daily, apply triple abx oint, DSD  Avoid adhesive tape (Topical reaction)  Use paper tape only  Monitor for worsening s/s infection  Follow up if needed    - doxycycline hyclate (VIBRAMYCIN) 100 mg capsule; Take 1 capsule (100 mg total) by mouth every 12 (twelve) hours for 7 days  Dispense: 14 capsule; Refill: 0    2  Insomnia, unspecified type  Difficulty sleeping lately  Lengthy conversation   struggling with health  Likely stress related  Compliant with Celexa 40  Will trial Melatonin  Follow up if ineffective  Will explore alternate options            SUBJECTIVE       Patient ID: Zuhair Roman is a 54 y o  female  Chief Complaint   Patient presents with    Cellulitis     Skin infection from skin tag removal       HISTORY OF PRESENT ILLNESS    Presents today with skin concern  Recently had 2 skin tags removed  Area tender, red and inflamed  Trying to keep covered, but gauze moves and then her bra rubs area  Weeps at times  Area surrounding is red and blistered  Used Band-Aid that she was allergic too  [place] going to mountains and has concern infection will worsen        The following portions of the patient's history were reviewed and updated as appropriate: allergies, current medications, past family history, past medical history, past social history, past surgical history and problem list     REVIEW OF SYSTEMS  Review of Systems   Skin: Positive for wound         OBJECTIVE      VITAL SIGNS  /92 (BP Location: Left arm, Patient Position: Sitting, Cuff Size: Adult)   Pulse 79   Temp 98 2 °F (36 8 °C)   Ht 4' 11 45" (1 51 m)   Wt 104 kg (228 lb 3 2 oz)   LMP 03/18/1997 (Exact Date)   SpO2 99%   BMI 45 40 kg/m²     CURRENT MEDICATIONS    Current Outpatient Medications:     albuterol (VENTOLIN HFA) 90 mcg/act inhaler, Inhale 2 puffs every 6 (six) hours as needed for wheezing or shortness of breath, Disp: 1 Inhaler, Rfl: 0    aspirin 81 MG tablet, Take 81 mg by mouth daily, Disp: , Rfl:     butalbital-acetaminophen-caffeine (FIORICET,ESGIC) -40 mg per tablet, Take 1 tablet by mouth every 6 (six) hours, Disp: , Rfl:     Cholecalciferol (VITAMIN D) 2000 units CAPS, Take 1 capsule by mouth daily, Disp: , Rfl:     citalopram (CeleXA) 40 mg tablet, Take 1 tablet (40 mg total) by mouth daily, Disp: 30 tablet, Rfl: 5    furosemide (LASIX) 20 mg tablet, Take 1 tablet (20 mg total) by mouth daily, Disp: 30 tablet, Rfl: 5    loratadine (CLARITIN) 10 mg tablet, Take 1 tablet (10 mg total) by mouth daily, Disp: 30 tablet, Rfl: 1    multivitamin (THERAGRAN) TABS, Take 1 tablet by mouth daily, Disp: , Rfl:     omeprazole (PriLOSEC) 20 mg delayed release capsule, Take 1 capsule (20 mg total) by mouth daily, Disp: 30 capsule, Rfl: 5    rizatriptan (MAXALT) 10 MG tablet, Take 1 tablet (10 mg total) by mouth once as needed for migraine for up to 1 dose May repeat in 2 hours if needed, Disp: 9 tablet, Rfl: 1    topiramate (TOPAMAX) 100 mg tablet, take 1 tablet by mouth once daily, Disp: 30 tablet, Rfl: 5    cromolyn (OPTICROM) 4 % ophthalmic solution, Administer 1 drop to both eyes 4 (four) times a day (Patient not taking: Reported on 4/13/2021), Disp: 10 mL, Rfl: 1    doxycycline hyclate (VIBRAMYCIN) 100 mg capsule, Take 1 capsule (100 mg total) by mouth every 12 (twelve) hours for 7 days, Disp: 14 capsule, Rfl: 0    lidocaine (LIDODERM) 5 %, Apply 1 patch topically daily Apply in am and  remove in pm  12 hour on, 12 hour off (Patient not taking: Reported on 4/13/2021), Disp: 30 patch, Rfl: 0      PHYSICAL EXAMINATION   Physical Exam  Vitals signs and nursing note reviewed  Constitutional:       General: She is not in acute distress  Appearance: Normal appearance  She is not ill-appearing  Pulmonary:      Effort: No respiratory distress     Abdominal: Comments: 2 small open areas - prior skin tag removal  Tender  Red/inflamed  Blistered area roughly 3-8  No drainage    Skin:     General: Skin is warm and dry  Neurological:      Mental Status: She is alert and oriented to person, place, and time     Psychiatric:         Attention and Perception: Attention normal          Mood and Affect: Mood normal          Speech: Speech normal          Behavior: Behavior normal

## 2021-06-10 ENCOUNTER — HOSPITAL ENCOUNTER (OUTPATIENT)
Dept: MAMMOGRAPHY | Facility: MEDICAL CENTER | Age: 56
Discharge: HOME | End: 2021-06-10
Payer: COMMERCIAL

## 2021-06-10 VITALS — BODY MASS INDEX: 44.76 KG/M2 | HEIGHT: 60 IN | WEIGHT: 228 LBS

## 2021-06-10 DIAGNOSIS — Z12.31 ENCOUNTER FOR SCREENING MAMMOGRAM FOR BREAST CANCER: ICD-10-CM

## 2021-06-10 PROCEDURE — 77067 SCR MAMMO BI INCL CAD: CPT

## 2021-06-10 PROCEDURE — 77063 BREAST TOMOSYNTHESIS BI: CPT

## 2021-06-16 DIAGNOSIS — R60.0 BILATERAL LOWER EXTREMITY EDEMA: ICD-10-CM

## 2021-06-16 DIAGNOSIS — K21.9 GASTROESOPHAGEAL REFLUX DISEASE: ICD-10-CM

## 2021-06-16 RX ORDER — OMEPRAZOLE 20 MG/1
CAPSULE, DELAYED RELEASE ORAL
Qty: 30 CAPSULE | Refills: 5 | Status: SHIPPED | OUTPATIENT
Start: 2021-06-16 | End: 2022-01-24 | Stop reason: SDUPTHER

## 2021-06-16 RX ORDER — FUROSEMIDE 20 MG/1
TABLET ORAL
Qty: 30 TABLET | Refills: 5 | Status: SHIPPED | OUTPATIENT
Start: 2021-06-16 | End: 2022-01-24 | Stop reason: SDUPTHER

## 2021-08-25 DIAGNOSIS — G43.909 MIGRAINE WITHOUT STATUS MIGRAINOSUS, NOT INTRACTABLE, UNSPECIFIED MIGRAINE TYPE: ICD-10-CM

## 2021-08-25 RX ORDER — TOPIRAMATE 100 MG/1
TABLET, FILM COATED ORAL
Qty: 30 TABLET | Refills: 5 | Status: SHIPPED | OUTPATIENT
Start: 2021-08-25 | End: 2022-04-06

## 2021-08-25 RX ORDER — RIZATRIPTAN BENZOATE 10 MG/1
10 TABLET ORAL ONCE AS NEEDED
Qty: 9 TABLET | Refills: 2 | Status: SHIPPED | OUTPATIENT
Start: 2021-08-25 | End: 2022-04-12 | Stop reason: SDUPTHER

## 2021-10-08 DIAGNOSIS — F41.1 GENERALIZED ANXIETY DISORDER: ICD-10-CM

## 2021-10-08 RX ORDER — CITALOPRAM 40 MG/1
40 TABLET ORAL DAILY
Qty: 30 TABLET | Refills: 1 | Status: SHIPPED | OUTPATIENT
Start: 2021-10-08 | End: 2021-12-29 | Stop reason: SDUPTHER

## 2021-10-13 ENCOUNTER — OFFICE VISIT (OUTPATIENT)
Dept: FAMILY MEDICINE CLINIC | Facility: CLINIC | Age: 56
End: 2021-10-13
Payer: COMMERCIAL

## 2021-10-13 VITALS
WEIGHT: 234.6 LBS | TEMPERATURE: 98.1 F | SYSTOLIC BLOOD PRESSURE: 146 MMHG | OXYGEN SATURATION: 99 % | HEIGHT: 60 IN | BODY MASS INDEX: 46.06 KG/M2 | HEART RATE: 83 BPM | DIASTOLIC BLOOD PRESSURE: 94 MMHG

## 2021-10-13 DIAGNOSIS — G43.909 MIGRAINE WITHOUT STATUS MIGRAINOSUS, NOT INTRACTABLE, UNSPECIFIED MIGRAINE TYPE: ICD-10-CM

## 2021-10-13 DIAGNOSIS — R07.9 CHEST PAIN, UNSPECIFIED TYPE: Primary | ICD-10-CM

## 2021-10-13 DIAGNOSIS — Z13.220 SCREENING FOR LIPID DISORDERS: ICD-10-CM

## 2021-10-13 DIAGNOSIS — R03.0 ELEVATED BP WITHOUT DIAGNOSIS OF HYPERTENSION: ICD-10-CM

## 2021-10-13 DIAGNOSIS — E03.8 SUBCLINICAL HYPOTHYROIDISM: ICD-10-CM

## 2021-10-13 DIAGNOSIS — R06.02 SOB (SHORTNESS OF BREATH) ON EXERTION: ICD-10-CM

## 2021-10-13 DIAGNOSIS — F41.1 GENERALIZED ANXIETY DISORDER: ICD-10-CM

## 2021-10-13 PROCEDURE — 99214 OFFICE O/P EST MOD 30 MIN: CPT | Performed by: NURSE PRACTITIONER

## 2021-10-13 PROCEDURE — 3008F BODY MASS INDEX DOCD: CPT | Performed by: NURSE PRACTITIONER

## 2021-10-13 PROCEDURE — 1036F TOBACCO NON-USER: CPT | Performed by: NURSE PRACTITIONER

## 2021-10-14 PROCEDURE — 93000 ELECTROCARDIOGRAM COMPLETE: CPT | Performed by: NURSE PRACTITIONER

## 2021-10-15 ENCOUNTER — APPOINTMENT (OUTPATIENT)
Dept: LAB | Facility: CLINIC | Age: 56
End: 2021-10-15
Payer: COMMERCIAL

## 2021-10-15 DIAGNOSIS — Z13.220 SCREENING FOR LIPID DISORDERS: ICD-10-CM

## 2021-10-15 DIAGNOSIS — E03.8 SUBCLINICAL HYPOTHYROIDISM: ICD-10-CM

## 2021-10-15 LAB
ALBUMIN SERPL BCP-MCNC: 3.3 G/DL (ref 3.5–5)
ALP SERPL-CCNC: 110 U/L (ref 46–116)
ALT SERPL W P-5'-P-CCNC: 20 U/L (ref 12–78)
ANION GAP SERPL CALCULATED.3IONS-SCNC: 4 MMOL/L (ref 4–13)
AST SERPL W P-5'-P-CCNC: 15 U/L (ref 5–45)
BILIRUB SERPL-MCNC: 0.26 MG/DL (ref 0.2–1)
BUN SERPL-MCNC: 13 MG/DL (ref 5–25)
CALCIUM ALBUM COR SERPL-MCNC: 10 MG/DL (ref 8.3–10.1)
CALCIUM SERPL-MCNC: 9.4 MG/DL (ref 8.3–10.1)
CHLORIDE SERPL-SCNC: 108 MMOL/L (ref 100–108)
CHOLEST SERPL-MCNC: 201 MG/DL (ref 50–200)
CO2 SERPL-SCNC: 27 MMOL/L (ref 21–32)
CREAT SERPL-MCNC: 1.02 MG/DL (ref 0.6–1.3)
GFR SERPL CREATININE-BSD FRML MDRD: 62 ML/MIN/1.73SQ M
GLUCOSE P FAST SERPL-MCNC: 75 MG/DL (ref 65–99)
HDLC SERPL-MCNC: 71 MG/DL
LDLC SERPL CALC-MCNC: 118 MG/DL (ref 0–100)
NONHDLC SERPL-MCNC: 130 MG/DL
POTASSIUM SERPL-SCNC: 4 MMOL/L (ref 3.5–5.3)
PROT SERPL-MCNC: 7.6 G/DL (ref 6.4–8.2)
SODIUM SERPL-SCNC: 139 MMOL/L (ref 136–145)
TRIGL SERPL-MCNC: 59 MG/DL
TSH SERPL DL<=0.05 MIU/L-ACNC: 3.37 UIU/ML (ref 0.36–3.74)

## 2021-10-15 PROCEDURE — 36415 COLL VENOUS BLD VENIPUNCTURE: CPT

## 2021-10-15 PROCEDURE — 80053 COMPREHEN METABOLIC PANEL: CPT

## 2021-10-15 PROCEDURE — 80061 LIPID PANEL: CPT

## 2021-10-15 PROCEDURE — 84443 ASSAY THYROID STIM HORMONE: CPT

## 2021-11-03 ENCOUNTER — HOSPITAL ENCOUNTER (OUTPATIENT)
Dept: NON INVASIVE DIAGNOSTICS | Facility: HOSPITAL | Age: 56
Discharge: HOME | End: 2021-11-03
Payer: COMMERCIAL

## 2021-11-03 DIAGNOSIS — R06.02 SOB (SHORTNESS OF BREATH) ON EXERTION: ICD-10-CM

## 2021-11-03 DIAGNOSIS — R07.9 CHEST PAIN, UNSPECIFIED TYPE: ICD-10-CM

## 2021-11-03 PROCEDURE — 93350 STRESS TTE ONLY: CPT

## 2021-11-04 LAB
AORTIC VALVE MEAN VELOCITY: 8.1 M/S
AV LVOT MEAN GRADIENT: 2 MMHG
AV LVOT PEAK GRADIENT: 4 MMHG
AV MEAN GRADIENT: 3 MMHG
AV PEAK GRADIENT: 5 MMHG
BASELINE ST DEPRESSION: 0 MM
DOP CALC AO VTI: 27.38 CM
DOP CALC LVOT PEAK VEL VTI: 22.94 CM
DOP CALC LVOT PEAK VEL: 0.98 M/S
MAX HR PERCENT: 87 %
MAX HR: 164 BPM
SL CV STRESS RECOVERY BP: NORMAL MMHG
SL CV STRESS RECOVERY HR: 76 BPM
SL CV STRESS RECOVERY O2 SAT: 100 %
SL CV STRESS STAGE REACHED: 2
STRESS ANGINA INDEX: 2
STRESS BASELINE BP: NORMAL MMHG
STRESS BASELINE HR: 79 BPM
STRESS DUKE TREADMILL SCORE: -5
STRESS O2 SAT REST: 99 %
STRESS PEAK HR: 144 BPM
STRESS PERCENT HR: 87 %
STRESS POST ESTIMATED WORKLOAD: 5.2 METS
STRESS POST EXERCISE DUR MIN: 3 MIN
STRESS POST EXERCISE DUR SEC: 30 SEC
STRESS POST O2 SAT PEAK: 100 %
STRESS POST PEAK BP: NORMAL MMHG
STRESS ST DEPRESSION: 0 MM
STRESS TARGET HR: 144 BPM
TRICUSPID VALVE S': 1 CM/S

## 2021-11-04 PROCEDURE — 93351 STRESS TTE COMPLETE: CPT

## 2021-11-09 LAB
CHEST PAIN STATEMENT: NORMAL
MAX DIASTOLIC BP: 86 MMHG
MAX HEART RATE: 144 BPM
MAX PREDICTED HEART RATE: 164 BPM
MAX. SYSTOLIC BP: 192 MMHG
PROTOCOL NAME: NORMAL
TARGET HR FORMULA: NORMAL
TIME IN EXERCISE PHASE: NORMAL

## 2021-11-23 ENCOUNTER — TELEPHONE (OUTPATIENT)
Dept: FAMILY MEDICINE CLINIC | Facility: CLINIC | Age: 56
End: 2021-11-23

## 2021-11-26 ENCOUNTER — TELEPHONE (OUTPATIENT)
Dept: FAMILY MEDICINE CLINIC | Facility: CLINIC | Age: 56
End: 2021-11-26

## 2021-12-29 DIAGNOSIS — F41.1 GENERALIZED ANXIETY DISORDER: ICD-10-CM

## 2021-12-29 RX ORDER — CITALOPRAM 40 MG/1
40 TABLET ORAL DAILY
Qty: 30 TABLET | Refills: 2 | Status: SHIPPED | OUTPATIENT
Start: 2021-12-29 | End: 2022-04-06 | Stop reason: SDUPTHER

## 2022-01-03 ENCOUNTER — CONSULT (OUTPATIENT)
Dept: CARDIOLOGY CLINIC | Facility: CLINIC | Age: 57
End: 2022-01-03
Payer: COMMERCIAL

## 2022-01-03 VITALS
BODY MASS INDEX: 46.03 KG/M2 | DIASTOLIC BLOOD PRESSURE: 100 MMHG | WEIGHT: 231.8 LBS | HEART RATE: 70 BPM | SYSTOLIC BLOOD PRESSURE: 140 MMHG

## 2022-01-03 DIAGNOSIS — R07.9 CHEST PAIN, UNSPECIFIED TYPE: ICD-10-CM

## 2022-01-03 DIAGNOSIS — R06.02 SOB (SHORTNESS OF BREATH) ON EXERTION: ICD-10-CM

## 2022-01-03 DIAGNOSIS — R00.2 PALPITATIONS: Primary | ICD-10-CM

## 2022-01-03 DIAGNOSIS — I05.9 MITRAL VALVE ANNULAR CALCIFICATION: ICD-10-CM

## 2022-01-03 PROCEDURE — 99204 OFFICE O/P NEW MOD 45 MIN: CPT

## 2022-01-03 PROCEDURE — 1036F TOBACCO NON-USER: CPT

## 2022-01-03 PROCEDURE — 93000 ELECTROCARDIOGRAM COMPLETE: CPT

## 2022-01-03 RX ORDER — METOPROLOL TARTRATE 50 MG/1
50 TABLET, FILM COATED ORAL EVERY 12 HOURS SCHEDULED
Qty: 4 TABLET | Refills: 0 | Status: SHIPPED | OUTPATIENT
Start: 2022-01-03

## 2022-01-03 RX ORDER — AMLODIPINE BESYLATE 5 MG/1
5 TABLET ORAL DAILY
Qty: 90 TABLET | Refills: 3 | Status: SHIPPED | OUTPATIENT
Start: 2022-01-03

## 2022-01-03 NOTE — PROGRESS NOTES
Cardiology Consultation   MD Romaine Morales MD Azalia Burlington, DO, MD Lukas Alex DO, Sierra Gibson DO, [facility] [place]  -------------------------------------------------------------------  [facility] ORTHOPEDIC [facility]  [address]  [place], [place] [address]  [phone]  [phone]  01/03/22  Dawit Roque  YOB: 1965   MRN: [id number]      Referring Physician: Tyronne Bloch, CRNP  [address]  [address]  [place],  [address]     HPI:  I am seeing this patient in cardiology consultation for:  Chest pain and dyspnea on exertion    Dawit Roque is a 64 y o  female with   Gastroesophageal reflux disease   Bilateral lower extremity edema   Fibromyalgia   Anxiety    She presents today for initial cardiac evaluation for symptoms of chest pain and dyspnea with exertion  The symptoms have been going on now for several years  She had a recent stress test, stress echo which was negative for ischemia however her overall exercise capacity was very poor, only able to exercise for 3 minutes and 30 seconds before having to stop due to chest pain as well as dyspnea on exertion  She is obese, BMI is 46  Blood pressure has been running elevated since October, currently on no blood pressure lowering medications  She has on Lasix for lower extremity edema  She also notes some palpitations, mostly at night when she is sleeping, she feels like her heart is pounding at times to the point were she feels like its shaking the entire bed    Review of Systems   Constitutional: Negative for chills and fever  HENT: Negative for facial swelling and sore throat  Eyes: Negative for visual disturbance  Respiratory: Positive for shortness of breath  Negative for cough, chest tightness and wheezing  Cardiovascular: Positive for chest pain and palpitations  Negative for leg swelling     Gastrointestinal: Negative for abdominal pain, blood in stool, constipation, diarrhea, nausea and vomiting  Endocrine: Negative for cold intolerance and heat intolerance  Genitourinary: Negative for decreased urine volume, difficulty urinating, dysuria and hematuria  Musculoskeletal: Negative for arthralgias, back pain and myalgias  Skin: Negative for rash  Neurological: Negative for dizziness, syncope, weakness and numbness  Psychiatric/Behavioral: Negative for agitation, behavioral problems and confusion  The patient is not nervous/anxious  OBJECTIVE  Vitals:    01/03/22 1431   BP: 140/100   Pulse: 70       Physical Exam   Constitutional: awake, alert and oriented, in no acute distress, no obvious deformities, obese female  Head: Normocephalic, without obvious abnormality, atraumatic  Eyes: conjunctivae clear and moist  Sclera anicteric  No xanthelasmas  Pupils equal bilaterally  Extraocular motions are full  Ear nose mouth and throat: ears are symmetrical bilaterally, hearing appears to be equal bilaterally, no nasal discharge or epistaxis, oropharynx is clear with moist mucous membranes  Neck:  Trachea is midline, neck is supple, no thyromegaly or significant lymphadenopathy, there is full range of motion  Lungs: clear to auscultation bilaterally, no wheezes, no rales, no rhonchi, no accessory muscle use, breathing is nonlabored  Heart: regular rate and rhythm, S1, S2 normal, no murmur, no click, no rub and no gallop, no lower extremity edema  Abdomen:  Obese, soft, non-tender; bowel sounds normal; no masses,  no organomegaly  Psychiatric:  Patient is oriented to time, place, person, mood/affect is negative for depression, anxiety, agitation, appears to have appropriate insight  Skin: Skin is warm, dry, intact  No obvious rashes or lesions on exposed extremities  Nail beds are pink with no cyanosis or clubbing  EKG:  No results found for this visit on 01/03/22       The 10-year ASCVD risk score (Karen Devine et al , 2013) is: 2 1%    Values used to calculate the score:      Age: 64 years      Sex: Female      Is Non- : No      Diabetic: No      Tobacco smoker: No      Systolic Blood Pressure: 991 mmHg      Is BP treated: No      HDL Cholesterol: 71 mg/dL      Total Cholesterol: 201 mg/dL    IMPRESSION:  1  Atypical chest pain, central chest, sharp, nonexertional, with stress echo that is essentially equivocal   There is no echocardiographic evidence of stress-induced ischemia however her exercise tolerance was very poor, only able to exercise for 3 minutes and 30 seconds  2  Abnormal electrocardiogram   3  Dyspnea on exertion   4  Palpitations  5  Obesity  6  Deconditioning   7  Gastroesophageal reflux disease  8  Fibromyalgia   9  Anxiety    DISCUSSION/RECOMMENDATIONS:  Rickey Chauhan She has been having on and off chest pain over the past several years with associated dyspnea on exertion  She has abnormal electrocardiogram with poor anterior R-wave progression, possible age undetermined anterior infarct   Her stress echo was negative for stress-induced ischemia however she could barely tolerate exercising enough to get heart rate to target level  She had exaggerated heart rate response to exercise, reaching target heart rate within 3 minutes into exercise and having to stop treadmill exercise due to chest pain and shortness of breath   Given her symptoms are persisting, episodic in nature, associated with dyspnea on exertion, and with her history of new diagnosis of hypertension with elevated diastolic blood pressures, further investigation is warranted     Would evaluate further with CT coronary angiogram to rule out obstructive epicardial coronary stenosis   Plan to start amlodipine 5 milligrams today for hypertension   Gave Rx for metoprolol to be taken day of and prior to CT   Check Holter monitor given palpitations that occur at night, rule out arrhythmia   Plan for follow-up after the above tests are completed    Kw[employer] Conception Essence Carreon  --------------------------------------------------------------------------------  TREADMILL STRESS  No results found for this or any previous visit      ----------------------------------------------------------------------------------------------  NUCLEAR STRESS TEST: No results found for this or any previous visit  No results found for this or any previous visit       --------------------------------------------------------------------------------  CATH:  No results found for this or any previous visit     --------------------------------------------------------------------------------  ECHO:   Results for orders placed during the hospital encounter of 18    Echo complete with contrast if indicated    [address]    Transthoracic Echocardiogram  2D, M-mode, Doppler, and Color Doppler    Study date:  13-Dec-2018    Patient: Rohit Wilson  MR number: [id number]  Account number: [de-identified]  : 1965  Age: 48 years  Gender: Female  Status: Outpatient  Location: [address] [place] [address]  Height: 60 in  Weight: 218 5 lb  BP: 138/ 86 mmHg    Indications: Dyspnea    Diagnoses: R06 00 - Dyspnea, unspecified    Sonographer:  Juliana Carter RDCS  Primary Physician:  Aung Chowdhury DO  Referring Physician:  Almas Griffin DO  Group:  [facility]  Interpreting Physician:  Katharine Wilson MD    SUMMARY    SUMMARY:  1  Sinus rhythm  2  Good technical quality  3  Normal left ventricular systolic and diastolic function  4  Mild left ventricular cavity enlargement    LEFT VENTRICLE:  Normal left ventricular systolic function, EF 71%  Mild left ventricular cavity enlargement  Normal left ventricular wall thickness  Normal left ventricular wall motion without regional wall motion abnormalities  Normal left ventricular  diastolic function and filling pressures      LEFT ATRIUM:  Normal left atrial size     RIGHT ATRIUM:  Normal right atrial size  MITRAL VALVE:  Normal mitral valve with trace mitral regurgitation  AORTIC VALVE:  Normal tricuspid aortic valve without stenosis  TRICUSPID VALVE:  Trace tricuspid regurgitation  PULMONIC VALVE:  No pulmonic regurgitation  PULMONARY ARTERIES:  Normal pulmonary artery pressures  PERICARDIUM:  No pericardial effusion  HISTORY: PRIOR HISTORY: Patient has no history of cardiovascular disease  Risk factors: morbid obesity  PROCEDURE: The study was performed in the [facility]  This was a routine study  The transthoracic approach was used  The study included complete 2D imaging, M-mode, complete spectral Doppler, and color Doppler  The heart rate was  84 bpm, at the start of the study  This was a technically difficult study  LEFT VENTRICLE: Normal left ventricular systolic function, EF 22%  Mild left ventricular cavity enlargement  Normal left ventricular wall thickness  Normal left ventricular wall motion without regional wall motion abnormalities  Normal  left ventricular diastolic function and filling pressures  RIGHT VENTRICLE: The size was normal  Systolic function was normal  Wall thickness was normal     LEFT ATRIUM: Normal left atrial size  RIGHT ATRIUM: Normal right atrial size  MITRAL VALVE: Normal mitral valve with trace mitral regurgitation  AORTIC VALVE: Normal tricuspid aortic valve without stenosis  TRICUSPID VALVE: Trace tricuspid regurgitation  PULMONIC VALVE: No pulmonic regurgitation  PERICARDIUM: No pericardial effusion  There was no pericardial effusion  The pericardium was normal in appearance  AORTA: The root exhibited normal size  PULMONARY ARTERY: Normal pulmonary artery pressures      SYSTEM MEASUREMENT TABLES    2D  %FS: 35 91 %  Ao Diam: 3 11 cm  EDV(Teich): 125 09 ml  EF(Teich): 65 15 %  ESV(Teich): 43 6 ml  IVSd: 0 87 cm  LA Area: 11 16 cm2  LA Diam: 3 7 cm  LVEDV MOD A4C: 78 93 ml  LVEF MOD A4C: 68 69 %  LVESV MOD A4C: 24 71 ml  LVIDd: 5 12 cm  LVIDs: 3 28 cm  LVLd A4C: 7 15 cm  LVLs A4C: 5 52 cm  LVPWd: 0 9 cm  RA Area: 10 65 cm2  SV MOD A4C: 54 22 ml  SV(Teich): 81 5 ml    CW  TR Vmax: 1 92 m/s  TR maxP 8 mmHg    MM  TAPSE: 2 4 cm    PW  E': 0 14 m/s  E/E': 6 72  MV A Hugo: 1 05 m/s  MV Dec [place]: 4 01 m/s2  MV DecT: 231 54 ms  MV E Hugo: 0 93 m/s  MV E/A Ratio: 0 88  MV PHT: 67 15 ms  MVA By PHT: 3 28 cm2    [facility] Accredited [facility]chocardi[facility]    Prepared and electronically signed by    Raul Lopez MD  Signed 13-Dec-2018 16:36:37    No results found for this or any previous visit     --------------------------------------------------------------------------------  HOLTER  No results found for this or any previous visit     --------------------------------------------------------------------------------  CAROTIDS  No results found for this or any previous visit  Diagnoses and all orders for this visit:    Palpitations  -     Holter monitor; Future    Chest pain, unspecified type  -     Ambulatory referral to Cardiology  -     POCT ECG  -     CTA cardiac; Future  -     amLODIPine (NORVASC) 5 mg tablet; Take 1 tablet (5 mg total) by mouth daily  -     metoprolol tartrate (LOPRESSOR) 50 mg tablet; Take 1 tablet (50 mg total) by mouth every 12 (twelve) hours Start the day before CT scan, then take morning of and 1 hour before CT    SOB (shortness of breath) on exertion  -     Ambulatory referral to Cardiology  -     POCT ECG  -     CTA cardiac; Future  -     amLODIPine (NORVASC) 5 mg tablet; Take 1 tablet (5 mg total) by mouth daily  -     metoprolol tartrate (LOPRESSOR) 50 mg tablet;  Take 1 tablet (50 mg total) by mouth every 12 (twelve) hours Start the day before CT scan, then take morning of and 1 hour before CT    Mitral valve annular calcification  -     Ambulatory referral to Cardiology ======================================================    Past Medical History:   Diagnosis Date    Anxiety     Chest pain 08/12/2016    Chest wall pain 07/15/2016    Costochondritis 02/28/2014    Dog bite 07/29/2014    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([facility] [place] [address] )     Fluid retention     Gastroenteritis 05/06/2013    Herpes zoster 07/08/2013    History of chemotherapy 1986    Migraine 01/19/2016    Shortness of breath 08/12/2016    Toxoplasmosis 08/28/2014     Past Surgical History:   Procedure Laterality Date    CHOLECYSTECTOMY      COLONOSCOPY      ESOPHAGOGASTRODUODENOSCOPY      HYSTERECTOMY  1986    KNEE ARTHROSCOPY      left shoulder    NECK SURGERY      Lumpectomy    OOPHORECTOMY Right 1986    SC ESOPHAGOGASTRODUODENOSCOPY TRANSORAL DIAGNOSTIC N/A 11/30/2018    Procedure: EGD;  Surgeon: Hugo Gimenez MD;  Location: [facility];   Service: Gastroenterology         Medications  Current Outpatient Medications   Medication Sig Dispense Refill    aspirin 81 MG tablet Take 81 mg by mouth daily      Cholecalciferol (VITAMIN D) 2000 units CAPS Take 1 capsule by mouth daily      citalopram (CeleXA) 40 mg tablet Take 1 tablet (40 mg total) by mouth daily 30 tablet 2    furosemide (LASIX) 20 mg tablet TAKE 1 TABLET BY MOUTH DAILY 30 tablet 5    loratadine (CLARITIN) 10 mg tablet Take 1 tablet (10 mg total) by mouth daily 30 tablet 1    multivitamin (THERAGRAN) TABS Take 1 tablet by mouth daily      omeprazole (PriLOSEC) 20 mg delayed release capsule TAKE 1 CAPSULE BY MOUTH DAILY 30 capsule 5    rizatriptan (MAXALT) 10 MG tablet Take 1 tablet (10 mg total) by mouth once as needed for migraine for up to 1 dose May repeat in 2 hours if needed 9 tablet 2    topiramate (TOPAMAX) 100 mg tablet take 1 tablet by mouth once daily 30 tablet 5    albuterol (VENTOLIN HFA) 90 mcg/act inhaler Inhale 2 puffs every 6 (six) hours as needed for wheezing or shortness of breath (Patient not taking: Reported on 1/3/2022 ) 1 Inhaler 0    amLODIPine (NORVASC) 5 mg tablet Take 1 tablet (5 mg total) by mouth daily 90 tablet 3    butalbital-acetaminophen-caffeine (FIORICET,ESGIC) -40 mg per tablet Take 1 tablet by mouth every 6 (six) hours (Patient not taking: Reported on 1/3/2022 )      cromolyn (OPTICROM) 4 % ophthalmic solution Administer 1 drop to both eyes 4 (four) times a day (Patient not taking: Reported on 4/13/2021) 10 mL 1    lidocaine (LIDODERM) 5 % Apply 1 patch topically daily Apply in am and  remove in pm  12 hour on, 12 hour off (Patient not taking: Reported on 4/13/2021) 30 patch 0    metoprolol tartrate (LOPRESSOR) 50 mg tablet Take 1 tablet (50 mg total) by mouth every 12 (twelve) hours Start the day before CT scan, then take morning of and 1 hour before CT 4 tablet 0     No current facility-administered medications for this visit          Allergies   Allergen Reactions    Azithromycin GI Intolerance     Yeast infection    Imitrex [Sumatriptan] GI Intolerance    Cephalexin     Wound Dressings Rash     Adhesive tape       Social History     Socioeconomic History    Marital status: /Civil Union     Spouse name: Not on file    Number of children: Not on file    Years of education: Not on file    Highest education level: Not on file   Occupational History    Not on file   Tobacco Use    Smoking status: Never Smoker    Smokeless tobacco: Never Used   Substance and Sexual Activity    Alcohol use: Yes     Comment: Rarely    Drug use: No    Sexual activity: Not on file   Other Topics Concern    Not on file   Social History Narrative    Not on file     Social Determinants of Health     Financial Resource Strain: Not on file   Food Insecurity: Not on file   Transportation Needs: Not on file   Physical Activity: Not on file   Stress: Not on file   Social Connections: Not on file   Intimate Partner Violence: Not on file   Housing Stability: Not on file        Family History   Problem Relation Age of Onset    Coronary artery disease Family     Diabetes Mother     Heart disease Mother     Heart disease Father     No Known Problems Sister     No Known Problems Daughter     No Known Problems Maternal Grandmother     No Known Problems Maternal Grandfather     No Known Problems Paternal Grandmother     No Known Problems Paternal Grandfather     No Known Problems Sister     No Known Problems Sister     No Known Problems Daughter     No Known Problems Maternal Aunt     No Known Problems Maternal Aunt        Lab Results   Component Value Date    WBC 7 73 04/15/2021    HGB 11 9 04/15/2021    HCT 37 0 04/15/2021    MCV 92 04/15/2021     04/15/2021      Lab Results   Component Value Date    SODIUM 139 10/15/2021    K 4 0 10/15/2021     10/15/2021    CO2 27 10/15/2021    BUN 13 10/15/2021    CREATININE 1 02 10/15/2021    GLUC 78 06/15/2018    CALCIUM 9 4 10/15/2021      Lab Results   Component Value Date    HGBA1C 5 1 06/05/2017      No results found for: CHOL  Lab Results   Component Value Date    HDL 71 10/15/2021    HDL 74 04/15/2021    HDL 71 (H) 06/12/2019     Lab Results   Component Value Date    LDLCALC 118 (H) 10/15/2021    LDLCALC 118 (H) 04/15/2021    LDLCALC 114 (H) 06/12/2019     Lab Results   Component Value Date    TRIG 59 10/15/2021    TRIG 64 04/15/2021    TRIG 52 06/12/2019     No results found for: [place], [place]   Lab Results   Component Value Date    INR 0 99 06/09/2015    PROTIME 12 9 06/09/2015          Patient Active Problem List    Diagnosis Date Noted    Generalized anxiety disorder 02/20/2018    Gastroesophageal reflux disease 08/07/2017    Bilateral occipital neuralgia 05/15/2017    Restless leg 09/23/2016    Bilateral lower extremity edema 05/31/2016    Fibromyalgia 08/12/2015    Insomnia 04/01/2015    Vitamin D deficiency 05/31/2013    Migraine 01/22/2009       Portions of the record may have been created with voice recognition software   Occasional wrong word or "sound a like" substitutions may have occurred due to the inherent limitations of voice recognition software  Read the chart carefully and recognize, using context, where substitutions have occurred      Andres Posada DO, [facility] [place]  1/3/2022 3:56 PM

## 2022-01-10 ENCOUNTER — HOSPITAL ENCOUNTER (OUTPATIENT)
Dept: NON INVASIVE DIAGNOSTICS | Facility: HOSPITAL | Age: 57
Discharge: HOME | End: 2022-01-10
Payer: COMMERCIAL

## 2022-01-10 DIAGNOSIS — R00.2 PALPITATIONS: ICD-10-CM

## 2022-01-10 PROCEDURE — 93225 XTRNL ECG REC<48 HRS REC: CPT

## 2022-01-10 PROCEDURE — 93226 XTRNL ECG REC<48 HR SCAN A/R: CPT

## 2022-01-19 PROCEDURE — 93227 XTRNL ECG REC<48 HR R&I: CPT | Performed by: INTERNAL MEDICINE

## 2022-01-24 DIAGNOSIS — R60.0 BILATERAL LOWER EXTREMITY EDEMA: ICD-10-CM

## 2022-01-24 DIAGNOSIS — K21.9 GASTROESOPHAGEAL REFLUX DISEASE: ICD-10-CM

## 2022-01-24 RX ORDER — OMEPRAZOLE 20 MG/1
20 CAPSULE, DELAYED RELEASE ORAL DAILY
Qty: 30 CAPSULE | Refills: 5 | Status: SHIPPED | OUTPATIENT
Start: 2022-01-24 | End: 2022-07-18

## 2022-01-24 RX ORDER — FUROSEMIDE 20 MG/1
20 TABLET ORAL DAILY
Qty: 30 TABLET | Refills: 5 | Status: SHIPPED | OUTPATIENT
Start: 2022-01-24 | End: 2022-07-18

## 2022-01-24 NOTE — TELEPHONE ENCOUNTER
Pt is requesting a refill of Furosemide, and Omeprazole  She will run out in two days   [facility] [address]

## 2022-02-02 ENCOUNTER — OFFICE VISIT (OUTPATIENT)
Dept: CARDIOLOGY CLINIC | Facility: CLINIC | Age: 57
End: 2022-02-02
Payer: COMMERCIAL

## 2022-02-02 VITALS
DIASTOLIC BLOOD PRESSURE: 86 MMHG | HEIGHT: 60 IN | WEIGHT: 230.4 LBS | SYSTOLIC BLOOD PRESSURE: 124 MMHG | OXYGEN SATURATION: 98 % | BODY MASS INDEX: 45.23 KG/M2 | HEART RATE: 87 BPM

## 2022-02-02 DIAGNOSIS — R07.89 ATYPICAL CHEST PAIN: Primary | ICD-10-CM

## 2022-02-02 DIAGNOSIS — R06.00 DYSPNEA ON EXERTION: ICD-10-CM

## 2022-02-02 DIAGNOSIS — E66.01 CLASS 3 SEVERE OBESITY DUE TO EXCESS CALORIES WITH BODY MASS INDEX (BMI) OF 45.0 TO 49.9 IN ADULT, UNSPECIFIED WHETHER SERIOUS COMORBIDITY PRESENT (HCC): ICD-10-CM

## 2022-02-02 PROCEDURE — 99214 OFFICE O/P EST MOD 30 MIN: CPT

## 2022-02-02 PROCEDURE — 1036F TOBACCO NON-USER: CPT

## 2022-02-02 PROCEDURE — 3008F BODY MASS INDEX DOCD: CPT

## 2022-02-02 NOTE — PROGRESS NOTES
Cardiology   Brown Bale, MD Radene Sandifer, MD Lus Mustache, DO, MD Maynor Valenica DO, Shanelle Garcia DO, [facility] [place]  -------------------------------------------------------------------  [facility] ORTHOP[facility]IC \[facility] [place] [facility]\"" and Vascular Center  [address], [place] [address]  [phone]  [phone]  02/02/22  Dona Adams  YOB: 1965   MRN: [id number]      Referring Physician: Yousuf Jones DO  [address]  [address]  [facility],  [address]     HPI: Dona Adams is a 64 y o  female with:   Gastroesophageal reflux disease   Bilateral lower extremity edema   Fibromyalgia   Anxiety  Obesity, BMI 46    I had seen her earlier in January 2020 to for symptoms of chest pain and dyspnea on exertion  The symptoms have been going on for several years  She had a recent stress test which was a stress echo which was negative for ischemia however overall exercise capacity was very poor only exercised for 3 minutes and 30 seconds and had to stop due to chest pain and dyspnea on exertion  She also noted palpitations    I had ordered a coronary CT angiogram however this was not performed yet  Started amlodipine last visit 5 mg last visit  Ordered Holter which was unremarkable    She states that she feels much improved since prior visit  Her chest pain has resolved for the most part she does still note dyspnea with exertion, CT coronary angiogram scheduled for early March  Blood pressure is improved  Review of Systems   Constitutional: Negative for chills and fever  HENT: Negative for facial swelling and sore throat  Eyes: Negative for visual disturbance  Respiratory: Negative for cough, chest tightness, shortness of breath and wheezing  Cardiovascular: Positive for chest pain and palpitations  Negative for leg swelling  Gastrointestinal: Negative for abdominal pain, blood in stool, constipation, diarrhea, nausea and vomiting     Endocrine: Negative for cold intolerance and heat intolerance  Genitourinary: Negative for decreased urine volume, difficulty urinating, dysuria and hematuria  Musculoskeletal: Negative for arthralgias, back pain and myalgias  Skin: Negative for rash  Neurological: Negative for dizziness, syncope, weakness and numbness  Psychiatric/Behavioral: Negative for agitation, behavioral problems and confusion  The patient is not nervous/anxious  OBJECTIVE  Vitals:    02/02/22 1508   BP: 124/86   Pulse: 87   SpO2: 98%       Physical Exam  Constitutional: awake, alert and oriented, in no acute distress, no obvious deformities, obese female  Head: Normocephalic, without obvious abnormality, atraumatic  Eyes: conjunctivae clear and moist  Sclera anicteric  No xanthelasmas  Pupils equal bilaterally  Extraocular motions are full  Ear nose mouth and throat: ears are symmetrical bilaterally, hearing appears to be equal bilaterally, no nasal discharge or epistaxis, oropharynx is clear with moist mucous membranes  Neck:  Trachea is midline, neck is supple, no thyromegaly or significant lymphadenopathy, there is full range of motion  Lungs: clear to auscultation bilaterally, no wheezes, no rales, no rhonchi, no accessory muscle use, breathing is nonlabored  Heart: regular rate and rhythm, S1, S2 normal, no murmur, no click, no rub and no gallop, no lower extremity edema  Abdomen:  Obese, soft, non-tender; bowel sounds normal; no masses,  no organomegaly  Psychiatric:  Patient is oriented to time, place, person, mood/affect is negative for depression, anxiety, agitation, appears to have appropriate insight  Skin: Skin is warm, dry, intact  No obvious rashes or lesions on exposed extremities  Nail beds are pink with no cyanosis or clubbing  EKG:  No results found for this visit on 02/02/22  IMPRESSION:  1   Atypical chest pain, central chest, sharp, nonexertional, with stress echo that is essentially equivocal   There is no echocardiographic evidence of stress-induced ischemia however her exercise tolerance was very poor, only able to exercise for 3 minutes and 30 seconds  2  Abnormal electrocardiogram   3  Dyspnea on exertion   4  Palpitations  5  Obesity  6  Deconditioning   7  Gastroesophageal reflux disease  8  Fibromyalgia   9  Anxiety    DISCUSSION/RECOMMENDATIONS:   Her chest pain has improved with the addition of amlodipine, lowering blood pressure   Blood pressure is much improved since prior visit   Would continue with current dose amlodipine    Continue aspirin, Lasix   Plan for CT coronary angiogram in March   I asked her to check her blood pressure on a daily basis and keep a blood pressure diary   Would continue with current meds and plan for follow-up after CT coronary angiogram for review blood pressure diary as well as results of CT where further recommendations will be made thereafter    Shelbi Cruz DO, [facility] [place]  --------------------------------------------------------------------------------  TREADMILL STRESS  No results found for this or any previous visit      ----------------------------------------------------------------------------------------------  NUCLEAR STRESS TEST: No results found for this or any previous visit      No results found for this or any previous visit       --------------------------------------------------------------------------------  CATH:  No results found for this or any previous visit     --------------------------------------------------------------------------------  ECHO:   Results for orders placed during the hospital encounter of 18    Echo complete with contrast if indicated    [place] [address] "[facility] [address]    Transthoracic Echocardiogram  2D, M-mode, Doppler, and Color Doppler    Study date:  13-Dec-2018    Patient: Luna Chadwick  MR number: [id number]  Account number: [de-identified]  : 1965  Age: 48 years  Gender: Female  Status: Outpatient  Location: [facility]  Height: 60 in  Weight: 218 5 lb  BP: 138/ 86 mmHg    Indications: Dyspnea    Diagnoses: R06 00 - Dyspnea, unspecified    Sonographer:  Yuliana Xavier RDCS  Primary Physician:  Cy Kilgore DO  Referring Physician:  Zaire Barrios DO  Group:  [facility]  Interpreting Physician:  Rios Dsouza MD    SUMMARY    SUMMARY:  1  Sinus rhythm  2  Good technical quality  3  Normal left ventricular systolic and diastolic function  4  Mild left ventricular cavity enlargement    LEFT VENTRICLE:  Normal left ventricular systolic function, EF 74%  Mild left ventricular cavity enlargement  Normal left ventricular wall thickness  Normal left ventricular wall motion without regional wall motion abnormalities  Normal left ventricular  diastolic function and filling pressures  LEFT ATRIUM:  Normal left atrial size  RIGHT ATRIUM:  Normal right atrial size  MITRAL VALVE:  Normal mitral valve with trace mitral regurgitation  AORTIC VALVE:  Normal tricuspid aortic valve without stenosis  TRICUSPID VALVE:  Trace tricuspid regurgitation  PULMONIC VALVE:  No pulmonic regurgitation  PULMONARY ARTERIES:  Normal pulmonary artery pressures  PERICARDIUM:  No pericardial effusion  HISTORY: PRIOR HISTORY: Patient has no history of cardiovascular disease  Risk factors: morbid obesity  PROCEDURE: The study was performed in the [facility]  This was a routine study  The transthoracic approach was used  The study included complete 2D imaging, M-mode, complete spectral Doppler, and color Doppler  The heart rate was  84 bpm, at the start of the study  This was a technically difficult study  LEFT VENTRICLE: Normal left ventricular systolic function, EF 15%  Mild left ventricular cavity enlargement  Normal left ventricular wall thickness   Normal left ventricular wall motion without regional wall motion abnormalities  Normal  left ventricular diastolic function and filling pressures  RIGHT VENTRICLE: The size was normal  Systolic function was normal  Wall thickness was normal     LEFT ATRIUM: Normal left atrial size  RIGHT ATRIUM: Normal right atrial size  MITRAL VALVE: Normal mitral valve with trace mitral regurgitation  AORTIC VALVE: Normal tricuspid aortic valve without stenosis  TRICUSPID VALVE: Trace tricuspid regurgitation  PULMONIC VALVE: No pulmonic regurgitation  PERICARDIUM: No pericardial effusion  There was no pericardial effusion  The pericardium was normal in appearance  AORTA: The root exhibited normal size  PULMONARY ARTERY: Normal pulmonary artery pressures  SYSTEM MEASUREMENT TABLES    2D  %FS: 35 91 %  Ao Diam: 3 11 cm  EDV(Teich): 125 09 ml  EF(Teich): 65 15 %  ESV(Teich): 43 6 ml  IVSd: 0 87 cm  LA Area: 11 16 cm2  LA Diam: 3 7 cm  LVEDV MOD A4C: 78 93 ml  LVEF MOD A4C: 68 69 %  LVESV MOD A4C: 24 71 ml  LVIDd: 5 12 cm  LVIDs: 3 28 cm  LVLd A4C: 7 15 cm  LVLs A4C: 5 52 cm  LVPWd: 0 9 cm  RA Area: 10 65 cm2  SV MOD A4C: 54 22 ml  SV(Teich): 81 5 ml    CW  TR Vmax: 1 92 m/s  TR maxP 8 mmHg    MM  TAPSE: 2 4 cm    PW  E': 0 14 m/s  E/E': 6 72  MV A Hugo: 1 05 m/s  MV Dec [place]: 4 01 m/s2  MV DecT: 231 54 ms  MV E Hugo: 0 93 m/s  MV E/A Ratio: 0 88  MV PHT: 67 15 ms  MVA By PHT: 3 28 cm2    [facility]    Prepared and electronically signed by    Norma Back MD  Signed 13-Dec-2018 16:36:37    No results found for this or any previous visit     --------------------------------------------------------------------------------  HOLTER  No results found for this or any previous visit  No results found for this or any previous visit     --------------------------------------------------------------------------------  CAROTIDS  No results found for this or any previous visit  --------------------------------------------------------------------------------  There are no diagnoses linked to this encounter    ======================================================    Past Medical History:   Diagnosis Date    Anxiety     Chest pain 08/12/2016    Chest wall pain 07/15/2016    Costochondritis 02/28/2014    Dog bite 07/29/2014    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([address] )     Fluid retention     Gastroenteritis 05/06/2013    Herpes zoster 07/08/2013    History of chemotherapy 1986    Migraine 01/19/2016    Shortness of breath 08/12/2016    Toxoplasmosis 08/28/2014     Past Surgical History:   Procedure Laterality Date    CHOLECYSTECTOMY      COLONOSCOPY      ESOPHAGOGASTRODUODENOSCOPY      HYSTERECTOMY  1986    KNEE ARTHROSCOPY      left shoulder    NECK SURGERY      Lumpectomy    OOPHORECTOMY Right 1986    IA ESOPHAGOGASTRODUODENOSCOPY TRANSORAL DIAGNOSTIC N/A 11/30/2018    Procedure: EGD;  Surgeon: Bernardo Ramirez MD;  Location: [facility];   Service: Gastroenterology         Medications  Current Outpatient Medications   Medication Sig Dispense Refill    albuterol (VENTOLIN HFA) 90 mcg/act inhaler Inhale 2 puffs every 6 (six) hours as needed for wheezing or shortness of breath (Patient not taking: Reported on 1/3/2022 ) 1 Inhaler 0    amLODIPine (NORVASC) 5 mg tablet Take 1 tablet (5 mg total) by mouth daily 90 tablet 3    aspirin 81 MG tablet Take 81 mg by mouth daily      butalbital-acetaminophen-caffeine (FIORICET,ESGIC) -40 mg per tablet Take 1 tablet by mouth every 6 (six) hours (Patient not taking: Reported on 1/3/2022 )      Cholecalciferol (VITAMIN D) 2000 units CAPS Take 1 capsule by mouth daily      citalopram (CeleXA) 40 mg tablet Take 1 tablet (40 mg total) by mouth daily 30 tablet 2    cromolyn (OPTICROM) 4 % ophthalmic solution Administer 1 drop to both eyes 4 (four) times a day (Patient not taking: Reported on 4/13/2021) 10 mL 1    furosemide (LASIX) 20 mg tablet Take 1 tablet (20 mg total) by mouth daily 30 tablet 5    lidocaine (LIDODERM) 5 % Apply 1 patch topically daily Apply in am and  remove in pm  12 hour on, 12 hour off (Patient not taking: Reported on 4/13/2021) 30 patch 0    loratadine (CLARITIN) 10 mg tablet Take 1 tablet (10 mg total) by mouth daily 30 tablet 1    metoprolol tartrate (LOPRESSOR) 50 mg tablet Take 1 tablet (50 mg total) by mouth every 12 (twelve) hours Start the day before CT scan, then take morning of and 1 hour before CT 4 tablet 0    multivitamin (THERAGRAN) TABS Take 1 tablet by mouth daily      omeprazole (PriLOSEC) 20 mg delayed release capsule Take 1 capsule (20 mg total) by mouth daily 30 capsule 5    rizatriptan (MAXALT) 10 MG tablet Take 1 tablet (10 mg total) by mouth once as needed for migraine for up to 1 dose May repeat in 2 hours if needed 9 tablet 2    topiramate (TOPAMAX) 100 mg tablet take 1 tablet by mouth once daily 30 tablet 5     No current facility-administered medications for this visit          Allergies   Allergen Reactions    Azithromycin GI Intolerance     Yeast infection    Imitrex [Sumatriptan] GI Intolerance    Cephalexin     Wound Dressings Rash     Adhesive tape       Social History     Socioeconomic History    Marital status: /Civil Union     Spouse name: Not on file    Number of children: Not on file    Years of education: Not on file    Highest education level: Not on file   Occupational History    Not on file   Tobacco Use    Smoking status: Never Smoker    Smokeless tobacco: Never Used   Substance and Sexual Activity    Alcohol use: Yes     Comment: Occasionally    Drug use: No    Sexual activity: Not on file   Other Topics Concern    Not on file   Social History Narrative    Not on file     Social Determinants of Health     Financial Resource Strain: Not on file   Food Insecurity: Not on file   Transportation Needs: Not on file   Physical Activity: Not on file Stress: Not on file   Social Connections: Not on file   Intimate Partner Violence: Not on file   Housing Stability: Not on file        Family History   Problem Relation Age of Onset    Coronary artery disease Family     Diabetes Mother     Heart disease Mother     Heart disease Father     No Known Problems Sister     No Known Problems Daughter     No Known Problems Maternal Grandmother     No Known Problems Maternal Grandfather     No Known Problems Paternal Grandmother     No Known Problems Paternal Grandfather     No Known Problems Sister     No Known Problems Sister     No Known Problems Daughter     No Known Problems Maternal Aunt     No Known Problems Maternal Aunt        Lab Results   Component Value Date    WBC 7 73 04/15/2021    HGB 11 9 04/15/2021    HCT 37 0 04/15/2021    MCV 92 04/15/2021     04/15/2021      Lab Results   Component Value Date    SODIUM 139 10/15/2021    K 4 0 10/15/2021     10/15/2021    CO2 27 10/15/2021    BUN 13 10/15/2021    CREATININE 1 02 10/15/2021    GLUC 78 06/15/2018    CALCIUM 9 4 10/15/2021      Lab Results   Component Value Date    HGBA1C 5 1 06/05/2017      No results found for: CHOL  Lab Results   Component Value Date    HDL 71 10/15/2021    HDL 74 04/15/2021    HDL 71 (H) 06/12/2019     Lab Results   Component Value Date    LDLCALC 118 (H) 10/15/2021    LDLCALC 118 (H) 04/15/2021    LDLCALC 114 (H) 06/12/2019     Lab Results   Component Value Date    TRIG 59 10/15/2021    TRIG 64 04/15/2021    TRIG 52 06/12/2019     No results found for: [place], [place]   Lab Results   Component Value Date    INR 0 99 06/09/2015    PROTIME 12 9 06/09/2015          Patient Active Problem List    Diagnosis Date Noted    Generalized anxiety disorder 02/20/2018    Gastroesophageal reflux disease 08/07/2017    Bilateral occipital neuralgia 05/15/2017    Restless leg 09/23/2016    Bilateral lower extremity edema 05/31/2016    Fibromyalgia 08/12/2015    Insomnia 04/01/2015    Vitamin D deficiency 05/31/2013    Migraine 01/22/2009       Portions of the record may have been created with voice recognition software  Occasional wrong word or "sound a like" substitutions may have occurred due to the inherent limitations of voice recognition software  Read the chart carefully and recognize, using context, where substitutions have occurred      Joyce Aguilar DO, [facility] [place]  2/2/2022 3:13 PM

## 2022-02-28 NOTE — NURSING NOTE
Cardiac CTA Questionairre      Cardiac history     Reason for exam: Chest pain and Short of breath on exertion     Have you been diagnosed with heart disease? NO     Do you have a family history of heart disease? YES     Have you ever experienced chest pain? YES     Have you had a CABG, angioplasty, cardiac cath, stent placement? NO     Do you have a pacemaker or defibrillator? NO     Have you ever been diagnosed with an irregular heart beat? NO     Do you have a history of having COPD or asthma? NO     Is your asthma controlled? Do you have high blood pressure? YES     Do you have diabetes? NO      Do you have high cholesterol? NO     Do you smoke? NO    Did you ever smoke? NO     General Information     Do you take any male enhancement medications such as Viagra, Levitra, or Cialis (PDE5 inhibitors) ? NO       Due to nitrate given during test, this needs to be held for 3 days prior to testing and may be            resumed 24 hrs  Do you have allergies? Azithro, imitrex, cephalexin and tape  Allergy to intravenous contrast or dye? NO     Do you have kidney disease? NO                           Blood work done:                BUN:      CREATINE:         GFR:      Height: 4'11"                         Weight: 231     Call placed to patient to discuss upcoming appointment at [place] [address] radiology department and complete consultation and Questionnaire with patient via phone  Patient is having a Cardiac CTA  Reviewed patient's allergies, also reviewed medications  Beta blocker ordered by Cardiologist and patient aware to take per MD orders scan  Test instructions given, patient aware to hold all caffeine products for 12 hours prior (0030 )to the exam this includes coffee, decaf, tea, soda and chocolate  Also made aware to avoid solid food for 4 hours prior (0830) to exam and to increase fluids one day prior to exam unless contraindicated    Patient was instructed that they may take all medications as usual unless otherwise directed by physician  Patient instructed to not use inhalers prior to exam, if uses may need to reschedule the study to another day  Discussed the pre procedure expectations, post procedure expectations, time entailed to perform the study and also the medications that may be used in exam ( beta blocker if needed to  heart rate to under 65 beats per minute for optimal exam results and NTG given during scan for vasodilation)  Reminded patient of location and time expected for procedure, instructed to bring photo ID, insurance card and script  Informed the patient that the study will last approximately 30-45 minutes  Pt verbalizes understanding of education and instructions  My number was also supplied to patient to call if any further questions or concerns should arise pre or post procedure

## 2022-03-04 ENCOUNTER — TELEPHONE (OUTPATIENT)
Dept: CARDIOLOGY CLINIC | Facility: CLINIC | Age: 57
End: 2022-03-04

## 2022-03-04 DIAGNOSIS — R07.89 ATYPICAL CHEST PAIN: Primary | ICD-10-CM

## 2022-03-04 DIAGNOSIS — R07.9 CHEST PAIN, UNSPECIFIED TYPE: ICD-10-CM

## 2022-03-04 DIAGNOSIS — R06.00 DYSPNEA ON EXERTION: ICD-10-CM

## 2022-03-04 NOTE — TELEPHONE ENCOUNTER
Ct dept called pt needs CMP before her cardiac cta- tigertexted Dr everett to put the order in,called pt left detailed message she needs this BW before the test

## 2022-03-05 ENCOUNTER — APPOINTMENT (OUTPATIENT)
Dept: LAB | Facility: CLINIC | Age: 57
End: 2022-03-05
Payer: COMMERCIAL

## 2022-03-05 LAB
ALBUMIN SERPL BCP-MCNC: 3.3 G/DL (ref 3.5–5)
ALP SERPL-CCNC: 108 U/L (ref 46–116)
ALT SERPL W P-5'-P-CCNC: 25 U/L (ref 12–78)
ANION GAP SERPL CALCULATED.3IONS-SCNC: 4 MMOL/L (ref 4–13)
AST SERPL W P-5'-P-CCNC: 19 U/L (ref 5–45)
BILIRUB SERPL-MCNC: 0.26 MG/DL (ref 0.2–1)
BUN SERPL-MCNC: 13 MG/DL (ref 5–25)
CALCIUM ALBUM COR SERPL-MCNC: 10.1 MG/DL (ref 8.3–10.1)
CALCIUM SERPL-MCNC: 9.5 MG/DL (ref 8.3–10.1)
CHLORIDE SERPL-SCNC: 109 MMOL/L (ref 100–108)
CO2 SERPL-SCNC: 27 MMOL/L (ref 21–32)
CREAT SERPL-MCNC: 1 MG/DL (ref 0.6–1.3)
GFR SERPL CREATININE-BSD FRML MDRD: 63 ML/MIN/1.73SQ M
GLUCOSE P FAST SERPL-MCNC: 81 MG/DL (ref 65–99)
POTASSIUM SERPL-SCNC: 3.8 MMOL/L (ref 3.5–5.3)
PROT SERPL-MCNC: 7.6 G/DL (ref 6.4–8.2)
SODIUM SERPL-SCNC: 140 MMOL/L (ref 136–145)

## 2022-03-05 PROCEDURE — 80053 COMPREHEN METABOLIC PANEL: CPT

## 2022-03-05 PROCEDURE — 36415 COLL VENOUS BLD VENIPUNCTURE: CPT

## 2022-03-08 ENCOUNTER — HOSPITAL ENCOUNTER (OUTPATIENT)
Dept: RADIOLOGY | Facility: HOSPITAL | Age: 57
Discharge: HOME | End: 2022-03-08
Payer: COMMERCIAL

## 2022-03-08 VITALS
OXYGEN SATURATION: 97 % | DIASTOLIC BLOOD PRESSURE: 84 MMHG | HEART RATE: 62 BPM | SYSTOLIC BLOOD PRESSURE: 145 MMHG | RESPIRATION RATE: 22 BRPM

## 2022-03-08 DIAGNOSIS — R06.02 SOB (SHORTNESS OF BREATH) ON EXERTION: ICD-10-CM

## 2022-03-08 DIAGNOSIS — R07.9 CHEST PAIN, UNSPECIFIED TYPE: ICD-10-CM

## 2022-03-08 PROCEDURE — G1004 CDSM NDSC: HCPCS

## 2022-03-08 PROCEDURE — 75574 CT ANGIO HRT W/3D IMAGE: CPT

## 2022-03-08 RX ORDER — METOPROLOL TARTRATE 5 MG/5ML
5 INJECTION INTRAVENOUS
Status: DISCONTINUED | OUTPATIENT
Start: 2022-03-08 | End: 2022-03-09 | Stop reason: HOSPADM

## 2022-03-08 RX ORDER — NITROGLYCERIN 0.4 MG/1
0.4 TABLET SUBLINGUAL
Status: COMPLETED | OUTPATIENT
Start: 2022-03-08 | End: 2022-03-08

## 2022-03-08 RX ADMIN — METOROPROLOL TARTRATE 5 MG: 5 INJECTION, SOLUTION INTRAVENOUS at 12:58

## 2022-03-08 RX ADMIN — NITROGLYCERIN 0.4 MG: 0.4 TABLET SUBLINGUAL at 13:03

## 2022-03-08 RX ADMIN — IODIXANOL 100 ML: 320 INJECTION, SOLUTION INTRAVASCULAR at 13:13

## 2022-03-17 ENCOUNTER — TELEPHONE (OUTPATIENT)
Dept: CARDIOLOGY CLINIC | Facility: CLINIC | Age: 57
End: 2022-03-17

## 2022-03-17 NOTE — TELEPHONE ENCOUNTER
Left message on answering machine that patient has an abnormal CT result and that Dr Jerry Flores will see and speak to her at appointment on 3/31/22

## 2022-03-31 ENCOUNTER — OFFICE VISIT (OUTPATIENT)
Dept: CARDIOLOGY CLINIC | Facility: CLINIC | Age: 57
End: 2022-03-31
Payer: COMMERCIAL

## 2022-03-31 VITALS
WEIGHT: 230.8 LBS | HEIGHT: 59 IN | HEART RATE: 82 BPM | OXYGEN SATURATION: 97 % | DIASTOLIC BLOOD PRESSURE: 70 MMHG | BODY MASS INDEX: 46.53 KG/M2 | SYSTOLIC BLOOD PRESSURE: 136 MMHG

## 2022-03-31 DIAGNOSIS — I20.8 STABLE ANGINA PECTORIS (HCC): Primary | ICD-10-CM

## 2022-03-31 DIAGNOSIS — R93.89 ABNORMAL COMPUTED TOMOGRAPHY ANGIOGRAPHY (CTA): ICD-10-CM

## 2022-03-31 PROCEDURE — 99214 OFFICE O/P EST MOD 30 MIN: CPT

## 2022-03-31 PROCEDURE — 3008F BODY MASS INDEX DOCD: CPT

## 2022-03-31 NOTE — PROGRESS NOTES
Cardiology   MD Lilliana Estes MD Jarold Levy, DO, Belita Baston Mansoor, MD Issac Merlin, DO, Trina Ormond, DO, [facility] [place]  -------------------------------------------------------------------  Formerly [facility]  [address] [place], [place] [address]  [phone]  [phone]  03/31/22  Crow Swann  YOB: 1965   MRN: [id number]      Referring Physician: PASHA Mathis  [address]  [facility],  [address]     HPI: Crow Swann is a 64 y o  female with:   Gastroesophageal reflux disease   Bilateral lower extremity edema   Fibromyalgia   Anxiety  Obesity, BMI 46    I had seen her in January of 2020 for symptoms chest pain and dyspnea on exertion  The symptoms have been going on for several years but these are actually getting worse now  She had a stress echo that was negative for ischemia however her overall exercise capacity was very poor, she only exercised for 3 minutes 30 seconds and had to stop due to chest pain and dyspnea on exertion  She also noted palpitations at the time    Because of this I ordered a coronary CT angiogram as I a m  concerned about underlying coronary artery disease and ischemia being present  This did show 50-69% stenosis in the left anterior descending artery territory  Recommendation is for functional assessment  She continues to have chest pain and pressure with exertion which seems to be increasing in frequency      Coronary CT angiogram  IMPRESSION:     Mild and moderate coronary atherosclerotic disease most notably in the proximal left anterior descending coronary artery where there is a predominantly noncalcified lesion resulting in estimated 50 - 60% atherosclerotic narrowing      CAD-RADS 3     - Degree of maximal coronary stenosis = 50 - 69%  - Moderate stenosis      Management recommendations:  - Consider functional assessment   - Consider symptom guided anti-ischemic and preventative pharmacotherapy as well as risk factor modification per guideline directed care  Review of Systems   Constitutional: Negative for chills and fever  HENT: Negative for facial swelling and sore throat  Eyes: Negative for visual disturbance  Respiratory: Positive for shortness of breath  Negative for cough, chest tightness and wheezing  Cardiovascular: Positive for chest pain  Negative for palpitations and leg swelling  Gastrointestinal: Negative for abdominal pain, blood in stool, constipation, diarrhea, nausea and vomiting  Endocrine: Negative for cold intolerance and heat intolerance  Genitourinary: Negative for decreased urine volume, difficulty urinating, dysuria and hematuria  Musculoskeletal: Negative for arthralgias, back pain and myalgias  Skin: Negative for rash  Neurological: Negative for dizziness, syncope, weakness and numbness  Psychiatric/Behavioral: Negative for agitation, behavioral problems and confusion  The patient is not nervous/anxious  OBJECTIVE  There were no vitals filed for this visit  Physical Exam  Constitutional: awake, alert and oriented, in no acute distress, no obvious deformities, obese female  Head: Normocephalic, without obvious abnormality, atraumatic  Eyes: conjunctivae clear and moist  Sclera anicteric  No xanthelasmas  Pupils equal bilaterally  Extraocular motions are full  Ear nose mouth and throat: ears are symmetrical bilaterally, hearing appears to be equal bilaterally, no nasal discharge or epistaxis, oropharynx is clear with moist mucous membranes  Neck:  Trachea is midline, neck is supple, no thyromegaly or significant lymphadenopathy, there is full range of motion    Lungs: clear to auscultation bilaterally, no wheezes, no rales, no rhonchi, no accessory muscle use, breathing is nonlabored  Heart: regular rate and rhythm, S1, S2 normal, no murmur, no click, no rub and no gallop, no lower extremity edema  Abdomen: Obese, soft, non-tender; bowel sounds normal; no masses,  no organomegaly  Psychiatric:  Patient is oriented to time, place, person, mood/affect is negative for depression, anxiety, agitation, appears to have appropriate insight  Skin: Skin is warm, dry, intact  No obvious rashes or lesions on exposed extremities  Nail beds are pink with no cyanosis or clubbing  EKG:  No results found for this visit on 03/31/22  IMPRESSION:  1  Atypical chest pain, central chest, both exertional and nonexertional, with stress echo that is essentially equivocal   There is no echocardiographic evidence of stress-induced ischemia however her exercise tolerance was very poor, only able to exercise for 3 minutes and 30 seconds   CT angiogram did show moderate proximal LAD disease  Functional assessment is recommended as well as medical management coronary artery disease  2  Abnormal electrocardiogram   3  Dyspnea on exertion   4  Palpitations  5  Obesity  6  Deconditioning   7  Gastroesophageal reflux disease  8  Fibromyalgia   9  Anxiety       DISCUSSION/RECOMMENDATIONS:  Joe Nilda Given her continued chest pain and moderate proximal left anterior descending artery disease on CT angiogram would recommend the following    Continue aspirin    Continue amlodipine    Continue Lasix    Add metoprolol tartrate 25 mg daily    Check pharmacologic nuclear stress  She is unable to exercise to adequate degree on treadmill for stress testing  This will be to assess for the presence of at least moderate ischemia in the LAD territory to correlate with the possible significance of disease seen on CT angiogram   If This is positive she will need catheterization      Tram Peters DO, [facility] [place]  --------------------------------------------------------------------------------  TREADMILL STRESS  No results found for this or any previous visit      ----------------------------------------------------------------------------------------------  NUCLEAR STRESS TEST: No results found for this or any previous visit  No results found for this or any previous visit       --------------------------------------------------------------------------------  CATH:  No results found for this or any previous visit     --------------------------------------------------------------------------------  ECHO:   Results for orders placed during the hospital encounter of 18    Echo complete with contrast if indicated    Narrative  [address] [place] [address]    Transthoracic Echocardiogram  2D, M-mode, Doppler, and Color Doppler    Study date:  13-Dec-2018    Patient: Nasim Chaudhari  MR number: [id number]  Account number: [de-identified]  : 1965  Age: 48 years  Gender: Female  Status: Outpatient  Location: [address] [place] [address]  Height: 60 in  Weight: 218 5 lb  BP: 138/ 86 mmHg    Indications: Dyspnea    Diagnoses: R06 00 - Dyspnea, unspecified    Sonographer:  Elba Blandon RDCS  Primary Physician:  Ross Medina DO  Referring Physician:  Guevara Zhou DO  Group:  [facility]  Interpreting Physician:  Kalpesh Stephens MD    SUMMARY    SUMMARY:  1  Sinus rhythm  2  Good technical quality  3  Normal left ventricular systolic and diastolic function  4  Mild left ventricular cavity enlargement    LEFT VENTRICLE:  Normal left ventricular systolic function, EF 92%  Mild left ventricular cavity enlargement  Normal left ventricular wall thickness  Normal left ventricular wall motion without regional wall motion abnormalities  Normal left ventricular  diastolic function and filling pressures  LEFT ATRIUM:  Normal left atrial size  RIGHT ATRIUM:  Normal right atrial size  MITRAL VALVE:  Normal mitral valve with trace mitral regurgitation  AORTIC VALVE:  Normal tricuspid aortic valve without stenosis  TRICUSPID VALVE:  Trace tricuspid regurgitation      PULMONIC VALVE:  No pulmonic regurgitation  PULMONARY ARTERIES:  Normal pulmonary artery pressures  PERICARDIUM:  No pericardial effusion  HISTORY: PRIOR HISTORY: Patient has no history of cardiovascular disease  Risk factors: morbid obesity  PROCEDURE: The study was performed in the [facility]  This was a routine study  The transthoracic approach was used  The study included complete 2D imaging, M-mode, complete spectral Doppler, and color Doppler  The heart rate was  84 bpm, at the start of the study  This was a technically difficult study  LEFT VENTRICLE: Normal left ventricular systolic function, EF 59%  Mild left ventricular cavity enlargement  Normal left ventricular wall thickness  Normal left ventricular wall motion without regional wall motion abnormalities  Normal  left ventricular diastolic function and filling pressures  RIGHT VENTRICLE: The size was normal  Systolic function was normal  Wall thickness was normal     LEFT ATRIUM: Normal left atrial size  RIGHT ATRIUM: Normal right atrial size  MITRAL VALVE: Normal mitral valve with trace mitral regurgitation  AORTIC VALVE: Normal tricuspid aortic valve without stenosis  TRICUSPID VALVE: Trace tricuspid regurgitation  PULMONIC VALVE: No pulmonic regurgitation  PERICARDIUM: No pericardial effusion  There was no pericardial effusion  The pericardium was normal in appearance  AORTA: The root exhibited normal size  PULMONARY ARTERY: Normal pulmonary artery pressures      SYSTEM MEASUREMENT TABLES    2D  %FS: 35 91 %  Ao Diam: 3 11 cm  EDV(Teich): 125 09 ml  EF(Teich): 65 15 %  ESV(Teich): 43 6 ml  IVSd: 0 87 cm  LA Area: 11 16 cm2  LA Diam: 3 7 cm  LVEDV MOD A4C: 78 93 ml  LVEF MOD A4C: 68 69 %  LVESV MOD A4C: 24 71 ml  LVIDd: 5 12 cm  LVIDs: 3 28 cm  LVLd A4C: 7 15 cm  LVLs A4C: 5 52 cm  LVPWd: 0 9 cm  RA Area: 10 65 cm2  SV MOD A4C: 54 22 ml  SV(Teich): 81 5 ml    CW  TR Vmax: 1 92 m/s  TR maxP 8 mmHg    MM  TAPSE: 2 4 cm    PW  E': 0 14 m/s  E/E': 6 72  MV A [place]: 1 05 m/s  MV Dec [place]: 4 01 m/s2  MV DecT: 231 54 ms  MV E Hugo: 0 93 m/s  MV E/A Ratio: 0 88  MV PHT: 67 15 ms  MVA By PHT: 3 28 cm2    [facility]    Prepared and electronically signed by    Patricia Batres MD  Signed 13-Dec-2018 16:36:37    No results found for this or any previous visit     --------------------------------------------------------------------------------  HOLTER  No results found for this or any previous visit  No results found for this or any previous visit     --------------------------------------------------------------------------------  CAROTIDS  No results found for this or any previous visit      --------------------------------------------------------------------------------  There are no diagnoses linked to this encounter    ======================================================    Past Medical History:   Diagnosis Date    Anxiety     Chest pain 08/12/2016    Chest wall pain 07/15/2016    Costochondritis 02/28/2014    Dog bite 07/29/2014    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     Fluid retention     Gastroenteritis 05/06/2013    Herpes zoster 07/08/2013    History of chemotherapy 1986    Migraine 01/19/2016    Shortness of breath 08/12/2016    Toxoplasmosis 08/28/2014     Past Surgical History:   Procedure Laterality Date    CHOLECYSTECTOMY      COLONOSCOPY      ESOPHAGOGASTRODUODENOSCOPY      HYSTERECTOMY  1986    KNEE ARTHROSCOPY      left shoulder    NECK SURGERY      Lumpectomy    OOPHORECTOMY Right 1986    IL ESOPHAGOGASTRODUODENOSCOPY TRANSORAL DIAGNOSTIC N/A 11/30/2018    Procedure: EGD;  Surgeon: Rk Alonzo MD;  Location: [facility];   Service: Gastroenterology         Medications  Current Outpatient Medications   Medication Sig Dispense Refill    albuterol (VENTOLIN HFA) 90 mcg/act inhaler Inhale 2 puffs every 6 (six) hours as needed for wheezing or shortness of breath (Patient not taking: Reported on 1/3/2022 ) 1 Inhaler 0    amLODIPine (NORVASC) 5 mg tablet Take 1 tablet (5 mg total) by mouth daily 90 tablet 3    aspirin 81 MG tablet Take 81 mg by mouth daily      butalbital-acetaminophen-caffeine (FIORICET,ESGIC) -40 mg per tablet Take 1 tablet by mouth every 6 (six) hours (Patient not taking: Reported on 1/3/2022 )      Cholecalciferol (VITAMIN D) 2000 units CAPS Take 1 capsule by mouth daily      citalopram (CeleXA) 40 mg tablet Take 1 tablet (40 mg total) by mouth daily 30 tablet 2    cromolyn (OPTICROM) 4 % ophthalmic solution Administer 1 drop to both eyes 4 (four) times a day (Patient not taking: Reported on 4/13/2021) 10 mL 1    furosemide (LASIX) 20 mg tablet Take 1 tablet (20 mg total) by mouth daily 30 tablet 5    Glycerin-Polysorbate 80 (REFRESH DRY EYE THERAPY OP) Apply to eye      lidocaine (LIDODERM) 5 % Apply 1 patch topically daily Apply in am and  remove in pm  12 hour on, 12 hour off (Patient not taking: Reported on 4/13/2021) 30 patch 0    loratadine (CLARITIN) 10 mg tablet Take 1 tablet (10 mg total) by mouth daily 30 tablet 1    metoprolol tartrate (LOPRESSOR) 50 mg tablet Take 1 tablet (50 mg total) by mouth every 12 (twelve) hours Start the day before CT scan, then take morning of and 1 hour before CT (Patient not taking: Reported on 2/2/2022 ) 4 tablet 0    multivitamin (THERAGRAN) TABS Take 1 tablet by mouth daily      omeprazole (PriLOSEC) 20 mg delayed release capsule Take 1 capsule (20 mg total) by mouth daily 30 capsule 5    rizatriptan (MAXALT) 10 MG tablet Take 1 tablet (10 mg total) by mouth once as needed for migraine for up to 1 dose May repeat in 2 hours if needed 9 tablet 2    topiramate (TOPAMAX) 100 mg tablet take 1 tablet by mouth once daily 30 tablet 5     No current facility-administered medications for this visit          Allergies   Allergen Reactions    Azithromycin GI Intolerance     Yeast infection    Imitrex [Sumatriptan] GI Intolerance    Cephalexin     Wound Dressings Rash     Adhesive tape       Social History     Socioeconomic History    Marital status: /Civil Union     Spouse name: Not on file    Number of children: Not on file    Years of education: Not on file    Highest education level: Not on file   Occupational History    Not on file   Tobacco Use    Smoking status: Never Smoker    Smokeless tobacco: Never Used   Substance and Sexual Activity    Alcohol use: Yes     Comment: Occasionally    Drug use: No    Sexual activity: Not on file   Other Topics Concern    Not on file   Social History Narrative    Not on file     Social Determinants of Health     Financial Resource Strain: Not on file   Food Insecurity: Not on file   Transportation Needs: Not on file   Physical Activity: Not on file   Stress: Not on file   Social Connections: Not on file   Intimate Partner Violence: Not on file   Housing Stability: Not on file        Family History   Problem Relation Age of Onset    Coronary artery disease Family     Diabetes Mother     Heart disease Mother     Heart disease Father     No Known Problems Sister     No Known Problems Daughter     No Known Problems Maternal Grandmother     No Known Problems Maternal Grandfather     No Known Problems Paternal Grandmother     No Known Problems Paternal Grandfather     No Known Problems Sister     No Known Problems Sister     No Known Problems Daughter     No Known Problems Maternal Aunt     No Known Problems Maternal Aunt        Lab Results   Component Value Date    WBC 7 73 04/15/2021    HGB 11 9 04/15/2021    HCT 37 0 04/15/2021    MCV 92 04/15/2021     04/15/2021      Lab Results   Component Value Date    SODIUM 140 03/05/2022    K 3 8 03/05/2022     (H) 03/05/2022    CO2 27 03/05/2022    BUN 13 03/05/2022    CREATININE 1 00 03/05/2022    GLUC 78 06/15/2018    CALCIUM 9 5 03/05/2022      Lab Results Component Value Date    HGBA1C 5 1 06/05/2017      No results found for: CHOL  Lab Results   Component Value Date    HDL 71 10/15/2021    HDL 74 04/15/2021    HDL 71 (H) 06/12/2019     Lab Results   Component Value Date    LDLCALC 118 (H) 10/15/2021    LDLCALC 118 (H) 04/15/2021    LDLCALC 114 (H) 06/12/2019     Lab Results   Component Value Date    TRIG 59 10/15/2021    TRIG 64 04/15/2021    TRIG 52 06/12/2019     No results found for: [place], [place]   Lab Results   Component Value Date    INR 0 99 06/09/2015    PROTIME 12 9 06/09/2015          Patient Active Problem List    Diagnosis Date Noted    Generalized anxiety disorder 02/20/2018    Gastroesophageal reflux disease 08/07/2017    Bilateral occipital neuralgia 05/15/2017    Restless leg 09/23/2016    Bilateral lower extremity edema 05/31/2016    Fibromyalgia 08/12/2015    Insomnia 04/01/2015    Vitamin D deficiency 05/31/2013    Migraine 01/22/2009       Portions of the record may have been created with voice recognition software  Occasional wrong word or "sound a like" substitutions may have occurred due to the inherent limitations of voice recognition software  Read the chart carefully and recognize, using context, where substitutions have occurred      Yves Lunsford DO, [facility] [place]  3/31/2022 3:35 PM

## 2022-04-01 ENCOUNTER — TELEPHONE (OUTPATIENT)
Dept: CARDIOLOGY CLINIC | Facility: CLINIC | Age: 57
End: 2022-04-01

## 2022-04-01 NOTE — TELEPHONE ENCOUNTER
Pt calling here asking for restrictions pt was seen yesterday, Told the patient would not overexert self until testing is complete  Pt states that's what she thought    BORIS

## 2022-04-06 ENCOUNTER — TELEPHONE (OUTPATIENT)
Dept: CARDIOLOGY CLINIC | Facility: CLINIC | Age: 57
End: 2022-04-06

## 2022-04-06 DIAGNOSIS — F41.1 GENERALIZED ANXIETY DISORDER: ICD-10-CM

## 2022-04-06 DIAGNOSIS — G43.909 MIGRAINE WITHOUT STATUS MIGRAINOSUS, NOT INTRACTABLE, UNSPECIFIED MIGRAINE TYPE: ICD-10-CM

## 2022-04-06 RX ORDER — TOPIRAMATE 100 MG/1
TABLET, FILM COATED ORAL
Qty: 30 TABLET | Refills: 5 | Status: SHIPPED | OUTPATIENT
Start: 2022-04-06

## 2022-04-06 RX ORDER — CITALOPRAM 40 MG/1
40 TABLET ORAL DAILY
Qty: 30 TABLET | Refills: 2 | Status: SHIPPED | OUTPATIENT
Start: 2022-04-06 | End: 2022-07-06 | Stop reason: SDUPTHER

## 2022-04-06 NOTE — TELEPHONE ENCOUNTER
Called patient and told to hold her metoprolol the day before and day of scheduled stress test per Dr Pace Given instructions  Pt understood directions

## 2022-04-12 DIAGNOSIS — G43.909 MIGRAINE WITHOUT STATUS MIGRAINOSUS, NOT INTRACTABLE, UNSPECIFIED MIGRAINE TYPE: ICD-10-CM

## 2022-04-12 NOTE — TELEPHONE ENCOUNTER
Called pt, scheduled for 6m f/u 4/19  Pt requested refill of Maxalt be sent to [facility] in [place]   Thank you

## 2022-04-14 RX ORDER — RIZATRIPTAN BENZOATE 10 MG/1
10 TABLET ORAL ONCE AS NEEDED
Qty: 9 TABLET | Refills: 2 | Status: SHIPPED | OUTPATIENT
Start: 2022-04-14

## 2022-04-19 ENCOUNTER — HOSPITAL ENCOUNTER (OUTPATIENT)
Dept: NUCLEAR MEDICINE | Facility: HOSPITAL | Age: 57
Discharge: HOME | End: 2022-04-19
Payer: COMMERCIAL

## 2022-04-19 ENCOUNTER — OFFICE VISIT (OUTPATIENT)
Dept: FAMILY MEDICINE CLINIC | Facility: CLINIC | Age: 57
End: 2022-04-19
Payer: COMMERCIAL

## 2022-04-19 ENCOUNTER — HOSPITAL ENCOUNTER (OUTPATIENT)
Dept: NON INVASIVE DIAGNOSTICS | Facility: HOSPITAL | Age: 57
Discharge: HOME | End: 2022-04-19
Payer: COMMERCIAL

## 2022-04-19 VITALS
OXYGEN SATURATION: 97 % | BODY MASS INDEX: 46.81 KG/M2 | SYSTOLIC BLOOD PRESSURE: 126 MMHG | DIASTOLIC BLOOD PRESSURE: 80 MMHG | HEIGHT: 59 IN | RESPIRATION RATE: 18 BRPM | TEMPERATURE: 97.4 F | WEIGHT: 232.2 LBS | HEART RATE: 83 BPM

## 2022-04-19 DIAGNOSIS — R93.89 ABNORMAL COMPUTED TOMOGRAPHY ANGIOGRAPHY (CTA): ICD-10-CM

## 2022-04-19 DIAGNOSIS — M79.7 FIBROMYALGIA: ICD-10-CM

## 2022-04-19 DIAGNOSIS — Z13.220 SCREENING, LIPID: ICD-10-CM

## 2022-04-19 DIAGNOSIS — F41.1 GENERALIZED ANXIETY DISORDER: ICD-10-CM

## 2022-04-19 DIAGNOSIS — Z00.00 ANNUAL PHYSICAL EXAM: Primary | ICD-10-CM

## 2022-04-19 DIAGNOSIS — Z12.31 BREAST CANCER SCREENING BY MAMMOGRAM: ICD-10-CM

## 2022-04-19 DIAGNOSIS — I20.8 STABLE ANGINA PECTORIS (HCC): ICD-10-CM

## 2022-04-19 DIAGNOSIS — E03.8 SUBCLINICAL HYPOTHYROIDISM: ICD-10-CM

## 2022-04-19 DIAGNOSIS — Z13.820 SCREENING FOR OSTEOPOROSIS: ICD-10-CM

## 2022-04-19 DIAGNOSIS — R22.0 MASS OF SKIN OF HEAD: ICD-10-CM

## 2022-04-19 DIAGNOSIS — Z13.1 SCREENING FOR DIABETES MELLITUS: ICD-10-CM

## 2022-04-19 DIAGNOSIS — K21.9 GASTROESOPHAGEAL REFLUX DISEASE, UNSPECIFIED WHETHER ESOPHAGITIS PRESENT: ICD-10-CM

## 2022-04-19 DIAGNOSIS — G43.909 MIGRAINE WITHOUT STATUS MIGRAINOSUS, NOT INTRACTABLE, UNSPECIFIED MIGRAINE TYPE: ICD-10-CM

## 2022-04-19 DIAGNOSIS — H91.92 HEARING DIFFICULTY OF LEFT EAR: ICD-10-CM

## 2022-04-19 DIAGNOSIS — I05.9 MITRAL VALVE ANNULAR CALCIFICATION: ICD-10-CM

## 2022-04-19 DIAGNOSIS — H93.12 TINNITUS OF LEFT EAR: ICD-10-CM

## 2022-04-19 DIAGNOSIS — Z78.0 POST-MENOPAUSAL: ICD-10-CM

## 2022-04-19 DIAGNOSIS — Z12.11 SCREEN FOR COLON CANCER: ICD-10-CM

## 2022-04-19 DIAGNOSIS — Z13.0 SCREENING FOR DEFICIENCY ANEMIA: ICD-10-CM

## 2022-04-19 PROBLEM — I34.81 MITRAL VALVE ANNULAR CALCIFICATION: Status: ACTIVE | Noted: 2022-04-19

## 2022-04-19 LAB
BASELINE ST DEPRESSION: 0 MM
CHEST PAIN STATEMENT: NORMAL
MAX DIASTOLIC BP: 84 MMHG
MAX HEART RATE: 127 BPM
MAX PREDICTED HEART RATE: 164 BPM
MAX. SYSTOLIC BP: 140 MMHG
NUC STRESS EJECTION FRACTION: 73 %
PROTOCOL NAME: NORMAL
RATE PRESSURE PRODUCT: NORMAL
REASON FOR TERMINATION: NORMAL
SL CV REST NUCLEAR ISOTOPE DOSE: 10.9 MCI
SL CV STRESS NUCLEAR ISOTOPE DOSE: 32.3 MCI
SL CV STRESS RECOVERY BP: NORMAL MMHG
SL CV STRESS RECOVERY HR: 83 BPM
STRESS ANGINA INDEX: 1
STRESS BASELINE BP: NORMAL MMHG
STRESS BASELINE HR: 90 BPM
STRESS O2 SAT REST: 99 %
STRESS PEAK HR: 127 BPM
STRESS POST O2 SAT PEAK: 98 %
STRESS POST PEAK BP: 140 MMHG
STRESS ST DEPRESSION: 0 MM
STRESS/REST PERFUSION RATIO: 1.02
TARGET HR FORMULA: NORMAL
TIME IN EXERCISE PHASE: NORMAL

## 2022-04-19 PROCEDURE — 78452 HT MUSCLE IMAGE SPECT MULT: CPT

## 2022-04-19 PROCEDURE — 3008F BODY MASS INDEX DOCD: CPT | Performed by: NURSE PRACTITIONER

## 2022-04-19 PROCEDURE — 93016 CV STRESS TEST SUPVJ ONLY: CPT

## 2022-04-19 PROCEDURE — G1004 CDSM NDSC: HCPCS

## 2022-04-19 PROCEDURE — 1036F TOBACCO NON-USER: CPT | Performed by: NURSE PRACTITIONER

## 2022-04-19 PROCEDURE — 99396 PREV VISIT EST AGE 40-64: CPT | Performed by: NURSE PRACTITIONER

## 2022-04-19 PROCEDURE — A9502 TC99M TETROFOSMIN: HCPCS

## 2022-04-19 PROCEDURE — 93017 CV STRESS TEST TRACING ONLY: CPT

## 2022-04-19 PROCEDURE — 3725F SCREEN DEPRESSION PERFORMED: CPT | Performed by: NURSE PRACTITIONER

## 2022-04-19 PROCEDURE — 93018 CV STRESS TEST I&R ONLY: CPT

## 2022-04-19 RX ADMIN — REGADENOSON 0.4 MG: 0.08 INJECTION, SOLUTION INTRAVENOUS at 10:26

## 2022-04-19 NOTE — PROGRESS NOTES
ADULT ANNUAL [address]  GROUP    NAME: Everardo Smith  AGE: 64 y o  SEX: female  : 1965     DATE: 2022     Assessment and Plan:   Comprehensive physical exam  Past medical history and chronic conditions reviewed  Medications reconciled  Preventative care and age-appropriate screenings as below  Six-month follow-up  Return sooner with problems or concerns  Problem List Items Addressed This Visit        Digestive    Gastroesophageal reflux disease     Stable on Omeprazole 20            Endocrine    Subclinical hypothyroidism    Relevant Orders    TSH, 3rd generation with Free T4 reflex       Cardiovascular and Mediastinum    Migraine     Stable on Topiramate 100  Occasional Rizatriptan use         Mitral valve annular calcification     Currently following with cardiology - work up in progress            Other    Fibromyalgia    Generalized anxiety disorder     Stable on Citalopram 40 daily  Hearing difficulty of left ear     C/O decreased hearing left ear  Frequent ringing  Referral to audiology today for evaluation and testing as deemed appropriate         Relevant Orders    Ambulatory Referral to Audiology    Mass of skin of head     Small, hard mass noted center forehead  Present for years but pt notes it to be enlarging  Intermittent pain  Especially with her migraines  She was told it was a cyst in the past  Assess today with US         Relevant Orders    US head neck soft tissue    Post-menopausal     Hysterectomy at age 24 (left ovary remained)   Dexa today to assess bone density         Relevant Orders    DXA bone density spine hip and pelvis      Other Visit Diagnoses     Annual physical exam    -  Primary    Breast cancer screening by mammogram        Relevant Orders    Mammo screening bilateral w 3d & cad    BMI 45 0-49 9, adult ([facility] [address] )        Screening for diabetes mellitus        Relevant Orders    Comprehensive metabolic panel    Screening for deficiency anemia        Relevant Orders    CBC and differential    Screening, lipid        Relevant Orders    Lipid panel    Screening for osteoporosis        Relevant Orders    DXA bone density spine hip and pelvis    Screen for colon cancer        Relevant Orders    Ambulatory referral for colonoscopy    Tinnitus of left ear        Relevant Orders    Ambulatory Referral to Audiology          Immunizations and preventive care screenings were discussed with patient today  Appropriate education was printed on patient's after visit summary  Counseling:  Alcohol/drug use: discussed moderation in alcohol intake, the recommendations for healthy alcohol use, and avoidance of illicit drug use  Dental Health: discussed importance of regular tooth brushing, flossing, and dental visits  Injury prevention: discussed safety/seat belts, safety helmets, smoke detectors, carbon dioxide detectors, and smoking near bedding or upholstery  Sexual health: discussed sexually transmitted diseases, partner selection, use of condoms, avoidance of unintended pregnancy, and contraceptive alternatives  · Exercise: the importance of regular exercise/physical activity was discussed  Recommend exercise 3-5 times per week for at least 30 minutes  BMI Counseling: Body mass index is 46 9 kg/m²  The BMI is above normal  Nutrition recommendations include decreasing portion sizes, moderation in carbohydrate intake and reducing intake of saturated and trans fat  Exercise recommendations include moderate physical activity 150 minutes/week  Rationale for BMI follow-up plan is due to patient being overweight or obese  Healthy lifestyle dicussed  Encouraged daily exercise - as tolerated      Depression Screening and Follow-up Plan: Patient was screened for depression during today's encounter  They screened negative with a PHQ-2 score of 2  Return in about 6 months (around 10/19/2022) for Recheck  Chief Complaint:     Chief Complaint   Patient presents with    Follow-up      History of Present Illness:     Adult Annual Physical   Patient here for a comprehensive physical exam  The patient reports no problems  Diet and Physical Activity  · Diet/Nutrition: well balanced diet  · Exercise: no formal exercise and unable to tolerate at this time  Cardiology work up in progress  Depression Screening  PHQ-2/9 Depression Screening    Little interest or pleasure in doing things: 0 - not at all  Feeling down, depressed, or hopeless: 2 - more than half the days  PHQ-2 Score: 2  PHQ-2 Interpretation: Negative depression screen       General Health  · Sleep: sleeps well, snores loudly and experiences daytime sleepiness  May need sleep study  Reassess at next follow up  · Hearing: decreased - left  · Vision: no vision problems, goes for regular eye exams, most recent eye exam >1 year ago, wears glasses and glasess just updated  · Dental: no routine dental visits  Home care daily  No tooth pain  Gums clean/intact  But do recommend professional dental cleanings  Note importance especially in light of cardiac concerns  /GYN Health  · Patient is: likely post menopausal    · Last menstrual period: hysterectomy age 24 (uterine Cancer)  · Contraceptive method: none  · No recent Women's health care  · Mammo 6/2021 - orders placed today  · Recommend self breast exams  · Dexa order in  · Recommend daily Vitamins: calcium/Vitamin D     Review of Systems:     Review of Systems   Constitutional: Negative  HENT: Negative  Hearing loss: decreased left  Eyes: Negative  Respiratory: Negative  Cardiovascular: Negative  Gastrointestinal: Negative  Genitourinary: Negative  Musculoskeletal: Negative  Skin: Negative  Neurological: Negative  Psychiatric/Behavioral: Negative         Past Medical History:     Past Medical History:   Diagnosis Date    Anxiety     Chest pain 08/12/2016    Chest wall pain 07/15/2016    Costochondritis 02/28/2014    Dog bite 07/29/2014    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([facility] [address] )     Fluid retention     Gastroenteritis 05/06/2013    Herpes zoster 07/08/2013    History of chemotherapy 1986    Migraine 01/19/2016    Shortness of breath 08/12/2016    Toxoplasmosis 08/28/2014      Past Surgical History:     Past Surgical History:   Procedure Laterality Date    CHOLECYSTECTOMY      COLONOSCOPY      ESOPHAGOGASTRODUODENOSCOPY      HYSTERECTOMY  1986    KNEE ARTHROSCOPY      left shoulder    NECK SURGERY      Lumpectomy    OOPHORECTOMY Right 1986    SD ESOPHAGOGASTRODUODENOSCOPY TRANSORAL DIAGNOSTIC N/A 11/30/2018    Procedure: EGD;  Surgeon: Judy Rivera MD;  Location: [facility];   Service: Gastroenterology      Social History:     Social History     Socioeconomic History    Marital status: /Civil Union     Spouse name: None    Number of children: None    Years of education: None    Highest education level: None   Occupational History    None   Tobacco Use    Smoking status: Never Smoker    Smokeless tobacco: Never Used   Substance and Sexual Activity    Alcohol use: Yes     Comment: Occasionally    Drug use: No    Sexual activity: None   Other Topics Concern    None   Social History Narrative    None     Social Determinants of Health     Financial Resource Strain: Not on file   Food Insecurity: Not on file   Transportation Needs: Not on file   Physical Activity: Not on file   Stress: Not on file   Social Connections: Not on file   Intimate Partner Violence: Not on file   Housing Stability: Not on file      Family History:     Family History   Problem Relation Age of Onset    Coronary artery disease Family     Diabetes Mother     Heart disease Mother     Heart disease Father     No Known Problems Sister     No Known Problems Daughter     No Known Problems Maternal Grandmother     No Known Problems Maternal Grandfather     No Known Problems Paternal Grandmother     No Known Problems Paternal Grandfather     No Known Problems Sister     No Known Problems Sister     No Known Problems Daughter     No Known Problems Maternal Aunt     No Known Problems Maternal Aunt       Current Medications:     Current Outpatient Medications   Medication Sig Dispense Refill    albuterol (VENTOLIN HFA) 90 mcg/act inhaler Inhale 2 puffs every 6 (six) hours as needed for wheezing or shortness of breath 1 Inhaler 0    amLODIPine (NORVASC) 5 mg tablet Take 1 tablet (5 mg total) by mouth daily 90 tablet 3    aspirin 81 MG tablet Take 81 mg by mouth daily      Cholecalciferol (VITAMIN D) 2000 units CAPS Take 1 capsule by mouth daily      citalopram (CeleXA) 40 mg tablet Take 1 tablet (40 mg total) by mouth daily 30 tablet 2    furosemide (LASIX) 20 mg tablet Take 1 tablet (20 mg total) by mouth daily 30 tablet 5    Glycerin-Polysorbate 80 (REFRESH DRY EYE THERAPY OP) Apply to eye      loratadine (CLARITIN) 10 mg tablet Take 1 tablet (10 mg total) by mouth daily 30 tablet 1    metoprolol tartrate (LOPRESSOR) 25 mg tablet Take 1 tablet (25 mg total) by mouth every 12 (twelve) hours (Patient taking differently: Take 25 mg by mouth in the morning  ) 180 tablet 3    multivitamin (THERAGRAN) TABS Take 1 tablet by mouth daily      omeprazole (PriLOSEC) 20 mg delayed release capsule Take 1 capsule (20 mg total) by mouth daily 30 capsule 5    rizatriptan (MAXALT) 10 MG tablet Take 1 tablet (10 mg total) by mouth once as needed for migraine for up to 1 dose May repeat in 2 hours if needed 9 tablet 2    topiramate (TOPAMAX) 100 mg tablet take 1 tablet by mouth once daily 30 tablet 5    butalbital-acetaminophen-caffeine (FIORICET,ESGIC) -40 mg per tablet Take 1 tablet by mouth every 6 (six) hours   (Patient not taking: Reported on 4/19/2022 )      cromolyn (OPTICROM) 4 % ophthalmic solution Administer 1 drop to both eyes 4 (four) times a day (Patient not taking: Reported on 4/19/2022 ) 10 mL 1    lidocaine (LIDODERM) 5 % Apply 1 patch topically daily Apply in am and  remove in pm  12 hour on, 12 hour off (Patient not taking: Reported on 3/31/2022 ) 30 patch 0    metoprolol tartrate (LOPRESSOR) 50 mg tablet Take 1 tablet (50 mg total) by mouth every 12 (twelve) hours Start the day before CT scan, then take morning of and 1 hour before CT (Patient not taking: Reported on 3/31/2022 ) 4 tablet 0     No current facility-administered medications for this visit  Allergies: Allergies   Allergen Reactions    Azithromycin GI Intolerance     Yeast infection    Imitrex [Sumatriptan] GI Intolerance    Cephalexin     Wound Dressings Rash     Adhesive tape      Physical Exam:     /80 (BP Location: Left arm, Patient Position: Sitting)   Pulse 83   Temp (!) 97 4 °F (36 3 °C) (Tympanic)   Resp 18   Ht 4' 11" (1 499 m)   Wt 105 kg (232 lb 3 2 oz)   LMP 03/18/1997 (Exact Date)   SpO2 97%   BMI 46 90 kg/m²     Physical Exam  Vitals and nursing note reviewed  Constitutional:       General: She is not in acute distress  Appearance: Normal appearance  She is well-developed and well-groomed  She is not ill-appearing  HENT:      Head: Normocephalic and atraumatic  Right Ear: Tympanic membrane, ear canal and external ear normal       Left Ear: Tympanic membrane, ear canal and external ear normal       Nose: Nose normal       Mouth/Throat:      Mouth: Mucous membranes are moist    Eyes:      Extraocular Movements: Extraocular movements intact  Conjunctiva/sclera: Conjunctivae normal       Pupils: Pupils are equal, round, and reactive to light  Neck:      Thyroid: No thyromegaly  Vascular: No carotid bruit  Cardiovascular:      Rate and Rhythm: Normal rate and regular rhythm  Pulses:           Posterior tibial pulses are 2+ on the right side and 2+ on the left side  Heart sounds: Normal heart sounds     Pulmonary: Effort: Pulmonary effort is normal  No respiratory distress  Breath sounds: Normal breath sounds and air entry  Musculoskeletal:      Cervical back: Full passive range of motion without pain  Right lower leg: No edema  Left lower leg: No edema  Lymphadenopathy:      Cervical: No cervical adenopathy  Skin:     General: Skin is warm and dry  Neurological:      General: No focal deficit present  Mental Status: She is alert and oriented to person, place, and time  Cranial Nerves: Cranial nerves are intact  Sensory: Sensation is intact  Motor: Motor function is intact  Coordination: Coordination is intact  Gait: Gait is intact  Psychiatric:         Attention and Perception: Attention normal          Mood and Affect: Mood normal          Speech: Speech normal          Behavior: Behavior normal          Thought Content:  Thought content normal          Cognition and Memory: Cognition normal          Judgment: Judgment normal           PASHA Salas  [address] [facility]

## 2022-04-19 NOTE — ASSESSMENT & PLAN NOTE
Small, hard mass noted center forehead  Present for years but pt notes it to be enlarging  Intermittent pain  Especially with her migraines   She was told it was a cyst in the past  Assess today with US

## 2022-04-19 NOTE — ASSESSMENT & PLAN NOTE
C/O decreased hearing left ear  Frequent ringing   Referral to audiology today for evaluation and testing as deemed appropriate

## 2022-04-19 NOTE — PATIENT INSTRUCTIONS

## 2022-04-20 ENCOUNTER — TELEPHONE (OUTPATIENT)
Dept: ADMINISTRATIVE | Facility: OTHER | Age: 57
End: 2022-04-20

## 2022-04-20 NOTE — TELEPHONE ENCOUNTER
Upon review of the In Basket request and the patient's chart, initial outreach has been made via fax, please see Contacts section for details  Sent 3 fax attempts to different locations - to obtain OP report - if not use US to exclude PAP    Thank you  Gracia Meredith MA   Received Fax From [facility] - do not keep records after 10 years

## 2022-04-20 NOTE — TELEPHONE ENCOUNTER
----- Message from Fransisca Alexander sent at 4/19/2022  3:13 PM EDT -----  Regarding: Care Gap Exclusion  04/19/22 3:13 PM    Hello, our patient Jolie Morales has had total abdominal hysterectomy in 1986 completed/performed  Please assist in obtaining the exclusion documentation by reviewing US done 9/2018      Thank you,  Jay Arteaga MA  [facility]

## 2022-04-20 NOTE — LETTER
Procedure Request Form: Hysterectomy 1986      Date Requested: 22  Patient: Loraine Winston  Patient : 1965   Referring Provider: Peggy Schaefer, ALISSONNP        Date of Procedure ______________________________       The above patient has informed us that they have completed their   most recent Hysterectomy 1986 at your facility  Please complete   this form and attach all corresponding procedure reports/results  Comments __________________________________________________________  ____________________________________________________________________  ____________________________________________________________________  ____________________________________________________________________    Facility Completing Procedure _________________________________________    Form Completed By (print name) _______________________________________      Signature __________________________________________________________      These reports are needed for  compliance  Please fax this completed form and a copy of the procedure report to our office located at [place] [address] as soon as possible to [phone] [facility]: Phone [phone]    We thank you for your assistance in treating our mutual patient

## 2022-04-20 NOTE — LETTER
Procedure Request Form: Hysterectomy 1986      Date Requested: 22  Patient: July Radha  Patient : 1965   Referring Provider: PASHA Acuna        Date of Procedure ______________________________       The above patient has informed us that they have completed their   most recent Hysterectomy 1986 at your facility  Please complete   this form and attach all corresponding procedure reports/results  Comments __________________________________________________________  ____________________________________________________________________  ____________________________________________________________________  ____________________________________________________________________    Facility Completing Procedure _________________________________________    Form Completed By (print name) _______________________________________      Signature __________________________________________________________      These reports are needed for  compliance  Please fax this completed form and a copy of the procedure report to our office located at [place] [address] as soon as possible to [phone] [facility]: Phone [phone]    We thank you for your assistance in treating our mutual patient

## 2022-04-25 ENCOUNTER — APPOINTMENT (OUTPATIENT)
Dept: LAB | Facility: CLINIC | Age: 57
End: 2022-04-25
Payer: COMMERCIAL

## 2022-04-25 DIAGNOSIS — Z13.220 SCREENING, LIPID: ICD-10-CM

## 2022-04-25 DIAGNOSIS — Z13.0 SCREENING FOR DEFICIENCY ANEMIA: ICD-10-CM

## 2022-04-25 DIAGNOSIS — Z13.1 SCREENING FOR DIABETES MELLITUS: ICD-10-CM

## 2022-04-25 DIAGNOSIS — E03.8 SUBCLINICAL HYPOTHYROIDISM: ICD-10-CM

## 2022-04-25 LAB
ALBUMIN SERPL BCP-MCNC: 3.5 G/DL (ref 3.5–5)
ALP SERPL-CCNC: 108 U/L (ref 46–116)
ALT SERPL W P-5'-P-CCNC: 23 U/L (ref 12–78)
ANION GAP SERPL CALCULATED.3IONS-SCNC: 3 MMOL/L (ref 4–13)
AST SERPL W P-5'-P-CCNC: 22 U/L (ref 5–45)
BASOPHILS # BLD AUTO: 0.03 THOUSANDS/ΜL (ref 0–0.1)
BASOPHILS NFR BLD AUTO: 0 % (ref 0–1)
BILIRUB SERPL-MCNC: 0.36 MG/DL (ref 0.2–1)
BUN SERPL-MCNC: 12 MG/DL (ref 5–25)
CALCIUM SERPL-MCNC: 9.6 MG/DL (ref 8.3–10.1)
CHLORIDE SERPL-SCNC: 108 MMOL/L (ref 100–108)
CHOLEST SERPL-MCNC: 206 MG/DL
CO2 SERPL-SCNC: 28 MMOL/L (ref 21–32)
CREAT SERPL-MCNC: 1.07 MG/DL (ref 0.6–1.3)
EOSINOPHIL # BLD AUTO: 0.14 THOUSAND/ΜL (ref 0–0.61)
EOSINOPHIL NFR BLD AUTO: 2 % (ref 0–6)
ERYTHROCYTE [DISTWIDTH] IN BLOOD BY AUTOMATED COUNT: 14.8 % (ref 11.6–15.1)
GFR SERPL CREATININE-BSD FRML MDRD: 58 ML/MIN/1.73SQ M
GLUCOSE P FAST SERPL-MCNC: 74 MG/DL (ref 65–99)
HCT VFR BLD AUTO: 40.1 % (ref 34.8–46.1)
HDLC SERPL-MCNC: 76 MG/DL
HGB BLD-MCNC: 12.5 G/DL (ref 11.5–15.4)
IMM GRANULOCYTES # BLD AUTO: 0.03 THOUSAND/UL (ref 0–0.2)
IMM GRANULOCYTES NFR BLD AUTO: 0 % (ref 0–2)
LDLC SERPL CALC-MCNC: 118 MG/DL (ref 0–100)
LYMPHOCYTES # BLD AUTO: 2.8 THOUSANDS/ΜL (ref 0.6–4.47)
LYMPHOCYTES NFR BLD AUTO: 36 % (ref 14–44)
MCH RBC QN AUTO: 29.3 PG (ref 26.8–34.3)
MCHC RBC AUTO-ENTMCNC: 31.2 G/DL (ref 31.4–37.4)
MCV RBC AUTO: 94 FL (ref 82–98)
MONOCYTES # BLD AUTO: 0.57 THOUSAND/ΜL (ref 0.17–1.22)
MONOCYTES NFR BLD AUTO: 7 % (ref 4–12)
NEUTROPHILS # BLD AUTO: 4.29 THOUSANDS/ΜL (ref 1.85–7.62)
NEUTS SEG NFR BLD AUTO: 55 % (ref 43–75)
NONHDLC SERPL-MCNC: 130 MG/DL
NRBC BLD AUTO-RTO: 0 /100 WBCS
PLATELET # BLD AUTO: 285 THOUSANDS/UL (ref 149–390)
PMV BLD AUTO: 11 FL (ref 8.9–12.7)
POTASSIUM SERPL-SCNC: 3.6 MMOL/L (ref 3.5–5.3)
PROT SERPL-MCNC: 8 G/DL (ref 6.4–8.2)
RBC # BLD AUTO: 4.27 MILLION/UL (ref 3.81–5.12)
SODIUM SERPL-SCNC: 139 MMOL/L (ref 136–145)
TRIGL SERPL-MCNC: 61 MG/DL
TSH SERPL DL<=0.05 MIU/L-ACNC: 4.3 UIU/ML (ref 0.45–4.5)
WBC # BLD AUTO: 7.86 THOUSAND/UL (ref 4.31–10.16)

## 2022-04-25 PROCEDURE — 80061 LIPID PANEL: CPT

## 2022-04-25 PROCEDURE — 85025 COMPLETE CBC W/AUTO DIFF WBC: CPT

## 2022-04-25 PROCEDURE — 80053 COMPREHEN METABOLIC PANEL: CPT

## 2022-04-25 PROCEDURE — 84443 ASSAY THYROID STIM HORMONE: CPT

## 2022-04-25 PROCEDURE — 36415 COLL VENOUS BLD VENIPUNCTURE: CPT

## 2022-04-25 NOTE — TELEPHONE ENCOUNTER
As a follow-up, a second attempt has been made for outreach via fax, please see Contacts section for details      Thank you  Beverlyn Severin, MA   2nd attempt to two offices that I have not heard back from

## 2022-05-02 NOTE — TELEPHONE ENCOUNTER
As a final attempt, a third outreach has been made via fax  Please see Contacts section for details  This encounter will be closed and completed by end of day  Should we receive the requested information because of previous outreach attempts, the requested patient's chart will be updated appropriately      Final attempts to two facilities    Thank you  Shiloh Calderón MA

## 2022-05-03 NOTE — TELEPHONE ENCOUNTER
Upon review of the In Basket request we did not hear back from two facilities that we outreached    Any additional questions or concerns should be emailed to the Practice Liaisons via [email or web address] email, please do not reply via In Basket      Thank you  Jin Aguilera MA

## 2022-05-06 ENCOUNTER — OFFICE VISIT (OUTPATIENT)
Dept: CARDIOLOGY CLINIC | Facility: CLINIC | Age: 57
End: 2022-05-06
Payer: COMMERCIAL

## 2022-05-06 VITALS
HEART RATE: 63 BPM | DIASTOLIC BLOOD PRESSURE: 80 MMHG | SYSTOLIC BLOOD PRESSURE: 118 MMHG | BODY MASS INDEX: 46.33 KG/M2 | WEIGHT: 229.4 LBS

## 2022-05-06 DIAGNOSIS — I25.10 CORONARY ARTERY DISEASE DUE TO LIPID RICH PLAQUE: ICD-10-CM

## 2022-05-06 DIAGNOSIS — I25.83 CORONARY ARTERY DISEASE DUE TO LIPID RICH PLAQUE: ICD-10-CM

## 2022-05-06 DIAGNOSIS — I20.8 STABLE ANGINA PECTORIS (HCC): ICD-10-CM

## 2022-05-06 DIAGNOSIS — R93.89 ABNORMAL COMPUTED TOMOGRAPHY ANGIOGRAPHY (CTA): ICD-10-CM

## 2022-05-06 DIAGNOSIS — R06.02 SOB (SHORTNESS OF BREATH) ON EXERTION: Primary | ICD-10-CM

## 2022-05-06 PROCEDURE — 99214 OFFICE O/P EST MOD 30 MIN: CPT

## 2022-05-06 PROCEDURE — 1036F TOBACCO NON-USER: CPT

## 2022-05-06 RX ORDER — ISOSORBIDE MONONITRATE 30 MG/1
30 TABLET, EXTENDED RELEASE ORAL DAILY
Qty: 90 TABLET | Refills: 0 | Status: SHIPPED | OUTPATIENT
Start: 2022-05-06 | End: 2022-08-01

## 2022-05-06 NOTE — PROGRESS NOTES
Cardiology   MD Kristal Rodriguez MD Cyd Scotland, DO, [facility] MD Jerry Santoyo DO, Graham Mendez DO, [facility] [place]  -------------------------------------------------------------------  [facility]  [address], [place] [address]  [phone]  [phone]  05/06/22  Vicki Hernandez  YOB: 1965   MRN: [id number]      Referring Physician: PASHA Medina  [address]  [facility],  [address]     HPI: Vicki Hernandez is a 64 y o  female with:   Gastroesophageal reflux disease   Bilateral lower extremity edema   Fibromyalgia   Anxiety  Obesity, BMI 46     I had seen her in January of 2020 for symptoms chest pain and dyspnea on exertion  The symptoms have been going on for several years but these are actually getting worse now  She had a stress echo that was negative for ischemia however her overall exercise capacity was very poor, she only exercised for 3 minutes 30 seconds and had to stop due to chest pain and dyspnea on exertion  She also noted palpitations at the time     Because of this I ordered a coronary CT angiogram as I a m  concerned about underlying coronary artery disease and ischemia being present        This did show 50-69% stenosis in the left anterior descending artery territory  Recommendation is for functional assessment  Nuclear stress showed image artifact in the apex without evidence of reversible perfusion abnormality  She continues to have exertional shortness of breath and chest pressure  She has taken her 's nitro before and, while she got a severe headache, she states that this did improve her chest pain/pressure    Review of Systems   Constitutional: Negative for chills and fever  HENT: Negative for facial swelling and sore throat  Eyes: Negative for visual disturbance  Respiratory: Negative for cough, chest tightness, shortness of breath and wheezing  Cardiovascular: Positive for chest pain  Negative for palpitations and leg swelling  Gastrointestinal: Negative for abdominal pain, blood in stool, constipation, diarrhea, nausea and vomiting  Endocrine: Negative for cold intolerance and heat intolerance  Genitourinary: Negative for decreased urine volume, difficulty urinating, dysuria and hematuria  Musculoskeletal: Negative for arthralgias, back pain and myalgias  Skin: Negative for rash  Neurological: Negative for dizziness, syncope, weakness and numbness  Psychiatric/Behavioral: Negative for agitation, behavioral problems and confusion  The patient is not nervous/anxious  OBJECTIVE  Vitals:    05/06/22 1537   BP: 118/80   Pulse: 63       Physical Exam  Constitutional: awake, alert and oriented, in no acute distress, no obvious deformities  Head: Normocephalic, without obvious abnormality, atraumatic  Eyes: conjunctivae clear and moist  Sclera anicteric  No xanthelasmas  Pupils equal bilaterally  Extraocular motions are full  Ear nose mouth and throat: ears are symmetrical bilaterally, hearing appears to be equal bilaterally, no nasal discharge or epistaxis, oropharynx is clear with moist mucous membranes  Neck:  Trachea is midline, neck is supple, no thyromegaly or significant lymphadenopathy, there is full range of motion  Lungs: clear to auscultation bilaterally, no wheezes, no rales, no rhonchi, no accessory muscle use, breathing is nonlabored  Heart: regular rate and rhythm, S1, S2 normal, no murmur, no click, no rub and no gallop, no lower extremity edema  Abdomen: soft, non-tender; bowel sounds normal; no masses,  no organomegaly  Psychiatric:  Patient is oriented to time, place, person, mood/affect is negative for depression, anxiety, agitation, appears to have appropriate insight  Skin: Skin is warm, dry, intact  No obvious rashes or lesions on exposed extremities  Nail beds are pink with no cyanosis or clubbing      EKG:  No results found for this visit on 05/06/22  IMPRESSION:  Atypical chest pain, central chest, both exertional and nonexertional, CT angiogram did show moderate proximal LAD disease  nuclear stress with apical artifact, no definite ischemia  Abnormal electrocardiogram   Dyspnea on exertion   Palpitations  Obesity  Deconditioning   Gastroesophageal reflux disease  Fibromyalgia   Anxiety      DISCUSSION/RECOMMENDATIONS:  She continues to have symptoms of chest pain shortness of breath  She did take 1 of her 's nitros before and was improved with this   Would start Imdur 30 in addition to her current medications, she is on metoprolol and amlodipine as well   Plan for follow-up in 1-2 months to reassess symptoms on the long-acting nitro  If she has symptoms despite maximum medical therapy that are concerning for CAD ultimately may need definitive evaluation with catheterization    Darshan Padilla DO, [facility] [place]  --------------------------------------------------------------------------------  TREADMILL STRESS  No results found for this or any previous visit      ----------------------------------------------------------------------------------------------  NUCLEAR STRESS TEST: No results found for this or any previous visit  Results for orders placed during the hospital encounter of 04/19/22    NM myocardial perfusion spect (rx stress and/or rest)    Interpretation Summary    Stress ECG: No ST deviation is noted  There were no arrhythmias during recovery    The ECG was negative for ischemia  The stress ECG is negative for ischemia    Perfusion: There is a left ventricular perfusion defect that is small in size with moderate reduction in uptake present in the apical location(s) that is fixed  The defect appears to be probable artifact caused by breast attenuation    Stress Function: Left ventricular function post-stress is normal  Post-stress ejection fraction is 73 %      Stress Combined Conclusion: There is image artifact, without diagnostic evidence for perfusion abnormality       --------------------------------------------------------------------------------  CATH:  No results found for this or any previous visit     --------------------------------------------------------------------------------  ECHO:   Results for orders placed during the hospital encounter of 18    Echo complete with contrast if indicated    Narrative  [address]    Transthoracic Echocardiogram  2D, M-mode, Doppler, and Color Doppler    Study date:  13-Dec-2018    Patient: Conor Chopra  MR number: [id number]  Account number: [de-identified]  : 1965  Age: 48 years  Gender: Female  Status: Outpatient  Location: [address] [place] [address]  Height: 60 in  Weight: 218 5 lb  BP: 138/ 86 mmHg    Indications: Dyspnea    Diagnoses: R06 00 - Dyspnea, unspecified    Sonographer:  Sreekanth Jin RDCS  Primary Physician:  Jamala Vanessa DO  Referring Physician:  Olegario Nicole DO  Group:  [facility]  Interpreting Physician:  Annamae Kussmaul, MD    SUMMARY    SUMMARY:  1  Sinus rhythm  2  Good technical quality  3  Normal left ventricular systolic and diastolic function  4  Mild left ventricular cavity enlargement    LEFT VENTRICLE:  Normal left ventricular systolic function, EF 45%  Mild left ventricular cavity enlargement  Normal left ventricular wall thickness  Normal left ventricular wall motion without regional wall motion abnormalities  Normal left ventricular  diastolic function and filling pressures  LEFT ATRIUM:  Normal left atrial size  RIGHT ATRIUM:  Normal right atrial size  MITRAL VALVE:  Normal mitral valve with trace mitral regurgitation  AORTIC VALVE:  Normal tricuspid aortic valve without stenosis  TRICUSPID VALVE:  Trace tricuspid regurgitation  PULMONIC VALVE:  No pulmonic regurgitation      PULMONARY ARTERIES:  Normal pulmonary artery pressures  PERICARDIUM:  No pericardial effusion  HISTORY: PRIOR HISTORY: Patient has no history of cardiovascular disease  Risk factors: morbid obesity  PROCEDURE: [facility] in [facility]  This was a routine study  The transthoracic approach was used  The study included complete 2D imaging, M-mode, complete spectral Doppler, and color Doppler  The heart rate was  84 bpm, at the start of the study  This was a technically difficult study  LEFT VENTRICLE: Normal left ventricular systolic function, EF 05%  Mild left ventricular cavity enlargement  Normal left ventricular wall thickness  Normal left ventricular wall motion without regional wall motion abnormalities  Normal  left ventricular diastolic function and filling pressures  RIGHT VENTRICLE: The size was normal  Systolic function was normal  Wall thickness was normal     LEFT ATRIUM: Normal left atrial size  RIGHT ATRIUM: Normal right atrial size  MITRAL VALVE: Normal mitral valve with trace mitral regurgitation  AORTIC VALVE: Normal tricuspid aortic valve without stenosis  TRICUSPID VALVE: Trace tricuspid regurgitation  PULMONIC VALVE: No pulmonic regurgitation  PERICARDIUM: No pericardial effusion  There was no pericardial effusion  The pericardium was normal in appearance  AORTA: The root exhibited normal size  PULMONARY ARTERY: Normal pulmonary artery pressures      SYSTEM MEASUREMENT TABLES    2D  %FS: 35 91 %  Ao Diam: 3 11 cm  EDV(Teich): 125 09 ml  EF(Teich): 65 15 %  ESV(Teich): 43 6 ml  IVSd: 0 87 cm  LA Area: 11 16 cm2  LA Diam: 3 7 cm  LVEDV MOD A4C: 78 93 ml  LVEF MOD A4C: 68 69 %  LVESV MOD A4C: 24 71 ml  LVIDd: 5 12 cm  LVIDs: 3 28 cm  LVLd A4C: 7 15 cm  LVLs A4C: 5 52 cm  LVPWd: 0 9 cm  RA Area: 10 65 cm2  SV MOD A4C: 54 22 ml  SV(Teich): 81 5 ml    CW  TR Vmax: 1 92 m/s  TR maxP 8 mmHg    MM  TAPSE: 2 4 cm    PW  E': 0 14 m/s  E/E': 6 72  MV A Hugo: 1 05 m/s  MV [place]: 4 01 m/s2  MV DecT: 231 54 ms  MV E Hugo: 0 93 m/s  MV E/A Ratio: 0 88  MV PHT: 67 15 ms  MVA By PHT: 3 28 cm2    [employer] Accredited Echocardiography Laboratory    Prepared and electronically signed by    Victorino Martin MD  Signed 13-Dec-2018 16:36:37    No results found for this or any previous visit     --------------------------------------------------------------------------------  HOLTER  No results found for this or any previous visit  No results found for this or any previous visit     --------------------------------------------------------------------------------  CAROTIDS  No results found for this or any previous visit      --------------------------------------------------------------------------------  Diagnoses and all orders for this visit:    SOB (shortness of breath) on exertion  -     isosorbide mononitrate (IMDUR) 30 mg 24 hr tablet; Take 1 tablet (30 mg total) by mouth daily    Stable angina pectoris (HCC)  -     isosorbide mononitrate (IMDUR) 30 mg 24 hr tablet; Take 1 tablet (30 mg total) by mouth daily  -     Lipid Panel with Direct LDL reflex; Future    Abnormal computed tomography angiography (CTA)    Coronary artery disease due to lipid rich plaque  -     Lipid Panel with Direct LDL reflex;  Future     ======================================================    Past Medical History:   Diagnosis Date    Anxiety     Chest pain 08/12/2016    Chest wall pain 07/15/2016    Costochondritis 02/28/2014    Dog bite 07/29/2014    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([facility] [address] )     Fluid retention     Gastroenteritis 05/06/2013    Herpes zoster 07/08/2013    History of chemotherapy 1986    Migraine 01/19/2016    Shortness of breath 08/12/2016    Toxoplasmosis 08/28/2014     Past Surgical History:   Procedure Laterality Date    CHOLECYSTECTOMY      COLONOSCOPY      ESOPHAGOGASTRODUODENOSCOPY      HYSTERECTOMY  1986    KNEE ARTHROSCOPY      left shoulder    NECK SURGERY      Lumpectomy    OOPHORECTOMY Right 1986    TX ESOPHAGOGASTRODUODENOSCOPY TRANSORAL DIAGNOSTIC N/A 11/30/2018    Procedure: EGD;  Surgeon: Eliane Closs, MD;  Location: [facility];   Service: Gastroenterology         Medications  Current Outpatient Medications   Medication Sig Dispense Refill    albuterol (VENTOLIN HFA) 90 mcg/act inhaler Inhale 2 puffs every 6 (six) hours as needed for wheezing or shortness of breath 1 Inhaler 0    amLODIPine (NORVASC) 5 mg tablet Take 1 tablet (5 mg total) by mouth daily 90 tablet 3    aspirin 81 MG tablet Take 81 mg by mouth daily      Cholecalciferol (VITAMIN D) 2000 units CAPS Take 1 capsule by mouth daily      citalopram (CeleXA) 40 mg tablet Take 1 tablet (40 mg total) by mouth daily 30 tablet 2    furosemide (LASIX) 20 mg tablet Take 1 tablet (20 mg total) by mouth daily 30 tablet 5    Glycerin-Polysorbate 80 (REFRESH DRY EYE THERAPY OP) Apply to eye      loratadine (CLARITIN) 10 mg tablet Take 1 tablet (10 mg total) by mouth daily 30 tablet 1    metoprolol tartrate (LOPRESSOR) 25 mg tablet Take 1 tablet (25 mg total) by mouth every 12 (twelve) hours 180 tablet 3    multivitamin (THERAGRAN) TABS Take 1 tablet by mouth daily      omeprazole (PriLOSEC) 20 mg delayed release capsule Take 1 capsule (20 mg total) by mouth daily 30 capsule 5    rizatriptan (MAXALT) 10 MG tablet Take 1 tablet (10 mg total) by mouth once as needed for migraine for up to 1 dose May repeat in 2 hours if needed 9 tablet 2    topiramate (TOPAMAX) 100 mg tablet take 1 tablet by mouth once daily 30 tablet 5    butalbital-acetaminophen-caffeine (FIORICET,ESGIC) -40 mg per tablet Take 1 tablet by mouth every 6 (six) hours   (Patient not taking: Reported on 4/19/2022 )      cromolyn (OPTICROM) 4 % ophthalmic solution Administer 1 drop to both eyes 4 (four) times a day (Patient not taking: Reported on 4/19/2022 ) 10 mL 1    isosorbide mononitrate (IMDUR) 30 mg 24 hr tablet Take 1 tablet (30 mg total) by mouth daily 90 tablet 0    lidocaine (LIDODERM) 5 % Apply 1 patch topically daily Apply in am and  remove in pm  12 hour on, 12 hour off (Patient not taking: Reported on 3/31/2022 ) 30 patch 0    metoprolol tartrate (LOPRESSOR) 50 mg tablet Take 1 tablet (50 mg total) by mouth every 12 (twelve) hours Start the day before CT scan, then take morning of and 1 hour before CT (Patient not taking: Reported on 3/31/2022 ) 4 tablet 0     No current facility-administered medications for this visit          Allergies   Allergen Reactions    Azithromycin GI Intolerance     Yeast infection    Imitrex [Sumatriptan] GI Intolerance    Cephalexin     Wound Dressings Rash     Adhesive tape       Social History     Socioeconomic History    Marital status: /Civil Union     Spouse name: Not on file    Number of children: Not on file    Years of education: Not on file    Highest education level: Not on file   Occupational History    Not on file   Tobacco Use    Smoking status: Never Smoker    Smokeless tobacco: Never Used   Substance and Sexual Activity    Alcohol use: Yes     Comment: Occasionally    Drug use: No    Sexual activity: Not on file   Other Topics Concern    Not on file   Social History Narrative    Not on file     Social Determinants of Health     Financial Resource Strain: Not on file   Food Insecurity: Not on file   Transportation Needs: Not on file   Physical Activity: Not on file   Stress: Not on file   Social Connections: Not on file   Intimate Partner Violence: Not on file   Housing Stability: Not on file        Family History   Problem Relation Age of Onset    Coronary artery disease Family     Diabetes Mother     Heart disease Mother     Heart disease Father     No Known Problems Sister     No Known Problems Daughter     No Known Problems Maternal Grandmother     No Known Problems Maternal Grandfather     No Known Problems Paternal Grandmother  No Known Problems Paternal Grandfather     No Known Problems Sister     No Known Problems Sister     No Known Problems Daughter     No Known Problems Maternal Aunt     No Known Problems Maternal Aunt        Lab Results   Component Value Date    WBC 7 86 04/25/2022    HGB 12 5 04/25/2022    HCT 40 1 04/25/2022    MCV 94 04/25/2022     04/25/2022      Lab Results   Component Value Date    SODIUM 139 04/25/2022    K 3 6 04/25/2022     04/25/2022    CO2 28 04/25/2022    BUN 12 04/25/2022    CREATININE 1 07 04/25/2022    GLUC 78 06/15/2018    CALCIUM 9 6 04/25/2022      Lab Results   Component Value Date    HGBA1C 5 1 06/05/2017      No results found for: CHOL  Lab Results   Component Value Date    HDL 76 04/25/2022    HDL 71 10/15/2021    HDL 74 04/15/2021     Lab Results   Component Value Date    LDLCALC 118 (H) 04/25/2022    LDLCALC 118 (H) 10/15/2021    LDLCALC 118 (H) 04/15/2021     Lab Results   Component Value Date    TRIG 61 04/25/2022    TRIG 59 10/15/2021    TRIG 64 04/15/2021     No results found for: [place], [place]   Lab Results   Component Value Date    INR 0 99 06/09/2015    PROTIME 12 9 06/09/2015          Patient Active Problem List    Diagnosis Date Noted    Subclinical hypothyroidism 04/19/2022    Hearing difficulty of left ear 04/19/2022    Mass of skin of head 04/19/2022    Post-menopausal 04/19/2022    Mitral valve annular calcification 04/19/2022    Generalized anxiety disorder 02/20/2018    Gastroesophageal reflux disease 08/07/2017    Bilateral occipital neuralgia 05/15/2017    Restless leg 09/23/2016    Bilateral lower extremity edema 05/31/2016    Fibromyalgia 08/12/2015    Insomnia 04/01/2015    Vitamin D deficiency 05/31/2013    Migraine 01/22/2009       Portions of the record may have been created with voice recognition software  Occasional wrong word or "sound a like" substitutions may have occurred due to the inherent limitations of voice recognition software  Read the chart carefully and recognize, using context, where substitutions have occurred      Liam Gomez DO, [facility]  5/6/2022 4:55 PM

## 2022-05-12 ENCOUNTER — HOSPITAL ENCOUNTER (OUTPATIENT)
Dept: ULTRASOUND IMAGING | Facility: MEDICAL CENTER | Age: 57
Discharge: HOME | End: 2022-05-12
Payer: COMMERCIAL

## 2022-05-12 DIAGNOSIS — R22.0 MASS OF SKIN OF HEAD: ICD-10-CM

## 2022-05-12 PROCEDURE — 76536 US EXAM OF HEAD AND NECK: CPT

## 2022-05-19 DIAGNOSIS — R22.0 MASS OF SKIN OF HEAD: Primary | ICD-10-CM

## 2022-05-25 DIAGNOSIS — R22.0 MASS OF SKIN OF HEAD: Primary | ICD-10-CM

## 2022-06-09 ENCOUNTER — VBI (OUTPATIENT)
Dept: ADMINISTRATIVE | Facility: OTHER | Age: 57
End: 2022-06-09

## 2022-07-06 DIAGNOSIS — F41.1 GENERALIZED ANXIETY DISORDER: ICD-10-CM

## 2022-07-06 RX ORDER — CITALOPRAM 40 MG/1
40 TABLET ORAL DAILY
Qty: 30 TABLET | Refills: 2 | Status: SHIPPED | OUTPATIENT
Start: 2022-07-06 | End: 2022-10-04 | Stop reason: SDUPTHER

## 2022-07-06 NOTE — TELEPHONE ENCOUNTER
[address] sent fax to office requesting refill for Citalopram HBR 40 mg tablet, Quantity 30 with 2 refills

## 2022-07-12 ENCOUNTER — HOSPITAL ENCOUNTER (OUTPATIENT)
Dept: MAMMOGRAPHY | Facility: MEDICAL CENTER | Age: 57
Discharge: HOME | End: 2022-07-12
Payer: COMMERCIAL

## 2022-07-12 ENCOUNTER — HOSPITAL ENCOUNTER (OUTPATIENT)
Dept: BONE DENSITY | Facility: MEDICAL CENTER | Age: 57
Discharge: HOME | End: 2022-07-12
Payer: COMMERCIAL

## 2022-07-12 VITALS — BODY MASS INDEX: 46.22 KG/M2 | HEIGHT: 59 IN | WEIGHT: 229.28 LBS

## 2022-07-12 DIAGNOSIS — Z78.0 POST-MENOPAUSAL: ICD-10-CM

## 2022-07-12 DIAGNOSIS — Z12.31 BREAST CANCER SCREENING BY MAMMOGRAM: ICD-10-CM

## 2022-07-12 DIAGNOSIS — Z13.820 SCREENING FOR OSTEOPOROSIS: ICD-10-CM

## 2022-07-12 PROCEDURE — 77063 BREAST TOMOSYNTHESIS BI: CPT

## 2022-07-12 PROCEDURE — 77067 SCR MAMMO BI INCL CAD: CPT

## 2022-07-12 PROCEDURE — 77080 DXA BONE DENSITY AXIAL: CPT

## 2022-07-16 DIAGNOSIS — K21.9 GASTROESOPHAGEAL REFLUX DISEASE: ICD-10-CM

## 2022-07-16 DIAGNOSIS — R60.0 BILATERAL LOWER EXTREMITY EDEMA: ICD-10-CM

## 2022-07-18 RX ORDER — FUROSEMIDE 20 MG/1
TABLET ORAL
Qty: 30 TABLET | Refills: 5 | Status: SHIPPED | OUTPATIENT
Start: 2022-07-18

## 2022-07-18 RX ORDER — OMEPRAZOLE 20 MG/1
CAPSULE, DELAYED RELEASE ORAL
Qty: 30 CAPSULE | Refills: 5 | Status: SHIPPED | OUTPATIENT
Start: 2022-07-18

## 2022-07-28 ENCOUNTER — ANESTHESIA (EMERGENCY)
Dept: PERIOP | Facility: HOSPITAL | Age: 57
End: 2022-07-28
Payer: COMMERCIAL

## 2022-07-28 ENCOUNTER — APPOINTMENT (EMERGENCY)
Dept: RADIOLOGY | Facility: HOSPITAL | Age: 57
End: 2022-07-28
Payer: COMMERCIAL

## 2022-07-28 ENCOUNTER — ANESTHESIA EVENT (EMERGENCY)
Dept: PERIOP | Facility: HOSPITAL | Age: 57
End: 2022-07-28
Payer: COMMERCIAL

## 2022-07-28 ENCOUNTER — HOSPITAL ENCOUNTER (OUTPATIENT)
Facility: HOSPITAL | Age: 57
Setting detail: SURGERY ADMIT
End: 2022-07-28
Attending: UROLOGY | Admitting: UROLOGY
Payer: COMMERCIAL

## 2022-07-28 ENCOUNTER — APPOINTMENT (EMERGENCY)
Dept: CT IMAGING | Facility: HOSPITAL | Age: 57
End: 2022-07-28
Payer: COMMERCIAL

## 2022-07-28 ENCOUNTER — HOSPITAL ENCOUNTER (EMERGENCY)
Facility: HOSPITAL | Age: 57
Discharge: HOME | End: 2022-07-28
Attending: EMERGENCY MEDICINE | Admitting: UROLOGY
Payer: COMMERCIAL

## 2022-07-28 VITALS
SYSTOLIC BLOOD PRESSURE: 148 MMHG | TEMPERATURE: 96.6 F | RESPIRATION RATE: 16 BRPM | OXYGEN SATURATION: 96 % | HEART RATE: 81 BPM | DIASTOLIC BLOOD PRESSURE: 91 MMHG

## 2022-07-28 DIAGNOSIS — N13.5 URETERAL OBSTRUCTION, LEFT: Primary | ICD-10-CM

## 2022-07-28 DIAGNOSIS — N20.0 KIDNEY STONE: ICD-10-CM

## 2022-07-28 DIAGNOSIS — R07.9 CHEST PAIN: ICD-10-CM

## 2022-07-28 PROBLEM — N20.1 URETEROLITHIASIS: Status: ACTIVE | Noted: 2022-07-28

## 2022-07-28 LAB
2HR DELTA HS TROPONIN: <0 NG/L
4HR DELTA HS TROPONIN: 1 NG/L
ALBUMIN SERPL BCP-MCNC: 3.2 G/DL (ref 3.5–5)
ALP SERPL-CCNC: 102 U/L (ref 46–116)
ALT SERPL W P-5'-P-CCNC: 20 U/L (ref 12–78)
ANION GAP SERPL CALCULATED.3IONS-SCNC: 8 MMOL/L (ref 4–13)
AST SERPL W P-5'-P-CCNC: 13 U/L (ref 5–45)
BACTERIA UR QL AUTO: ABNORMAL /HPF
BASOPHILS # BLD AUTO: 0.03 THOUSANDS/ΜL (ref 0–0.1)
BASOPHILS NFR BLD AUTO: 0 % (ref 0–1)
BILIRUB SERPL-MCNC: 0.18 MG/DL (ref 0.2–1)
BILIRUB UR QL STRIP: NEGATIVE
BUN SERPL-MCNC: 15 MG/DL (ref 5–25)
CALCIUM ALBUM COR SERPL-MCNC: 9.6 MG/DL (ref 8.3–10.1)
CALCIUM SERPL-MCNC: 9 MG/DL (ref 8.3–10.1)
CARDIAC TROPONIN I PNL SERPL HS: 2 NG/L
CARDIAC TROPONIN I PNL SERPL HS: 3 NG/L
CARDIAC TROPONIN I PNL SERPL HS: <2 NG/L
CHLORIDE SERPL-SCNC: 106 MMOL/L (ref 96–108)
CLARITY UR: ABNORMAL
CO2 SERPL-SCNC: 27 MMOL/L (ref 21–32)
COLOR UR: ABNORMAL
CREAT SERPL-MCNC: 1.29 MG/DL (ref 0.6–1.3)
EOSINOPHIL # BLD AUTO: 0.06 THOUSAND/ΜL (ref 0–0.61)
EOSINOPHIL NFR BLD AUTO: 1 % (ref 0–6)
ERYTHROCYTE [DISTWIDTH] IN BLOOD BY AUTOMATED COUNT: 14.6 % (ref 11.6–15.1)
GFR SERPL CREATININE-BSD FRML MDRD: 46 ML/MIN/1.73SQ M
GLUCOSE SERPL-MCNC: 104 MG/DL (ref 65–140)
GLUCOSE UR STRIP-MCNC: NEGATIVE MG/DL
HCT VFR BLD AUTO: 35.2 % (ref 34.8–46.1)
HGB BLD-MCNC: 11.3 G/DL (ref 11.5–15.4)
HGB UR QL STRIP.AUTO: ABNORMAL
IMM GRANULOCYTES # BLD AUTO: 0.04 THOUSAND/UL (ref 0–0.2)
IMM GRANULOCYTES NFR BLD AUTO: 1 % (ref 0–2)
KETONES UR STRIP-MCNC: NEGATIVE MG/DL
LEUKOCYTE ESTERASE UR QL STRIP: NEGATIVE
LYMPHOCYTES # BLD AUTO: 1.54 THOUSANDS/ΜL (ref 0.6–4.47)
LYMPHOCYTES NFR BLD AUTO: 18 % (ref 14–44)
MCH RBC QN AUTO: 29.4 PG (ref 26.8–34.3)
MCHC RBC AUTO-ENTMCNC: 32.1 G/DL (ref 31.4–37.4)
MCV RBC AUTO: 92 FL (ref 82–98)
MONOCYTES # BLD AUTO: 0.53 THOUSAND/ΜL (ref 0.17–1.22)
MONOCYTES NFR BLD AUTO: 6 % (ref 4–12)
NEUTROPHILS # BLD AUTO: 6.18 THOUSANDS/ΜL (ref 1.85–7.62)
NEUTS SEG NFR BLD AUTO: 74 % (ref 43–75)
NITRITE UR QL STRIP: NEGATIVE
NON-SQ EPI CELLS URNS QL MICRO: ABNORMAL /HPF
NRBC BLD AUTO-RTO: 0 /100 WBCS
PH UR STRIP.AUTO: 6 [PH] (ref 4.5–8)
PLATELET # BLD AUTO: 264 THOUSANDS/UL (ref 149–390)
PMV BLD AUTO: 10 FL (ref 8.9–12.7)
POTASSIUM SERPL-SCNC: 3.9 MMOL/L (ref 3.5–5.3)
PROT SERPL-MCNC: 7.6 G/DL (ref 6.4–8.4)
PROT UR STRIP-MCNC: ABNORMAL MG/DL
RBC # BLD AUTO: 3.84 MILLION/UL (ref 3.81–5.12)
RBC #/AREA URNS AUTO: ABNORMAL /HPF
SODIUM SERPL-SCNC: 141 MMOL/L (ref 135–147)
SP GR UR STRIP.AUTO: >=1.03 (ref 1–1.03)
UROBILINOGEN UR QL STRIP.AUTO: 0.2 E.U./DL
WBC # BLD AUTO: 8.38 THOUSAND/UL (ref 4.31–10.16)
WBC #/AREA URNS AUTO: ABNORMAL /HPF

## 2022-07-28 PROCEDURE — C1758 CATHETER, URETERAL: HCPCS | Performed by: UROLOGY

## 2022-07-28 PROCEDURE — 36415 COLL VENOUS BLD VENIPUNCTURE: CPT | Performed by: PHYSICIAN ASSISTANT

## 2022-07-28 PROCEDURE — 99285 EMERGENCY DEPT VISIT HI MDM: CPT | Performed by: PHYSICIAN ASSISTANT

## 2022-07-28 PROCEDURE — 85025 COMPLETE CBC W/AUTO DIFF WBC: CPT | Performed by: PHYSICIAN ASSISTANT

## 2022-07-28 PROCEDURE — G1004 CDSM NDSC: HCPCS

## 2022-07-28 PROCEDURE — 96374 THER/PROPH/DIAG INJ IV PUSH: CPT

## 2022-07-28 PROCEDURE — 52356 CYSTO/URETERO W/LITHOTRIPSY: CPT | Performed by: UROLOGY

## 2022-07-28 PROCEDURE — 81001 URINALYSIS AUTO W/SCOPE: CPT

## 2022-07-28 PROCEDURE — 99285 EMERGENCY DEPT VISIT HI MDM: CPT

## 2022-07-28 PROCEDURE — 80053 COMPREHEN METABOLIC PANEL: CPT | Performed by: PHYSICIAN ASSISTANT

## 2022-07-28 PROCEDURE — C1769 GUIDE WIRE: HCPCS | Performed by: UROLOGY

## 2022-07-28 PROCEDURE — 93005 ELECTROCARDIOGRAM TRACING: CPT

## 2022-07-28 PROCEDURE — 96375 TX/PRO/DX INJ NEW DRUG ADDON: CPT

## 2022-07-28 PROCEDURE — 99205 OFFICE O/P NEW HI 60 MIN: CPT | Performed by: UROLOGY

## 2022-07-28 PROCEDURE — 74420 UROGRAPHY RTRGR +-KUB: CPT

## 2022-07-28 PROCEDURE — C2617 STENT, NON-COR, TEM W/O DEL: HCPCS | Performed by: UROLOGY

## 2022-07-28 PROCEDURE — 96361 HYDRATE IV INFUSION ADD-ON: CPT

## 2022-07-28 PROCEDURE — 84484 ASSAY OF TROPONIN QUANT: CPT | Performed by: PHYSICIAN ASSISTANT

## 2022-07-28 PROCEDURE — 74176 CT ABD & PELVIS W/O CONTRAST: CPT

## 2022-07-28 PROCEDURE — 82360 CALCULUS ASSAY QUANT: CPT | Performed by: UROLOGY

## 2022-07-28 DEVICE — INLAY OPTIMA URETERAL STENT W/O GUIDEWIRE
Type: IMPLANTABLE DEVICE | Site: URETHRA | Status: FUNCTIONAL
Brand: BARD® INLAY OPTIMA® URETERAL STENT

## 2022-07-28 RX ORDER — FENTANYL CITRATE/PF 50 MCG/ML
25 SYRINGE (ML) INJECTION
Status: DISCONTINUED | OUTPATIENT
Start: 2022-07-28 | End: 2022-07-28 | Stop reason: HOSPADM

## 2022-07-28 RX ORDER — ALBUTEROL SULFATE 2.5 MG/3ML
2.5 SOLUTION RESPIRATORY (INHALATION) ONCE AS NEEDED
Status: DISCONTINUED | OUTPATIENT
Start: 2022-07-28 | End: 2022-07-28 | Stop reason: HOSPADM

## 2022-07-28 RX ORDER — SODIUM CHLORIDE 9 MG/ML
INJECTION, SOLUTION INTRAVENOUS AS NEEDED
Status: DISCONTINUED | OUTPATIENT
Start: 2022-07-28 | End: 2022-07-28 | Stop reason: HOSPADM

## 2022-07-28 RX ORDER — ACETAMINOPHEN 325 MG/1
650 TABLET ORAL ONCE
Status: COMPLETED | OUTPATIENT
Start: 2022-07-28 | End: 2022-07-28

## 2022-07-28 RX ORDER — PROPOFOL 10 MG/ML
INJECTION, EMULSION INTRAVENOUS AS NEEDED
Status: DISCONTINUED | OUTPATIENT
Start: 2022-07-28 | End: 2022-07-28

## 2022-07-28 RX ORDER — LIDOCAINE HYDROCHLORIDE 20 MG/ML
INJECTION, SOLUTION EPIDURAL; INFILTRATION; INTRACAUDAL; PERINEURAL AS NEEDED
Status: DISCONTINUED | OUTPATIENT
Start: 2022-07-28 | End: 2022-07-28

## 2022-07-28 RX ORDER — LEVOFLOXACIN 500 MG/1
500 TABLET, FILM COATED ORAL EVERY 24 HOURS
Qty: 5 TABLET | Refills: 0 | Status: SHIPPED | OUTPATIENT
Start: 2022-07-28 | End: 2022-08-03

## 2022-07-28 RX ORDER — HYDROMORPHONE HCL/PF 1 MG/ML
0.5 SYRINGE (ML) INJECTION
Status: DISCONTINUED | OUTPATIENT
Start: 2022-07-28 | End: 2022-07-28 | Stop reason: HOSPADM

## 2022-07-28 RX ORDER — ROCURONIUM BROMIDE 10 MG/ML
INJECTION, SOLUTION INTRAVENOUS AS NEEDED
Status: DISCONTINUED | OUTPATIENT
Start: 2022-07-28 | End: 2022-07-28

## 2022-07-28 RX ORDER — CEFAZOLIN SODIUM 2 G/50ML
SOLUTION INTRAVENOUS AS NEEDED
Status: DISCONTINUED | OUTPATIENT
Start: 2022-07-28 | End: 2022-07-28

## 2022-07-28 RX ORDER — SODIUM CHLORIDE, SODIUM LACTATE, POTASSIUM CHLORIDE, CALCIUM CHLORIDE 600; 310; 30; 20 MG/100ML; MG/100ML; MG/100ML; MG/100ML
INJECTION, SOLUTION INTRAVENOUS CONTINUOUS PRN
Status: DISCONTINUED | OUTPATIENT
Start: 2022-07-28 | End: 2022-07-28

## 2022-07-28 RX ORDER — FENTANYL CITRATE 50 UG/ML
INJECTION, SOLUTION INTRAMUSCULAR; INTRAVENOUS AS NEEDED
Status: DISCONTINUED | OUTPATIENT
Start: 2022-07-28 | End: 2022-07-28

## 2022-07-28 RX ORDER — OXYCODONE HYDROCHLORIDE 5 MG/1
5 TABLET ORAL EVERY 4 HOURS PRN
Status: DISCONTINUED | OUTPATIENT
Start: 2022-07-28 | End: 2022-07-28 | Stop reason: HOSPADM

## 2022-07-28 RX ORDER — OXYCODONE HYDROCHLORIDE 5 MG/1
5 TABLET ORAL EVERY 6 HOURS PRN
Qty: 10 TABLET | Refills: 0 | Status: SHIPPED | OUTPATIENT
Start: 2022-07-28 | End: 2022-08-02

## 2022-07-28 RX ORDER — MAGNESIUM HYDROXIDE 1200 MG/15ML
LIQUID ORAL AS NEEDED
Status: DISCONTINUED | OUTPATIENT
Start: 2022-07-28 | End: 2022-07-28 | Stop reason: HOSPADM

## 2022-07-28 RX ORDER — PROMETHAZINE HYDROCHLORIDE 25 MG/ML
12.5 INJECTION, SOLUTION INTRAMUSCULAR; INTRAVENOUS ONCE AS NEEDED
Status: DISCONTINUED | OUTPATIENT
Start: 2022-07-28 | End: 2022-07-28 | Stop reason: HOSPADM

## 2022-07-28 RX ORDER — SODIUM CHLORIDE, SODIUM LACTATE, POTASSIUM CHLORIDE, CALCIUM CHLORIDE 600; 310; 30; 20 MG/100ML; MG/100ML; MG/100ML; MG/100ML
125 INJECTION, SOLUTION INTRAVENOUS CONTINUOUS
Status: CANCELLED | OUTPATIENT
Start: 2022-07-28

## 2022-07-28 RX ORDER — ONDANSETRON 2 MG/ML
4 INJECTION INTRAMUSCULAR; INTRAVENOUS ONCE AS NEEDED
Status: DISCONTINUED | OUTPATIENT
Start: 2022-07-28 | End: 2022-07-28 | Stop reason: HOSPADM

## 2022-07-28 RX ORDER — EPHEDRINE SULFATE 50 MG/ML
INJECTION INTRAVENOUS AS NEEDED
Status: DISCONTINUED | OUTPATIENT
Start: 2022-07-28 | End: 2022-07-28

## 2022-07-28 RX ORDER — SUCCINYLCHOLINE/SOD CL,ISO/PF 100 MG/5ML
SYRINGE (ML) INTRAVENOUS AS NEEDED
Status: DISCONTINUED | OUTPATIENT
Start: 2022-07-28 | End: 2022-07-28

## 2022-07-28 RX ORDER — ONDANSETRON 2 MG/ML
4 INJECTION INTRAMUSCULAR; INTRAVENOUS ONCE
Status: COMPLETED | OUTPATIENT
Start: 2022-07-28 | End: 2022-07-28

## 2022-07-28 RX ORDER — ONDANSETRON 2 MG/ML
INJECTION INTRAMUSCULAR; INTRAVENOUS AS NEEDED
Status: DISCONTINUED | OUTPATIENT
Start: 2022-07-28 | End: 2022-07-28

## 2022-07-28 RX ORDER — DIPHENHYDRAMINE HYDROCHLORIDE 50 MG/ML
25 INJECTION INTRAMUSCULAR; INTRAVENOUS ONCE
Status: DISCONTINUED | OUTPATIENT
Start: 2022-07-28 | End: 2022-07-28

## 2022-07-28 RX ORDER — DEXAMETHASONE SODIUM PHOSPHATE 10 MG/ML
INJECTION, SOLUTION INTRAMUSCULAR; INTRAVENOUS AS NEEDED
Status: DISCONTINUED | OUTPATIENT
Start: 2022-07-28 | End: 2022-07-28

## 2022-07-28 RX ADMIN — MORPHINE SULFATE 2 MG: 2 INJECTION, SOLUTION INTRAMUSCULAR; INTRAVENOUS at 07:52

## 2022-07-28 RX ADMIN — Medication 100 MG: at 10:22

## 2022-07-28 RX ADMIN — FENTANYL CITRATE 100 MCG: 50 INJECTION INTRAMUSCULAR; INTRAVENOUS at 10:21

## 2022-07-28 RX ADMIN — EPHEDRINE SULFATE 10 MG: 50 INJECTION, SOLUTION INTRAVENOUS at 10:34

## 2022-07-28 RX ADMIN — ACETAMINOPHEN 650 MG: 325 TABLET, FILM COATED ORAL at 06:15

## 2022-07-28 RX ADMIN — SODIUM CHLORIDE 1000 ML: 0.9 INJECTION, SOLUTION INTRAVENOUS at 07:46

## 2022-07-28 RX ADMIN — PROPOFOL 200 MG: 10 INJECTION, EMULSION INTRAVENOUS at 10:22

## 2022-07-28 RX ADMIN — ROCURONIUM BROMIDE 35 MG: 50 INJECTION, SOLUTION INTRAVENOUS at 10:37

## 2022-07-28 RX ADMIN — ONDANSETRON 4 MG: 2 INJECTION INTRAMUSCULAR; INTRAVENOUS at 07:51

## 2022-07-28 RX ADMIN — DEXAMETHASONE SODIUM PHOSPHATE 10 MG: 10 INJECTION INTRAMUSCULAR; INTRAVENOUS at 10:35

## 2022-07-28 RX ADMIN — SUGAMMADEX 300 MG: 100 INJECTION, SOLUTION INTRAVENOUS at 11:15

## 2022-07-28 RX ADMIN — MORPHINE SULFATE 2 MG: 2 INJECTION, SOLUTION INTRAMUSCULAR; INTRAVENOUS at 08:20

## 2022-07-28 RX ADMIN — CEFAZOLIN SODIUM 2000 MG: 2 SOLUTION INTRAVENOUS at 10:27

## 2022-07-28 RX ADMIN — ONDANSETRON 4 MG: 2 INJECTION INTRAMUSCULAR; INTRAVENOUS at 10:35

## 2022-07-28 RX ADMIN — LIDOCAINE HYDROCHLORIDE 60 MG: 20 INJECTION, SOLUTION EPIDURAL; INFILTRATION; INTRACAUDAL; PERINEURAL at 10:21

## 2022-07-28 RX ADMIN — ROCURONIUM BROMIDE 5 MG: 50 INJECTION, SOLUTION INTRAVENOUS at 10:21

## 2022-07-28 RX ADMIN — SODIUM CHLORIDE, SODIUM LACTATE, POTASSIUM CHLORIDE, AND CALCIUM CHLORIDE: .6; .31; .03; .02 INJECTION, SOLUTION INTRAVENOUS at 10:00

## 2022-07-28 NOTE — ED NOTES
Within a couple minutes after giving morphine 2 mg (2nd dose) pt began with chest pain/pressure,RUPAL HUSTON aware, cardiac work up started, pressure relieved approx 10-15 mins after started        Ghada Merrill, RN  07/28/22 9950

## 2022-07-28 NOTE — OP NOTE
OPERATIVE REPORT  PATIENT NAME: Jenelle Negrete    :  1965  MRN: [id number]  Pt Location: [facility] OR ROOM 02    SURGERY DATE: 2022    Surgeon(s) and Role:     Americo Maravilla MD - Primary    Preop Diagnosis:  Kidney stone [N20 0]    Post-Op Diagnosis Codes:     * Kidney stone [N20 0]    Procedure(s) (LRB):  CYSTOSCOPY URETEROSCOPY WITH LITHOTRIPSY HOLMIUM LASER, RETROGRADE PYELOGRAM AND INSERTION STENT LEFT URETERAL (Left)    Specimen(s):  ID Type Source Tests Collected by Time Destination   A : LEFT KIDNEY STONES Calculus Kidney, Left STONE ANALYSIS Flavio Adams MD 2022 1058        Estimated Blood Loss:   Minimal    Drains:  Ureteral Internal Stent Left ureter 6 Fr  (Active)   Number of days: 0       Anesthesia Type:   General    Operative Indications:  Kidney stone [N20 0]      Operative Findings:  Impacted left UPJ stone  Completely fragmented and retrieved  Proximal ureteral extravasation at conclusion  Stent left without string  Complications:   None    Procedure and Technique:  The patient was identified, brought to the operating room, and placed on the table in supine position  After induction of general anesthesia, the patient was placed in dorsal lithotomy position and prepped and draped in the usual sterile fashion  A complete formal timeout was performed  The 25 Faroese rigid cystoscope was placed per urethra and cystoscopy was performed  There was no bladder abnormality identified  The left ureteral stent was identified and cannulated with a Solo wire  A dual-lumen catheter was placed followed by a second guidewire  Only a 10 Fr ureteral access sheath could be placed  The Wiscope flexible ureteroscope was placed, as there was no available 5 3 Fr scope, and the stone was identified at the UPJ  A holmium laser fiber was placed and laser lithotripsy was performed  The stone retreated to the kidney and was followed to a mid-pole calyx    When the stone was of suitable size, a zero tip stone basket was placed and the stone fragments were retrieved  At this point, a retrograde pyelogram was performed delineating the upper urinary tract anatomy  No further filling defect identified,, however there was proximal extravasation  I evaluated with semi rigid ureteral scope and was able to ensure that the guidewire was within the true ureteral lumen and coiled in the kidney  The safety wire was backloaded into the cystoscope and a 26 cm x 6 [place] double-J stent was placed without string  The patient tolerated the procedure well and was transferred to the recovery room awake alert and in stable condition  Plan:  Patient has stent without string  Lois Chick has been extracted  Stent remained in place to allow for optimal ureteral healing  She will require cystoscopy/stent removal in office in about 4 weeks       I was present for the entire procedure    Patient Disposition:  PACU       SIGNATURE: Huber Christine MD  DATE: July 28, 2022  TIME: 11:23 AM

## 2022-07-28 NOTE — ANESTHESIA POSTPROCEDURE EVALUATION
Post-Op Assessment Note    CV Status:  Stable    Pain management: adequate     Mental Status:  Awake   Hydration Status:  Stable   PONV Controlled:  None   Airway Patency:  Patent      Post Op Vitals Reviewed: Yes      Staff: Anesthesiologist         No complications documented      /61 (07/28/22 1213)    Temp     Pulse 84 (07/28/22 1213)   Resp 15 (07/28/22 1213)    SpO2 90 % (07/28/22 1213)

## 2022-07-28 NOTE — CONSULTS
Consult - Urology   Oral Robles 1965, 62 y o  female MRN: [id number]    Unit/Bed#: ED 09 Encounter: [id number]    Ureterolithiasis  Assessment & Plan  · CT with 6 mm proximal left ureteral calculus with mild hydronephrosis  · UA macro negative for infection  · Patient is currently afebrile and VSS  No leukocytosis  Cr 1 29  Hgb 11 3  · Plan for OR today with Dr Chey Lai for cystoscopy, ureteroscopy with lithotripsy holmium laser, retrograde pyelogram and left ureteral stent insertion  · Reviewed risks and benefits of proceeding with procedure  Patient educated on possible second-stage procedure if signs of overt infection during today's surgical intervention  Reviewed postop stent colic expectations  · Informed consent signed and given to OR staff  Subjective:   Patient is a 31-year-old female who presented to the ED today with reports of left-sided flank pain that began early this morning  CT scan confirms a 6 mm proximal left ureteral calculus with mild hydronephrosis  Patient denies any previous history of nephrolithiasis  She has undergone surgical intervention the past and has tolerated anesthesia without complications  She currently reports 9/10 left flank pain with associated nausea  She denies any fevers or chills  She denies any urinary symptoms including dysuria, frequency, urgency, gross hematuria, or difficulty voiding  Review of Systems   Constitutional: Negative for chills and fever  HENT: Negative for congestion and sore throat  Respiratory: Negative for cough and shortness of breath  Cardiovascular: Negative for chest pain and palpitations  Gastrointestinal: Positive for nausea  Negative for abdominal pain and vomiting  Genitourinary: Positive for flank pain (left, 9/10)  Negative for difficulty urinating, dysuria, frequency, hematuria and urgency  Skin: Negative  Neurological: Negative for dizziness and light-headedness       Objective:  Vitals: Blood pressure 111/61, pulse 58, temperature 97 6 °F (36 4 °C), temperature source Oral, resp  rate 12, last menstrual period 1997, SpO2 94 %, not currently breastfeeding  ,There is no height or weight on file to calculate BMI  Physical Exam  Vitals reviewed  Constitutional:       General: She is not in acute distress  Appearance: Normal appearance  She is obese  She is not ill-appearing, toxic-appearing or diaphoretic  HENT:      Head: Normocephalic and atraumatic  Eyes:      Conjunctiva/sclera: Conjunctivae normal    Cardiovascular:      Rate and Rhythm: Normal rate and regular rhythm  Heart sounds: Normal heart sounds  Pulmonary:      Effort: Pulmonary effort is normal  No respiratory distress  Abdominal:      General: Bowel sounds are normal  There is no distension  Palpations: Abdomen is soft  Tenderness: There is no abdominal tenderness  There is left CVA tenderness  There is no right CVA tenderness, guarding or rebound  Musculoskeletal:         General: Normal range of motion  Cervical back: Normal range of motion  Skin:     General: Skin is warm and dry  Neurological:      General: No focal deficit present  Mental Status: She is alert and oriented to person, place, and time  Psychiatric:         Mood and Affect: Mood normal          Behavior: Behavior normal          Thought Content: Thought content normal          Judgment: Judgment normal          Imagin22: CT ABDOMEN AND PELVIS WITHOUT IV CONTRAST - LOW DOSE RENAL STONE      INDICATION:   Flank pain, kidney stone suspected  left flank pain        Flank pain  C/o left sided flank pain ongoing since 0300 this am  States radiates towards pelvis  Denies urinary symptoms       This is a 59-year-old female patient will complete 0300 hours this morning with left low back/flank pain that radiates into her abdomen  Pain does come in waves and is associated with nausea no vomiting    She does not recall injuring herself she has   never had a kidney stone before  Denies any fever chills headache blurred vision double vision cough sore throat chest pain shortness of breath  No urinary symptoms  His better or she has taken nothing for the pain  No urgency frequency or dysuria  No hematuria  Differential diagnosis includes not limited to kidney stone, musculoskeletal pain, renal colic     The patient's entire past medical history was obtained directly from either the attending emergency room physicians notes and/or the resident/physician's assistant notes in [facility]  The information was copied and pasted directly from Epic            COMPARISON:  Abdominal ultrasound September 20, 2018     TECHNIQUE:  Low radiation dose thin section CT examination of the abdomen and pelvis was performed without intravenous or oral contrast according to a protocol specifically designed to evaluate for urinary tract calculus  Axial, sagittal, and coronal 2D   reformatted images were created from the source data and submitted for interpretation  Evaluation for pathology in the abdomen and pelvis that is unrelated to urinary tract calculi is limited        Radiation dose length product (DLP) for this visit:  816 mGy-cm   This examination, like all CT scans performed in the [facility], was performed utilizing techniques to minimize radiation dose exposure, including the use of iterative   reconstruction and automated exposure control               URINARY TRACT FINDINGS:  RIGHT KIDNEY AND URETER:    No urinary tract calculi  No hydronephrosis or hydroureter            LEFT KIDNEY AND URETER:    The left kidney is mildly edematous      No renal calyceal calculi      Mild hydroureteronephrosis, up to the level of a 6 x 5 mm calculus in the proximal ureter, just below the ureteropelvic junction (series 2, image 58; series 601, image 90; series 602, image 71)  Calculus is slightly irregularly shaped    The calculus is located at the level of the superior endplate of the L2 vertebral body        Mild perinephric and periureteric inflammation, up to the level of the calculus  No perinephric fluid collections are seen      No additional ureteric calculi are seen            URINARY BLADDER:    Incompletely distended  Grossly unremarkable                  ADDITIONAL FINDINGS:  LOWER CHEST:  No clinically significant abnormality identified in the visualized lower chest            SOLID VISCERA: Limited low radiation dose noncontrast CT evaluation demonstrates no clinically significant abnormality of the imaged portions of the liver, spleen, pancreas, or adrenal glands           GALLBLADDER/BILIARY TREE:  Surgically absent            STOMACH AND BOWEL:    Evaluation of the GI tract limited due to lack of oral contrast material      Stomach incompletely distended with ingested food products and air  Hiatal hernia      No evidence of small bowel obstruction      Colon is segmentally distended with feces  Normal fecal burden in the colon  Scattered diverticula  Nothing to suggest acute diverticulitis         APPENDIX:  No findings to suggest appendicitis           ABDOMINOPELVIC CAVITY:  No ascites  No pneumoperitoneum  No lymphadenopathy         REPRODUCTIVE ORGANS:    Prior hysterectomy            ABDOMINAL WALL/INGUINAL REGIONS:  There is a small fat-containing umbilical hernia         OSSEOUS STRUCTURES:  No acute fracture or destructive osseous lesion  Spinal degenerative changes are noted  This is most pronounced L5-S1               IMPRESSION:  Mildly obstructing, slightly irregularly-shaped, 6 x 5 mm proximal left ureteric calculus just below the ureteropelvic junction  The calculus is located at the level of the superior endplate of the L2 vertebral body    Follow-up urology consultation   recommended            The study was marked in EPIC for immediate notification        Labs:  Recent Labs     07/28/22  0745   WBC 8 38     Recent Labs     07/28/22  0745   HGB 11 3*       Recent Labs     07/28/22  0745   CREATININE 1 29       Microbiology:  Component      Latest Ref Rng & Units 7/28/2022   Color, UA       Alea   Clarity, UA       Cloudy   pH, UA      4 5 - 8 0 6 0   Leukocytes, UA      Negative Negative   Nitrite, UA      Negative Negative   POCT URINE PROTEIN      Negative mg/dl 100 (2+) (A)   Glucose, UA      Negative mg/dl Negative   Ketones, UA      Negative mg/dl Negative   SL AMB POCT UROBILINOGEN      0 2, 1 0 E U /dl E U /dl 0 2   Bilirubin, UA      Negative Negative   Blood, UA      Negative Large (A)   SL AMB SPECIFIC GRAVITY-URINE      1 003 - 1 030 >=1 030    UA micro pending       History:  Social History     Socioeconomic History    Marital status: /Civil Union     Spouse name: None    Number of children: None    Years of education: None    Highest education level: None   Occupational History    None   Tobacco Use    Smoking status: Never Smoker    Smokeless tobacco: Never Used   Substance and Sexual Activity    Alcohol use: Yes     Comment: Occasionally    Drug use: No    Sexual activity: None   Other Topics Concern    None   Social History Narrative    None     Social Determinants of Health     Financial Resource Strain: Not on file   Food Insecurity: Not on file   Transportation Needs: Not on file   Physical Activity: Not on file   Stress: Not on file   Social Connections: Not on file   Intimate Partner Violence: Not on file   Housing Stability: Not on file       Past Medical History:   Diagnosis Date    Anxiety     Chest pain 08/12/2016    Chest wall pain 07/15/2016    Costochondritis 02/28/2014    Dog bite 07/29/2014    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([facility] [address] )     Fluid retention     Gastroenteritis 05/06/2013    Herpes zoster 07/08/2013    History of chemotherapy 1986    Migraine 01/19/2016    Shortness of breath 08/12/2016    Toxoplasmosis 08/28/2014     Past Surgical History:   Procedure Laterality Date    CHOLECYSTECTOMY      COLONOSCOPY      ESOPHAGOGASTRODUODENOSCOPY      HYSTERECTOMY  1986    KNEE ARTHROSCOPY      left shoulder    NECK SURGERY      Lumpectomy    OOPHORECTOMY Right 1986    OH ESOPHAGOGASTRODUODENOSCOPY TRANSORAL DIAGNOSTIC N/A 11/30/2018    Procedure: EGD;  Surgeon: Baldo Paredes MD;  Location: [facility];   Service: Gastroenterology     Family History   Problem Relation Age of Onset    Diabetes Mother     Heart disease Mother     Heart disease Father     No Known Problems Sister     No Known Problems Sister     No Known Problems Sister     No Known Problems Daughter     No Known Problems Daughter     No Known Problems Maternal Grandmother     No Known Problems Maternal Grandfather     No Known Problems Paternal Grandmother     No Known Problems Paternal Grandfather     Breast cancer Maternal Aunt         age unknown    No Known Problems Maternal Aunt     Coronary artery disease Family        [place], [place]  Date: 7/28/2022 Time: 9:29 AM

## 2022-07-28 NOTE — ED NOTES
Patient transported to OR via OR transporter    Dr Concepcion Kothari attending     Magnus Stevenson, ALBERT  07/28/22 [address] [facility] [address], [facility]  07/28/22 [id number]

## 2022-07-28 NOTE — ED NOTES
Offered pt tylenol or motrin during her wait time  Pt prefers tylenol        Katy Cabrera RN  07/28/22 [id number]

## 2022-07-28 NOTE — ED NOTES
Returned from [id number] [place] [address], [address] [place] [address]  07/28/22 2234

## 2022-07-28 NOTE — ED NOTES
Dr Arndt General anesthesia made aware of cardiac issues by [facility], also made aware of what happened with dose of Morphine       Jose Lozano RN  07/28/22 [id number]

## 2022-07-28 NOTE — ANESTHESIA PREPROCEDURE EVALUATION
Procedure:  CYSTOSCOPY URETEROSCOPY WITH LITHOTRIPSY HOLMIUM LASER, RETROGRADE PYELOGRAM AND INSERTION STENT URETERAL (Left Bladder)    Relevant Problems   CARDIO   (+) Chest pain (with IV morphine, central, resolved in 5 minutes)   (+) Migraine   (+) Mitral valve annular calcification      ENDO   (+) Subclinical hypothyroidism      GI/HEPATIC   (+) Gastroesophageal reflux disease      MUSCULOSKELETAL   (+) Fibromyalgia      NEURO/PSYCH   (+) Fibromyalgia   (+) Generalized anxiety disorder   (+) Migraine      Genitourinary   (+) Ureterolithiasis        Physical Exam    Airway    Mallampati score: III  TM Distance: <3 FB  Neck ROM: full     Dental   No notable dental hx     Cardiovascular  Rhythm: regular, Rate: normal,     Pulmonary  Breath sounds clear to auscultation,     Other Findings        Anesthesia Plan  ASA Score- 3 Emergent    Anesthesia Type- general with ASA Monitors  Additional Monitors:   Airway Plan: ETT and LMA  Plan Factors-Exercise tolerance (METS): >4 METS  Chart reviewed  Existing labs reviewed  Patient is not a current smoker  Obstructive sleep apnea risk education given perioperatively  Induction- intravenous  Postoperative Plan- Plan for postoperative opioid use  Informed Consent- Anesthetic plan and risks discussed with patient

## 2022-07-28 NOTE — ED PROVIDER NOTES
History  Chief Complaint   Patient presents with    Flank Pain     C/o left sided flank pain ongoing since 0300 this am  States radiates towards pelvis  Denies urinary symptoms  This is a 19-year-old female patient will complete 0300 hours this morning with left low back/flank pain that radiates into her abdomen  Pain does come in waves and is associated with nausea no vomiting  She does not recall injuring herself she has never had a kidney stone before  Denies any fever chills headache blurred vision double vision cough sore throat chest pain shortness of breath  No urinary symptoms  His better or she has taken nothing for the pain  No urgency frequency or dysuria  No hematuria  Differential diagnosis includes not limited to kidney stone, musculoskeletal pain, renal colic          Prior to Admission Medications   Prescriptions Last Dose Informant Patient Reported? Taking?    Cholecalciferol (VITAMIN D) 2000 units CAPS  Self Yes Yes   Sig: Take 1 capsule by mouth daily   Glycerin-Polysorbate 80 (REFRESH DRY EYE THERAPY OP)  Self Yes Yes   Sig: Apply to eye   albuterol (VENTOLIN HFA) 90 mcg/act inhaler  Self No Yes   Sig: Inhale 2 puffs every 6 (six) hours as needed for wheezing or shortness of breath   amLODIPine (NORVASC) 5 mg tablet  Self No Yes   Sig: Take 1 tablet (5 mg total) by mouth daily   aspirin 81 MG tablet  Self Yes Yes   Sig: Take 81 mg by mouth daily   butalbital-acetaminophen-caffeine (FIORICET,ESGIC) -40 mg per tablet  Self Yes No   Sig: Take 1 tablet by mouth every 6 (six) hours     Patient not taking: Reported on 4/19/2022    citalopram (CeleXA) 40 mg tablet   No Yes   Sig: Take 1 tablet (40 mg total) by mouth daily   cromolyn (OPTICROM) 4 % ophthalmic solution  Self No No   Sig: Administer 1 drop to both eyes 4 (four) times a day   Patient not taking: Reported on 4/19/2022    furosemide (LASIX) 20 mg tablet   No Yes   Sig: take 1 tablet by mouth once daily   isosorbide mononitrate (IMDUR) 30 mg 24 hr tablet   No Yes   Sig: Take 1 tablet (30 mg total) by mouth daily   lidocaine (LIDODERM) 5 %  Self No No   Sig: Apply 1 patch topically daily Apply in am and  remove in pm  12 hour on, 12 hour off   Patient not taking: Reported on 3/31/2022    loratadine (CLARITIN) 10 mg tablet  Self No Yes   Sig: Take 1 tablet (10 mg total) by mouth daily   metoprolol tartrate (LOPRESSOR) 25 mg tablet   No Yes   Sig: Take 1 tablet (25 mg total) by mouth every 12 (twelve) hours   metoprolol tartrate (LOPRESSOR) 50 mg tablet  Self No No   Sig: Take 1 tablet (50 mg total) by mouth every 12 (twelve) hours Start the day before CT scan, then take morning of and 1 hour before CT   Patient not taking: Reported on 3/31/2022    multivitamin (THERAGRAN) TABS  Self Yes Yes   Sig: Take 1 tablet by mouth daily   omeprazole (PriLOSEC) 20 mg delayed release capsule   No Yes   Sig: take 1 capsule by mouth once daily   rizatriptan (MAXALT) 10 MG tablet   No Yes   Sig: Take 1 tablet (10 mg total) by mouth once as needed for migraine for up to 1 dose May repeat in 2 hours if needed   topiramate (TOPAMAX) 100 mg tablet   No Yes   Sig: take 1 tablet by mouth once daily      Facility-Administered Medications: None       Past Medical History:   Diagnosis Date    Anxiety     Chest pain 08/12/2016    Chest wall pain 07/15/2016    Costochondritis 02/28/2014    Dog bite 07/29/2014    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([facility] [address] )     Fluid retention     Gastroenteritis 05/06/2013    Herpes zoster 07/08/2013    History of chemotherapy 1986    Migraine 01/19/2016    Shortness of breath 08/12/2016    Toxoplasmosis 08/28/2014       Past Surgical History:   Procedure Laterality Date    CHOLECYSTECTOMY      COLONOSCOPY      ESOPHAGOGASTRODUODENOSCOPY      FL RETROGRADE PYELOGRAM  7/28/2022    HYSTERECTOMY  1986    KNEE ARTHROSCOPY      left shoulder    NECK SURGERY      Lumpectomy    OOPHORECTOMY Right 1986    [place] ESOPHAGOGASTRODUODENOSCOPY TRANSORAL DIAGNOSTIC N/A 11/30/2018    Procedure: EGD;  Surgeon: Laury Watts MD;  Location: [facility]; Service: Gastroenterology       Family History   Problem Relation Age of Onset    Diabetes Mother     Heart disease Mother     Heart disease Father     No Known Problems Sister     No Known Problems Sister     No Known Problems Sister     No Known Problems Daughter     No Known Problems Daughter     No Known Problems Maternal Grandmother     No Known Problems Maternal Grandfather     No Known Problems Paternal Grandmother     No Known Problems Paternal Grandfather     Breast cancer Maternal Aunt         age unknown    No Known Problems Maternal Aunt     Coronary artery disease Family      I have reviewed and agree with the history as documented  E-Cigarette/Vaping     E-Cigarette/Vaping Substances     Social History     Tobacco Use    Smoking status: Never Smoker    Smokeless tobacco: Never Used   Substance Use Topics    Alcohol use: Yes     Comment: Occasionally    Drug use: No       Review of Systems   Constitutional: Negative for diaphoresis, fatigue and fever  HENT: Negative for congestion, ear pain, hearing loss, nosebleeds and sore throat  Eyes: Negative for photophobia, pain, discharge and visual disturbance  Respiratory: Negative for cough, choking, chest tightness, shortness of breath and wheezing  Cardiovascular: Negative for chest pain, palpitations and leg swelling  Gastrointestinal: Positive for nausea  Negative for abdominal distention, abdominal pain, diarrhea and vomiting  Endocrine: Negative for polydipsia and polyphagia  Genitourinary: Positive for flank pain  Negative for dysuria and frequency  Musculoskeletal: Positive for back pain  Negative for arthralgias, gait problem and joint swelling  Skin: Negative for color change, pallor and rash  Allergic/Immunologic: Negative for environmental allergies and food allergies  Neurological: Negative for dizziness, syncope and headaches  Psychiatric/Behavioral: Negative for agitation, behavioral problems and confusion  The patient is not nervous/anxious  All other systems reviewed and are negative  Physical Exam  Physical Exam  Vitals and nursing note reviewed  Constitutional:       General: She is not in acute distress  Appearance: Normal appearance  She is not ill-appearing, toxic-appearing or diaphoretic  HENT:      Head: Normocephalic and atraumatic  Right Ear: Tympanic membrane, ear canal and external ear normal       Left Ear: Tympanic membrane, ear canal and external ear normal       Nose: Nose normal  No congestion or rhinorrhea  Mouth/Throat:      Mouth: Mucous membranes are moist       Pharynx: Oropharynx is clear  No oropharyngeal exudate or posterior oropharyngeal erythema  Eyes:      Extraocular Movements: Extraocular movements intact  Conjunctiva/sclera: Conjunctivae normal       Pupils: Pupils are equal, round, and reactive to light  Cardiovascular:      Rate and Rhythm: Normal rate and regular rhythm  Pulmonary:      Effort: Pulmonary effort is normal  No respiratory distress  Breath sounds: Normal breath sounds  Abdominal:      General: Bowel sounds are normal       Palpations: Abdomen is soft  Tenderness: There is no abdominal tenderness  There is left CVA tenderness  Musculoskeletal:         General: Normal range of motion  Cervical back: Normal range of motion and neck supple  No rigidity or tenderness  Back:       Right lower leg: No edema  Left lower leg: No edema  Lymphadenopathy:      Cervical: No cervical adenopathy  Skin:     General: Skin is warm and dry  Capillary Refill: Capillary refill takes less than 2 seconds  Findings: No rash  Neurological:      General: No focal deficit present  Mental Status: She is alert and oriented to person, place, and time   Mental status is at baseline     Psychiatric:         Mood and Affect: Mood normal          Behavior: Behavior normal          Vital Signs  ED Triage Vitals [07/28/22 0611]   Temperature Pulse Respirations Blood Pressure SpO2   97 6 °F (36 4 °C) 59 19 (!) 199/77 93 %      Temp Source Heart Rate Source Patient Position - Orthostatic VS BP Location FiO2 (%)   Oral Monitor Sitting Right arm --      Pain Score       10 - Worst Possible Pain           Vitals:    07/28/22 1320 07/28/22 1334 07/28/22 1346 07/28/22 1406   BP: 142/64 152/68 152/66 135/70   Pulse: 85 96 81 80   Patient Position - Orthostatic VS:             Visual Acuity      ED Medications  Medications   oxyCODONE (ROXICODONE) IR tablet 5 mg (has no administration in time range)   acetaminophen (TYLENOL) tablet 650 mg (650 mg Oral Given 7/28/22 0615)   sodium chloride 0 9 % bolus 1,000 mL (0 mL Intravenous Stopped 7/28/22 0900)   morphine injection 2 mg (2 mg Intravenous Given 7/28/22 0752)   ondansetron (ZOFRAN) injection 4 mg (4 mg Intravenous Given 7/28/22 0751)   morphine injection 2 mg (2 mg Intravenous Given 7/28/22 0820)       Diagnostic Studies  Results Reviewed     Procedure Component Value Units Date/Time    HS Troponin I 4hr [[id number]]  (Normal) Collected: 07/28/22 1241    Lab Status: Final result Specimen: Blood from Arm, Left Updated: 07/28/22 1324     hs TnI 4hr 3 ng/L      Delta 4hr hsTnI 1 ng/L     HS Troponin I 2hr [[id number]]  (Normal) Collected: 07/28/22 0939    Lab Status: Final result Specimen: Blood from Arm, Right Updated: 07/28/22 1006     hs TnI 2hr <2 ng/L      Delta 2hr hsTnI <0 ng/L     Urine Microscopic [[id number]]  (Abnormal) Collected: 07/28/22 0919    Lab Status: Final result Specimen: Urine, Clean Catch Updated: 07/28/22 0944     RBC, UA Innumerable /hpf      WBC, UA 4-10 /hpf      Epithelial Cells Moderate /hpf      Bacteria, UA Innumerable /hpf     Urine Macroscopic, POC [[id number]]  (Abnormal) Collected: 07/28/22 0919    Lab Status: Final result Specimen: Urine Updated: 07/28/22 0921     Color, UA Alea     Clarity, UA Cloudy     pH, UA 6 0     Leukocytes, UA Negative     Nitrite, UA Negative     Protein,  (2+) mg/dl      Glucose, UA Negative mg/dl      Ketones, UA Negative mg/dl      Urobilinogen, UA 0 2 E U /dl      Bilirubin, UA Negative     Occult Blood, UA Large     Specific Gravity, UA >=1 030    Narrative:      CLINITEK RESULT    HS Troponin 0hr (reflex protocol) [[id number]]  (Normal) Collected: 07/28/22 0840    Lab Status: Final result Specimen: Blood from Arm, Right Updated: 07/28/22 0911     hs TnI 0hr 2 ng/L     Comprehensive metabolic panel [[id number]]  (Abnormal) Collected: 07/28/22 0745    Lab Status: Final result Specimen: Blood from Arm, Right Updated: 07/28/22 0811     Sodium 141 mmol/L      Potassium 3 9 mmol/L      Chloride 106 mmol/L      CO2 27 mmol/L      ANION GAP 8 mmol/L      BUN 15 mg/dL      Creatinine 1 29 mg/dL      Glucose 104 mg/dL      Calcium 9 0 mg/dL      Corrected Calcium 9 6 mg/dL      AST 13 U/L      ALT 20 U/L      Alkaline Phosphatase 102 U/L      Total Protein 7 6 g/dL      Albumin 3 2 g/dL      Total Bilirubin 0 18 mg/dL      eGFR 46 ml/min/1 73sq m     Narrative:      Ju[place] guidelines for Chronic Kidney Disease (CKD):     Stage 1 with normal or high GFR (GFR > 90 mL/min/1 73 square meters)    Stage 2 Mild CKD (GFR = 60-89 mL/min/1 73 square meters)    Stage 3A Moderate CKD (GFR = 45-59 mL/min/1 73 square meters)    Stage 3B Moderate CKD (GFR = 30-44 mL/min/1 73 square meters)    Stage 4 Severe CKD (GFR = 15-29 mL/min/1 73 square meters)    Stage 5 End Stage CKD (GFR <15 mL/min/1 73 square meters)  Note: GFR calculation is accurate only with a steady state creatinine    CBC and differential [[id number]]  (Abnormal) Collected: 07/28/22 0745    Lab Status: Final result Specimen: Blood from Arm, Right Updated: 07/28/22 0752     WBC 8 38 Thousand/uL      RBC 3 84 Million/uL Hemoglobin 11 3 g/dL      Hematocrit 35 2 %      MCV 92 fL      MCH 29 4 pg      MCHC 32 1 g/dL      RDW 14 6 %      MPV 10 0 fL      Platelets 423 Thousands/uL      nRBC 0 /100 WBCs      Neutrophils Relative 74 %      Immat GRANS % 1 %      Lymphocytes Relative 18 %      Monocytes Relative 6 %      Eosinophils Relative 1 %      Basophils Relative 0 %      Neutrophils Absolute 6 18 Thousands/µL      Immature Grans Absolute 0 04 Thousand/uL      Lymphocytes Absolute 1 54 Thousands/µL      Monocytes Absolute 0 53 Thousand/µL      Eosinophils Absolute 0 06 Thousand/µL      Basophils Absolute 0 03 Thousands/µL                  FL retrograde pyelogram   Final Result by aRjiv Pereira MD (07/28 1204)      Fluoroscopic guidance provided for procedure guidance  Please refer to the separate procedure notes for additional details  Workstation performed: [id number]         CT renal stone study abdomen pelvis without contrast   Final Result by Merleen Jeans, DO (07/28 5845)   Mildly obstructing, slightly irregularly-shaped, 6 x 5 mm proximal left ureteric calculus just below the ureteropelvic junction  The calculus is located at the level of the superior endplate of the L2 vertebral body  Follow-up urology consultation    recommended  The study was marked in [facility] for immediate notification  Workstation performed: [id number]                    Procedures  Procedures         ED Course  ED Course as of 07/28/22 1451   Thu Jul 28, 2022   0830 Was called to the room patient had immediate substernal chest pressure after 2nd dose of morphine without signs of allergic reaction or anaphylaxis  The chest pain lasted for approximately 3 minutes and is now resolved  Most likely secondary to morphine however will have cardiac workup  0299 Patient does have a cardiac history stating that she has a "blockage" and is being medically managed    EKG interpreted by me 56 beats per minute sinus bradycardia no ST elevation left axis     Similar to previous except axis change   2730 Spoke with Dr Laura Winter regarding the case recommends 2nd troponin 2 hours after 1st   1st 1 was less than 5 with less than 3 hours worth of pain   [phone] with Anesthesia who requested repeat troponin now before procedure             HEART Risk Score    Flowsheet Row Most Recent Value   Heart Score Risk Calculator    History 1 Filed at: 07/28/2022 0920   ECG 0 Filed at: 07/28/2022 0920   Age 1 Filed at: 07/28/2022 0920   Risk Factors 2 Filed at: 07/28/2022 0920   Troponin 0 Filed at: 07/28/2022 0920   HEART Score 4 Filed at: 07/28/2022 0920                                      MDM    Disposition  Final diagnoses:   Kidney stone   Chest pain   Ureteral obstruction, left     Time reflects when diagnosis was documented in both MDM as applicable and the Disposition within this note     Time User Action Codes Description Comment    7/28/2022  8:47 AM Dinapoli, Juwan Add [N20 0] Kidney stone     7/28/2022  9:21 AM Dinapoli, Juwan Add [R07 9] Chest pain     7/28/2022  9:57 AM Dinapoli, Juwan Add [N13 5] Ureteral obstruction, left     7/28/2022 10:58 AM Yary Check Modify [N20 0] Kidney stone     7/28/2022 11:18 AM Anahi Mast Modify [N20 0] Kidney stone     7/28/2022 11:22 AM Sherri Adriana E Modify [N20 0] Kidney stone     7/28/2022 11:22 AM Sherri Adriana E Modify [R07 9] Chest pain     7/28/2022 11:22 AM Sherri Adriana E Modify [N13 5] Ureteral obstruction, left       ED Disposition     ED Disposition   Send to OR    Condition   --    Date/Time   Thu Jul 28, 2022  9:57 AM    Comment   Sent the operating room to have kidney stone removed         Follow-up Information    None         Current Discharge Medication List      START taking these medications    Details   levofloxacin (LEVAQUIN) 500 mg tablet Take 1 tablet (500 mg total) by mouth every 24 hours for 5 days  Qty: 5 tablet, Refills: 0    Associated Diagnoses: Ureteral obstruction, left      oxyCODONE (Roxicodone) 5 immediate release tablet Take 1 tablet (5 mg total) by mouth every 6 (six) hours as needed for severe pain for up to 5 days Max Daily Amount: 20 mg  Qty: 10 tablet, Refills: 0    Associated Diagnoses: Ureteral obstruction, left         CONTINUE these medications which have NOT CHANGED    Details   albuterol (VENTOLIN HFA) 90 mcg/act inhaler Inhale 2 puffs every 6 (six) hours as needed for wheezing or shortness of breath  Qty: 1 Inhaler, Refills: 0    Associated Diagnoses: Acute bronchitis, unspecified organism      amLODIPine (NORVASC) 5 mg tablet Take 1 tablet (5 mg total) by mouth daily  Qty: 90 tablet, Refills: 3    Associated Diagnoses: Chest pain, unspecified type; SOB (shortness of breath) on exertion      aspirin 81 MG tablet Take 81 mg by mouth daily      Cholecalciferol (VITAMIN D) 2000 units CAPS Take 1 capsule by mouth daily      citalopram (CeleXA) 40 mg tablet Take 1 tablet (40 mg total) by mouth daily  Qty: 30 tablet, Refills: 2    Associated Diagnoses: Generalized anxiety disorder      furosemide (LASIX) 20 mg tablet take 1 tablet by mouth once daily  Qty: 30 tablet, Refills: 5    Associated Diagnoses: Bilateral lower extremity edema      Glycerin-Polysorbate 80 (REFRESH DRY EYE THERAPY OP) Apply to eye      isosorbide mononitrate (IMDUR) 30 mg 24 hr tablet Take 1 tablet (30 mg total) by mouth daily  Qty: 90 tablet, Refills: 0    Associated Diagnoses: SOB (shortness of breath) on exertion; Stable angina pectoris (HCC)      loratadine (CLARITIN) 10 mg tablet Take 1 tablet (10 mg total) by mouth daily  Qty: 30 tablet, Refills: 1    Associated Diagnoses: Allergic rhinitis, unspecified seasonality, unspecified trigger      !! metoprolol tartrate (LOPRESSOR) 25 mg tablet Take 1 tablet (25 mg total) by mouth every 12 (twelve) hours  Qty: 180 tablet, Refills: 3    Associated Diagnoses: Stable angina pectoris ([address] );  Abnormal computed tomography angiography (CTA)      multivitamin (THERAGRAN) TABS Take 1 tablet by mouth daily      omeprazole (PriLOSEC) 20 mg delayed release capsule take 1 capsule by mouth once daily  Qty: 30 capsule, Refills: 5    Associated Diagnoses: Gastroesophageal reflux disease      rizatriptan (MAXALT) 10 MG tablet Take 1 tablet (10 mg total) by mouth once as needed for migraine for up to 1 dose May repeat in 2 hours if needed  Qty: 9 tablet, Refills: 2    Associated Diagnoses: Migraine without status migrainosus, not intractable, unspecified migraine type      topiramate (TOPAMAX) 100 mg tablet take 1 tablet by mouth once daily  Qty: 30 tablet, Refills: 5    Associated Diagnoses: Migraine without status migrainosus, not intractable, unspecified migraine type      butalbital-acetaminophen-caffeine (FIORICET,ESGIC) -40 mg per tablet Take 1 tablet by mouth every 6 (six) hours        cromolyn (OPTICROM) 4 % ophthalmic solution Administer 1 drop to both eyes 4 (four) times a day  Qty: 10 mL, Refills: 1    Associated Diagnoses: Allergic conjunctivitis of both eyes      lidocaine (LIDODERM) 5 % Apply 1 patch topically daily Apply in am and  remove in pm  12 hour on, 12 hour off  Qty: 30 patch, Refills: 0    Associated Diagnoses: Chronic pain of right knee      !! metoprolol tartrate (LOPRESSOR) 50 mg tablet Take 1 tablet (50 mg total) by mouth every 12 (twelve) hours Start the day before CT scan, then take morning of and 1 hour before CT  Qty: 4 tablet, Refills: 0    Associated Diagnoses: Chest pain, unspecified type; SOB (shortness of breath) on exertion       !! - Potential duplicate medications found  Please discuss with provider  Outpatient Discharge Orders   Ambulatory Referral to Cardiology   Standing Status: Future Standing Exp   Date: 07/28/23      Discharge Diet     Activity as tolerated     No strenuous exercise     Shower on day dressing removed (No bath)     No driving until     Ice to affected area     Other restrictions (specify):     Call provider for:     Follow-up with surgeon (specify):       PDMP Review     None          ED Provider  Electronically Signed by           Norbert Negrete PA-C  07/28/22 1527

## 2022-07-28 NOTE — ASSESSMENT & PLAN NOTE
· CT with 6 mm proximal left ureteral calculus with mild hydronephrosis  · UA macro negative for infection  · Patient is currently afebrile and VSS  No leukocytosis  Cr 1 29  Hgb 11 3  · Plan for OR today with Dr Carlita Dowling for cystoscopy, ureteroscopy with lithotripsy holmium laser, retrograde pyelogram and left ureteral stent insertion  · Reviewed risks and benefits of proceeding with procedure  Patient educated on possible second-stage procedure if signs of overt infection during today's surgical intervention  Reviewed postop stent colic expectations  · Informed consent signed and given to OR staff

## 2022-07-29 ENCOUNTER — TELEPHONE (OUTPATIENT)
Dept: UROLOGY | Facility: AMBULATORY SURGERY CENTER | Age: 57
End: 2022-07-29

## 2022-07-29 LAB
ATRIAL RATE: 56 BPM
ATRIAL RATE: 60 BPM
P AXIS: 53 DEGREES
P AXIS: 55 DEGREES
PR INTERVAL: 158 MS
PR INTERVAL: 168 MS
QRS AXIS: -2 DEGREES
QRS AXIS: -3 DEGREES
QRSD INTERVAL: 94 MS
QRSD INTERVAL: 94 MS
QT INTERVAL: 412 MS
QT INTERVAL: 426 MS
QTC INTERVAL: 411 MS
QTC INTERVAL: 412 MS
T WAVE AXIS: 14 DEGREES
T WAVE AXIS: 3 DEGREES
VENTRICULAR RATE: 56 BPM
VENTRICULAR RATE: 60 BPM

## 2022-07-29 PROCEDURE — 93010 ELECTROCARDIOGRAM REPORT: CPT | Performed by: INTERNAL MEDICINE

## 2022-07-29 NOTE — TELEPHONE ENCOUNTER
Post Op Note    Berhane Salazar is a 62 y o  female s/p CYSTOSCOPY URETEROSCOPY WITH LITHOTRIPSY HOLMIUM LASER, RETROGRADE PYELOGRAM AND INSERTION STENT LEFT URETERAL (Left Bladder) performed 7/28/2022  Berhane Salazar had surgery by Dr Raven Marin     How would you rate your pain on a scale from 1 to 10, 10 being the worst pain ever? Some mild pain that OTC pain medications helps with  Do you have any difficulty urinating? No    Do you have any other questions or concerns that I can address at this time? Patient states she is doing better now  She states she is taking the pain medication they prescribed as well as OTC to assist with her pain  She is also taking the antibiotics that were prescribed  Advised to hydrate well with water  Advised she can use a warm heating pad to assist with pain  Advised of Dr Surendra Aguirre note of removing the stent in the office in 4 weeks  Appointment has been scheduled at the [facility] office  Location provided and confirmed  Advised to contact the office in the meantime with questions or concerns

## 2022-07-31 DIAGNOSIS — R06.02 SOB (SHORTNESS OF BREATH) ON EXERTION: ICD-10-CM

## 2022-07-31 DIAGNOSIS — I20.8 STABLE ANGINA PECTORIS (HCC): ICD-10-CM

## 2022-08-01 RX ORDER — ISOSORBIDE MONONITRATE 30 MG/1
TABLET, EXTENDED RELEASE ORAL
Qty: 90 TABLET | Refills: 2 | Status: SHIPPED | OUTPATIENT
Start: 2022-08-01

## 2022-08-03 ENCOUNTER — OFFICE VISIT (OUTPATIENT)
Dept: CARDIOLOGY CLINIC | Facility: CLINIC | Age: 57
End: 2022-08-03
Payer: COMMERCIAL

## 2022-08-03 VITALS
HEART RATE: 74 BPM | SYSTOLIC BLOOD PRESSURE: 105 MMHG | BODY MASS INDEX: 47.06 KG/M2 | DIASTOLIC BLOOD PRESSURE: 72 MMHG | WEIGHT: 233 LBS

## 2022-08-03 DIAGNOSIS — I25.10 CORONARY ARTERY DISEASE DUE TO LIPID RICH PLAQUE: ICD-10-CM

## 2022-08-03 DIAGNOSIS — F41.1 GENERALIZED ANXIETY DISORDER: ICD-10-CM

## 2022-08-03 DIAGNOSIS — I34.81 MITRAL VALVE ANNULAR CALCIFICATION: ICD-10-CM

## 2022-08-03 DIAGNOSIS — I25.83 CORONARY ARTERY DISEASE DUE TO LIPID RICH PLAQUE: ICD-10-CM

## 2022-08-03 DIAGNOSIS — R60.0 BILATERAL LOWER EXTREMITY EDEMA: Primary | ICD-10-CM

## 2022-08-03 DIAGNOSIS — R07.9 CHEST PAIN: ICD-10-CM

## 2022-08-03 LAB
CALCIUM OXALATE DIHYDRATE MFR STONE IR: 40 %
COLOR STONE: NORMAL
COM MFR STONE: 30 %
COMMENT-STONE3: NORMAL
COMPOSITION: NORMAL
HYDROXYAPATITE 24H ENGDIFF UR: 30 %
LABORATORY COMMENT REPORT: NORMAL
PHOTO: NORMAL
SIZE STONE: NORMAL MM
SPEC SOURCE SUBJ: NORMAL
STONE ANALYSIS-IMP: NORMAL
STONE ANALYSIS-IMP: NORMAL
WT STONE: 16 MG

## 2022-08-03 PROCEDURE — 99214 OFFICE O/P EST MOD 30 MIN: CPT

## 2022-08-03 RX ORDER — ATORVASTATIN CALCIUM 40 MG/1
40 TABLET, FILM COATED ORAL DAILY
Qty: 90 TABLET | Refills: 3 | Status: SHIPPED | OUTPATIENT
Start: 2022-08-03

## 2022-08-03 NOTE — PROGRESS NOTES
Cardiology   MD Re Balderas MD Kelsey Churches, DO, ELVA Rosa MD Rodger Hawthorn, DO, Timothy Shelton DO, [facility], [address]  -------------------------------------------------------------------  Critical access hospital [facility]  [address], [place] [id number] [phone]  [phone]  08/03/22  Darwin Valero  YOB: 1965   MRN: [id number]      Referring Physician: Chi Stearns PA-C  [address] [place] [address]     HPI: Darwin Valero is a 62 y o  female with:   Gastroesophageal reflux disease   Bilateral lower extremity edema   Fibromyalgia   Anxiety  Obesity, BMI 46     I had seen her in January of 2020 for symptoms chest pain and dyspnea on exertion   The symptoms have been going on for several years but these are actually getting worse now  Fito Nixon had a stress echo that was negative for ischemia however her overall exercise capacity was very poor, she only exercised for 3 minutes 30 seconds and had to stop due to chest pain and dyspnea on exertion   She also noted palpitations at the time     Because of this I ordered a coronary CT angiogram as I a m  concerned about underlying coronary artery disease and ischemia being present        This did show 50-69% stenosis in the left anterior descending artery territory   Recommendation is for functional assessment        Nuclear stress showed image artifact in the apex without evidence of reversible perfusion abnormality  Her chest pain is improved with medical therapy  She recently had an issue with a kidney stone underwent surgery, she did have chest pain at that time where high sensitivity troponins were negative    Her LDL cholesterol is elevated at 118    Review of Systems   Constitutional: Negative for chills and fever  HENT: Negative for facial swelling and sore throat  Eyes: Negative for visual disturbance     Respiratory: Negative for cough, chest tightness, shortness of breath and wheezing  Cardiovascular: Negative for chest pain, palpitations and leg swelling  Gastrointestinal: Negative for abdominal pain, blood in stool, constipation, diarrhea, nausea and vomiting  Endocrine: Negative for cold intolerance and heat intolerance  Genitourinary: Negative for decreased urine volume, difficulty urinating, dysuria and hematuria  Musculoskeletal: Negative for arthralgias, back pain and myalgias  Skin: Negative for rash  Neurological: Negative for dizziness, syncope, weakness and numbness  Psychiatric/Behavioral: Negative for agitation, behavioral problems and confusion  The patient is not nervous/anxious  OBJECTIVE  Vitals:    08/03/22 1619   BP: 105/72   Pulse: 74       Physical Exam  Constitutional: awake, alert and oriented, in no acute distress, no obvious deformities, obese female  Head: Normocephalic, without obvious abnormality, atraumatic  Eyes: conjunctivae clear and moist  Sclera anicteric  No xanthelasmas  Pupils equal bilaterally  Extraocular motions are full  Ear nose mouth and throat: ears are symmetrical bilaterally, hearing appears to be equal bilaterally, no nasal discharge or epistaxis, oropharynx is clear with moist mucous membranes  Neck:  Trachea is midline, neck is supple, no thyromegaly or significant lymphadenopathy, there is full range of motion  Lungs: clear to auscultation bilaterally, no wheezes, no rales, no rhonchi, no accessory muscle use, breathing is nonlabored  Heart: regular rate and rhythm, S1, S2 normal, no murmur, no click, no rub and no gallop, no lower extremity edema  Abdomen:  Obese, soft, non-tender; bowel sounds normal; no masses,  no organomegaly  Psychiatric:  Patient is oriented to time, place, person, mood/affect is negative for depression, anxiety, agitation, appears to have appropriate insight  Skin: Skin is warm, dry, intact  No obvious rashes or lesions on exposed extremities    Nail beds are pink with no cyanosis or clubbing  EKG:  No results found for this visit on 08/03/22  IMPRESSION:  Atypical chest pain, central chest, both exertional and nonexertional, CT angiogram did show moderate proximal LAD disease    nuclear stress with apical artifact, no definite ischemia  Dyslipidemia  Abnormal electrocardiogram   Dyspnea on exertion   Palpitations  Obesity  Deconditioning   Gastroesophageal reflux disease  Fibromyalgia   Anxiety    DISCUSSION/RECOMMENDATIONS:  Her symptoms of chest pain have improved  Would continue her current medical therapy  Would add Lipitor to her regimen for dyslipidemia as well as coronary artery disease seen on CT with proximal LAD disease  Had long discussion with her today regarding heart healthy diet, recommended Mediterranean diet high in lean meats and fresh fruits and vegetables, avoid butter, margarine, fatty foods, red meats, pork, simple carbohydrates  Plan for re-evaluation 6 months    Reeta Cockayne, DO, [facility], [facility]  --------------------------------------------------------------------------------  TREADMILL STRESS  No results found for this or any previous visit      ----------------------------------------------------------------------------------------------  NUCLEAR STRESS TEST: No results found for this or any previous visit  Results for orders placed during the hospital encounter of 04/19/22    NM myocardial perfusion spect (rx stress and/or rest)    Interpretation Summary    Stress ECG: No ST deviation is noted  There were no arrhythmias during recovery    The ECG was negative for ischemia  The stress ECG is negative for ischemia    Perfusion: There is a left ventricular perfusion defect that is small in size with moderate reduction in uptake present in the apical location(s) that is fixed  The defect appears to be probable artifact caused by breast attenuation      Stress Function: Left ventricular function post-stress is normal  Post-stress ejection fraction is 73 %    Stress Combined Conclusion: There is image artifact, without diagnostic evidence for perfusion abnormality       --------------------------------------------------------------------------------  CATH:  No results found for this or any previous visit     --------------------------------------------------------------------------------  ECHO:   Results for orders placed during the hospital encounter of 18    Echo complete with contrast if indicated    Narrative  [address]    Transthoracic Echocardiogram  2D, M-mode, Doppler, and Color Doppler    Study date:  13-Dec-2018    Patient: Vinh Hwang  MR number: [id number]  Account number: [de-identified]  : 1965  Age: 48 years  Gender: Female  Status: Outpatient  Location: [address] [place] [address]  Height: 60 in  Weight: 218 5 lb  BP: 138/ 86 mmHg    Indications: Dyspnea    Diagnoses: R06 00 - Dyspnea, unspecified    Sonographer:  Agustin Olea RDCS  Primary Physician:  Cara Shah DO  Referring Physician:  Chelsea Barney DO  Group:  [facility]  Interpreting Physician:  Eamon uEgene MD    SUMMARY    SUMMARY:  1  Sinus rhythm  2  Good technical quality  3  Normal left ventricular systolic and diastolic function  4  Mild left ventricular cavity enlargement    LEFT VENTRICLE:  Normal left ventricular systolic function, EF 67%  Mild left ventricular cavity enlargement  Normal left ventricular wall thickness  Normal left ventricular wall motion without regional wall motion abnormalities  Normal left ventricular  diastolic function and filling pressures  LEFT ATRIUM:  Normal left atrial size  RIGHT ATRIUM:  Normal right atrial size  MITRAL VALVE:  Normal mitral valve with trace mitral regurgitation  AORTIC VALVE:  Normal tricuspid aortic valve without stenosis      TRICUSPID VALVE:  Trace tricuspid regurgitation  PULMONIC VALVE:  No pulmonic regurgitation  PULMONARY ARTERIES:  Normal pulmonary artery pressures  PERICARDIUM:  No pericardial effusion  HISTORY: PRIOR HISTORY: Patient has no history of cardiovascular disease  Risk factors: morbid obesity  PROCEDURE: The study was performed in the [facility]  This was a routine study  The transthoracic approach was used  The study included complete 2D imaging, M-mode, complete spectral Doppler, and color Doppler  The heart rate was  84 bpm, at the start of the study  This was a technically difficult study  LEFT VENTRICLE: Normal left ventricular systolic function, EF 89%  Mild left ventricular cavity enlargement  Normal left ventricular wall thickness  Normal left ventricular wall motion without regional wall motion abnormalities  Normal  left ventricular diastolic function and filling pressures  RIGHT VENTRICLE: The size was normal  Systolic function was normal  Wall thickness was normal     LEFT ATRIUM: Normal left atrial size  RIGHT ATRIUM: Normal right atrial size  MITRAL VALVE: Normal mitral valve with trace mitral regurgitation  AORTIC VALVE: Normal tricuspid aortic valve without stenosis  TRICUSPID VALVE: Trace tricuspid regurgitation  PULMONIC VALVE: No pulmonic regurgitation  PERICARDIUM: No pericardial effusion  There was no pericardial effusion  The pericardium was normal in appearance  AORTA: The root exhibited normal size  PULMONARY ARTERY: Normal pulmonary artery pressures      SYSTEM MEASUREMENT TABLES    2D  %FS: 35 91 %  Ao Diam: 3 11 cm  EDV(Teich): 125 09 ml  EF(Teich): 65 15 %  ESV(Teich): 43 6 ml  IVSd: 0 87 cm  LA Area: 11 16 cm2  LA Diam: 3 7 cm  LVEDV MOD A4C: 78 93 ml  LVEF MOD A4C: 68 69 %  LVESV MOD A4C: 24 71 ml  LVIDd: 5 12 cm  LVIDs: 3 28 cm  LVLd A4C: 7 15 cm  LVLs A4C: 5 52 cm  LVPWd: 0 9 cm  RA Area: 10 65 cm2  SV MOD A4C: 54 22 ml  SV(Teich): 81 5 ml    CW  TR Vmax: 1 92 m/s  TR maxP 8 mmHg    MM  TAPSE: 2 4 cm    PW  E': 0 14 m/s  E/E': 6 72  MV A Hugo: 1 05 m/s  MV Dec [place]: 4 01 m/s2  MV DecT: 231 54 ms  MV E Hugo: 0 93 m/s  MV E/A Ratio: 0 88  MV PHT: 67 15 ms  MVA By PHT: 3 28 cm2    [facility]    Prepared and electronically signed by    Makeda Wheatley MD  Signed 13-Dec-2018 16:36:37    No results found for this or any previous visit     --------------------------------------------------------------------------------  HOLTER  No results found for this or any previous visit  No results found for this or any previous visit     --------------------------------------------------------------------------------  CAROTIDS  No results found for this or any previous visit      --------------------------------------------------------------------------------  Diagnoses and all orders for this visit:    Bilateral lower extremity edema    Chest pain  -     Ambulatory Referral to Cardiology    Mitral valve annular calcification    Generalized anxiety disorder    Coronary artery disease due to lipid rich plaque  -     atorvastatin (LIPITOR) 40 mg tablet;  Take 1 tablet (40 mg total) by mouth daily     ======================================================    Past Medical History:   Diagnosis Date    Anxiety     Chest pain 2016    Chest wall pain 07/15/2016    Costochondritis 2014    Dog bite 2014    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([facility] [address] )     Fluid retention     Gastroenteritis 2013    Herpes zoster 2013    History of chemotherapy 1986    Migraine 2016    Shortness of breath 2016    Toxoplasmosis 2014     Past Surgical History:   Procedure Laterality Date    CHOLECYSTECTOMY      COLONOSCOPY      ESOPHAGOGASTRODUODENOSCOPY      FL RETROGRADE PYELOGRAM  2022    HYSTERECTOMY  1986    KNEE ARTHROSCOPY      left shoulder    NECK SURGERY      Lumpectomy    OOPHORECTOMY Right 1986    NH CYSTO/URETERO W/LITHOTRIPSY &INDWELL STENT INSRT Left 7/28/2022    Procedure: CYSTOSCOPY URETEROSCOPY WITH LITHOTRIPSY HOLMIUM LASER, RETROGRADE PYELOGRAM AND INSERTION STENT LEFT URETERAL;  Surgeon: Danay Dominguez MD;  Location: [facility];  Service: Urology    NH ESOPHAGOGASTRODUODENOSCOPY TRANSORAL DIAGNOSTIC N/A 11/30/2018    Procedure: EGD;  Surgeon: Hugo Gimenez MD;  Location: [facility];   Service: Gastroenterology         Medications  Current Outpatient Medications   Medication Sig Dispense Refill    albuterol (VENTOLIN HFA) 90 mcg/act inhaler Inhale 2 puffs every 6 (six) hours as needed for wheezing or shortness of breath 1 Inhaler 0    amLODIPine (NORVASC) 5 mg tablet Take 1 tablet (5 mg total) by mouth daily 90 tablet 3    aspirin 81 MG tablet Take 81 mg by mouth daily      atorvastatin (LIPITOR) 40 mg tablet Take 1 tablet (40 mg total) by mouth daily 90 tablet 3    Cholecalciferol (VITAMIN D) 2000 units CAPS Take 1 capsule by mouth daily      citalopram (CeleXA) 40 mg tablet Take 1 tablet (40 mg total) by mouth daily 30 tablet 2    furosemide (LASIX) 20 mg tablet take 1 tablet by mouth once daily 30 tablet 5    Glycerin-Polysorbate 80 (REFRESH DRY EYE THERAPY OP) Apply to eye      isosorbide mononitrate (IMDUR) 30 mg 24 hr tablet take 1 tablet by mouth once daily 90 tablet 2    loratadine (CLARITIN) 10 mg tablet Take 1 tablet (10 mg total) by mouth daily 30 tablet 1    metoprolol tartrate (LOPRESSOR) 25 mg tablet Take 1 tablet (25 mg total) by mouth every 12 (twelve) hours 180 tablet 3    multivitamin (THERAGRAN) TABS Take 1 tablet by mouth daily      rizatriptan (MAXALT) 10 MG tablet Take 1 tablet (10 mg total) by mouth once as needed for migraine for up to 1 dose May repeat in 2 hours if needed 9 tablet 2    topiramate (TOPAMAX) 100 mg tablet take 1 tablet by mouth once daily 30 tablet 5    butalbital-acetaminophen-caffeine (FIORICET,ESGIC) -40 mg per tablet Take 1 tablet by mouth every 6 (six) hours   (Patient not taking: No sig reported)      cromolyn (OPTICROM) 4 % ophthalmic solution Administer 1 drop to both eyes 4 (four) times a day (Patient not taking: No sig reported) 10 mL 1    levofloxacin (LEVAQUIN) 500 mg tablet Take 1 tablet (500 mg total) by mouth every 24 hours for 5 days (Patient not taking: Reported on 8/3/2022) 5 tablet 0    lidocaine (LIDODERM) 5 % Apply 1 patch topically daily Apply in am and  remove in pm  12 hour on, 12 hour off (Patient not taking: No sig reported) 30 patch 0    metoprolol tartrate (LOPRESSOR) 50 mg tablet Take 1 tablet (50 mg total) by mouth every 12 (twelve) hours Start the day before CT scan, then take morning of and 1 hour before CT (Patient not taking: No sig reported) 4 tablet 0    omeprazole (PriLOSEC) 20 mg delayed release capsule take 1 capsule by mouth once daily 30 capsule 5     No current facility-administered medications for this visit          Allergies   Allergen Reactions    Azithromycin GI Intolerance     Yeast infection    Imitrex [Sumatriptan] GI Intolerance    Cephalexin     Wound Dressings Rash     Adhesive tape       Social History     Socioeconomic History    Marital status: /Civil Union     Spouse name: Not on file    Number of children: Not on file    Years of education: Not on file    Highest education level: Not on file   Occupational History    Not on file   Tobacco Use    Smoking status: Never Smoker    Smokeless tobacco: Never Used   Substance and Sexual Activity    Alcohol use: Yes     Comment: Occasionally    Drug use: No    Sexual activity: Not on file   Other Topics Concern    Not on file   Social History Narrative    Not on file     Social Determinants of Health     Financial Resource Strain: Not on file   Food Insecurity: Not on file   Transportation Needs: Not on file   Physical Activity: Not on file   Stress: Not on file   Social Connections: Not on file   Intimate Partner Violence: Not on file   Housing Stability: Not on file        Family History   Problem Relation Age of Onset    Diabetes Mother     Heart disease Mother     Heart disease Father     No Known Problems Sister     No Known Problems Sister     No Known Problems Sister     No Known Problems Daughter     No Known Problems Daughter     No Known Problems Maternal Grandmother     No Known Problems Maternal Grandfather     No Known Problems Paternal Grandmother     No Known Problems Paternal Grandfather     Breast cancer Maternal Aunt         age unknown    No Known Problems Maternal Aunt     Coronary artery disease Family        Lab Results   Component Value Date    WBC 8 38 07/28/2022    HGB 11 3 (L) 07/28/2022    HCT 35 2 07/28/2022    MCV 92 07/28/2022     07/28/2022      Lab Results   Component Value Date    SODIUM 141 07/28/2022    K 3 9 07/28/2022     07/28/2022    CO2 27 07/28/2022    BUN 15 07/28/2022    CREATININE 1 29 07/28/2022    GLUC 104 07/28/2022    CALCIUM 9 0 07/28/2022      Lab Results   Component Value Date    HGBA1C 5 1 06/05/2017      No results found for: CHOL  Lab Results   Component Value Date    HDL 76 04/25/2022    HDL 71 10/15/2021    HDL 74 04/15/2021     Lab Results   Component Value Date    LDLCALC 118 (H) 04/25/2022    LDLCALC 118 (H) 10/15/2021    LDLCALC 118 (H) 04/15/2021     Lab Results   Component Value Date    TRIG 61 04/25/2022    TRIG 59 10/15/2021    TRIG 64 04/15/2021     No results found for: [place], [place]   Lab Results   Component Value Date    INR 0 99 06/09/2015    PROTIME 12 9 06/09/2015          Patient Active Problem List    Diagnosis Date Noted    Ureterolithiasis 07/28/2022    Subclinical hypothyroidism 04/19/2022    Hearing difficulty of left ear 04/19/2022    Mass of skin of head 04/19/2022    Post-menopausal 04/19/2022    Mitral valve annular calcification 04/19/2022    Generalized anxiety disorder 02/20/2018    Gastroesophageal reflux disease 08/07/2017    Bilateral occipital neuralgia 05/15/2017    Restless leg 09/23/2016    Chest pain 08/12/2016    Bilateral lower extremity edema 05/31/2016    Fibromyalgia 08/12/2015    Insomnia 04/01/2015    Vitamin D deficiency 05/31/2013    Migraine 01/22/2009       Portions of the record may have been created with voice recognition software  Occasional wrong word or "sound a like" substitutions may have occurred due to the inherent limitations of voice recognition software  Read the chart carefully and recognize, using context, where substitutions have occurred      Ulisses Paez DO, [facility]  8/3/2022 5:30 PM

## 2022-08-19 NOTE — TELEPHONE ENCOUNTER
Patient had surgery with Dr Hanna Mccrary on 7/28/22  Patient calling for recommendations  She has been having pink blood in her urine for the last 2 weeks  She stated she will notice more when she does a normal daily task at home and the blood will become darker  She also is having really bad cramps when she urinates       Patient requesting a call back at [phone]

## 2022-08-19 NOTE — TELEPHONE ENCOUNTER
Sounds OK continue usual supportive measures for stent colic  Hematuria OK   Proceed with stent removal next week

## 2022-08-19 NOTE — TELEPHONE ENCOUNTER
Called and left detailed voicemail message for patient with Devon Joel PA-C's recommendations and upcoming appointment date and time  Advised for patient to call the office with any questions

## 2022-08-24 ENCOUNTER — PROCEDURE VISIT (OUTPATIENT)
Dept: UROLOGY | Facility: CLINIC | Age: 57
End: 2022-08-24
Payer: COMMERCIAL

## 2022-08-24 VITALS
HEART RATE: 82 BPM | BODY MASS INDEX: 46.77 KG/M2 | DIASTOLIC BLOOD PRESSURE: 82 MMHG | WEIGHT: 232 LBS | SYSTOLIC BLOOD PRESSURE: 120 MMHG | HEIGHT: 59 IN

## 2022-08-24 DIAGNOSIS — N20.1 URETEROLITHIASIS: Primary | ICD-10-CM

## 2022-08-24 PROCEDURE — 52310 CYSTOSCOPY AND TREATMENT: CPT | Performed by: UROLOGY

## 2022-08-24 RX ORDER — LEVOFLOXACIN 250 MG/1
250 TABLET ORAL DAILY
Qty: 3 TABLET | Refills: 0 | Status: SHIPPED | OUTPATIENT
Start: 2022-08-24 | End: 2022-08-27

## 2022-08-24 NOTE — PROGRESS NOTES
Cystoscopy     Date/Time 8/24/2022 10:17 AM     Performed by  Claudene Bradford, MD     Authorized by Claudene Bradford, MD      Universal Protocol:  Timeout called at: 8/24/2022 10:17 AM       Procedure Details:  Procedure type: simple removal of a foreign body, stone, or stent    Patient tolerance: Patient tolerated the procedure well with no immediate complications    Additional Procedure Details:   Cystoscopy with stent removal procedure note: The patient returns to the office today to undergo cystoscopy with stent removal  Risks and benefits of the procedure were discussed and informed consent was obtained  The patient was placed in the modified supine position  Genitalia was prepped with Betadine and draped in a sterile fashion  Viscous 2% lidocaine was used for local anesthesia  The flexible cystoscope was passed  The bladder was inspected  The stent was identified coming from the left ureteral orifice  The stent was grasped and easily removed fully intact without difficulty  Overall the patient tolerated the procedure  PLAN:   Discharge home with Levaquin 250 mg for 3 days  Reviewed symptoms that can be expected after stent removal and conservative treatment with Tylenol Motrin, fluid hydration  Discussed dietary recommendations to reduce future stone formation  Patient will follow-up in 3 months with x-ray and ultrasound    Can follow-up with advanced practitioner or the patient's operative surgeon, Dr Concepcion Kothari

## 2022-10-04 DIAGNOSIS — F41.1 GENERALIZED ANXIETY DISORDER: ICD-10-CM

## 2022-10-06 ENCOUNTER — PREP FOR PROCEDURE (OUTPATIENT)
Dept: GASTROENTEROLOGY | Facility: AMBULARY SURGERY CENTER | Age: 57
End: 2022-10-06

## 2022-10-06 ENCOUNTER — TELEPHONE (OUTPATIENT)
Dept: GASTROENTEROLOGY | Facility: AMBULARY SURGERY CENTER | Age: 57
End: 2022-10-06

## 2022-10-06 DIAGNOSIS — Z12.11 SPECIAL SCREENING FOR MALIGNANT NEOPLASMS, COLON: Primary | ICD-10-CM

## 2022-10-06 RX ORDER — CITALOPRAM 40 MG/1
40 TABLET ORAL DAILY
Qty: 30 TABLET | Refills: 2 | Status: SHIPPED | OUTPATIENT
Start: 2022-10-06

## 2022-10-06 NOTE — TELEPHONE ENCOUNTER
Patient is scheduled for colonoscopy on December 12 , 2022 at [address] [place] [address] with [address]  Chapo Pierre MD  Patient is aware of pre-procedure prep of Miralax/Dulcolax and they will be called the day prior between 2 and 6 pm for time to report for procedure  Pre-procedure prep has been given to the patient  via [facility] mail and e-mail on October 6, 2022

## 2022-10-11 DIAGNOSIS — G43.909 MIGRAINE WITHOUT STATUS MIGRAINOSUS, NOT INTRACTABLE, UNSPECIFIED MIGRAINE TYPE: ICD-10-CM

## 2022-10-11 RX ORDER — TOPIRAMATE 100 MG/1
TABLET, FILM COATED ORAL
Qty: 30 TABLET | Refills: 5 | Status: SHIPPED | OUTPATIENT
Start: 2022-10-11

## 2022-10-13 ENCOUNTER — OFFICE VISIT (OUTPATIENT)
Dept: FAMILY MEDICINE CLINIC | Facility: CLINIC | Age: 57
End: 2022-10-13
Payer: COMMERCIAL

## 2022-10-13 VITALS
HEIGHT: 59 IN | OXYGEN SATURATION: 100 % | TEMPERATURE: 98.5 F | DIASTOLIC BLOOD PRESSURE: 82 MMHG | BODY MASS INDEX: 46.93 KG/M2 | WEIGHT: 232.8 LBS | SYSTOLIC BLOOD PRESSURE: 124 MMHG | HEART RATE: 66 BPM

## 2022-10-13 DIAGNOSIS — Z91.09 ENVIRONMENTAL ALLERGIES: ICD-10-CM

## 2022-10-13 DIAGNOSIS — I25.10 CORONARY ARTERY DISEASE INVOLVING NATIVE HEART WITHOUT ANGINA PECTORIS, UNSPECIFIED VESSEL OR LESION TYPE: ICD-10-CM

## 2022-10-13 DIAGNOSIS — M79.7 FIBROMYALGIA: ICD-10-CM

## 2022-10-13 DIAGNOSIS — R22.0 MASS OF SKIN OF HEAD: ICD-10-CM

## 2022-10-13 DIAGNOSIS — F41.1 GENERALIZED ANXIETY DISORDER: ICD-10-CM

## 2022-10-13 DIAGNOSIS — K21.9 GASTROESOPHAGEAL REFLUX DISEASE, UNSPECIFIED WHETHER ESOPHAGITIS PRESENT: ICD-10-CM

## 2022-10-13 DIAGNOSIS — R53.83 OTHER FATIGUE: ICD-10-CM

## 2022-10-13 DIAGNOSIS — R94.4 DECREASED GFR: ICD-10-CM

## 2022-10-13 DIAGNOSIS — J01.90 ACUTE SINUSITIS, RECURRENCE NOT SPECIFIED, UNSPECIFIED LOCATION: ICD-10-CM

## 2022-10-13 DIAGNOSIS — E03.8 SUBCLINICAL HYPOTHYROIDISM: ICD-10-CM

## 2022-10-13 DIAGNOSIS — E55.9 VITAMIN D DEFICIENCY: ICD-10-CM

## 2022-10-13 DIAGNOSIS — I20.8 STABLE ANGINA PECTORIS (HCC): ICD-10-CM

## 2022-10-13 DIAGNOSIS — G43.909 MIGRAINE WITHOUT STATUS MIGRAINOSUS, NOT INTRACTABLE, UNSPECIFIED MIGRAINE TYPE: Primary | ICD-10-CM

## 2022-10-13 PROCEDURE — 99214 OFFICE O/P EST MOD 30 MIN: CPT | Performed by: NURSE PRACTITIONER

## 2022-10-13 RX ORDER — MONTELUKAST SODIUM 10 MG/1
10 TABLET ORAL
Qty: 30 TABLET | Refills: 2 | Status: SHIPPED | OUTPATIENT
Start: 2022-10-13

## 2022-10-13 RX ORDER — RIZATRIPTAN BENZOATE 10 MG/1
10 TABLET ORAL ONCE AS NEEDED
Qty: 9 TABLET | Refills: 2 | Status: SHIPPED | OUTPATIENT
Start: 2022-10-13

## 2022-10-13 RX ORDER — DOXYCYCLINE HYCLATE 100 MG/1
100 CAPSULE ORAL EVERY 12 HOURS SCHEDULED
Qty: 14 CAPSULE | Refills: 0 | Status: SHIPPED | OUTPATIENT
Start: 2022-10-13 | End: 2022-10-20

## 2022-10-14 PROBLEM — I20.89 STABLE ANGINA PECTORIS: Status: ACTIVE | Noted: 2022-10-14

## 2022-10-14 PROBLEM — R94.4 DECREASED GFR: Status: ACTIVE | Noted: 2022-10-14

## 2022-10-14 PROBLEM — Z91.09 ENVIRONMENTAL ALLERGIES: Status: ACTIVE | Noted: 2022-10-14

## 2022-10-14 PROBLEM — I25.10 CORONARY ARTERY DISEASE INVOLVING NATIVE HEART WITHOUT ANGINA PECTORIS: Status: ACTIVE | Noted: 2022-10-14

## 2022-10-14 PROBLEM — I20.8 STABLE ANGINA PECTORIS (HCC): Status: ACTIVE | Noted: 2022-10-14

## 2022-10-14 PROBLEM — J01.90 ACUTE SINUSITIS: Status: ACTIVE | Noted: 2022-10-14

## 2022-10-14 NOTE — ASSESSMENT & PLAN NOTE
GFR April: 58  Down to 55 with recent kidney stone over the summer  Note F/U urology in December  Repeat lab orders placed today  Consider Nephrology referral if remains low   Avoid nephrotoxic agents

## 2022-10-14 NOTE — ASSESSMENT & PLAN NOTE
Fairly well controlled: Topamax daily - Rizatriptan prn  No change to characteristics when she does have a headache  No change to treatment   F/U 6 months - sooner with changes or concerns

## 2022-10-14 NOTE — ASSESSMENT & PLAN NOTE
Persistent, intermittent pain - states she has tried to schedule for excision, but has been unsuccessful in scheduling   Referrals in - will have staff assist in scheduling  for her

## 2022-10-14 NOTE — ASSESSMENT & PLAN NOTE
Stable  Omeprazole 20 daily  Monitoring for and avoiding triggers  Over due for endoscopy - scheduled for December  Encouraged to keep appointment

## 2022-10-14 NOTE — PROGRESS NOTES
[facility]    ASSESSMENT AND PLAN     1  Migraine without status migrainosus, not intractable, unspecified migraine type  Assessment & Plan:  Fairly well controlled: Topamax daily - Rizatriptan prn  No change to characteristics when she does have a headache  No change to treatment  F/U 6 months - sooner with changes or concerns    Orders:  -     rizatriptan (MAXALT) 10 mg tablet; Take 1 tablet (10 mg total) by mouth once as needed for migraine for up to 1 dose May repeat in 2 hours if needed    2  Gastroesophageal reflux disease, unspecified whether esophagitis present  Assessment & Plan:  Stable  Omeprazole 20 daily  Monitoring for and avoiding triggers  Over due for endoscopy - scheduled for December  Encouraged to keep appointment  3  Mass of skin of head  Assessment & Plan:  Persistent, intermittent pain - states she has tried to schedule for excision, but has been unsuccessful in scheduling  Referrals in - will have staff assist in scheduling  for her      4  Generalized anxiety disorder  Assessment & Plan:  Still with some occasional flares, but for the most part stable  No depression  Flares appears to be situational and stem around  and his chronic illness  Discussed importance of self care  5  Fibromyalgia    6  Subclinical hypothyroidism  Assessment & Plan:  TSH 4 3 April - repeat labs ordered    Orders:  -     TSH, 3rd generation with Free T4 reflex; Future    7  Vitamin D deficiency  -     Vitamin D 25 hydroxy; Future    8  Coronary artery disease involving native heart without angina pectoris, unspecified vessel or lesion type  Assessment & Plan:  Managed by cardiology  BB, statin, ASA  Last lipid 4/2022: 206/61/76/118      9  Decreased GFR  Assessment & Plan:  GFR April: 58  Down to 55 with recent kidney stone over the summer  Note F/U urology in December  Repeat lab orders placed today  Consider Nephrology referral if remains low   Avoid nephrotoxic agents    Orders:  - Comprehensive metabolic panel; Future    10  Other fatigue  -     Iron Panel (Includes Ferritin, Iron Sat%, Iron, and TIBC); Future    11  Environmental allergies  Assessment & Plan:  Persistent symptoms  OTC medications ineffective  Will trail Singulair  Dose/use /potential side effects reviewed  Recheck effectiveness at next follow up  Advised to return sooner with problems/concerns    Orders:  -     montelukast (SINGULAIR) 10 mg tablet; Take 1 tablet (10 mg total) by mouth daily at bedtime    12  Acute sinusitis, recurrence not specified, unspecified location  Assessment & Plan:  Acute sinusitis; abx as ordered; advised on supportive care; f/u guidance given should sx not improve      Orders:  -     doxycycline hyclate (VIBRAMYCIN) 100 mg capsule; Take 1 capsule (100 mg total) by mouth every 12 (twelve) hours for 7 days    13  Stable angina pectoris ([address] )  Assessment & Plan:  Stable  Managed by cardiology  Imdur           SUBJECTIVE       Patient ID: Betty Carlton is a 62 y o  female  Chief Complaint   Patient presents with   • 6 month follow     Cough/sinus-started Friday  Tired- worsening       HISTORY OF PRESENT ILLNESS    Routien check up        The following portions of the patient's history were reviewed and updated as appropriate: allergies, current medications, past family history, past medical history, past social history, past surgical history, and problem list     REVIEW OF SYSTEMS  Review of Systems   Constitutional: Positive for fatigue  Negative for activity change, appetite change and unexpected weight change  Respiratory: Negative  Cardiovascular: Negative  Gastrointestinal: Negative  Genitourinary: Negative  Neurological: Negative  Psychiatric/Behavioral: Negative          OBJECTIVE      VITAL SIGNS  /82 (BP Location: Left arm, Patient Position: Sitting, Cuff Size: Large)   Pulse 66   Temp 98 5 °F (36 9 °C) (Temporal)   Ht 4' 11" (1 499 m)   Wt 106 kg (232 lb 12 8 oz)   LMP 03/18/1997 (Exact Date)   SpO2 100%   BMI 47 02 kg/m²     CURRENT MEDICATIONS    Current Outpatient Medications:   •  albuterol (VENTOLIN HFA) 90 mcg/act inhaler, Inhale 2 puffs every 6 (six) hours as needed for wheezing or shortness of breath, Disp: 1 Inhaler, Rfl: 0  •  amLODIPine (NORVASC) 5 mg tablet, Take 1 tablet (5 mg total) by mouth daily, Disp: 90 tablet, Rfl: 3  •  aspirin 81 MG tablet, Take 81 mg by mouth daily, Disp: , Rfl:   •  atorvastatin (LIPITOR) 40 mg tablet, Take 1 tablet (40 mg total) by mouth daily, Disp: 90 tablet, Rfl: 3  •  Cholecalciferol (VITAMIN D) 2000 units CAPS, Take 1 capsule by mouth daily, Disp: , Rfl:   •  citalopram (CeleXA) 40 mg tablet, Take 1 tablet (40 mg total) by mouth daily, Disp: 30 tablet, Rfl: 2  •  doxycycline hyclate (VIBRAMYCIN) 100 mg capsule, Take 1 capsule (100 mg total) by mouth every 12 (twelve) hours for 7 days, Disp: 14 capsule, Rfl: 0  •  furosemide (LASIX) 20 mg tablet, take 1 tablet by mouth once daily, Disp: 30 tablet, Rfl: 5  •  Glycerin-Polysorbate 80 (REFRESH DRY EYE THERAPY OP), Apply to eye, Disp: , Rfl:   •  isosorbide mononitrate (IMDUR) 30 mg 24 hr tablet, take 1 tablet by mouth once daily, Disp: 90 tablet, Rfl: 2  •  metoprolol tartrate (LOPRESSOR) 25 mg tablet, Take 1 tablet (25 mg total) by mouth every 12 (twelve) hours, Disp: 180 tablet, Rfl: 3  •  montelukast (SINGULAIR) 10 mg tablet, Take 1 tablet (10 mg total) by mouth daily at bedtime, Disp: 30 tablet, Rfl: 2  •  multivitamin (THERAGRAN) TABS, Take 1 tablet by mouth daily, Disp: , Rfl:   •  omeprazole (PriLOSEC) 20 mg delayed release capsule, take 1 capsule by mouth once daily, Disp: 30 capsule, Rfl: 5  •  rizatriptan (MAXALT) 10 mg tablet, Take 1 tablet (10 mg total) by mouth once as needed for migraine for up to 1 dose May repeat in 2 hours if needed, Disp: 9 tablet, Rfl: 2  •  topiramate (TOPAMAX) 100 mg tablet, take 1 tablet by mouth once daily, Disp: 30 tablet, Rfl: 5  •  butalbital-acetaminophen-caffeine (FIORICET,ESGIC) -40 mg per tablet, Take 1 tablet by mouth every 6 (six) hours   (Patient not taking: No sig reported), Disp: , Rfl:   •  cromolyn (OPTICROM) 4 % ophthalmic solution, Administer 1 drop to both eyes 4 (four) times a day (Patient not taking: No sig reported), Disp: 10 mL, Rfl: 1  •  lidocaine (LIDODERM) 5 %, Apply 1 patch topically daily Apply in am and  remove in pm  12 hour on, 12 hour off (Patient not taking: No sig reported), Disp: 30 patch, Rfl: 0  •  metoprolol tartrate (LOPRESSOR) 50 mg tablet, Take 1 tablet (50 mg total) by mouth every 12 (twelve) hours Start the day before CT scan, then take morning of and 1 hour before CT (Patient not taking: No sig reported), Disp: 4 tablet, Rfl: 0      PHYSICAL EXAMINATION   Physical Exam  Vitals and nursing note reviewed  Constitutional:       General: She is not in acute distress  Appearance: Normal appearance  She is well-developed and well-groomed  She is not ill-appearing  HENT:      Head: Normocephalic and atraumatic  Right Ear: Tympanic membrane, ear canal and external ear normal       Left Ear: Tympanic membrane, ear canal and external ear normal       Nose: Rhinorrhea present  Rhinorrhea is purulent  Mouth/Throat:      Mouth: Mucous membranes are moist    Eyes:      Pupils: Pupils are equal, round, and reactive to light  Neck:      Vascular: No carotid bruit  Cardiovascular:      Rate and Rhythm: Normal rate and regular rhythm  Comments: Mild - non pitting b/l  Pulmonary:      Effort: Pulmonary effort is normal  No respiratory distress  Breath sounds: Normal breath sounds and air entry  Lymphadenopathy:      Cervical: No cervical adenopathy  Skin:     General: Skin is warm and dry  Neurological:      General: No focal deficit present  Mental Status: She is alert and oriented to person, place, and time     Psychiatric:         Attention and Perception: Attention normal          Mood and Affect: Mood normal          Speech: Speech normal          Behavior: Behavior normal          Thought Content:  Thought content normal          Cognition and Memory: Cognition normal          Judgment: Judgment normal

## 2022-10-14 NOTE — ASSESSMENT & PLAN NOTE
Acute sinusitis; abx as ordered; advised on supportive care; f/u guidance given should sx not improve

## 2022-10-14 NOTE — ASSESSMENT & PLAN NOTE
Persistent symptoms  OTC medications ineffective  Will trail Singulair  Dose/use /potential side effects reviewed  Recheck effectiveness at next follow up   Advised to return sooner with problems/concerns

## 2022-10-14 NOTE — ASSESSMENT & PLAN NOTE
Still with some occasional flares, but for the most part stable  No depression  Flares appears to be situational and stem around  and his chronic illness  Discussed importance of self care

## 2022-10-21 ENCOUNTER — APPOINTMENT (OUTPATIENT)
Dept: LAB | Facility: CLINIC | Age: 57
End: 2022-10-21
Payer: COMMERCIAL

## 2022-10-21 DIAGNOSIS — R53.83 OTHER FATIGUE: ICD-10-CM

## 2022-10-21 DIAGNOSIS — E03.8 SUBCLINICAL HYPOTHYROIDISM: ICD-10-CM

## 2022-10-21 DIAGNOSIS — E55.9 VITAMIN D DEFICIENCY: ICD-10-CM

## 2022-10-21 DIAGNOSIS — R94.4 DECREASED GFR: ICD-10-CM

## 2022-10-21 LAB
25(OH)D3 SERPL-MCNC: 48.9 NG/ML (ref 30–100)
ALBUMIN SERPL BCP-MCNC: 3.5 G/DL (ref 3.5–5)
ALP SERPL-CCNC: 108 U/L (ref 46–116)
ALT SERPL W P-5'-P-CCNC: 25 U/L (ref 12–78)
ANION GAP SERPL CALCULATED.3IONS-SCNC: 6 MMOL/L (ref 4–13)
AST SERPL W P-5'-P-CCNC: 16 U/L (ref 5–45)
BILIRUB SERPL-MCNC: 0.23 MG/DL (ref 0.2–1)
BUN SERPL-MCNC: 18 MG/DL (ref 5–25)
CALCIUM SERPL-MCNC: 9.3 MG/DL (ref 8.3–10.1)
CHLORIDE SERPL-SCNC: 108 MMOL/L (ref 96–108)
CO2 SERPL-SCNC: 26 MMOL/L (ref 21–32)
CREAT SERPL-MCNC: 0.98 MG/DL (ref 0.6–1.3)
FERRITIN SERPL-MCNC: 41 NG/ML (ref 8–388)
GFR SERPL CREATININE-BSD FRML MDRD: 64 ML/MIN/1.73SQ M
GLUCOSE P FAST SERPL-MCNC: 89 MG/DL (ref 65–99)
IRON SATN MFR SERPL: 13 % (ref 15–50)
IRON SERPL-MCNC: 39 UG/DL (ref 50–170)
POTASSIUM SERPL-SCNC: 3.6 MMOL/L (ref 3.5–5.3)
PROT SERPL-MCNC: 7.5 G/DL (ref 6.4–8.4)
SODIUM SERPL-SCNC: 140 MMOL/L (ref 135–147)
TIBC SERPL-MCNC: 291 UG/DL (ref 250–450)
TSH SERPL DL<=0.05 MIU/L-ACNC: 2.65 UIU/ML (ref 0.45–4.5)

## 2022-10-21 PROCEDURE — 84443 ASSAY THYROID STIM HORMONE: CPT

## 2022-10-21 PROCEDURE — 83550 IRON BINDING TEST: CPT

## 2022-10-21 PROCEDURE — 82728 ASSAY OF FERRITIN: CPT

## 2022-10-21 PROCEDURE — 80053 COMPREHEN METABOLIC PANEL: CPT

## 2022-10-21 PROCEDURE — 82306 VITAMIN D 25 HYDROXY: CPT

## 2022-10-21 PROCEDURE — 36415 COLL VENOUS BLD VENIPUNCTURE: CPT

## 2022-10-21 PROCEDURE — 83540 ASSAY OF IRON: CPT

## 2022-11-28 ENCOUNTER — TELEPHONE (OUTPATIENT)
Dept: UROLOGY | Facility: CLINIC | Age: 57
End: 2022-11-28

## 2022-12-13 PROBLEM — J01.90 ACUTE SINUSITIS: Status: RESOLVED | Noted: 2022-10-14 | Resolved: 2022-12-13

## 2022-12-16 ENCOUNTER — VBI (OUTPATIENT)
Dept: ADMINISTRATIVE | Facility: OTHER | Age: 57
End: 2022-12-16

## 2023-02-20 ENCOUNTER — OFFICE VISIT (OUTPATIENT)
Dept: CARDIOLOGY CLINIC | Facility: CLINIC | Age: 58
End: 2023-02-20

## 2023-02-20 VITALS
HEART RATE: 64 BPM | BODY MASS INDEX: 45.53 KG/M2 | DIASTOLIC BLOOD PRESSURE: 80 MMHG | WEIGHT: 225.4 LBS | SYSTOLIC BLOOD PRESSURE: 118 MMHG

## 2023-02-20 DIAGNOSIS — G47.00 INSOMNIA, UNSPECIFIED TYPE: ICD-10-CM

## 2023-02-20 DIAGNOSIS — R06.02 SOB (SHORTNESS OF BREATH) ON EXERTION: ICD-10-CM

## 2023-02-20 DIAGNOSIS — I25.83 CORONARY ARTERY DISEASE DUE TO LIPID RICH PLAQUE: ICD-10-CM

## 2023-02-20 DIAGNOSIS — E61.1 IRON DEFICIENCY: ICD-10-CM

## 2023-02-20 DIAGNOSIS — I25.10 CORONARY ARTERY DISEASE DUE TO LIPID RICH PLAQUE: ICD-10-CM

## 2023-02-20 DIAGNOSIS — R53.82 CHRONIC FATIGUE: ICD-10-CM

## 2023-02-20 DIAGNOSIS — I20.8 STABLE ANGINA PECTORIS (HCC): Primary | ICD-10-CM

## 2023-02-20 DIAGNOSIS — I25.10 CORONARY ARTERY DISEASE INVOLVING NATIVE HEART WITHOUT ANGINA PECTORIS, UNSPECIFIED VESSEL OR LESION TYPE: ICD-10-CM

## 2023-02-20 DIAGNOSIS — M79.7 FIBROMYALGIA: ICD-10-CM

## 2023-02-20 RX ORDER — DOCUSATE SODIUM 100 MG/1
100 CAPSULE, LIQUID FILLED ORAL 2 TIMES DAILY
Qty: 180 CAPSULE | Refills: 3 | Status: SHIPPED | OUTPATIENT
Start: 2023-02-20 | End: 2023-02-20

## 2023-02-20 RX ORDER — FERROUS SULFATE TAB EC 324 MG (65 MG FE EQUIVALENT) 324 (65 FE) MG
324 TABLET DELAYED RESPONSE ORAL
Qty: 180 TABLET | Refills: 3 | Status: SHIPPED | OUTPATIENT
Start: 2023-02-20

## 2023-02-20 RX ORDER — ATORVASTATIN CALCIUM 80 MG/1
80 TABLET, FILM COATED ORAL DAILY
Qty: 90 TABLET | Refills: 3 | Status: SHIPPED | OUTPATIENT
Start: 2023-02-20

## 2023-02-20 RX ORDER — ISOSORBIDE MONONITRATE 60 MG/1
60 TABLET, EXTENDED RELEASE ORAL DAILY
Qty: 90 TABLET | Refills: 0 | Status: SHIPPED | OUTPATIENT
Start: 2023-02-20 | End: 2023-02-20

## 2023-02-20 RX ORDER — ATORVASTATIN CALCIUM 80 MG/1
80 TABLET, FILM COATED ORAL DAILY
Qty: 90 TABLET | Refills: 3 | Status: SHIPPED | OUTPATIENT
Start: 2023-02-20 | End: 2023-02-20

## 2023-02-20 RX ORDER — DOCUSATE SODIUM 100 MG/1
100 CAPSULE, LIQUID FILLED ORAL 2 TIMES DAILY
Qty: 180 CAPSULE | Refills: 3 | Status: SHIPPED | OUTPATIENT
Start: 2023-02-20

## 2023-02-20 RX ORDER — ISOSORBIDE MONONITRATE 60 MG/1
60 TABLET, EXTENDED RELEASE ORAL DAILY
Qty: 90 TABLET | Refills: 0 | Status: SHIPPED | OUTPATIENT
Start: 2023-02-20

## 2023-02-20 RX ORDER — FERROUS SULFATE TAB EC 324 MG (65 MG FE EQUIVALENT) 324 (65 FE) MG
324 TABLET DELAYED RESPONSE ORAL
Qty: 180 TABLET | Refills: 3 | Status: SHIPPED | OUTPATIENT
Start: 2023-02-20 | End: 2023-02-20

## 2023-02-20 NOTE — H&P (VIEW-ONLY)
Cardiology   MD Bertha Wilson MD Lyla Banner, DO, Kriste Cranker, FACP Ather Mansoor, MD Glee Pan, DO, Jame Call DO, [facility]  -------------------------------------------------------------------  [facility] ORTHOPEDIC [facility]  [address], [place] [address]  [phone]  [phone]  02/20/23  Blaire Campos  YOB: 1965   MRN: [id number]      Referring Physician: Nusrat Madison, [address]  [facility] [address]  [facility],  [address]     HPI: Blaire Campos is a 62 y o  female with:   Gastroesophageal reflux disease   Bilateral lower extremity edema   Fibromyalgia   Anxiety  Obesity, BMI 46     I had seen her in January of 2020 for symptoms chest pain and dyspnea on exertion   The symptoms have been going on for several years but these are actually getting worse now  [facility] had a stress echo that was negative for ischemia however her overall exercise capacity was very poor, she only exercised for 3 minutes 30 seconds and had to stop due to chest pain and dyspnea on exertion   She also noted palpitations at the time     Because of this I ordered a coronary CT angiogram as I a m  concerned about underlying coronary artery disease and ischemia being present        This did show 50-69% stenosis in the left anterior descending artery territory   Recommendation is for functional assessment        Nuclear stress showed image artifact in the apex without evidence of reversible perfusion abnormality  Her chest pain was initially improved with medical therapy however has been getting worse now  She states that even with just minimal activity she will get substernal chest pressure and severe shortness of breath that she has to stop doing what she is doing  This occurs even with just playing around with her grandkids in her house    If she is to walk up steps or up a hill she has severe limiting dyspnea as well as pressure in her chest   She Left message for patient to call back to reschedule appointment with Dr. Archuleta   also notes poor sleep  Has never been evaluated for sleep apnea but does note that she snores a lot and has frequent awakenings throughout the night with feeling fatigued throughout the day  Her diet is poor  Her  is disabled and is at home the entire day when she is at work and does all of the cooking  He makes foods that are very high in fat and preservatives  Diet includes sausage, oliver, fried foods, fried fish, red meat and pork, essentially no chicken or baked fish, minimal vegetables    Review of Systems   Constitutional: Positive for fatigue  Negative for chills and fever  HENT: Negative for facial swelling and sore throat  Eyes: Negative for visual disturbance  Respiratory: Positive for shortness of breath  Negative for cough, chest tightness and wheezing  Cardiovascular: Positive for chest pain  Negative for palpitations and leg swelling  Gastrointestinal: Negative for abdominal pain, blood in stool, constipation, diarrhea, nausea and vomiting  Endocrine: Negative for cold intolerance and heat intolerance  Genitourinary: Negative for decreased urine volume, difficulty urinating, dysuria and hematuria  Musculoskeletal: Negative for arthralgias, back pain and myalgias  Skin: Negative for rash  Neurological: Negative for dizziness, syncope, weakness and numbness  Psychiatric/Behavioral: Positive for sleep disturbance  Negative for agitation, behavioral problems and confusion  The patient is not nervous/anxious  OBJECTIVE  Vitals:    02/20/23 1622   BP: 118/80   Pulse: 64       Physical Exam  Constitutional: awake, alert and oriented, in no acute distress, no obvious deformities, obese female  Head: Normocephalic, without obvious abnormality, atraumatic  Eyes: conjunctivae clear and moist  Sclera anicteric  No xanthelasmas  Pupils equal bilaterally  Extraocular motions are full    Ear nose mouth and throat: ears are symmetrical bilaterally, hearing appears to be equal bilaterally, no nasal discharge or epistaxis, oropharynx is clear with moist mucous membranes  Neck: Trachea is midline, neck is supple, no thyromegaly or significant lymphadenopathy, there is full range of motion  Lungs: clear to auscultation bilaterally, no wheezes, no rales, no rhonchi, no accessory muscle use, breathing is nonlabored  Heart: regular rate and rhythm, S1, S2 normal, no murmur, no click, no rub and no gallop, no lower extremity edema  Abdomen: Obese, soft, non-tender; bowel sounds normal; no masses, no organomegaly  Psychiatric: Patient is oriented to time, place, person, mood/affect is negative for depression, anxiety, agitation, appears to have appropriate insight  Skin: Skin is warm, dry, intact  No obvious rashes or lesions on exposed extremities  Nail beds are pink with no cyanosis or clubbing  EKG:  No results found for this visit on 02/20/23  IMPRESSION:  Substernal chest pressure and dyspnea on exertion concerning for true cardiac angina  CT angiogram did show moderate 50 to 69% proximal LAD disease    Dyslipidemia  Abnormal electrocardiogram   Dyspnea on exertion   Palpitations  Obesity  Deconditioning   Gastroesophageal reflux disease  Fibromyalgia   Anxiety    DISCUSSION/RECOMMENDATIONS:  Her symptoms are now getting worse despite medical therapy that initially had improved her  Unfortunately she is in a bad situation regarding her ability for lifestyle modification  Her  does all the cooking and cooks a very unhealthy diet for an individual with coronary disease (interestingly enough, which he has as well)  Spoke about heart healthy diet extensively with her today and even recommended that she consider cooking completely separate meals from what her  is eating  With the progression of her symptoms, I am concerned that these are due to flow-limiting coronary disease despite the findings of the CT scan    She does have proximal LAD disease and I am concerned that this is the cause of her symptoms  We will uptitrate her medications today with increasing Lipitor to 80 mg and increasing Imdur to 60 mg, however will plan to investigate further with direct coronary visualization by cardiac catheterization given her severely limited functional capacity now at this point  She states even with just playing around on the floor within her house with her grandkids she is having severe limiting dyspnea and chest pressure with this type of exertion  She also complains of symptoms that seem to be consistent with obstructive sleep apnea, fatigue all day and poor sleep quality with loud snoring  We will plan for sleep study and sleep evaluation as well  Continue with aspirin 81  Continue with Lasix 20  Continue metoprolol 25 twice a day  Sent Rx for iron pills as well as stool softener as her labs do suggest iron deficiency  Plan for follow-up after cath    Rosa Maria Velázquez DO, [facility], [facility]  --------------------------------------------------------------------------------  TREADMILL STRESS  No results found for this or any previous visit      ----------------------------------------------------------------------------------------------  NUCLEAR STRESS TEST: No results found for this or any previous visit  Results for orders placed during the hospital encounter of 04/19/22    NM myocardial perfusion spect (rx stress and/or rest)    Interpretation Summary  •  Stress ECG: No ST deviation is noted  There were no arrhythmias during recovery    The ECG was negative for ischemia  The stress ECG is negative for ischemia  •  Perfusion: There is a left ventricular perfusion defect that is small in size with moderate reduction in uptake present in the apical location(s) that is fixed  The defect appears to be probable artifact caused by breast attenuation  •  Stress Function: Left ventricular function post-stress is normal  Post-stress ejection fraction is 73 %    • Stress Combined Conclusion: There is image artifact, without diagnostic evidence for perfusion abnormality       --------------------------------------------------------------------------------  CATH:  No results found for this or any previous visit     --------------------------------------------------------------------------------  ECHO:   Results for orders placed during the hospital encounter of 18    Echo complete with contrast if indicated    [facility]  [facility]  [address]    Transthoracic Echocardiogram  2D, M-mode, Doppler, and Color Doppler    Study date:  13-Dec-2018    Patient: Manuela Salgado  MR number: [id number]  Account number: [de-identified]  : 1965  Age: 48 years  Gender: Female  Status: Outpatient  Location: [address] [place] [address]  Height: 60 in  Weight: 218 5 lb  BP: 138/ 86 mmHg    Indications: Dyspnea    Diagnoses: R06 00 - Dyspnea, unspecified    Sonographer:  Zan Silverman RDCS  Primary Physician:  Milena Arita DO  Referring Physician:  Manuel Aviles DO  Group:  [facility]  Interpreting Physician:  Lisa Johnson MD    SUMMARY    SUMMARY:  1  Sinus rhythm  2  Good technical quality  3  Normal left ventricular systolic and diastolic function  4  Mild left ventricular cavity enlargement    LEFT VENTRICLE:  Normal left ventricular systolic function, EF 95%  Mild left ventricular cavity enlargement  Normal left ventricular wall thickness  Normal left ventricular wall motion without regional wall motion abnormalities  Normal left ventricular  diastolic function and filling pressures  LEFT ATRIUM:  Normal left atrial size  RIGHT ATRIUM:  Normal right atrial size  MITRAL VALVE:  Normal mitral valve with trace mitral regurgitation  AORTIC VALVE:  Normal tricuspid aortic valve without stenosis  TRICUSPID VALVE:  Trace tricuspid regurgitation      PULMONIC VALVE:  No pulmonic regurgitation  PULMONARY ARTERIES:  Normal pulmonary artery pressures  PERICARDIUM:  No pericardial effusion  HISTORY: PRIOR HISTORY: Patient has no history of cardiovascular disease  Risk factors: morbid obesity  PROCEDURE: The study was performed in the [facility]  This was a routine study  The transthoracic approach was used  The study included complete 2D imaging, M-mode, complete spectral Doppler, and color Doppler  The heart rate was  84 bpm, at the start of the study  This was a technically difficult study  LEFT VENTRICLE: Normal left ventricular systolic function, EF 49%  Mild left ventricular cavity enlargement  Normal left ventricular wall thickness  Normal left ventricular wall motion without regional wall motion abnormalities  Normal  left ventricular diastolic function and filling pressures  RIGHT VENTRICLE: The size was normal  Systolic function was normal  Wall thickness was normal     LEFT ATRIUM: Normal left atrial size  RIGHT ATRIUM: Normal right atrial size  MITRAL VALVE: Normal mitral valve with trace mitral regurgitation  AORTIC VALVE: Normal tricuspid aortic valve without stenosis  TRICUSPID VALVE: Trace tricuspid regurgitation  PULMONIC VALVE: No pulmonic regurgitation  PERICARDIUM: No pericardial effusion  There was no pericardial effusion  The pericardium was normal in appearance  AORTA: The root exhibited normal size  PULMONARY ARTERY: Normal pulmonary artery pressures      SYSTEM MEASUREMENT TABLES    2D  %FS: 35 91 %  Ao Diam: 3 11 cm  EDV(Teich): 125 09 ml  EF(Teich): 65 15 %  ESV(Teich): 43 6 ml  IVSd: 0 87 cm  LA Area: 11 16 cm2  LA Diam: 3 7 cm  LVEDV MOD A4C: 78 93 ml  LVEF MOD A4C: 68 69 %  LVESV MOD A4C: 24 71 ml  LVIDd: 5 12 cm  LVIDs: 3 28 cm  LVLd A4C: 7 15 cm  LVLs A4C: 5 52 cm  LVPWd: 0 9 cm  RA Area: 10 65 cm2  SV MOD A4C: 54 22 ml  SV(Teich): 81 5 ml    CW  TR Vmax: 1 92 m/s  TR maxP 8 mmHg    MM  TAPSE: 2 4 cm    PW  E': 0 14 m/s  E/E': 6 72  MV A Hugo: 1 05 m/s  MV Dec Shackelford: 4 01 m/s2  MV DecT: 231 54 ms  MV E Hugo: 0 93 m/s  MV E/A Ratio: 0 88  MV PHT: 67 15 ms  MVA By PHT: 3 28 cm2    [facility]    Prepared and electronically signed by    Syl Reddy MD  Signed 13-Dec-2018 16:36:37    No results found for this or any previous visit     --------------------------------------------------------------------------------  HOLTER  No results found for this or any previous visit  No results found for this or any previous visit     --------------------------------------------------------------------------------  CAROTIDS  No results found for this or any previous visit      --------------------------------------------------------------------------------  Diagnoses and all orders for this visit:    Stable angina pectoris ([address] )  -     Discontinue: isosorbide mononitrate (IMDUR) 60 mg 24 hr tablet; Take 1 tablet (60 mg total) by mouth daily  -     Case Request Cath Lab: Cardiac Left Heart Cath - Dr Joette Homans or Dr Shoaib Ruff please; Standing  -     Case Request Cath Lab: Cardiac Left Heart Cath - Dr Joette Homans or Dr Shoaib Ruff please  -     Basic metabolic panel  -     CBC and differential  -     Protime-INR  -     isosorbide mononitrate (IMDUR) 60 mg 24 hr tablet; Take 1 tablet (60 mg total) by mouth daily    Coronary artery disease involving native heart without angina pectoris, unspecified vessel or lesion type  -     Case Request Cath Lab: Cardiac Left Heart Cath - Dr Joette Homans or Dr Shoaib Ruff please; Standing  -     Case Request Cath Lab: Cardiac Left Heart Cath - Dr Joette Homans or Dr Shoaib Ruff please  -     Basic metabolic panel  -     CBC and differential  -     Protime-INR    Fibromyalgia    Chronic fatigue  -     Ambulatory Referral to Sleep Medicine; Future    Insomnia, unspecified type  -     Ambulatory Referral to Sleep Medicine;  Future    SOB (shortness of breath) on exertion  - Case Request Cath Lab: Cardiac Left Heart Cath - Dr Yifan Barrett or Dr Jovana Laughlin please; Standing  -     Case Request Cath Lab: Cardiac Left Heart Cath - Dr Yifan Barrett or Dr Jovana Laughlin please  -     Basic metabolic panel  -     CBC and differential  -     Protime-INR    Iron deficiency  -     Discontinue: ferrous sulfate 324 (65 Fe) mg; Take 1 tablet (324 mg total) by mouth 2 (two) times a day before meals  -     Discontinue: docusate sodium (COLACE) 100 mg capsule; Take 1 capsule (100 mg total) by mouth 2 (two) times a day  -     Basic metabolic panel  -     CBC and differential  -     Protime-INR  -     docusate sodium (COLACE) 100 mg capsule; Take 1 capsule (100 mg total) by mouth 2 (two) times a day  -     ferrous sulfate 324 (65 Fe) mg; Take 1 tablet (324 mg total) by mouth 2 (two) times a day before meals    Coronary artery disease due to lipid rich plaque  -     Discontinue: atorvastatin (LIPITOR) 80 mg tablet; Take 1 tablet (80 mg total) by mouth daily  -     atorvastatin (LIPITOR) 80 mg tablet; Take 1 tablet (80 mg total) by mouth daily    Other orders  -     Height and weight; Standing  -     Nursing communication 1) Nurse to verify the following labs are available PRE-PROCEDURE: CBC, INR (if on coumadin), BMP   2) Call Cardiologist with abnormal results ; Standing  -     Insert and maintain IV line; Standing  -     Void on Call to Cath Lab; Standing  -     Diet NPO; Sips of clear liquids; Standing     ======================================================    Past Medical History:   Diagnosis Date   • Anxiety    • Chest pain 08/12/2016   • Chest wall pain 07/15/2016   • Costochondritis 02/28/2014   • Dog bite 07/29/2014   • 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[facility])    • Fluid retention    • Gastroenteritis 05/06/2013   • Herpes zoster 07/08/2013   • History of chemotherapy 1986   • Migraine 01/19/2016   • Shortness of breath 08/12/2016   • Toxoplasmosis 08/28/2014     Past Surgical History:   Procedure Laterality Date   • CHOLECYSTECTOMY     • COLONOSCOPY     • ESOPHAGOGASTRODUODENOSCOPY     • FL RETROGRADE PYELOGRAM  7/28/2022   • HYSTERECTOMY  1986   • KNEE ARTHROSCOPY      left shoulder   • NECK SURGERY      Lumpectomy   • OOPHORECTOMY Right 1986   • OR CYSTO/URETERO W/LITHOTRIPSY &INDWELL STENT INSRT Left 7/28/2022    Procedure: CYSTOSCOPY URETEROSCOPY WITH LITHOTRIPSY HOLMIUM LASER, RETROGRADE PYELOGRAM AND INSERTION STENT LEFT URETERAL;  Surgeon: Noris Brock MD;  Location: [facility];  Service: Urology   • OR ESOPHAGOGASTRODUODENOSCOPY TRANSORAL DIAGNOSTIC N/A 11/30/2018    Procedure: EGD;  Surgeon: Humberto Cooper MD;  Location: [facility];   Service: Gastroenterology         Medications  Current Outpatient Medications   Medication Sig Dispense Refill   • albuterol (VENTOLIN HFA) 90 mcg/act inhaler Inhale 2 puffs every 6 (six) hours as needed for wheezing or shortness of breath 1 Inhaler 0   • amLODIPine (NORVASC) 5 mg tablet Take 1 tablet (5 mg total) by mouth daily 90 tablet 3   • aspirin 81 MG tablet Take 81 mg by mouth daily     • atorvastatin (LIPITOR) 80 mg tablet Take 1 tablet (80 mg total) by mouth daily 90 tablet 3   • Cholecalciferol (VITAMIN D) 2000 units CAPS Take 1 capsule by mouth daily     • citalopram (CeleXA) 40 mg tablet Take 1 tablet (40 mg total) by mouth daily 30 tablet 2   • docusate sodium (COLACE) 100 mg capsule Take 1 capsule (100 mg total) by mouth 2 (two) times a day 180 capsule 3   • ferrous sulfate 324 (65 Fe) mg Take 1 tablet (324 mg total) by mouth 2 (two) times a day before meals 180 tablet 3   • furosemide (LASIX) 20 mg tablet take 1 tablet by mouth once daily 30 tablet 5   • Glycerin-Polysorbate 80 (REFRESH DRY EYE THERAPY OP) Apply to eye     • isosorbide mononitrate (IMDUR) 60 mg 24 hr tablet Take 1 tablet (60 mg total) by mouth daily 90 tablet 0   • metoprolol tartrate (LOPRESSOR) 25 mg tablet Take 1 tablet (25 mg total) by mouth every 12 (twelve) hours 180 tablet 3   • montelukast (SINGULAIR) 10 mg tablet Take 1 tablet (10 mg total) by mouth daily at bedtime 30 tablet 2   • multivitamin (THERAGRAN) TABS Take 1 tablet by mouth daily     • omeprazole (PriLOSEC) 20 mg delayed release capsule take 1 capsule by mouth once daily 30 capsule 5   • rizatriptan (MAXALT) 10 mg tablet Take 1 tablet (10 mg total) by mouth once as needed for migraine for up to 1 dose May repeat in 2 hours if needed 9 tablet 2   • topiramate (TOPAMAX) 100 mg tablet take 1 tablet by mouth once daily 30 tablet 5   • butalbital-acetaminophen-caffeine (FIORICET,ESGIC) -40 mg per tablet Take 1 tablet by mouth every 6 (six) hours   (Patient not taking: Reported on 4/19/2022)     • cromolyn (OPTICROM) 4 % ophthalmic solution Administer 1 drop to both eyes 4 (four) times a day (Patient not taking: Reported on 4/19/2022) 10 mL 1   • lidocaine (LIDODERM) 5 % Apply 1 patch topically daily Apply in am and  remove in pm  12 hour on, 12 hour off (Patient not taking: Reported on 3/31/2022) 30 patch 0   • metoprolol tartrate (LOPRESSOR) 50 mg tablet Take 1 tablet (50 mg total) by mouth every 12 (twelve) hours Start the day before CT scan, then take morning of and 1 hour before CT 4 tablet 0     No current facility-administered medications for this visit          Allergies   Allergen Reactions   • Azithromycin GI Intolerance     Yeast infection   • Imitrex [Sumatriptan] GI Intolerance   • Cephalexin    • Wound Dressings Rash     Adhesive tape       Social History     Socioeconomic History   • Marital status: /Civil Union     Spouse name: Not on file   • Number of children: Not on file   • Years of education: Not on file   • Highest education level: Not on file   Occupational History   • Not on file   Tobacco Use   • Smoking status: Never   • Smokeless tobacco: Never   Vaping Use   • Vaping Use: Never used   Substance and Sexual Activity   • Alcohol use: Yes     Comment: Occasionally   • Drug use: No   • Sexual activity: Not on file   Other Topics Concern   • Not on file   Social History Narrative   • Not on file     Social Determinants of Health     Financial Resource Strain: Not on file   Food Insecurity: Not on file   Transportation Needs: Not on file   Physical Activity: Not on file   Stress: Not on file   Social Connections: Not on file   Intimate Partner Violence: Not on file   Housing Stability: Not on file        Family History   Problem Relation Age of Onset   • Diabetes Mother    • Heart disease Mother    • Heart disease Father    • No Known Problems Sister    • No Known Problems Sister    • No Known Problems Sister    • No Known Problems Daughter    • No Known Problems Daughter    • No Known Problems Maternal Grandmother    • No Known Problems Maternal Grandfather    • No Known Problems Paternal Grandmother    • No Known Problems Paternal Grandfather    • Breast cancer Maternal Aunt         age unknown   • No Known Problems Maternal Aunt    • Coronary artery disease Family        Lab Results   Component Value Date    WBC 8 38 07/28/2022    HGB 11 3 (L) 07/28/2022    HCT 35 2 07/28/2022    MCV 92 07/28/2022     07/28/2022      Lab Results   Component Value Date    SODIUM 140 10/21/2022    K 3 6 10/21/2022     10/21/2022    CO2 26 10/21/2022    BUN 18 10/21/2022    CREATININE 0 98 10/21/2022    GLUC 104 07/28/2022    CALCIUM 9 3 10/21/2022      Lab Results   Component Value Date    HGBA1C 5 1 06/05/2017      No results found for: CHOL  Lab Results   Component Value Date    HDL 76 04/25/2022    HDL 71 10/15/2021    HDL 74 04/15/2021     Lab Results   Component Value Date    LDLCALC 118 (H) 04/25/2022    LDLCALC 118 (H) 10/15/2021    LDLCALC 118 (H) 04/15/2021     Lab Results   Component Value Date    TRIG 61 04/25/2022    TRIG 59 10/15/2021    TRIG 64 04/15/2021     No results found for: [place], [place]   Lab Results   Component Value Date    INR 0 99 06/09/2015    PROTIME 12 9 06/09/2015          Patient Active Problem List Diagnosis Date Noted   • Stable angina pectoris ([address] ) 10/14/2022   • Decreased GFR 10/14/2022   • Coronary artery disease involving native heart without angina pectoris 10/14/2022   • Environmental allergies 10/14/2022   • Ureterolithiasis 07/28/2022   • Subclinical hypothyroidism 04/19/2022   • Hearing difficulty of left ear 04/19/2022   • Mass of skin of head 04/19/2022   • Post-menopausal 04/19/2022   • Mitral valve annular calcification 04/19/2022   • Generalized anxiety disorder 02/20/2018   • Gastroesophageal reflux disease 08/07/2017   • Bilateral occipital neuralgia 05/15/2017   • Restless leg 09/23/2016   • Chest pain 08/12/2016   • Bilateral lower extremity edema 05/31/2016   • Fibromyalgia 08/12/2015   • Insomnia 04/01/2015   • Vitamin D deficiency 05/31/2013   • Migraine 01/22/2009       Portions of the record may have been created with voice recognition software  Occasional wrong word or "sound a like" substitutions may have occurred due to the inherent limitations of voice recognition software  Read the chart carefully and recognize, using context, where substitutions have occurred      Denise Snell DO, [facility]  2/20/2023 5:21 PM

## 2023-02-20 NOTE — PROGRESS NOTES
Cardiology   Leslye Massy, MD Charna Riedel, MD Cleatus Shook, DO, ELVA Hightower MD Willie Fellows, , Aurora Waldron DO, [facility]  -------------------------------------------------------------------  On license of [facility]  [address], [place] [address]  [phone]  [phone]  02/20/23  Tonie Ryan  YOB: 1965   MRN: [id number]      Referring Physician: Robi Johnson [address]  [facility] [address]  [facility],  [address]     HPI: Tonie Ryan is a 62 y o  female with:   Gastroesophageal reflux disease   Bilateral lower extremity edema   Fibromyalgia   Anxiety  Obesity, BMI 46     I had seen her in January of 2020 for symptoms chest pain and dyspnea on exertion   The symptoms have been going on for several years but these are actually getting worse now  Janna Duque had a stress echo that was negative for ischemia however her overall exercise capacity was very poor, she only exercised for 3 minutes 30 seconds and had to stop due to chest pain and dyspnea on exertion   She also noted palpitations at the time     Because of this I ordered a coronary CT angiogram as I a m  concerned about underlying coronary artery disease and ischemia being present        This did show 50-69% stenosis in the left anterior descending artery territory   Recommendation is for functional assessment        Nuclear stress showed image artifact in the apex without evidence of reversible perfusion abnormality  Her chest pain was initially improved with medical therapy however has been getting worse now  She states that even with just minimal activity she will get substernal chest pressure and severe shortness of breath that she has to stop doing what she is doing  This occurs even with just playing around with her grandkids in her house    If she is to walk up steps or up a hill she has severe limiting dyspnea as well as pressure in her chest   She also notes poor sleep  Has never been evaluated for sleep apnea but does note that she snores a lot and has frequent awakenings throughout the night with feeling fatigued throughout the day  Her diet is poor  Her  is disabled and is at home the entire day when she is at work and does all of the cooking  He makes foods that are very high in fat and preservatives  Diet includes sausage, oliver, fried foods, fried fish, red meat and pork, essentially no chicken or baked fish, minimal vegetables    Review of Systems   Constitutional: Positive for fatigue  Negative for chills and fever  HENT: Negative for facial swelling and sore throat  Eyes: Negative for visual disturbance  Respiratory: Positive for shortness of breath  Negative for cough, chest tightness and wheezing  Cardiovascular: Positive for chest pain  Negative for palpitations and leg swelling  Gastrointestinal: Negative for abdominal pain, blood in stool, constipation, diarrhea, nausea and vomiting  Endocrine: Negative for cold intolerance and heat intolerance  Genitourinary: Negative for decreased urine volume, difficulty urinating, dysuria and hematuria  Musculoskeletal: Negative for arthralgias, back pain and myalgias  Skin: Negative for rash  Neurological: Negative for dizziness, syncope, weakness and numbness  Psychiatric/Behavioral: Positive for sleep disturbance  Negative for agitation, behavioral problems and confusion  The patient is not nervous/anxious  OBJECTIVE  Vitals:    02/20/23 1622   BP: 118/80   Pulse: 64       Physical Exam  Constitutional: awake, alert and oriented, in no acute distress, no obvious deformities, obese female  Head: Normocephalic, without obvious abnormality, atraumatic  Eyes: conjunctivae clear and moist  Sclera anicteric  No xanthelasmas  Pupils equal bilaterally  Extraocular motions are full    Ear nose mouth and throat: ears are symmetrical bilaterally, hearing appears to be equal bilaterally, no nasal discharge or epistaxis, oropharynx is clear with moist mucous membranes  Neck: Trachea is midline, neck is supple, no thyromegaly or significant lymphadenopathy, there is full range of motion  Lungs: clear to auscultation bilaterally, no wheezes, no rales, no rhonchi, no accessory muscle use, breathing is nonlabored  Heart: regular rate and rhythm, S1, S2 normal, no murmur, no click, no rub and no gallop, no lower extremity edema  Abdomen: Obese, soft, non-tender; bowel sounds normal; no masses, no organomegaly  Psychiatric: Patient is oriented to time, place, person, mood/affect is negative for depression, anxiety, agitation, appears to have appropriate insight  Skin: Skin is warm, dry, intact  No obvious rashes or lesions on exposed extremities  Nail beds are pink with no cyanosis or clubbing  EKG:  No results found for this visit on 02/20/23  IMPRESSION:  Substernal chest pressure and dyspnea on exertion concerning for true cardiac angina  CT angiogram did show moderate 50 to 69% proximal LAD disease    Dyslipidemia  Abnormal electrocardiogram   Dyspnea on exertion   Palpitations  Obesity  Deconditioning   Gastroesophageal reflux disease  Fibromyalgia   Anxiety    DISCUSSION/RECOMMENDATIONS:  Her symptoms are now getting worse despite medical therapy that initially had improved her  Unfortunately she is in a bad situation regarding her ability for lifestyle modification  Her  does all the cooking and cooks a very unhealthy diet for an individual with coronary disease (interestingly enough, which he has as well)  Spoke about heart healthy diet extensively with her today and even recommended that she consider cooking completely separate meals from what her  is eating  With the progression of her symptoms, I am concerned that these are due to flow-limiting coronary disease despite the findings of the CT scan    She does have proximal LAD disease and I am concerned that this is the cause of her symptoms  We will uptitrate her medications today with increasing Lipitor to 80 mg and increasing Imdur to 60 mg, however will plan to investigate further with direct coronary visualization by cardiac catheterization given her severely limited functional capacity now at this point  She states even with just playing around on the floor within her house with her grandkids she is having severe limiting dyspnea and chest pressure with this type of exertion  She also complains of symptoms that seem to be consistent with obstructive sleep apnea, fatigue all day and poor sleep quality with loud snoring  We will plan for sleep study and sleep evaluation as well  Continue with aspirin 81  Continue with Lasix 20  Continue metoprolol 25 twice a day  Sent Rx for iron pills as well as stool softener as her labs do suggest iron deficiency  Plan for follow-up after cath    Ulisses Paez DO, [facility], [address]  --------------------------------------------------------------------------------  TREADMILL STRESS  No results found for this or any previous visit      ----------------------------------------------------------------------------------------------  NUCLEAR STRESS TEST: No results found for this or any previous visit  Results for orders placed during the hospital encounter of 04/19/22    NM myocardial perfusion spect (rx stress and/or rest)    Interpretation Summary  •  Stress ECG: No ST deviation is noted  There were no arrhythmias during recovery    The ECG was negative for ischemia  The stress ECG is negative for ischemia  •  Perfusion: There is a left ventricular perfusion defect that is small in size with moderate reduction in uptake present in the apical location(s) that is fixed  The defect appears to be probable artifact caused by breast attenuation  •  Stress Function: Left ventricular function post-stress is normal  Post-stress ejection fraction is 73 %    • Stress Combined Conclusion: There is image artifact, without diagnostic evidence for perfusion abnormality       --------------------------------------------------------------------------------  CATH:  No results found for this or any previous visit     --------------------------------------------------------------------------------  ECHO:   Results for orders placed during the hospital encounter of 18    Echo complete with contrast if indicated    [facility]  [place] [address]    Transthoracic Echocardiogram  2D, M-mode, Doppler, and Color Doppler    Study date:  13-Dec-2018    Patient: Sarwat Petersen  MR number: [id number]  Account number: [de-identified]  : 1965  Age: 48 years  Gender: Female  Status: Outpatient  Location: [address] [place] [address]  Height: 60 in  Weight: 218 5 lb  BP: 138/ 86 mmHg    Indications: Dyspnea    Diagnoses: R06 00 - Dyspnea, unspecified    Sonographer:  Yuliana Xavier RDCS  Primary Physician:  Cy Kilgore DO  Referring Physician:  Zaire Barrios DO  Group:  [facility]  Interpreting Physician:  Rios Dsouza MD    SUMMARY    SUMMARY:  1  Sinus rhythm  2  Good technical quality  3  Normal left ventricular systolic and diastolic function  4  Mild left ventricular cavity enlargement    LEFT VENTRICLE:  Normal left ventricular systolic function, EF 27%  Mild left ventricular cavity enlargement  Normal left ventricular wall thickness  Normal left ventricular wall motion without regional wall motion abnormalities  Normal left ventricular  diastolic function and filling pressures  LEFT ATRIUM:  Normal left atrial size  RIGHT ATRIUM:  Normal right atrial size  MITRAL VALVE:  Normal mitral valve with trace mitral regurgitation  AORTIC VALVE:  Normal tricuspid aortic valve without stenosis  TRICUSPID VALVE:  Trace tricuspid regurgitation      PULMONIC VALVE:  No pulmonic regurgitation  PULMONARY ARTERIES:  Normal pulmonary artery pressures  PERICARDIUM:  No pericardial effusion  HISTORY: PRIOR HISTORY: Patient has no history of cardiovascular disease  Risk factors: morbid obesity  PROCEDURE: The study was performed in the [facility]  This was a routine study  The transthoracic approach was used  The study included complete 2D imaging, M-mode, complete spectral Doppler, and color Doppler  The heart rate was  84 bpm, at the start of the study  This was a technically difficult study  LEFT VENTRICLE: Normal left ventricular systolic function, EF 88%  Mild left ventricular cavity enlargement  Normal left ventricular wall thickness  Normal left ventricular wall motion without regional wall motion abnormalities  Normal  left ventricular diastolic function and filling pressures  RIGHT VENTRICLE: The size was normal  Systolic function was normal  Wall thickness was normal     LEFT ATRIUM: Normal left atrial size  RIGHT ATRIUM: Normal right atrial size  MITRAL VALVE: Normal mitral valve with trace mitral regurgitation  AORTIC VALVE: Normal tricuspid aortic valve without stenosis  TRICUSPID VALVE: Trace tricuspid regurgitation  PULMONIC VALVE: No pulmonic regurgitation  PERICARDIUM: No pericardial effusion  There was no pericardial effusion  The pericardium was normal in appearance  AORTA: The root exhibited normal size  PULMONARY ARTERY: Normal pulmonary artery pressures      SYSTEM MEASUREMENT TABLES    2D  %FS: 35 91 %  Ao Diam: 3 11 cm  EDV(Teich): 125 09 ml  EF(Teich): 65 15 %  ESV(Teich): 43 6 ml  IVSd: 0 87 cm  LA Area: 11 16 cm2  LA Diam: 3 7 cm  LVEDV MOD A4C: 78 93 ml  LVEF MOD A4C: 68 69 %  LVESV MOD A4C: 24 71 ml  LVIDd: 5 12 cm  LVIDs: 3 28 cm  LVLd A4C: 7 15 cm  LVLs A4C: 5 52 cm  LVPWd: 0 9 cm  RA Area: 10 65 cm2  SV MOD A4C: 54 22 ml  SV(Teich): 81 5 ml    CW  TR Vmax: 1 92 m/s  TR maxP 8 mmHg    MM  TAPSE: 2 4 cm    PW  E': 0 14 m/s  E/E': 6 72  MV A Hugo: 1 05 m/s  MV Dec [place]: 4 01 m/s2  MV DecT: 231 54 ms  MV E Hugo: 0 93 m/s  MV E/A Ratio: 0 88  MV PHT: 67 15 ms  MVA By PHT: 3 28 cm2    [facility]    Prepared and electronically signed by    Jennifer Kolb MD  Signed 13-Dec-2018 16:36:37    No results found for this or any previous visit     --------------------------------------------------------------------------------  HOLTER  No results found for this or any previous visit  No results found for this or any previous visit     --------------------------------------------------------------------------------  CAROTIDS  No results found for this or any previous visit      --------------------------------------------------------------------------------  Diagnoses and all orders for this visit:    Stable angina pectoris ([address] )  -     Discontinue: isosorbide mononitrate (IMDUR) 60 mg 24 hr tablet; Take 1 tablet (60 mg total) by mouth daily  -     Case Request Cath Lab: Cardiac Left Heart Cath - Dr Rita Mccollum or Dr Clint Vo please; Standing  -     Case Request Cath Lab: Cardiac Left Heart Cath - Dr Rita Mccollum or Dr Clint Vo please  -     Basic metabolic panel  -     CBC and differential  -     Protime-INR  -     isosorbide mononitrate (IMDUR) 60 mg 24 hr tablet; Take 1 tablet (60 mg total) by mouth daily    Coronary artery disease involving native heart without angina pectoris, unspecified vessel or lesion type  -     Case Request Cath Lab: Cardiac Left Heart Cath - Dr Rita Mccollum or Dr Clint Vo please; Standing  -     Case Request Cath Lab: Cardiac Left Heart Cath - Dr Rita Mccollum or Dr Clint Vo please  -     Basic metabolic panel  -     CBC and differential  -     Protime-INR    Fibromyalgia    Chronic fatigue  -     Ambulatory Referral to Sleep Medicine; Future    Insomnia, unspecified type  -     Ambulatory Referral to Sleep Medicine;  Future    SOB (shortness of breath) on exertion  - Case Request Cath Lab: Cardiac Left Heart Cath - Dr Rita Mccollum or Dr Clint Vo please; Standing  -     Case Request Cath Lab: Cardiac Left Heart Cath - Dr Rita Mccollum or Dr Clint Vo please  -     Basic metabolic panel  -     CBC and differential  -     Protime-INR    Iron deficiency  -     Discontinue: ferrous sulfate 324 (65 Fe) mg; Take 1 tablet (324 mg total) by mouth 2 (two) times a day before meals  -     Discontinue: docusate sodium (COLACE) 100 mg capsule; Take 1 capsule (100 mg total) by mouth 2 (two) times a day  -     Basic metabolic panel  -     CBC and differential  -     Protime-INR  -     docusate sodium (COLACE) 100 mg capsule; Take 1 capsule (100 mg total) by mouth 2 (two) times a day  -     ferrous sulfate 324 (65 Fe) mg; Take 1 tablet (324 mg total) by mouth 2 (two) times a day before meals    Coronary artery disease due to lipid rich plaque  -     Discontinue: atorvastatin (LIPITOR) 80 mg tablet; Take 1 tablet (80 mg total) by mouth daily  -     atorvastatin (LIPITOR) 80 mg tablet; Take 1 tablet (80 mg total) by mouth daily    Other orders  -     Height and weight; Standing  -     Nursing communication 1) Nurse to verify the following labs are available PRE-PROCEDURE: CBC, INR (if on coumadin), BMP   2) Call Cardiologist with abnormal results ; Standing  -     Insert and maintain IV line; Standing  -     Void on Call to Cath Lab; Standing  -     Diet NPO; Sips of clear liquids; Standing     ======================================================    Past Medical History:   Diagnosis Date   • Anxiety    • Chest pain 08/12/2016   • Chest wall pain 07/15/2016   • Costochondritis 02/28/2014   • Dog bite 07/29/2014   • 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[facility])    • Fluid retention    • Gastroenteritis 05/06/2013   • Herpes zoster 07/08/2013   • History of chemotherapy 1986   • Migraine 01/19/2016   • Shortness of breath 08/12/2016   • Toxoplasmosis 08/28/2014     Past Surgical History:   Procedure Laterality Date   • CHOLECYSTECTOMY     • COLONOSCOPY     • ESOPHAGOGASTRODUODENOSCOPY     • FL RETROGRADE PYELOGRAM  7/28/2022   • HYSTERECTOMY  1986   • KNEE ARTHROSCOPY      left shoulder   • NECK SURGERY      Lumpectomy   • OOPHORECTOMY Right 1986   • KS CYSTO/URETERO W/LITHOTRIPSY &INDWELL STENT INSRT Left 7/28/2022    Procedure: CYSTOSCOPY URETEROSCOPY WITH LITHOTRIPSY HOLMIUM LASER, RETROGRADE PYELOGRAM AND INSERTION STENT LEFT URETERAL;  Surgeon: Julee Peabody, MD;  Location: [facility];  Service: Urology   • KS ESOPHAGOGASTRODUODENOSCOPY TRANSORAL DIAGNOSTIC N/A 11/30/2018    Procedure: EGD;  Surgeon: Carlton Barkley MD;  Location: [facility];   Service: Gastroenterology         Medications  Current Outpatient Medications   Medication Sig Dispense Refill   • albuterol (VENTOLIN HFA) 90 mcg/act inhaler Inhale 2 puffs every 6 (six) hours as needed for wheezing or shortness of breath 1 Inhaler 0   • amLODIPine (NORVASC) 5 mg tablet Take 1 tablet (5 mg total) by mouth daily 90 tablet 3   • aspirin 81 MG tablet Take 81 mg by mouth daily     • atorvastatin (LIPITOR) 80 mg tablet Take 1 tablet (80 mg total) by mouth daily 90 tablet 3   • Cholecalciferol (VITAMIN D) 2000 units CAPS Take 1 capsule by mouth daily     • citalopram (CeleXA) 40 mg tablet Take 1 tablet (40 mg total) by mouth daily 30 tablet 2   • docusate sodium (COLACE) 100 mg capsule Take 1 capsule (100 mg total) by mouth 2 (two) times a day 180 capsule 3   • ferrous sulfate 324 (65 Fe) mg Take 1 tablet (324 mg total) by mouth 2 (two) times a day before meals 180 tablet 3   • furosemide (LASIX) 20 mg tablet take 1 tablet by mouth once daily 30 tablet 5   • Glycerin-Polysorbate 80 (REFRESH DRY EYE THERAPY OP) Apply to eye     • isosorbide mononitrate (IMDUR) 60 mg 24 hr tablet Take 1 tablet (60 mg total) by mouth daily 90 tablet 0   • metoprolol tartrate (LOPRESSOR) 25 mg tablet Take 1 tablet (25 mg total) by mouth every 12 (twelve) hours 180 tablet 3   • montelukast (SINGULAIR) 10 mg tablet Take 1 tablet (10 mg total) by mouth daily at bedtime 30 tablet 2   • multivitamin (THERAGRAN) TABS Take 1 tablet by mouth daily     • omeprazole (PriLOSEC) 20 mg delayed release capsule take 1 capsule by mouth once daily 30 capsule 5   • rizatriptan (MAXALT) 10 mg tablet Take 1 tablet (10 mg total) by mouth once as needed for migraine for up to 1 dose May repeat in 2 hours if needed 9 tablet 2   • topiramate (TOPAMAX) 100 mg tablet take 1 tablet by mouth once daily 30 tablet 5   • butalbital-acetaminophen-caffeine (FIORICET,ESGIC) -40 mg per tablet Take 1 tablet by mouth every 6 (six) hours   (Patient not taking: Reported on 4/19/2022)     • cromolyn (OPTICROM) 4 % ophthalmic solution Administer 1 drop to both eyes 4 (four) times a day (Patient not taking: Reported on 4/19/2022) 10 mL 1   • lidocaine (LIDODERM) 5 % Apply 1 patch topically daily Apply in am and  remove in pm  12 hour on, 12 hour off (Patient not taking: Reported on 3/31/2022) 30 patch 0   • metoprolol tartrate (LOPRESSOR) 50 mg tablet Take 1 tablet (50 mg total) by mouth every 12 (twelve) hours Start the day before CT scan, then take morning of and 1 hour before CT 4 tablet 0     No current facility-administered medications for this visit          Allergies   Allergen Reactions   • Azithromycin GI Intolerance     Yeast infection   • Imitrex [Sumatriptan] GI Intolerance   • Cephalexin    • Wound Dressings Rash     Adhesive tape       Social History     Socioeconomic History   • Marital status: /Civil Union     Spouse name: Not on file   • Number of children: Not on file   • Years of education: Not on file   • Highest education level: Not on file   Occupational History   • Not on file   Tobacco Use   • Smoking status: Never   • Smokeless tobacco: Never   Vaping Use   • Vaping Use: Never used   Substance and Sexual Activity   • Alcohol use: Yes     Comment: Occasionally   • Drug use: No   • Sexual activity: Not on file   Other Topics Concern   • Not on file   Social History Narrative   • Not on file     Social Determinants of Health     Financial Resource Strain: Not on file   Food Insecurity: Not on file   Transportation Needs: Not on file   Physical Activity: Not on file   Stress: Not on file   Social Connections: Not on file   Intimate Partner Violence: Not on file   Housing Stability: Not on file        Family History   Problem Relation Age of Onset   • Diabetes Mother    • Heart disease Mother    • Heart disease Father    • No Known Problems Sister    • No Known Problems Sister    • No Known Problems Sister    • No Known Problems Daughter    • No Known Problems Daughter    • No Known Problems Maternal Grandmother    • No Known Problems Maternal Grandfather    • No Known Problems Paternal Grandmother    • No Known Problems Paternal Grandfather    • Breast cancer Maternal Aunt         age unknown   • No Known Problems Maternal Aunt    • Coronary artery disease Family        Lab Results   Component Value Date    WBC 8 38 07/28/2022    HGB 11 3 (L) 07/28/2022    HCT 35 2 07/28/2022    MCV 92 07/28/2022     07/28/2022      Lab Results   Component Value Date    SODIUM 140 10/21/2022    K 3 6 10/21/2022     10/21/2022    CO2 26 10/21/2022    BUN 18 10/21/2022    CREATININE 0 98 10/21/2022    GLUC 104 07/28/2022    CALCIUM 9 3 10/21/2022      Lab Results   Component Value Date    HGBA1C 5 1 06/05/2017      No results found for: CHOL  Lab Results   Component Value Date    HDL 76 04/25/2022    HDL 71 10/15/2021    HDL 74 04/15/2021     Lab Results   Component Value Date    LDLCALC 118 (H) 04/25/2022    LDLCALC 118 (H) 10/15/2021    LDLCALC 118 (H) 04/15/2021     Lab Results   Component Value Date    TRIG 61 04/25/2022    TRIG 59 10/15/2021    TRIG 64 04/15/2021     No results found for: [place], [place]   Lab Results   Component Value Date    INR 0 99 06/09/2015    PROTIME 12 9 06/09/2015          Patient Active Problem List Diagnosis Date Noted   • Stable angina pectoris ([address] ) 10/14/2022   • Decreased GFR 10/14/2022   • Coronary artery disease involving native heart without angina pectoris 10/14/2022   • Environmental allergies 10/14/2022   • Ureterolithiasis 07/28/2022   • Subclinical hypothyroidism 04/19/2022   • Hearing difficulty of left ear 04/19/2022   • Mass of skin of head 04/19/2022   • Post-menopausal 04/19/2022   • Mitral valve annular calcification 04/19/2022   • Generalized anxiety disorder 02/20/2018   • Gastroesophageal reflux disease 08/07/2017   • Bilateral occipital neuralgia 05/15/2017   • Restless leg 09/23/2016   • Chest pain 08/12/2016   • Bilateral lower extremity edema 05/31/2016   • Fibromyalgia 08/12/2015   • Insomnia 04/01/2015   • Vitamin D deficiency 05/31/2013   • Migraine 01/22/2009       Portions of the record may have been created with voice recognition software  Occasional wrong word or "sound a like" substitutions may have occurred due to the inherent limitations of voice recognition software  Read the chart carefully and recognize, using context, where substitutions have occurred      Annabella Carreon DO, [facility]  2/20/2023 5:21 PM

## 2023-02-21 ENCOUNTER — TELEPHONE (OUTPATIENT)
Dept: CARDIOLOGY CLINIC | Facility: CLINIC | Age: 58
End: 2023-02-21

## 2023-02-21 NOTE — TELEPHONE ENCOUNTER
Kassandra Calabrese approved [facility] # [id number] for [address]/YGA @ [place]  Valid 2/21/23-8/20/23    Dr Chai Wilson

## 2023-02-21 NOTE — TELEPHONE ENCOUNTER
Patient scheduled for LH at [facility] on 03/02/2023 with Dr Mara Cabrera  Patient aware of general instructions, labs test required  Meds holds:  Lasix to hold the Am of    Can we please check insurance for approval

## 2023-02-22 ENCOUNTER — APPOINTMENT (OUTPATIENT)
Dept: LAB | Facility: CLINIC | Age: 58
End: 2023-02-22

## 2023-02-22 LAB
ANION GAP SERPL CALCULATED.3IONS-SCNC: 2 MMOL/L (ref 4–13)
BASOPHILS # BLD AUTO: 0.04 THOUSANDS/ÂΜL (ref 0–0.1)
BASOPHILS NFR BLD AUTO: 1 % (ref 0–1)
BUN SERPL-MCNC: 13 MG/DL (ref 5–25)
CALCIUM SERPL-MCNC: 9.6 MG/DL (ref 8.3–10.1)
CHLORIDE SERPL-SCNC: 108 MMOL/L (ref 96–108)
CO2 SERPL-SCNC: 27 MMOL/L (ref 21–32)
CREAT SERPL-MCNC: 0.98 MG/DL (ref 0.6–1.3)
EOSINOPHIL # BLD AUTO: 0.29 THOUSAND/ÂΜL (ref 0–0.61)
EOSINOPHIL NFR BLD AUTO: 4 % (ref 0–6)
ERYTHROCYTE [DISTWIDTH] IN BLOOD BY AUTOMATED COUNT: 15 % (ref 11.6–15.1)
GFR SERPL CREATININE-BSD FRML MDRD: 64 ML/MIN/1.73SQ M
GLUCOSE P FAST SERPL-MCNC: 93 MG/DL (ref 65–99)
HCT VFR BLD AUTO: 37.7 % (ref 34.8–46.1)
HGB BLD-MCNC: 12 G/DL (ref 11.5–15.4)
IMM GRANULOCYTES # BLD AUTO: 0.02 THOUSAND/UL (ref 0–0.2)
IMM GRANULOCYTES NFR BLD AUTO: 0 % (ref 0–2)
INR PPP: 0.94 (ref 0.84–1.19)
LYMPHOCYTES # BLD AUTO: 2.09 THOUSANDS/ÂΜL (ref 0.6–4.47)
LYMPHOCYTES NFR BLD AUTO: 26 % (ref 14–44)
MCH RBC QN AUTO: 29.6 PG (ref 26.8–34.3)
MCHC RBC AUTO-ENTMCNC: 31.8 G/DL (ref 31.4–37.4)
MCV RBC AUTO: 93 FL (ref 82–98)
MONOCYTES # BLD AUTO: 0.59 THOUSAND/ÂΜL (ref 0.17–1.22)
MONOCYTES NFR BLD AUTO: 7 % (ref 4–12)
NEUTROPHILS # BLD AUTO: 5.15 THOUSANDS/ÂΜL (ref 1.85–7.62)
NEUTS SEG NFR BLD AUTO: 62 % (ref 43–75)
NRBC BLD AUTO-RTO: 0 /100 WBCS
PLATELET # BLD AUTO: 306 THOUSANDS/UL (ref 149–390)
PMV BLD AUTO: 10.7 FL (ref 8.9–12.7)
POTASSIUM SERPL-SCNC: 4 MMOL/L (ref 3.5–5.3)
PROTHROMBIN TIME: 12.8 SECONDS (ref 11.6–14.5)
RBC # BLD AUTO: 4.06 MILLION/UL (ref 3.81–5.12)
SODIUM SERPL-SCNC: 137 MMOL/L (ref 135–147)
WBC # BLD AUTO: 8.18 THOUSAND/UL (ref 4.31–10.16)

## 2023-03-02 ENCOUNTER — HOSPITAL ENCOUNTER (OUTPATIENT)
Facility: HOSPITAL | Age: 58
Setting detail: OUTPATIENT SURGERY
Discharge: HOME | End: 2023-03-02
Attending: INTERNAL MEDICINE | Admitting: INTERNAL MEDICINE

## 2023-03-02 VITALS
RESPIRATION RATE: 18 BRPM | HEIGHT: 59 IN | BODY MASS INDEX: 45.53 KG/M2 | HEART RATE: 72 BPM | OXYGEN SATURATION: 96 % | DIASTOLIC BLOOD PRESSURE: 56 MMHG | TEMPERATURE: 97.3 F | SYSTOLIC BLOOD PRESSURE: 103 MMHG

## 2023-03-02 DIAGNOSIS — E66.01 MORBID OBESITY WITH BMI OF 45.0-49.9, ADULT (HCC): ICD-10-CM

## 2023-03-02 DIAGNOSIS — R60.0 BILATERAL LOWER EXTREMITY EDEMA: ICD-10-CM

## 2023-03-02 DIAGNOSIS — I20.8 STABLE ANGINA PECTORIS (HCC): ICD-10-CM

## 2023-03-02 DIAGNOSIS — R06.02 SOB (SHORTNESS OF BREATH) ON EXERTION: ICD-10-CM

## 2023-03-02 DIAGNOSIS — I25.10 CORONARY ARTERY DISEASE INVOLVING NATIVE HEART WITHOUT ANGINA PECTORIS, UNSPECIFIED VESSEL OR LESION TYPE: ICD-10-CM

## 2023-03-02 DIAGNOSIS — R53.83 CAREGIVER WITH FATIGUE: Primary | ICD-10-CM

## 2023-03-02 LAB
ATRIAL RATE: 73 BPM
P AXIS: 32 DEGREES
PR INTERVAL: 162 MS
QRS AXIS: -20 DEGREES
QRSD INTERVAL: 92 MS
QT INTERVAL: 418 MS
QTC INTERVAL: 460 MS
T WAVE AXIS: 6 DEGREES
VENTRICULAR RATE: 73 BPM

## 2023-03-02 RX ORDER — LIDOCAINE HYDROCHLORIDE 10 MG/ML
INJECTION, SOLUTION EPIDURAL; INFILTRATION; INTRACAUDAL; PERINEURAL CODE/TRAUMA/SEDATION MEDICATION
Status: DISCONTINUED | OUTPATIENT
Start: 2023-03-02 | End: 2023-03-02 | Stop reason: HOSPADM

## 2023-03-02 RX ORDER — NITROGLYCERIN 20 MG/100ML
INJECTION INTRAVENOUS CODE/TRAUMA/SEDATION MEDICATION
Status: DISCONTINUED | OUTPATIENT
Start: 2023-03-02 | End: 2023-03-02 | Stop reason: HOSPADM

## 2023-03-02 RX ORDER — FENTANYL CITRATE 50 UG/ML
INJECTION, SOLUTION INTRAMUSCULAR; INTRAVENOUS CODE/TRAUMA/SEDATION MEDICATION
Status: DISCONTINUED | OUTPATIENT
Start: 2023-03-02 | End: 2023-03-02 | Stop reason: HOSPADM

## 2023-03-02 RX ORDER — FUROSEMIDE 20 MG/1
20 TABLET ORAL DAILY
Qty: 30 TABLET | Refills: 0
Start: 2023-03-03

## 2023-03-02 RX ORDER — HEPARIN SODIUM 1000 [USP'U]/ML
INJECTION, SOLUTION INTRAVENOUS; SUBCUTANEOUS CODE/TRAUMA/SEDATION MEDICATION
Status: DISCONTINUED | OUTPATIENT
Start: 2023-03-02 | End: 2023-03-02 | Stop reason: HOSPADM

## 2023-03-02 RX ORDER — ACETAMINOPHEN 325 MG/1
650 TABLET ORAL EVERY 6 HOURS PRN
Status: DISCONTINUED | OUTPATIENT
Start: 2023-03-02 | End: 2023-03-02 | Stop reason: HOSPADM

## 2023-03-02 RX ORDER — ASPIRIN 81 MG/1
324 TABLET, CHEWABLE ORAL ONCE
Status: COMPLETED | OUTPATIENT
Start: 2023-03-02 | End: 2023-03-02

## 2023-03-02 RX ORDER — SODIUM CHLORIDE 9 MG/ML
100 INJECTION, SOLUTION INTRAVENOUS CONTINUOUS
Status: DISPENSED | OUTPATIENT
Start: 2023-03-02 | End: 2023-03-02

## 2023-03-02 RX ORDER — ONDANSETRON 2 MG/ML
4 INJECTION INTRAMUSCULAR; INTRAVENOUS EVERY 8 HOURS PRN
Status: DISCONTINUED | OUTPATIENT
Start: 2023-03-02 | End: 2023-03-02 | Stop reason: HOSPADM

## 2023-03-02 RX ORDER — SODIUM CHLORIDE 9 MG/ML
125 INJECTION, SOLUTION INTRAVENOUS CONTINUOUS
Status: DISCONTINUED | OUTPATIENT
Start: 2023-03-02 | End: 2023-03-02

## 2023-03-02 RX ORDER — VERAPAMIL HYDROCHLORIDE 2.5 MG/ML
INJECTION, SOLUTION INTRAVENOUS CODE/TRAUMA/SEDATION MEDICATION
Status: DISCONTINUED | OUTPATIENT
Start: 2023-03-02 | End: 2023-03-02 | Stop reason: HOSPADM

## 2023-03-02 RX ORDER — MIDAZOLAM HYDROCHLORIDE 2 MG/2ML
INJECTION, SOLUTION INTRAMUSCULAR; INTRAVENOUS CODE/TRAUMA/SEDATION MEDICATION
Status: DISCONTINUED | OUTPATIENT
Start: 2023-03-02 | End: 2023-03-02 | Stop reason: HOSPADM

## 2023-03-02 RX ADMIN — SODIUM CHLORIDE 125 ML/HR: 0.9 INJECTION, SOLUTION INTRAVENOUS at 08:42

## 2023-03-02 RX ADMIN — ASPIRIN 324 MG: 81 TABLET, CHEWABLE ORAL at 08:21

## 2023-03-02 NOTE — INTERVAL H&P NOTE
Update: (This section must be completed if the H&P was completed greater than 24 hrs to procedure or admission)    H&P reviewed  After examining the patient, I find no changed to the H&P since it had been written  Patient re-evaluated  Accept as history and physical     Patient offers no complaints today, including chest pain or shortness of breath  Cardiac catheterization reviewed with patient and daughter, all questions answered  EKG:  NSR, rate 73 BPM, no specific ST changes noted  Low voltage        /70 (BP Location: Left arm)   Pulse 75   Temp 97 9 °F (36 6 °C) (Temporal)   Resp 18   Ht 4' 11" (1 499 m)   LMP 03/18/1997 (Exact Date)   SpO2 97%   BMI 45 53 kg/m²       PASHA Calderon/March 2, 2023/9:02 AM

## 2023-03-02 NOTE — DISCHARGE INSTR - AVS FIRST PAGE
1  Please see the post cardiac catheterization dishcarge instructions  No heavy lifting, greater than 10 lbs  or strenuous  activity for 48 hrs  2 Remove band aid tomorrow  Shower and wash area- wrist gently with soap and water- beginning tomorrow  Rinse and pat dry  Apply new water seal band aid  Repeat this process for 5 days  No powders, creams lotions or antibiotic ointments  for 5 days  No tub baths, hot tubs or swimming for 5 days  3  Please call our office ([phone]) if you have any fever, redness, swelling, discharge from your wrist access site      4 No driving for 1 day

## 2023-03-02 NOTE — Clinical Note
Prepped: right groin and right radial  Prepped with: ChloraPrep  The site was clipped  The patient was draped  ,DirectAddress_Unknown

## 2023-03-10 ENCOUNTER — HOSPITAL ENCOUNTER (EMERGENCY)
Facility: HOSPITAL | Age: 58
Discharge: HOME | End: 2023-03-10
Attending: EMERGENCY MEDICINE

## 2023-03-10 ENCOUNTER — TELEPHONE (OUTPATIENT)
Dept: CARDIOLOGY CLINIC | Facility: CLINIC | Age: 58
End: 2023-03-10

## 2023-03-10 ENCOUNTER — APPOINTMENT (EMERGENCY)
Dept: NON INVASIVE DIAGNOSTICS | Facility: HOSPITAL | Age: 58
End: 2023-03-10

## 2023-03-10 VITALS
DIASTOLIC BLOOD PRESSURE: 72 MMHG | TEMPERATURE: 98 F | SYSTOLIC BLOOD PRESSURE: 151 MMHG | BODY MASS INDEX: 45.82 KG/M2 | OXYGEN SATURATION: 100 % | RESPIRATION RATE: 18 BRPM | HEART RATE: 71 BPM | WEIGHT: 226.85 LBS

## 2023-03-10 DIAGNOSIS — L23.89 ALLERGIC CONTACT DERMATITIS DUE TO OTHER AGENTS: Primary | ICD-10-CM

## 2023-03-10 LAB
ALBUMIN SERPL BCP-MCNC: 4 G/DL (ref 3.5–5)
ALP SERPL-CCNC: 94 U/L (ref 34–104)
ALT SERPL W P-5'-P-CCNC: 19 U/L (ref 7–52)
ANION GAP SERPL CALCULATED.3IONS-SCNC: 6 MMOL/L (ref 4–13)
AST SERPL W P-5'-P-CCNC: 25 U/L (ref 13–39)
BASOPHILS # BLD AUTO: 0.04 THOUSANDS/ÂΜL (ref 0–0.1)
BASOPHILS NFR BLD AUTO: 1 % (ref 0–1)
BILIRUB SERPL-MCNC: 0.43 MG/DL (ref 0.2–1)
BUN SERPL-MCNC: 15 MG/DL (ref 5–25)
CALCIUM SERPL-MCNC: 9.1 MG/DL (ref 8.4–10.2)
CHLORIDE SERPL-SCNC: 107 MMOL/L (ref 96–108)
CO2 SERPL-SCNC: 26 MMOL/L (ref 21–32)
CREAT SERPL-MCNC: 0.82 MG/DL (ref 0.6–1.3)
EOSINOPHIL # BLD AUTO: 0.18 THOUSAND/ÂΜL (ref 0–0.61)
EOSINOPHIL NFR BLD AUTO: 2 % (ref 0–6)
ERYTHROCYTE [DISTWIDTH] IN BLOOD BY AUTOMATED COUNT: 14.7 % (ref 11.6–15.1)
GFR SERPL CREATININE-BSD FRML MDRD: 79 ML/MIN/1.73SQ M
GLUCOSE SERPL-MCNC: 76 MG/DL (ref 65–140)
HCT VFR BLD AUTO: 35.9 % (ref 34.8–46.1)
HGB BLD-MCNC: 11.8 G/DL (ref 11.5–15.4)
IMM GRANULOCYTES # BLD AUTO: 0.01 THOUSAND/UL (ref 0–0.2)
IMM GRANULOCYTES NFR BLD AUTO: 0 % (ref 0–2)
LACTATE SERPL-SCNC: 0.7 MMOL/L (ref 0.5–2)
LYMPHOCYTES # BLD AUTO: 2.95 THOUSANDS/ÂΜL (ref 0.6–4.47)
LYMPHOCYTES NFR BLD AUTO: 35 % (ref 14–44)
MCH RBC QN AUTO: 29.8 PG (ref 26.8–34.3)
MCHC RBC AUTO-ENTMCNC: 32.9 G/DL (ref 31.4–37.4)
MCV RBC AUTO: 91 FL (ref 82–98)
MONOCYTES # BLD AUTO: 0.54 THOUSAND/ÂΜL (ref 0.17–1.22)
MONOCYTES NFR BLD AUTO: 6 % (ref 4–12)
NEUTROPHILS # BLD AUTO: 4.75 THOUSANDS/ÂΜL (ref 1.85–7.62)
NEUTS SEG NFR BLD AUTO: 56 % (ref 43–75)
NRBC BLD AUTO-RTO: 0 /100 WBCS
PLATELET # BLD AUTO: 302 THOUSANDS/UL (ref 149–390)
PMV BLD AUTO: 10.8 FL (ref 8.9–12.7)
POTASSIUM SERPL-SCNC: 3.9 MMOL/L (ref 3.5–5.3)
PROT SERPL-MCNC: 7.6 G/DL (ref 6.4–8.4)
RBC # BLD AUTO: 3.96 MILLION/UL (ref 3.81–5.12)
SODIUM SERPL-SCNC: 139 MMOL/L (ref 135–147)
WBC # BLD AUTO: 8.47 THOUSAND/UL (ref 4.31–10.16)

## 2023-03-10 RX ORDER — DIAPER,BRIEF,INFANT-TODD,DISP
EACH MISCELLANEOUS
Qty: 15 G | Refills: 0 | Status: SHIPPED | OUTPATIENT
Start: 2023-03-10

## 2023-03-10 RX ORDER — METHYLPREDNISOLONE 4 MG/1
TABLET ORAL
Qty: 21 TABLET | Refills: 0 | Status: SHIPPED | OUTPATIENT
Start: 2023-03-10

## 2023-03-10 RX ORDER — CEFAZOLIN SODIUM 2 G/50ML
2000 SOLUTION INTRAVENOUS ONCE
Status: COMPLETED | OUTPATIENT
Start: 2023-03-10 | End: 2023-03-10

## 2023-03-10 RX ADMIN — CEFAZOLIN SODIUM 2000 MG: 2 SOLUTION INTRAVENOUS at 12:10

## 2023-03-10 NOTE — ED PROVIDER NOTES
History  Chief Complaint   Patient presents with   • Arm Swelling     Pt c/o right wrist/arm swelling since 3/4/23  Pt has a cath procedure done on 3/2/23  Pt deniies cp/sob/n/v/d or fevers  Pt was told to come to the ED by Dr Prachi Harley     Patient is a 51-year-old female with past medical history significant for CHF maintained on Lasix, aspirin, and Imdur, hyperlipidemia, GERD, who underwent cardiac catheterization on March 2, 2023  The following day patient noted that her puncture site in her right radial artery was erythematous and itchy  Over the following days her arm became more erythematous with a blanchable vesicular rash  Patient also has a similar rash in her right groin  Patient contacted her cardiologist who recommended she be evaluated at the ED  Patient denies fever, chills, nausea, vomiting, fatigue, weakness, shortness of breath, wheezing, stridor, loss of sensation, change in temperature of her extremities, edema  Her only complaint is that the rash itches a lot and has an associated tingling sensation  History provided by:  Patient  Rash  Location:  Shoulder/arm and torso  Shoulder/arm rash location:  R forearm and R wrist  Torso rash location: Right groin    Quality: dryness, itchiness, redness and scaling    Quality comment:  Vesicular  Severity:  Moderate  Onset quality:  Sudden  Duration:  1 week  Timing:  Constant  Progression:  Worsening  Chronicity:  New  Context: not animal contact, not chemical exposure, not diapers, not eggs, not exposure to similar rash, not food, not hot tub use, not insect bite/sting, not medications, not new detergent/soap, not nuts, not plant contact, not pollen, not pregnancy, not sick contacts and not sun exposure    Relieved by:  Nothing  Exacerbated by: Hydrocortisone cream   Ineffective treatments:  Topical steroids and moisturizers  Associated symptoms: no abdominal pain, no diarrhea, no fatigue, no fever, no headaches, no hoarse voice, no induration, no joint pain, no myalgias, no nausea, no periorbital edema, no shortness of breath, no sore throat, no throat swelling, no tongue swelling, no URI, not vomiting and not wheezing        Prior to Admission Medications   Prescriptions Last Dose Informant Patient Reported? Taking? Cholecalciferol (VITAMIN D) 2000 units CAPS   Yes No   Sig: Take 1 capsule by mouth daily   Glycerin-Polysorbate 80 (REFRESH DRY EYE THERAPY OP)   Yes No   Sig: Apply to eye   albuterol (VENTOLIN HFA) 90 mcg/act inhaler   No No   Sig: Inhale 2 puffs every 6 (six) hours as needed for wheezing or shortness of breath   amLODIPine (NORVASC) 5 mg tablet   No No   Sig: Take 1 tablet (5 mg total) by mouth daily   aspirin 81 MG tablet   Yes No   Sig: Take 81 mg by mouth daily   atorvastatin (LIPITOR) 80 mg tablet   No No   Sig: Take 1 tablet (80 mg total) by mouth daily   citalopram (CeleXA) 40 mg tablet   No No   Sig: Take 1 tablet (40 mg total) by mouth daily   docusate sodium (COLACE) 100 mg capsule   No No   Sig: Take 1 capsule (100 mg total) by mouth 2 (two) times a day   ferrous sulfate 324 (65 Fe) mg   No No   Sig: Take 1 tablet (324 mg total) by mouth 2 (two) times a day before meals   furosemide (LASIX) 20 mg tablet   No No   Sig: Take 1 tablet (20 mg total) by mouth daily Do not start before March 3, 2023     isosorbide mononitrate (IMDUR) 60 mg 24 hr tablet   No No   Sig: Take 1 tablet (60 mg total) by mouth daily   metoprolol tartrate (LOPRESSOR) 25 mg tablet   No No   Sig: Take 1 tablet (25 mg total) by mouth every 12 (twelve) hours   montelukast (SINGULAIR) 10 mg tablet   No No   Sig: Take 1 tablet (10 mg total) by mouth daily at bedtime   multivitamin (THERAGRAN) TABS   Yes No   Sig: Take 1 tablet by mouth daily   omeprazole (PriLOSEC) 20 mg delayed release capsule   No No   Sig: take 1 capsule by mouth once daily   rizatriptan (MAXALT) 10 mg tablet   No No   Sig: Take 1 tablet (10 mg total) by mouth once as needed for migraine for up to 1 dose May repeat in 2 hours if needed   topiramate (TOPAMAX) 100 mg tablet   No No   Sig: take 1 tablet by mouth once daily      Facility-Administered Medications: None       Past Medical History:   Diagnosis Date   • Anxiety    • Chest pain 08/12/2016   • Chest wall pain 07/15/2016   • Costochondritis 02/28/2014   • Dog bite 07/29/2014   • 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[facility])    • Fluid retention    • Gastroenteritis 05/06/2013   • Herpes zoster 07/08/2013   • History of chemotherapy 1986   • Migraine 01/19/2016   • Shortness of breath 08/12/2016   • Toxoplasmosis 08/28/2014       Past Surgical History:   Procedure Laterality Date   • CARDIAC CATHETERIZATION Left 3/2/2023    Procedure: Cardiac Left Heart Cath - Dr Kuldeep Mott or Dr Carol Ann Mckinney please;  Surgeon: Gerardo Anderson DO;  Location: [facility]; Service: Cardiology   • CARDIAC CATHETERIZATION N/A 3/2/2023    Procedure: Cardiac Coronary Angiogram;  Surgeon: Gerardo Anderson DO;  [facility]; Service: Cardiology   • CARDIAC CATHETERIZATION  3/2/2023    Procedure: Cardiac catheterization;  Surgeon: Gerardo Anderson DO;  [facility]; Service: Cardiology   • CHOLECYSTECTOMY     • COLONOSCOPY     • ESOPHAGOGASTRODUODENOSCOPY     • FL RETROGRADE PYELOGRAM  7/28/2022   • HYSTERECTOMY  1986   • KNEE ARTHROSCOPY      left shoulder   • NECK SURGERY      Lumpectomy   • OOPHORECTOMY Right 1986   • RI CYSTO/URETERO W/LITHOTRIPSY &INDWELL STENT INSRT Left 7/28/2022    Procedure: CYSTOSCOPY URETEROSCOPY WITH LITHOTRIPSY HOLMIUM LASER, RETROGRADE PYELOGRAM AND INSERTION STENT LEFT URETERAL;  Surgeon: Jones Mccoy MD;  Location: [facility];  Service: Urology   • RI ESOPHAGOGASTRODUODENOSCOPY TRANSORAL DIAGNOSTIC N/A 11/30/2018    Procedure: EGD;  Surgeon: Ghazal Sanders MD;  Location: [facility];   Service: Gastroenterology       Family History   Problem Relation Age of Onset   • Diabetes Mother    • Heart disease Mother    • Heart disease Father    • No Known Problems Sister    • No Known Problems Sister    • No Known Problems Sister    • No Known Problems Daughter    • No Known Problems Daughter    • No Known Problems Maternal Grandmother    • No Known Problems Maternal Grandfather    • No Known Problems Paternal Grandmother    • No Known Problems Paternal Grandfather    • Breast cancer Maternal Aunt         age unknown   • No Known Problems Maternal Aunt    • Coronary artery disease Family      I have reviewed and agree with the history as documented  E-Cigarette/Vaping   • E-Cigarette Use Never User      E-Cigarette/Vaping Substances   • Nicotine No    • THC No    • CBD No    • Flavoring No    • Other No    • Unknown No      Social History     Tobacco Use   • Smoking status: Never   • Smokeless tobacco: Never   Vaping Use   • Vaping Use: Never used   Substance Use Topics   • Alcohol use: Yes     Comment: Occasionally   • Drug use: No       Review of Systems   Constitutional: Negative  Negative for chills, fatigue and fever  HENT: Negative  Negative for ear pain, hoarse voice and sore throat  Eyes: Negative  Negative for pain and visual disturbance  Respiratory: Negative for cough, shortness of breath and wheezing  Cardiovascular: Negative for chest pain and palpitations  Gastrointestinal: Negative for abdominal pain, diarrhea, nausea and vomiting  Endocrine: Negative  Genitourinary: Negative for dysuria and hematuria  Musculoskeletal: Negative for arthralgias, back pain and myalgias  Skin: Positive for rash  Negative for color change  Allergic/Immunologic: Negative  Neurological: Negative  Negative for seizures, syncope and headaches  Hematological: Negative  Psychiatric/Behavioral: Negative  All other systems reviewed and are negative  Physical Exam  Physical Exam  Vitals and nursing note reviewed  Constitutional:       General: She is not in acute distress       Appearance: Normal appearance  She is well-developed  She is not ill-appearing  HENT:      Head: Normocephalic and atraumatic  Right Ear: Tympanic membrane, ear canal and external ear normal       Left Ear: Tympanic membrane, ear canal and external ear normal       Nose: Nose normal       Mouth/Throat:      Mouth: Mucous membranes are moist       Pharynx: No oropharyngeal exudate or posterior oropharyngeal erythema  Eyes:      General: No scleral icterus  Extraocular Movements: Extraocular movements intact  Conjunctiva/sclera: Conjunctivae normal       Pupils: Pupils are equal, round, and reactive to light  Cardiovascular:      Rate and Rhythm: Normal rate and regular rhythm  Pulses: Normal pulses  Heart sounds: Normal heart sounds  No murmur heard  Pulmonary:      Effort: Pulmonary effort is normal  No respiratory distress  Breath sounds: Normal breath sounds  Abdominal:      General: Abdomen is flat  Bowel sounds are normal       Palpations: Abdomen is soft  Tenderness: There is no abdominal tenderness  There is no right CVA tenderness or left CVA tenderness  Musculoskeletal:         General: No swelling  Normal range of motion  Cervical back: Normal range of motion and neck supple  Right lower leg: No edema  Left lower leg: No edema  Skin:     General: Skin is warm and dry  Capillary Refill: Capillary refill takes less than 2 seconds  Findings: Rash present  Neurological:      General: No focal deficit present  Mental Status: She is alert and oriented to person, place, and time  Cranial Nerves: No cranial nerve deficit  Sensory: No sensory deficit     Psychiatric:         Mood and Affect: Mood normal          Vital Signs  ED Triage Vitals [03/10/23 1045]   Temperature Pulse Respirations Blood Pressure SpO2   98 °F (36 7 °C) 84 18 164/74 97 %      Temp Source Heart Rate Source Patient Position - Orthostatic VS BP Location FiO2 (%) Oral Monitor Sitting Left arm --      Pain Score       7           Vitals:    03/10/23 1045 03/10/23 1215 03/10/23 1230 03/10/23 1300   BP: 164/74 148/72 149/64 151/72   Pulse: 84 73 68 71   Patient Position - Orthostatic VS: Sitting Sitting Sitting Sitting         Visual Acuity      ED Medications  Medications   ceFAZolin (ANCEF) IVPB (premix in dextrose) 2,000 mg 50 mL (0 mg Intravenous Stopped 3/10/23 1240)       Diagnostic Studies  Results Reviewed     Procedure Component Value Units Date/Time    Comprehensive metabolic panel [[id number]] Collected: 03/10/23 1207    Lab Status: Final result Specimen: Blood from Arm, Left Updated: 03/10/23 1252     Sodium 139 mmol/L      Potassium 3 9 mmol/L      Chloride 107 mmol/L      CO2 26 mmol/L      ANION GAP 6 mmol/L      BUN 15 mg/dL      Creatinine 0 82 mg/dL      Glucose 76 mg/dL      Calcium 9 1 mg/dL      AST 25 U/L      ALT 19 U/L      Alkaline Phosphatase 94 U/L      Total Protein 7 6 g/dL      Albumin 4 0 g/dL      Total Bilirubin 0 43 mg/dL      eGFR 79 ml/min/1 73sq m     Narrative:      Juvenal guidelines for Chronic Kidney Disease (CKD):   •  Stage 1 with normal or high GFR (GFR > 90 mL/min/1 73 square meters)  •  Stage 2 Mild CKD (GFR = 60-89 mL/min/1 73 square meters)  •  Stage 3A Moderate CKD (GFR = 45-59 mL/min/1 73 square meters)  •  Stage 3B Moderate CKD (GFR = 30-44 mL/min/1 73 square meters)  •  Stage 4 Severe CKD (GFR = 15-29 mL/min/1 73 square meters)  •  Stage 5 End Stage CKD (GFR <15 mL/min/1 73 square meters)  Note: GFR calculation is accurate only with a steady state creatinine    Lactic acid, plasma [[id number]]  (Normal) Collected: 03/10/23 1207    Lab Status: Final result Specimen: Blood from Arm, Left Updated: 03/10/23 1252     LACTIC ACID 0 7 mmol/L     Narrative:      Result may be elevated if tourniquet was used during collection      CBC and differential [[id number]] Collected: 03/10/23 1207    Lab Status: Final result Specimen: Blood from Arm, Left Updated: 03/10/23 1223     WBC 8 47 Thousand/uL      RBC 3 96 Million/uL      Hemoglobin 11 8 g/dL      Hematocrit 35 9 %      MCV 91 fL      MCH 29 8 pg      MCHC 32 9 g/dL      RDW 14 7 %      MPV 10 8 fL      Platelets 244 Thousands/uL      nRBC 0 /100 WBCs      Neutrophils Relative 56 %      Immat GRANS % 0 %      Lymphocytes Relative 35 %      Monocytes Relative 6 %      Eosinophils Relative 2 %      Basophils Relative 1 %      Neutrophils Absolute 4 75 Thousands/µL      Immature Grans Absolute 0 01 Thousand/uL      Lymphocytes Absolute 2 95 Thousands/µL      Monocytes Absolute 0 54 Thousand/µL      Eosinophils Absolute 0 18 Thousand/µL      Basophils Absolute 0 04 Thousands/µL     Blood culture [[id number]] Collected: 03/10/23 1207    Lab Status: In process Specimen: Blood from Arm, Left Updated: 03/10/23 1220    Blood culture [[id number]] Collected: 03/10/23 1207    Lab Status: In process Specimen: Blood from Arm, Right Updated: 03/10/23 1220                 VAS upper limb venous duplex scan, unilateral/limited    (Results Pending)              Procedures  Procedures         ED Course  ED Course as of 03/10/23 1539   Fri Mar 10, 2023   1227 WBC: 8 47                               SBIRT 20yo+    Flowsheet Row Most Recent Value   SBIRT (23 yo +)    In order to provide better care to our patients, we are screening all of our patients for alcohol and drug use  Would it be okay to ask you these screening questions?  No Filed at: 03/10/2023 1236                    Medical Decision Making  Allergic contact dermatitis due to other agents: acute illness or injury     Details: Patient with rash and skin changes following cardiac catheterization procedure  Laboratory evaluation and ultrasound without acute pathology  Suspect patient is allergic to chlorhexidine  Patient discharged with topical hydrocortisone and medrol dosepack  Discussed with patient's cardiologist Dr Noemy Alegria  Patient educated on red flag symptoms that necessitate return to the ED  Amount and/or Complexity of Data Reviewed  External Data Reviewed: labs and notes  Labs: ordered  Decision-making details documented in ED Course  Radiology: ordered  Decision-making details documented in ED Course  ECG/medicine tests: ordered  Risk  Prescription drug management  Disposition  Final diagnoses: Allergic contact dermatitis due to other agents     Time reflects when diagnosis was documented in both MDM as applicable and the Disposition within this note     Time User Action Codes Description Comment    3/10/2023  1:24 PM Jm Wang Add [L23 89] Allergic contact dermatitis due to other agents       ED Disposition     ED Disposition   Discharge    Condition   Stable    Date/Time   Fri Mar 10, 2023  1:24 PM    Comment   Shilpa Dorado discharge to home/self care                 Follow-up Information     Follow up With Specialties Details Why Contact Info Additional Information    Elvin Awad, [facility]  Family Medicine Call  As needed Zofia Adalberto 10 100  [address] [facility] [address] [facility]       Noble Claduio DO Cardiology Call   [address]  [address] [facility] [address] [place] [address]/[place] [facility] Emergency Medicine Go to  If symptoms worsen [facility] [id number]  [address] [facility] Emergency Department, [address] [facility] , [facility], [address] [facility], [address]          Discharge Medication List as of 3/10/2023  1:40 PM      START taking these medications    Details   hydrocortisone 1 % cream Apply to affected area 2 times daily, Normal      methylPREDNISolone 4 MG tablet therapy pack Use as directed on package, Normal         CONTINUE these medications which have NOT CHANGED    Details   albuterol (VENTOLIN HFA) 90 mcg/act inhaler Inhale 2 puffs every 6 (six) hours as needed for wheezing or shortness of breath, Starting Tue 3/26/2019, Normal      amLODIPine (NORVASC) 5 mg tablet Take 1 tablet (5 mg total) by mouth daily, Starting Mon 1/3/2022, Normal      aspirin 81 MG tablet Take 81 mg by mouth daily, Starting Th 1/22/2009, Historical Med      atorvastatin (LIPITOR) 80 mg tablet Take 1 tablet (80 mg total) by mouth daily, Starting Mon 2/20/2023, Normal      Cholecalciferol (VITAMIN D) 2000 units CAPS Take 1 capsule by mouth daily, Starting Mon 3/4/2013, Historical Med      citalopram (CeleXA) 40 mg tablet Take 1 tablet (40 mg total) by mouth daily, Starting Th 10/6/2022, Normal      docusate sodium (COLACE) 100 mg capsule Take 1 capsule (100 mg total) by mouth 2 (two) times a day, Starting Mon 2/20/2023, Normal      ferrous sulfate 324 (65 Fe) mg Take 1 tablet (324 mg total) by mouth 2 (two) times a day before meals, Starting Mon 2/20/2023, Normal      furosemide (LASIX) 20 mg tablet Take 1 tablet (20 mg total) by mouth daily Do not start before March 3, 2023 , Starting Fri 3/3/2023, No Print      Glycerin-Polysorbate 80 (REFRESH DRY EYE THERAPY OP) Apply to eye, Historical Med      isosorbide mononitrate (IMDUR) 60 mg 24 hr tablet Take 1 tablet (60 mg total) by mouth daily, Starting Mon 2/20/2023, Normal      metoprolol tartrate (LOPRESSOR) 25 mg tablet Take 1 tablet (25 mg total) by mouth every 12 (twelve) hours, Starting Thu 3/31/2022, Normal      montelukast (SINGULAIR) 10 mg tablet Take 1 tablet (10 mg total) by mouth daily at bedtime, Starting Th 10/13/2022, Normal      multivitamin (THERAGRAN) TABS Take 1 tablet by mouth daily, Historical Med      omeprazole (PriLOSEC) 20 mg delayed release capsule take 1 capsule by mouth once daily, Normal      rizatriptan (MAXALT) 10 mg tablet Take 1 tablet (10 mg total) by mouth once as needed for migraine for up to 1 dose May repeat in 2 hours if needed, Starting Th 10/13/2022, Normal      topiramate (TOPAMAX) 100 mg tablet take 1 tablet by mouth once daily, Normal             No discharge procedures on file      PDMP Review     None          ED Provider  Electronically Signed by           Angel Luis Vann PA-C  03/10/23 3308

## 2023-03-10 NOTE — TELEPHONE ENCOUNTER
Pt is calling had cardiac cath 3/2/23 is stating has rash and swelling of wrist and fore arm where cath preformed  This started on Sat  3/4 per patient  She states is appears to be getting worse  The rash is "prickly feeling" Pt is advised to report to the ED per Dr Alfredo Segovia  Called patient she is in agreement and will report to ED

## 2023-03-11 DIAGNOSIS — R06.02 SOB (SHORTNESS OF BREATH) ON EXERTION: ICD-10-CM

## 2023-03-11 DIAGNOSIS — R07.9 CHEST PAIN, UNSPECIFIED TYPE: ICD-10-CM

## 2023-03-12 LAB
BACTERIA BLD CULT: NORMAL
BACTERIA BLD CULT: NORMAL

## 2023-03-13 RX ORDER — AMLODIPINE BESYLATE 5 MG/1
TABLET ORAL
Qty: 90 TABLET | Refills: 3 | Status: SHIPPED | OUTPATIENT
Start: 2023-03-13

## 2023-03-15 DIAGNOSIS — F41.1 GENERALIZED ANXIETY DISORDER: ICD-10-CM

## 2023-03-15 LAB
BACTERIA BLD CULT: NORMAL
BACTERIA BLD CULT: NORMAL

## 2023-03-15 RX ORDER — CITALOPRAM 40 MG/1
40 TABLET ORAL DAILY
Qty: 30 TABLET | Refills: 2 | Status: SHIPPED | OUTPATIENT
Start: 2023-03-15

## 2023-04-03 ENCOUNTER — OFFICE VISIT (OUTPATIENT)
Dept: CARDIOLOGY CLINIC | Facility: CLINIC | Age: 58
End: 2023-04-03

## 2023-04-03 VITALS
BODY MASS INDEX: 45.56 KG/M2 | HEIGHT: 59 IN | WEIGHT: 226 LBS | DIASTOLIC BLOOD PRESSURE: 78 MMHG | OXYGEN SATURATION: 97 % | SYSTOLIC BLOOD PRESSURE: 122 MMHG | HEART RATE: 83 BPM

## 2023-04-03 DIAGNOSIS — E66.01 CLASS 3 SEVERE OBESITY DUE TO EXCESS CALORIES WITH BODY MASS INDEX (BMI) OF 45.0 TO 49.9 IN ADULT, UNSPECIFIED WHETHER SERIOUS COMORBIDITY PRESENT (HCC): ICD-10-CM

## 2023-04-03 DIAGNOSIS — J38.3 VOCAL CORD DYSFUNCTION: ICD-10-CM

## 2023-04-03 DIAGNOSIS — R06.02 SOB (SHORTNESS OF BREATH) ON EXERTION: Primary | ICD-10-CM

## 2023-04-03 DIAGNOSIS — R53.82 CHRONIC FATIGUE: ICD-10-CM

## 2023-04-03 NOTE — PROGRESS NOTES
Cardiology   Perry Martyr, MD Threasa Gaucher, MD Vicky Levels, DO, Ally Correa, MD Cameron Colon DO, Salena Waldron DO, [facility]  -------------------------------------------------------------------  [facility]  [address]  [place], [address] [address] [phone]  [phone]  04/03/23  Carolina Hogan  YOB: 1965   MRN: [id number]      Referring Physician: Theo Miguel, [address]  [facility],  [address]     HPI: Carolina Hogan is a 62 y o  female with:   Mild CAD by cath  Dyslipidemia  Abnormal electrocardiogram   Dyspnea on exertion   Palpitations  Obesity  Deconditioning   Gastroesophageal reflux disease  Fibromyalgia   Anxiety    Cath  •  LM: short, large caliber vessel, bifurcates into LAD/LCX, with no angiographic evidence of significant obstructive CAD  •  LAD: type 3, large caliber vessel, with mild proximal disease, it gives off several small caliber DICK branches, with no angiographic evidence of significant obstructive CAD  •  LCX: dominant, large caliber vessel, with mild proximal disease, it gives off several large OM branches, with no angiographic evidence of significant obstructive CAD  •  RCA: nondominant, very small caliber vessel, with no minimla diffuse CAD  •  LVEDP is mildly elevated without gradient on LV-AO pullback  Recommendation  Cont GDMT optimization for CAD  Aggressive risk factor reduction   on diet/lifestyle modification  Pulm rehab upon discharge for likely [facility] with deconditioning  SL Weight management referral as well, discussed with patient  Patient with noted increased home stressors with ailing spouse, Psycotherapy referral as well, discussed with patient    She presents for f/u  Has not started pulm rehab - might not be able to with work situation  Still needs sleep study      Review of Systems   Constitutional: Negative for chills and fever     HENT: Negative for facial swelling and sore throat  Eyes: Negative for visual disturbance  Respiratory: Positive for shortness of breath  Negative for cough, chest tightness and wheezing  Cardiovascular: Negative for chest pain, palpitations and leg swelling  Gastrointestinal: Negative for abdominal pain, blood in stool, constipation, diarrhea, nausea and vomiting  Endocrine: Negative for cold intolerance and heat intolerance  Genitourinary: Negative for decreased urine volume, difficulty urinating, dysuria and hematuria  Musculoskeletal: Negative for arthralgias, back pain and myalgias  Skin: Negative for rash  Neurological: Negative for dizziness, syncope, weakness and numbness  Psychiatric/Behavioral: Negative for agitation, behavioral problems and confusion  The patient is not nervous/anxious  OBJECTIVE  Vitals:    04/03/23 1603   BP: 122/78   Pulse: 83   SpO2: 97%       Physical Exam  Constitutional: awake, alert and oriented, in no acute distress, no obvious deformities, obese female  Head: Normocephalic, without obvious abnormality, atraumatic  Eyes: conjunctivae clear and moist  Sclera anicteric  No xanthelasmas  Pupils equal bilaterally  Extraocular motions are full  Ear nose mouth and throat: ears are symmetrical bilaterally, hearing appears to be equal bilaterally, no nasal discharge or epistaxis, oropharynx is clear with moist mucous membranes  Neck: Trachea is midline, neck is supple, no thyromegaly or significant lymphadenopathy, there is full range of motion    Lungs: clear to auscultation bilaterally, no wheezes, no rales, no rhonchi, no accessory muscle use, breathing is nonlabored  Heart: regular rate and rhythm, S1, S2 normal, no murmur, no click, no rub and no gallop, no lower extremity edema  Abdomen: Obese, soft, non-tender; bowel sounds normal; no masses, no organomegaly  Psychiatric: Patient is oriented to time, place, person, mood/affect is negative for depression, anxiety, agitation, appears to have appropriate insight  Skin: Skin is warm, dry, intact  No obvious rashes or lesions on exposed extremities  Nail beds are pink with no cyanosis or clubbing  EKG:  No results found for this visit on 04/03/23  IMPRESSION:  Mild CAD by cath  Dyslipidemia  Abnormal electrocardiogram   Dyspnea on exertion   Palpitations  Obesity  Deconditioning   Gastroesophageal reflux disease  Fibromyalgia   Anxiety    DISCUSSION/RECOMMENDATIONS:  She has not scheduled sleep study yet - would highly recommend this  Needs eval by weight mgmt still as well  BP controlled  Continue with aspirin, lipitor 80, lasix 20, imdur, lopressor  Would rec eval for pulm rehab as well if she is able to make the appointments  Her home situation with her  who is diabled may affect her ability to complete the above  Would consider eval by ENT for vocal cord dysfunction as well - she states that there are occasions where she feels like her ability to take a deep breath is interrupted or hesitant  She feels it in her throat when this happens  Peter Edgar DO, [facility], [address]  --------------------------------------------------------------------------------  TREADMILL STRESS  No results found for this or any previous visit      ----------------------------------------------------------------------------------------------  NUCLEAR STRESS TEST: No results found for this or any previous visit  Results for orders placed during the hospital encounter of 04/19/22    NM myocardial perfusion spect (rx stress and/or rest)    Interpretation Summary  •  Stress ECG: No ST deviation is noted  There were no arrhythmias during recovery    The ECG was negative for ischemia  The stress ECG is negative for ischemia  •  Perfusion: There is a left ventricular perfusion defect that is small in size with moderate reduction in uptake present in the apical location(s) that is fixed   The defect appears to be probable artifact caused by breast attenuation  •  Stress Function: Left ventricular function post-stress is normal  Post-stress ejection fraction is 73 %  •  Stress Combined Conclusion: There is image artifact, without diagnostic evidence for perfusion abnormality       --------------------------------------------------------------------------------  CATH:  No results found for this or any previous visit     --------------------------------------------------------------------------------  ECHO:   Results for orders placed during the hospital encounter of 18    Echo complete with contrast if indicated    [place] [address] [address]    Transthoracic Echocardiogram  2D, M-mode, Doppler, and Color Doppler    Study date:  13-Dec-2018    Patient: Oxana Benitez  MR number: [id number]  Account number: [de-identified]  : 1965  Age: 48 years  Gender: Female  Status: Outpatient  Location: [address] [place] [address]  Height: 60 in  Weight: 218 5 lb  BP: 138/ 86 mmHg    Indications: Dyspnea    Diagnoses: R06 00 - Dyspnea, unspecified    Sonographer:  Sally Yan RDCS  Primary Physician:  Peggy Jeffers DO  Referring Physician:  Ella Sales DO  Group:  [facility]  Interpreting Physician:  Julia Penny MD    SUMMARY    SUMMARY:  1  Sinus rhythm  2  Good technical quality  3  Normal left ventricular systolic and diastolic function  4  Mild left ventricular cavity enlargement    LEFT VENTRICLE:  Normal left ventricular systolic function, EF 31%  Mild left ventricular cavity enlargement  Normal left ventricular wall thickness  Normal left ventricular wall motion without regional wall motion abnormalities  Normal left ventricular  diastolic function and filling pressures  LEFT ATRIUM:  Normal left atrial size  RIGHT ATRIUM:  Normal right atrial size  MITRAL VALVE:  Normal mitral valve with trace mitral regurgitation      AORTIC VALVE:  Normal tricuspid aortic valve without stenosis  TRICUSPID VALVE:  Trace tricuspid regurgitation  PULMONIC VALVE:  No pulmonic regurgitation  PULMONARY ARTERIES:  Normal pulmonary artery pressures  PERICARDIUM:  No pericardial effusion  HISTORY: PRIOR HISTORY: Patient has no history of cardiovascular disease  Risk factors: morbid obesity  PROCEDURE: The study was performed in the [facility]  This was a routine study  The transthoracic approach was used  The study included complete 2D imaging, M-mode, complete spectral Doppler, and color Doppler  The heart rate was  84 bpm, at the start of the study  This was a technically difficult study  LEFT VENTRICLE: Normal left ventricular systolic function, EF 56%  Mild left ventricular cavity enlargement  Normal left ventricular wall thickness  Normal left ventricular wall motion without regional wall motion abnormalities  Normal  left ventricular diastolic function and filling pressures  RIGHT VENTRICLE: The size was normal  Systolic function was normal  Wall thickness was normal     LEFT ATRIUM: Normal left atrial size  RIGHT ATRIUM: Normal right atrial size  MITRAL VALVE: Normal mitral valve with trace mitral regurgitation  AORTIC VALVE: Normal tricuspid aortic valve without stenosis  TRICUSPID VALVE: Trace tricuspid regurgitation  PULMONIC VALVE: No pulmonic regurgitation  PERICARDIUM: No pericardial effusion  There was no pericardial effusion  The pericardium was normal in appearance  AORTA: The root exhibited normal size  PULMONARY ARTERY: Normal pulmonary artery pressures      SYSTEM MEASUREMENT TABLES    2D  %FS: 35 91 %  Ao Diam: 3 11 cm  EDV(Teich): 125 09 ml  EF(Teich): 65 15 %  ESV(Teich): 43 6 ml  IVSd: 0 87 cm  LA Area: 11 16 cm2  LA Diam: 3 7 cm  LVEDV MOD A4C: 78 93 ml  LVEF MOD A4C: 68 69 %  LVESV MOD A4C: 24 71 ml  LVIDd: 5 12 cm  LVIDs: 3 28 cm  LVLd A4C: 7 15 cm  LVLs A4C: 5 52 cm  LVPWd: 0 9 cm  RA Area: 10 65 cm2  SV MOD A4C: 54 22 ml  SV(Teich): 81 5 ml    CW  TR Vmax: 1 92 m/s  TR maxP 8 mmHg    MM  TAPSE: 2 4 cm    PW  E': 0 14 m/s  E/E': 6 72  MV A [place]: 1 05 m/s  MV Dec [place]: 4 01 m/s2  MV DecT: 231 54 ms  MV E Hugo: 0 93 m/s  MV E/A Ratio: 0 88  MV PHT: 67 15 ms  MVA By PHT: 3 28 cm2    [facility] Accredited Echocardiography Laboratory    Prepared and electronically signed by    Ton Cain MD  Signed 13-Dec-2018 16:36:37    No results found for this or any previous visit     --------------------------------------------------------------------------------  HOLTER  No results found for this or any previous visit  No results found for this or any previous visit     --------------------------------------------------------------------------------  CAROTIDS  No results found for this or any previous visit      --------------------------------------------------------------------------------  Diagnoses and all orders for this visit:    SOB (shortness of breath) on exertion  -     Ambulatory Referral to Pulmonary Rehabilitation; Future  -     Ambulatory Referral to Otolaryngology; Future    Chronic fatigue  -     Ambulatory Referral to Pulmonary Rehabilitation; Future    Class 3 severe obesity due to excess calories with body mass index (BMI) of 45 0 to 49 9 in adult, unspecified whether serious comorbidity present [facility])  -     Ambulatory Referral to Pulmonary Rehabilitation; Future    Vocal cord dysfunction  -     Ambulatory Referral to Otolaryngology;  Future     ======================================================    Past Medical History:   Diagnosis Date   • Anxiety    • Chest pain 2016   • Chest wall pain 07/15/2016   • Costochondritis 2014   • Dog bite 2014   • 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[facility])    • Fluid retention    • Gastroenteritis 2013   • Herpes zoster 2013   • History of chemotherapy 1986   • Migraine 2016   • Shortness of breath 08/12/2016   • Toxoplasmosis 08/28/2014     Past Surgical History:   Procedure Laterality Date   • CARDIAC CATHETERIZATION Left 3/2/2023    Procedure: Cardiac Left Heart Cath - Dr Deya Rocha or Dr Jerome Melchor please;  Surgeon: Mónica Sapp DO;  Location: [facility]; Service: Cardiology   • CARDIAC CATHETERIZATION N/A 3/2/2023    Procedure: Cardiac Coronary Angiogram;  Surgeon: Mónica Sapp DO;  [facility]; Service: Cardiology   • CARDIAC CATHETERIZATION  3/2/2023    Procedure: Cardiac catheterization;  Surgeon: Mónica Sapp DO;  [facility]; Service: Cardiology   • CHOLECYSTECTOMY     • COLONOSCOPY     • ESOPHAGOGASTRODUODENOSCOPY     • FL RETROGRADE PYELOGRAM  7/28/2022   • HYSTERECTOMY  1986   • KNEE ARTHROSCOPY      left shoulder   • NECK SURGERY      Lumpectomy   • OOPHORECTOMY Right 1986   • LA CYSTO/URETERO W/LITHOTRIPSY &INDWELL STENT INSRT Left 7/28/2022    Procedure: CYSTOSCOPY URETEROSCOPY WITH LITHOTRIPSY HOLMIUM LASER, RETROGRADE PYELOGRAM AND INSERTION STENT LEFT URETERAL;  Surgeon: Destinee Nash MD;  Location: [facility];  Service: Urology   • LA ESOPHAGOGASTRODUODENOSCOPY TRANSORAL DIAGNOSTIC N/A 11/30/2018    Procedure: EGD;  Surgeon: uSzan Kolb MD;  Location: [facility] GI [facility];   Service: Gastroenterology         Medications  Current Outpatient Medications   Medication Sig Dispense Refill   • albuterol (VENTOLIN HFA) 90 mcg/act inhaler Inhale 2 puffs every 6 (six) hours as needed for wheezing or shortness of breath 1 Inhaler 0   • amLODIPine (NORVASC) 5 mg tablet take 1 tablet by mouth once daily 90 tablet 3   • aspirin 81 MG tablet Take 81 mg by mouth daily     • atorvastatin (LIPITOR) 80 mg tablet Take 1 tablet (80 mg total) by mouth daily 90 tablet 3   • Cholecalciferol (VITAMIN D) 2000 units CAPS Take 1 capsule by mouth daily     • citalopram (CeleXA) 40 mg tablet Take 1 tablet (40 mg total) by mouth daily 30 tablet 2   • docusate sodium (COLACE) 100 mg capsule Take 1 capsule (100 mg total) by mouth 2 (two) times a day 180 capsule 3   • ferrous sulfate 324 (65 Fe) mg Take 1 tablet (324 mg total) by mouth 2 (two) times a day before meals 180 tablet 3   • furosemide (LASIX) 20 mg tablet Take 1 tablet (20 mg total) by mouth daily Do not start before March 3, 2023  30 tablet 0   • Glycerin-Polysorbate 80 (REFRESH DRY EYE THERAPY OP) Apply to eye     • hydrocortisone 1 % cream Apply to affected area 2 times daily 15 g 0   • isosorbide mononitrate (IMDUR) 60 mg 24 hr tablet Take 1 tablet (60 mg total) by mouth daily 90 tablet 0   • methylPREDNISolone 4 MG tablet therapy pack Use as directed on package 21 tablet 0   • metoprolol tartrate (LOPRESSOR) 25 mg tablet Take 1 tablet (25 mg total) by mouth every 12 (twelve) hours 180 tablet 3   • montelukast (SINGULAIR) 10 mg tablet Take 1 tablet (10 mg total) by mouth daily at bedtime 30 tablet 2   • multivitamin (THERAGRAN) TABS Take 1 tablet by mouth daily     • omeprazole (PriLOSEC) 20 mg delayed release capsule take 1 capsule by mouth once daily 30 capsule 5   • rizatriptan (MAXALT) 10 mg tablet Take 1 tablet (10 mg total) by mouth once as needed for migraine for up to 1 dose May repeat in 2 hours if needed 9 tablet 2   • topiramate (TOPAMAX) 100 mg tablet take 1 tablet by mouth once daily 30 tablet 5     No current facility-administered medications for this visit          Allergies   Allergen Reactions   • Azithromycin GI Intolerance     Yeast infection   • Imitrex [Sumatriptan] GI Intolerance   • Cephalexin    • Chlorhexidine Rash   • Wound Dressings Rash     Adhesive tape       Social History     Socioeconomic History   • Marital status: /Civil Union     Spouse name: Not on file   • Number of children: Not on file   • Years of education: Not on file   • Highest education level: Not on file   Occupational History   • Not on file   Tobacco Use   • Smoking status: Never   • Smokeless tobacco: Never Vaping Use   • Vaping Use: Never used   Substance and Sexual Activity   • Alcohol use: Yes     Comment: Occasionally   • Drug use: No   • Sexual activity: Not on file   Other Topics Concern   • Not on file   Social History Narrative   • Not on file     Social Determinants of Health     Financial Resource Strain: Not on file   Food Insecurity: Not on file   Transportation Needs: Not on file   Physical Activity: Not on file   Stress: Not on file   Social Connections: Not on file   Intimate Partner Violence: Not on file   Housing Stability: Not on file        Family History   Problem Relation Age of Onset   • Diabetes Mother    • Heart disease Mother    • Heart disease Father    • No Known Problems Sister    • No Known Problems Sister    • No Known Problems Sister    • No Known Problems Daughter    • No Known Problems Daughter    • No Known Problems Maternal Grandmother    • No Known Problems Maternal Grandfather    • No Known Problems Paternal Grandmother    • No Known Problems Paternal Grandfather    • Breast cancer Maternal Aunt         age unknown   • No Known Problems Maternal Aunt    • Coronary artery disease Family        Lab Results   Component Value Date    WBC 8 47 03/10/2023    HGB 11 8 03/10/2023    HCT 35 9 03/10/2023    MCV 91 03/10/2023     03/10/2023      Lab Results   Component Value Date    SODIUM 139 03/10/2023    K 3 9 03/10/2023     03/10/2023    CO2 26 03/10/2023    BUN 15 03/10/2023    CREATININE 0 82 03/10/2023    GLUC 76 03/10/2023    CALCIUM 9 1 03/10/2023      Lab Results   Component Value Date    HGBA1C 5 1 06/05/2017      No results found for: CHOL  Lab Results   Component Value Date    HDL 76 04/25/2022    HDL 71 10/15/2021    HDL 74 04/15/2021     Lab Results   Component Value Date    LDLCALC 118 (H) 04/25/2022    LDLCALC 118 (H) 10/15/2021    LDLCALC 118 (H) 04/15/2021     Lab Results   Component Value Date    TRIG 61 04/25/2022    TRIG 59 10/15/2021    TRIG 64 04/15/2021 "    No results found for: [place], [place]   Lab Results   Component Value Date    INR 0 94 02/22/2023    INR 0 99 06/09/2015    PROTIME 12 8 02/22/2023    PROTIME 12 9 06/09/2015          Patient Active Problem List    Diagnosis Date Noted   • Stable angina pectoris ([address] ) 10/14/2022   • Decreased GFR 10/14/2022   • Coronary artery disease involving native heart without angina pectoris 10/14/2022   • Environmental allergies 10/14/2022   • Ureterolithiasis 07/28/2022   • Subclinical hypothyroidism 04/19/2022   • Hearing difficulty of left ear 04/19/2022   • Mass of skin of head 04/19/2022   • Post-menopausal 04/19/2022   • Mitral valve annular calcification 04/19/2022   • Generalized anxiety disorder 02/20/2018   • Gastroesophageal reflux disease 08/07/2017   • Bilateral occipital neuralgia 05/15/2017   • Restless leg 09/23/2016   • Chest pain 08/12/2016   • Bilateral lower extremity edema 05/31/2016   • Fibromyalgia 08/12/2015   • Insomnia 04/01/2015   • Vitamin D deficiency 05/31/2013   • Migraine 01/22/2009       Portions of the record may have been created with voice recognition software  Occasional wrong word or \"sound a like\" substitutions may have occurred due to the inherent limitations of voice recognition software  Read the chart carefully and recognize, using context, where substitutions have occurred      Vianca Stapleton DO, [facility]  4/3/2023 4:52 PM          "

## 2023-04-06 ENCOUNTER — RA CDI HCC (OUTPATIENT)
Dept: OTHER | Facility: HOSPITAL | Age: 58
End: 2023-04-06

## 2023-04-06 NOTE — PROGRESS NOTES
[facility]  coding opportunities          Chart Reviewed number of suggestions sent to Provider: 1   [id number] [id number]    Patients Insurance        Commercial Insurance: [employer]

## 2023-05-04 ENCOUNTER — OFFICE VISIT (OUTPATIENT)
Dept: FAMILY MEDICINE CLINIC | Facility: CLINIC | Age: 58
End: 2023-05-04

## 2023-05-04 ENCOUNTER — TELEPHONE (OUTPATIENT)
Dept: OBGYN CLINIC | Facility: HOSPITAL | Age: 58
End: 2023-05-04

## 2023-05-04 VITALS
TEMPERATURE: 98.5 F | OXYGEN SATURATION: 97 % | HEIGHT: 60 IN | WEIGHT: 228.6 LBS | SYSTOLIC BLOOD PRESSURE: 112 MMHG | DIASTOLIC BLOOD PRESSURE: 90 MMHG | BODY MASS INDEX: 44.88 KG/M2 | HEART RATE: 93 BPM

## 2023-05-04 DIAGNOSIS — Z12.31 ENCOUNTER FOR SCREENING MAMMOGRAM FOR BREAST CANCER: ICD-10-CM

## 2023-05-04 DIAGNOSIS — M25.572 CHRONIC PAIN OF BOTH ANKLES: ICD-10-CM

## 2023-05-04 DIAGNOSIS — I25.83 CORONARY ARTERY DISEASE DUE TO LIPID RICH PLAQUE: ICD-10-CM

## 2023-05-04 DIAGNOSIS — G89.29 CHRONIC PAIN OF BOTH ANKLES: ICD-10-CM

## 2023-05-04 DIAGNOSIS — Z13.1 SCREENING FOR DIABETES MELLITUS: ICD-10-CM

## 2023-05-04 DIAGNOSIS — Z00.00 ANNUAL PHYSICAL EXAM: Primary | ICD-10-CM

## 2023-05-04 DIAGNOSIS — D64.9 ANEMIA, UNSPECIFIED TYPE: ICD-10-CM

## 2023-05-04 DIAGNOSIS — G43.909 MIGRAINE WITHOUT STATUS MIGRAINOSUS, NOT INTRACTABLE, UNSPECIFIED MIGRAINE TYPE: ICD-10-CM

## 2023-05-04 DIAGNOSIS — K21.9 GASTROESOPHAGEAL REFLUX DISEASE: ICD-10-CM

## 2023-05-04 DIAGNOSIS — I25.10 CORONARY ARTERY DISEASE DUE TO LIPID RICH PLAQUE: ICD-10-CM

## 2023-05-04 DIAGNOSIS — Z13.220 SCREENING FOR LIPID DISORDERS: ICD-10-CM

## 2023-05-04 DIAGNOSIS — E03.8 SUBCLINICAL HYPOTHYROIDISM: ICD-10-CM

## 2023-05-04 DIAGNOSIS — E04.9 ENLARGED THYROID: ICD-10-CM

## 2023-05-04 DIAGNOSIS — M25.571 CHRONIC PAIN OF BOTH ANKLES: ICD-10-CM

## 2023-05-04 DIAGNOSIS — Z85.42 HISTORY OF UTERINE CANCER: ICD-10-CM

## 2023-05-04 DIAGNOSIS — R22.0 MASS OF SKIN OF HEAD: ICD-10-CM

## 2023-05-04 DIAGNOSIS — F41.1 GENERALIZED ANXIETY DISORDER: ICD-10-CM

## 2023-05-04 DIAGNOSIS — Z76.89 ENCOUNTER TO ESTABLISH CARE: ICD-10-CM

## 2023-05-04 PROBLEM — R93.89 ABNORMAL COMPUTED TOMOGRAPHY ANGIOGRAPHY (CTA): Status: ACTIVE | Noted: 2023-05-04

## 2023-05-04 NOTE — ASSESSMENT & PLAN NOTE
Persistent, intermittent pain - referral in system - had initial difficulty scheduling and has not tried recently  Advised to call to schedule   Call our office if she has difficulty so staff can assist

## 2023-05-04 NOTE — PROGRESS NOTES
ADULT ANNUAL [address]  GROUP    NAME: Fei Champion  AGE: 62 y o  SEX: female  : 1965     DATE: 2023     Assessment and Plan:   Comprehensive physical exam  PMH and chronic conditions reviewed  Medications reconciled  Preventative care and recommended screenings as below  Follow-up 6 months-routine check  Annual physicals  Follow-up sooner as needed  Problem List Items Addressed This Visit        Digestive    Gastroesophageal reflux disease    Relevant Orders    CBC and differential       Endocrine    Subclinical hypothyroidism    Relevant Orders    US thyroid    TSH, 3rd generation with Free T4 reflex       Cardiovascular and Mediastinum    Migraine     Remains fairly well controlled: Topamax daily - Rizatriptan prn  No change to characteristics when she does have a headache  No change to treatment  F/U 6 months - sooner with changes or concerns            Other    Generalized anxiety disorder     Still with some occasional flares, but for the most part stable  No depression  Flares appears to be situational and stem around  and his chronic illness  Discussed importance of self care  Continue Citalopram 40         Mass of skin of head     Persistent, intermittent pain - referral in system - had initial difficulty scheduling and has not tried recently  Advised to call to schedule   Call our office if she has difficulty so staff can assist         Chronic pain of both ankles    Relevant Orders    Ambulatory Referral to Podiatry   Other Visit Diagnoses     Annual physical exam    -  Primary    Encounter for screening mammogram for breast cancer        Relevant Orders    Mammo screening bilateral w 3d & cad    Coronary artery disease due to lipid rich plaque        Relevant Orders    CBC and differential    Encounter to establish care        Relevant Orders    Ambulatory Referral to Gynecology    History of uterine cancer        Well over due for womens health - encouraged compliance  referral in    Relevant Orders    Ambulatory Referral to Gynecology    Enlarged thyroid        Relevant Orders    US thyroid    Anemia, unspecified type        Relevant Orders    CBC and differential    Iron Panel (Includes Ferritin, Iron Sat%, Iron, and TIBC)    Screening for diabetes mellitus        Relevant Orders    Comprehensive metabolic panel    Screening for lipid disorders        Relevant Orders    Lipid panel          Immunizations and preventive care screenings were discussed with patient today  Appropriate education was printed on patient's after visit summary  Counseling:  Alcohol/drug use: discussed moderation in alcohol intake, the recommendations for healthy alcohol use, and avoidance of illicit drug use  Dental Health: discussed importance of regular tooth brushing, flossing, and dental visits  Injury prevention: discussed safety/seat belts, safety helmets, smoke detectors, carbon dioxide detectors, and smoking near bedding or upholstery  Sexual health: discussed sexually transmitted diseases, partner selection, use of condoms, avoidance of unintended pregnancy, and contraceptive alternatives  · Exercise: the importance of regular exercise/physical activity was discussed  Recommend exercise 3-5 times per week for at least 30 minutes  BMI Counseling: Body mass index is 45 4 kg/m²  The BMI is above normal  Nutrition recommendations include decreasing portion sizes, consuming healthier snacks, moderation in carbohydrate intake and reducing intake of saturated and trans fat  Exercise recommendations include moderate physical activity 150 minutes/week  Rationale for BMI follow-up plan is due to patient being overweight or obese  Healthy lifestyle counseling  Referral pending for bariatrics from cardiology    Depression Screening and Follow-up Plan: Patient was screened for depression during today's encounter   They screened negative with a PHQ-2 score of 2  Return in about 6 months (around 11/4/2023) for Recheck  Chief Complaint:     Chief Complaint   Patient presents with    Follow-up     6 month follow up      History of Present Illness:     Adult Annual Physical   Patient here for a comprehensive physical exam  The patient reports no problems  Diet and Physical Activity  · Diet/Nutrition: well balanced diet, limited junk food and consuming 3-5 servings of fruits/vegetables daily  · Exercise: walking  Depression Screening  PHQ-2/9 Depression Screening    Little interest or pleasure in doing things: 0 - not at all  Feeling down, depressed, or hopeless: 2 - more than half the days  PHQ-2 Score: 2  PHQ-2 Interpretation: Negative depression screen       General Health  · Sleep: sleeps well and has appt with sleep medicine to evaluate for sleep apnea  · Hearing: normal - bilateral   · Vision: no vision problems, goes for regular eye exams, most recent eye exam >1 year ago and wears glasses  · Dental: regular dental visits  · Immunizations: Tdap due 2024; declines Shingles ( education provided)      /GYN Health  · Patient is: postmenopausal  · Last menstrual period: hysterectomy 1987 (partial)  · Contraceptive method: none  · Women's health overdue  - years since last evaluation (note Uterine cancer hx)  · Encouraged scheduling  · Mammo due in July - order in  · Advise monthly self breast exams  · Dexa due in 7/2024  · Advise daily vitamins: D; Calcium     Review of Systems:     Review of Systems   Constitutional: Negative  HENT: Negative  Eyes: Negative  Respiratory: Negative  Cardiovascular: Negative  Gastrointestinal: Negative  Genitourinary: Negative  Musculoskeletal: Negative  Skin: Negative  Neurological: Negative  Psychiatric/Behavioral: Negative         Past Medical History:     Past Medical History:   Diagnosis Date    Anxiety     Chest pain 08/12/2016    Chest wall pain 07/15/2016    Costochondritis 02/28/2014    Dog bite 07/29/2014    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([facility], [place] [address] )     Fluid retention     Gastroenteritis 05/06/2013    Herpes zoster 07/08/2013    History of chemotherapy 1986    Migraine 01/19/2016    Shortness of breath 08/12/2016    Toxoplasmosis 08/28/2014      Past Surgical History:     Past Surgical History:   Procedure Laterality Date    CARDIAC CATHETERIZATION Left 3/2/2023    Procedure: Cardiac Left Heart Cath - Dr Ky Saint or Dr Burden [place] please;  Surgeon: Lilliana Manzo DO;  Location: [facility]; Service: Cardiology    CARDIAC CATHETERIZATION N/A 3/2/2023    Procedure: Cardiac Coronary Angiogram;  Surgeon: Lilliana Manzo DO;  [facility]; Service: Cardiology    CARDIAC CATHETERIZATION  3/2/2023    Procedure: Cardiac catheterization;  Surgeon: Lilliana Manzo DO;  [facility]; Service: Cardiology    CHOLECYSTECTOMY      COLONOSCOPY      ESOPHAGOGASTRODUODENOSCOPY      FL RETROGRADE PYELOGRAM  7/28/2022    HYSTERECTOMY  1986    KNEE ARTHROSCOPY      left shoulder    NECK SURGERY      Lumpectomy    OOPHORECTOMY Right 1986    VA CYSTO/URETERO W/LITHOTRIPSY &INDWELL STENT INSRT Left 7/28/2022    Procedure: CYSTOSCOPY URETEROSCOPY WITH LITHOTRIPSY HOLMIUM LASER, RETROGRADE PYELOGRAM AND INSERTION STENT LEFT URETERAL;  Surgeon: Christiano Escudero MD;  Location: [facility];  Service: Urology    VA ESOPHAGOGASTRODUODENOSCOPY TRANSORAL DIAGNOSTIC N/A 11/30/2018    Procedure: EGD;  Surgeon: Vangie Ambriz MD;  Location: [facility];   Service: Gastroenterology      Social History:     Social History     Socioeconomic History    Marital status: /Civil Union     Spouse name: None    Number of children: None    Years of education: None    Highest education level: None   Occupational History    None   Tobacco Use    Smoking status: Never    Smokeless tobacco: Never   Vaping Use    Vaping Use: Never used   Substance and Sexual Activity    Alcohol use: Yes     Comment: Occasionally    Drug use: No    Sexual activity: None   Other Topics Concern    None   Social History Narrative    None     Social Determinants of Health     Financial Resource Strain: Not on file   Food Insecurity: Not on file   Transportation Needs: Not on file   Physical Activity: Not on file   Stress: Not on file   Social Connections: Not on file   Intimate Partner Violence: Not on file   Housing Stability: Not on file      Family History:     Family History   Problem Relation Age of Onset    Diabetes Mother     Heart disease Mother     Heart disease Father     No Known Problems Sister     No Known Problems Sister     No Known Problems Sister     No Known Problems Daughter     No Known Problems Daughter     No Known Problems Maternal Grandmother     No Known Problems Maternal Grandfather     No Known Problems Paternal Grandmother     No Known Problems Paternal Grandfather     Breast cancer Maternal Aunt         age unknown    No Known Problems Maternal Aunt     Coronary artery disease Family       Current Medications:     Current Outpatient Medications   Medication Sig Dispense Refill    albuterol (VENTOLIN HFA) 90 mcg/act inhaler Inhale 2 puffs every 6 (six) hours as needed for wheezing or shortness of breath 1 Inhaler 0    amLODIPine (NORVASC) 5 mg tablet take 1 tablet by mouth once daily 90 tablet 3    aspirin 81 MG tablet Take 81 mg by mouth daily      atorvastatin (LIPITOR) 80 mg tablet Take 1 tablet (80 mg total) by mouth daily 90 tablet 3    Cholecalciferol (VITAMIN D) 2000 units CAPS Take 1 capsule by mouth daily      citalopram (CeleXA) 40 mg tablet Take 1 tablet (40 mg total) by mouth daily 30 tablet 2    docusate sodium (COLACE) 100 mg capsule Take 1 capsule (100 mg total) by mouth 2 (two) times a day 180 capsule 3    ferrous sulfate 324 (65 Fe) mg Take 1 tablet (324 mg total) by mouth 2 (two) times a day "before meals 180 tablet 3    furosemide (LASIX) 20 mg tablet Take 1 tablet (20 mg total) by mouth daily Do not start before March 3, 2023  30 tablet 0    Glycerin-Polysorbate 80 (REFRESH DRY EYE THERAPY OP) Apply to eye      isosorbide mononitrate (IMDUR) 60 mg 24 hr tablet Take 1 tablet (60 mg total) by mouth daily 90 tablet 0    metoprolol tartrate (LOPRESSOR) 25 mg tablet Take 1 tablet (25 mg total) by mouth every 12 (twelve) hours 180 tablet 3    montelukast (SINGULAIR) 10 mg tablet Take 1 tablet (10 mg total) by mouth daily at bedtime 30 tablet 2    multivitamin (THERAGRAN) TABS Take 1 tablet by mouth daily      omeprazole (PriLOSEC) 20 mg delayed release capsule take 1 capsule by mouth once daily 30 capsule 5    rizatriptan (MAXALT) 10 mg tablet Take 1 tablet (10 mg total) by mouth once as needed for migraine for up to 1 dose May repeat in 2 hours if needed 9 tablet 2    topiramate (TOPAMAX) 100 mg tablet take 1 tablet by mouth once daily 30 tablet 5     No current facility-administered medications for this visit  Allergies: Allergies   Allergen Reactions    Azithromycin GI Intolerance     Yeast infection    Imitrex [Sumatriptan] GI Intolerance    Cephalexin     Chlorhexidine Rash    Wound Dressings Rash     Adhesive tape      Physical Exam:     /90 (BP Location: Right arm, Patient Position: Sitting, Cuff Size: Large)   Pulse 93   Temp 98 5 °F (36 9 °C)   Ht 4' 11 5\" (1 511 m)   Wt 104 kg (228 lb 9 6 oz)   LMP 03/18/1997 (Exact Date)   SpO2 97%   BMI 45 40 kg/m²     Physical Exam  Vitals and nursing note reviewed  Constitutional:       General: She is not in acute distress  Appearance: Normal appearance  She is well-developed and well-groomed  She is not ill-appearing  HENT:      Head: Normocephalic          Right Ear: Tympanic membrane, ear canal and external ear normal       Left Ear: Tympanic membrane, ear canal and external ear normal       Nose: Nose normal       " Mouth/Throat:      Mouth: Mucous membranes are moist       Pharynx: Oropharynx is clear  Eyes:      Conjunctiva/sclera: Conjunctivae normal       Pupils: Pupils are equal, round, and reactive to light  Neck:      Thyroid: No thyromegaly  Vascular: No carotid bruit  Cardiovascular:      Rate and Rhythm: Normal rate and regular rhythm  Pulses:           Posterior tibial pulses are 2+ on the right side and 2+ on the left side  Heart sounds: Normal heart sounds  Pulmonary:      Effort: Pulmonary effort is normal  No respiratory distress  Breath sounds: Normal breath sounds and air entry  Abdominal:      General: Bowel sounds are normal       Palpations: Abdomen is soft  Tenderness: There is no abdominal tenderness  Musculoskeletal:      Right lower leg: No edema  Left lower leg: No edema  Lymphadenopathy:      Cervical: No cervical adenopathy  Skin:     General: Skin is warm and dry  Neurological:      General: No focal deficit present  Mental Status: She is alert and oriented to person, place, and time  Psychiatric:         Attention and Perception: Attention normal          Mood and Affect: Mood normal          Behavior: Behavior normal          Thought Content:  Thought content normal          Judgment: Judgment normal           Verlie PASHA Luong  ST [address] [place] [address]

## 2023-05-04 NOTE — TELEPHONE ENCOUNTER
Patient is being referred to a orthopedics. Please schedule accordingly.     [facility]   [phone]

## 2023-05-04 NOTE — ASSESSMENT & PLAN NOTE
Remains fairly well controlled: Topamax daily - Rizatriptan prn  No change to characteristics when she does have a headache  No change to treatment   F/U 6 months - sooner with changes or concerns

## 2023-05-04 NOTE — ASSESSMENT & PLAN NOTE
Still with some occasional flares, but for the most part stable  No depression  Flares appears to be situational and stem around  and his chronic illness  Discussed importance of self care   Continue Citalopram 40

## 2023-05-19 DIAGNOSIS — R60.0 BILATERAL LOWER EXTREMITY EDEMA: ICD-10-CM

## 2023-05-19 DIAGNOSIS — K21.9 GASTROESOPHAGEAL REFLUX DISEASE: ICD-10-CM

## 2023-05-22 RX ORDER — OMEPRAZOLE 20 MG/1
CAPSULE, DELAYED RELEASE ORAL
Qty: 30 CAPSULE | Refills: 5 | Status: SHIPPED | OUTPATIENT
Start: 2023-05-22

## 2023-05-22 RX ORDER — FUROSEMIDE 20 MG/1
TABLET ORAL
Qty: 30 TABLET | Refills: 0 | Status: SHIPPED | OUTPATIENT
Start: 2023-05-22

## 2023-06-05 ENCOUNTER — APPOINTMENT (OUTPATIENT)
Dept: LAB | Facility: CLINIC | Age: 58
End: 2023-06-05
Payer: COMMERCIAL

## 2023-06-05 DIAGNOSIS — D64.9 ANEMIA, UNSPECIFIED TYPE: ICD-10-CM

## 2023-06-05 DIAGNOSIS — I25.83 CORONARY ARTERY DISEASE DUE TO LIPID RICH PLAQUE: ICD-10-CM

## 2023-06-05 DIAGNOSIS — E03.8 SUBCLINICAL HYPOTHYROIDISM: ICD-10-CM

## 2023-06-05 DIAGNOSIS — Z13.1 SCREENING FOR DIABETES MELLITUS: ICD-10-CM

## 2023-06-05 DIAGNOSIS — Z13.220 SCREENING FOR LIPID DISORDERS: ICD-10-CM

## 2023-06-05 DIAGNOSIS — I25.10 CORONARY ARTERY DISEASE DUE TO LIPID RICH PLAQUE: ICD-10-CM

## 2023-06-05 DIAGNOSIS — K21.9 GASTROESOPHAGEAL REFLUX DISEASE: ICD-10-CM

## 2023-06-05 LAB
ALBUMIN SERPL BCP-MCNC: 3.3 G/DL (ref 3.5–5)
ALP SERPL-CCNC: 100 U/L (ref 46–116)
ALT SERPL W P-5'-P-CCNC: 23 U/L (ref 12–78)
ANION GAP SERPL CALCULATED.3IONS-SCNC: 3 MMOL/L (ref 4–13)
AST SERPL W P-5'-P-CCNC: 18 U/L (ref 5–45)
BASOPHILS # BLD AUTO: 0.04 THOUSANDS/ÂΜL (ref 0–0.1)
BASOPHILS NFR BLD AUTO: 1 % (ref 0–1)
BILIRUB SERPL-MCNC: 0.41 MG/DL (ref 0.2–1)
BUN SERPL-MCNC: 15 MG/DL (ref 5–25)
CALCIUM ALBUM COR SERPL-MCNC: 9.6 MG/DL (ref 8.3–10.1)
CALCIUM SERPL-MCNC: 9 MG/DL (ref 8.3–10.1)
CHLORIDE SERPL-SCNC: 112 MMOL/L (ref 96–108)
CHOLEST SERPL-MCNC: 182 MG/DL
CO2 SERPL-SCNC: 25 MMOL/L (ref 21–32)
CREAT SERPL-MCNC: 1.02 MG/DL (ref 0.6–1.3)
EOSINOPHIL # BLD AUTO: 0.16 THOUSAND/ÂΜL (ref 0–0.61)
EOSINOPHIL NFR BLD AUTO: 2 % (ref 0–6)
ERYTHROCYTE [DISTWIDTH] IN BLOOD BY AUTOMATED COUNT: 14.2 % (ref 11.6–15.1)
FERRITIN SERPL-MCNC: 33 NG/ML (ref 11–307)
GFR SERPL CREATININE-BSD FRML MDRD: 61 ML/MIN/1.73SQ M
GLUCOSE P FAST SERPL-MCNC: 83 MG/DL (ref 65–99)
HCT VFR BLD AUTO: 38.6 % (ref 34.8–46.1)
HDLC SERPL-MCNC: 84 MG/DL
HGB BLD-MCNC: 12.4 G/DL (ref 11.5–15.4)
IMM GRANULOCYTES # BLD AUTO: 0.02 THOUSAND/UL (ref 0–0.2)
IMM GRANULOCYTES NFR BLD AUTO: 0 % (ref 0–2)
IRON SATN MFR SERPL: 23 % (ref 15–50)
IRON SERPL-MCNC: 69 UG/DL (ref 50–170)
LDLC SERPL CALC-MCNC: 86 MG/DL (ref 0–100)
LYMPHOCYTES # BLD AUTO: 2.81 THOUSANDS/ÂΜL (ref 0.6–4.47)
LYMPHOCYTES NFR BLD AUTO: 37 % (ref 14–44)
MCH RBC QN AUTO: 28.9 PG (ref 26.8–34.3)
MCHC RBC AUTO-ENTMCNC: 32.1 G/DL (ref 31.4–37.4)
MCV RBC AUTO: 90 FL (ref 82–98)
MONOCYTES # BLD AUTO: 0.53 THOUSAND/ÂΜL (ref 0.17–1.22)
MONOCYTES NFR BLD AUTO: 7 % (ref 4–12)
NEUTROPHILS # BLD AUTO: 4.01 THOUSANDS/ÂΜL (ref 1.85–7.62)
NEUTS SEG NFR BLD AUTO: 53 % (ref 43–75)
NONHDLC SERPL-MCNC: 98 MG/DL
NRBC BLD AUTO-RTO: 0 /100 WBCS
PLATELET # BLD AUTO: 283 THOUSANDS/UL (ref 149–390)
PMV BLD AUTO: 10.5 FL (ref 8.9–12.7)
POTASSIUM SERPL-SCNC: 3.5 MMOL/L (ref 3.5–5.3)
PROT SERPL-MCNC: 7.5 G/DL (ref 6.4–8.4)
RBC # BLD AUTO: 4.29 MILLION/UL (ref 3.81–5.12)
SODIUM SERPL-SCNC: 140 MMOL/L (ref 135–147)
TIBC SERPL-MCNC: 302 UG/DL (ref 250–450)
TRIGL SERPL-MCNC: 59 MG/DL
TSH SERPL DL<=0.05 MIU/L-ACNC: 4.25 UIU/ML (ref 0.45–4.5)
WBC # BLD AUTO: 7.57 THOUSAND/UL (ref 4.31–10.16)

## 2023-06-05 PROCEDURE — 83540 ASSAY OF IRON: CPT

## 2023-06-05 PROCEDURE — 80061 LIPID PANEL: CPT

## 2023-06-05 PROCEDURE — 80053 COMPREHEN METABOLIC PANEL: CPT

## 2023-06-05 PROCEDURE — 84443 ASSAY THYROID STIM HORMONE: CPT

## 2023-06-05 PROCEDURE — 82728 ASSAY OF FERRITIN: CPT

## 2023-06-05 PROCEDURE — 85025 COMPLETE CBC W/AUTO DIFF WBC: CPT

## 2023-06-05 PROCEDURE — 83550 IRON BINDING TEST: CPT

## 2023-06-05 PROCEDURE — 36415 COLL VENOUS BLD VENIPUNCTURE: CPT

## 2023-06-13 NOTE — PROGRESS NOTES
"Eloy Santana is a 62 y o  female who presents for annual exam   Last Pap smear  NILM  Hysterectomy in  for adenocarcinoma of the uterus  last mammogram 22 birads 1  CRC screening- aspirated during EGD/ colonoscopy  Due for CRC screening  DEXA scan 22 - WNL The patient has no complaints today  The patient is sexually active  The patient is not taking hormone replacement therapy  Patient denies post-menopausal vaginal bleeding    The patient wears seatbelts: yes  The patient participates in regular exercise: no  The patient reports that there is not domestic violence in her life  Menstrual History:  OB History        3    Para   2    Term   2            AB   1    Living   2       SAB   1    IAB        Ectopic        Multiple        Live Births   2                Menarche age: [age]  Patient's last menstrual period was 1997 (exact date)  The following portions of the patient's history were reviewed and updated as appropriate: allergies, current medications, past family history, past medical history, past social history, past surgical history and problem list     Review of Systems  Pertinent items are noted in HPI  Objective      /64 (BP Location: Left arm)   Ht 4' 11 5\" (1 511 m)   Wt 104 kg (230 lb)   LMP 1997 (Exact Date)   BMI 45 68 kg/m²     General:   alert and oriented, in no acute distress and morbidly obese   Heart: regular rate and rhythm, S1, S2 normal, no murmur, click, rub or gallop   Lungs: clear to auscultation bilaterally   Abdomen: soft, non-tender, without masses or organomegaly   Vulva: normal, Bartholin's, Urethra, Peever's normal, female escutcheon   Vagina: normal mucosa, normal discharge, vaginal cuff intact   Cervix: surgically absent   Uterus: surgically absent   Adnexa: no mass, fullness, tenderness   Breast:  breasts appear normal, no suspicious masses, no skin or nipple changes or axillary nodes   Bilateral " inverted nipples          Assessment/Plan     Diagnoses and all orders for this visit:    Well woman exam with routine gynecological exam    Encounter to establish care  -     Ambulatory Referral to Gynecology    History of uterine cancer  Comments: Well over due for womens health - encouraged compliance  referral in  Orders:  -     Ambulatory Referral to Gynecology    Screen for colon cancer  -     Cologuard      CRC screening reviewed options  Cologuard ordered at today's visit   Encouraged healthy diet, exercise and lifestyle  Encouraged follow-up with PCP and specialists as needed  Encouraged supplementation with calcium, vitamin-D and strength training exercises for good bone health  Will call/ [employer] message with results  VBI-   BMI Counseling: Body mass index is 45 68 kg/m²  The BMI is above normal  Nutrition recommendations include reducing portion sizes, decreasing overall calorie intake and 3-5 servings of fruits/vegetables daily  Exercise recommendations include exercising 3-5 times per week, joining a gym and strength training exercises

## 2023-06-15 ENCOUNTER — HOSPITAL ENCOUNTER (OUTPATIENT)
Dept: ULTRASOUND IMAGING | Facility: MEDICAL CENTER | Age: 58
Discharge: HOME | End: 2023-06-15
Payer: COMMERCIAL

## 2023-06-15 DIAGNOSIS — E03.8 SUBCLINICAL HYPOTHYROIDISM: ICD-10-CM

## 2023-06-15 DIAGNOSIS — E04.9 ENLARGED THYROID: ICD-10-CM

## 2023-06-15 PROCEDURE — 76536 US EXAM OF HEAD AND NECK: CPT

## 2023-06-16 ENCOUNTER — HOSPITAL ENCOUNTER (EMERGENCY)
Facility: HOSPITAL | Age: 58
Discharge: HOME | End: 2023-06-16
Attending: EMERGENCY MEDICINE
Payer: COMMERCIAL

## 2023-06-16 ENCOUNTER — APPOINTMENT (EMERGENCY)
Dept: RADIOLOGY | Facility: HOSPITAL | Age: 58
End: 2023-06-16
Payer: COMMERCIAL

## 2023-06-16 VITALS
TEMPERATURE: 97.2 F | OXYGEN SATURATION: 99 % | BODY MASS INDEX: 46.72 KG/M2 | HEART RATE: 72 BPM | RESPIRATION RATE: 18 BRPM | SYSTOLIC BLOOD PRESSURE: 127 MMHG | WEIGHT: 235.23 LBS | DIASTOLIC BLOOD PRESSURE: 58 MMHG

## 2023-06-16 DIAGNOSIS — R07.9 CHEST PAIN, UNSPECIFIED: Primary | ICD-10-CM

## 2023-06-16 LAB
2HR DELTA HS TROPONIN: 0 NG/L
ALBUMIN SERPL BCP-MCNC: 4 G/DL (ref 3.5–5)
ALP SERPL-CCNC: 89 U/L (ref 34–104)
ALT SERPL W P-5'-P-CCNC: 13 U/L (ref 7–52)
ANION GAP SERPL CALCULATED.3IONS-SCNC: 7 MMOL/L (ref 4–13)
AST SERPL W P-5'-P-CCNC: 14 U/L (ref 13–39)
ATRIAL RATE: 61 BPM
ATRIAL RATE: 73 BPM
BASOPHILS # BLD AUTO: 0.04 THOUSANDS/ÂΜL (ref 0–0.1)
BASOPHILS NFR BLD AUTO: 0 % (ref 0–1)
BILIRUB SERPL-MCNC: 0.32 MG/DL (ref 0.2–1)
BNP SERPL-MCNC: 88 PG/ML (ref 0–100)
BUN SERPL-MCNC: 13 MG/DL (ref 5–25)
CALCIUM SERPL-MCNC: 9.1 MG/DL (ref 8.4–10.2)
CARDIAC TROPONIN I PNL SERPL HS: 3 NG/L
CARDIAC TROPONIN I PNL SERPL HS: 3 NG/L
CHLORIDE SERPL-SCNC: 105 MMOL/L (ref 96–108)
CO2 SERPL-SCNC: 28 MMOL/L (ref 21–32)
CREAT SERPL-MCNC: 0.96 MG/DL (ref 0.6–1.3)
EOSINOPHIL # BLD AUTO: 0.16 THOUSAND/ÂΜL (ref 0–0.61)
EOSINOPHIL NFR BLD AUTO: 2 % (ref 0–6)
ERYTHROCYTE [DISTWIDTH] IN BLOOD BY AUTOMATED COUNT: 14.3 % (ref 11.6–15.1)
GFR SERPL CREATININE-BSD FRML MDRD: 65 ML/MIN/1.73SQ M
GLUCOSE SERPL-MCNC: 97 MG/DL (ref 65–140)
HCT VFR BLD AUTO: 38.4 % (ref 34.8–46.1)
HGB BLD-MCNC: 12.1 G/DL (ref 11.5–15.4)
IMM GRANULOCYTES # BLD AUTO: 0.04 THOUSAND/UL (ref 0–0.2)
IMM GRANULOCYTES NFR BLD AUTO: 0 % (ref 0–2)
LYMPHOCYTES # BLD AUTO: 3.72 THOUSANDS/ÂΜL (ref 0.6–4.47)
LYMPHOCYTES NFR BLD AUTO: 39 % (ref 14–44)
MCH RBC QN AUTO: 28.7 PG (ref 26.8–34.3)
MCHC RBC AUTO-ENTMCNC: 31.5 G/DL (ref 31.4–37.4)
MCV RBC AUTO: 91 FL (ref 82–98)
MONOCYTES # BLD AUTO: 0.7 THOUSAND/ÂΜL (ref 0.17–1.22)
MONOCYTES NFR BLD AUTO: 7 % (ref 4–12)
NEUTROPHILS # BLD AUTO: 4.88 THOUSANDS/ÂΜL (ref 1.85–7.62)
NEUTS SEG NFR BLD AUTO: 52 % (ref 43–75)
NRBC BLD AUTO-RTO: 0 /100 WBCS
P AXIS: 46 DEGREES
P AXIS: 50 DEGREES
PLATELET # BLD AUTO: 277 THOUSANDS/UL (ref 149–390)
PMV BLD AUTO: 9.7 FL (ref 8.9–12.7)
POTASSIUM SERPL-SCNC: 3.8 MMOL/L (ref 3.5–5.3)
PR INTERVAL: 158 MS
PR INTERVAL: 176 MS
PROT SERPL-MCNC: 7.4 G/DL (ref 6.4–8.4)
QRS AXIS: -14 DEGREES
QRS AXIS: 0 DEGREES
QRSD INTERVAL: 72 MS
QRSD INTERVAL: 90 MS
QT INTERVAL: 396 MS
QT INTERVAL: 434 MS
QTC INTERVAL: 436 MS
QTC INTERVAL: 436 MS
RBC # BLD AUTO: 4.22 MILLION/UL (ref 3.81–5.12)
SODIUM SERPL-SCNC: 140 MMOL/L (ref 135–147)
T WAVE AXIS: -11 DEGREES
T WAVE AXIS: 27 DEGREES
VENTRICULAR RATE: 61 BPM
VENTRICULAR RATE: 73 BPM
WBC # BLD AUTO: 9.54 THOUSAND/UL (ref 4.31–10.16)

## 2023-06-16 PROCEDURE — 93010 ELECTROCARDIOGRAM REPORT: CPT | Performed by: STUDENT IN AN ORGANIZED HEALTH CARE EDUCATION/TRAINING PROGRAM

## 2023-06-16 PROCEDURE — 93010 ELECTROCARDIOGRAM REPORT: CPT | Performed by: INTERNAL MEDICINE

## 2023-06-16 PROCEDURE — 36415 COLL VENOUS BLD VENIPUNCTURE: CPT

## 2023-06-16 PROCEDURE — 84484 ASSAY OF TROPONIN QUANT: CPT | Performed by: EMERGENCY MEDICINE

## 2023-06-16 PROCEDURE — 99285 EMERGENCY DEPT VISIT HI MDM: CPT

## 2023-06-16 PROCEDURE — 93005 ELECTROCARDIOGRAM TRACING: CPT

## 2023-06-16 PROCEDURE — 85025 COMPLETE CBC W/AUTO DIFF WBC: CPT | Performed by: EMERGENCY MEDICINE

## 2023-06-16 PROCEDURE — 83880 ASSAY OF NATRIURETIC PEPTIDE: CPT | Performed by: EMERGENCY MEDICINE

## 2023-06-16 PROCEDURE — 80053 COMPREHEN METABOLIC PANEL: CPT | Performed by: EMERGENCY MEDICINE

## 2023-06-16 PROCEDURE — 71045 X-RAY EXAM CHEST 1 VIEW: CPT

## 2023-06-16 RX ORDER — ALBUTEROL SULFATE 2.5 MG/3ML
2.5 SOLUTION RESPIRATORY (INHALATION) ONCE
Status: COMPLETED | OUTPATIENT
Start: 2023-06-16 | End: 2023-06-16

## 2023-06-16 RX ADMIN — ALBUTEROL SULFATE 2.5 MG: 2.5 SOLUTION RESPIRATORY (INHALATION) at 14:15

## 2023-06-16 NOTE — ED PROVIDER NOTES
History  Chief Complaint   Patient presents with   • Chest Pain     Pt reports 9/10 left sided chest pain since this morning  Pt reports SOB associated with it and it radiates to neck  History provided by:  Patient   used: No    Chest Pain  Pain location:  Substernal area  Pain quality: aching    Pain radiates to:  Does not radiate  Pain radiates to the back: no    Pain severity:  Moderate  Onset quality:  Gradual  Duration:  9 hours  Timing:  Intermittent  Progression:  Waxing and waning  Chronicity:  New  Context: at rest    Relieved by:  Nothing  Worsened by:  Nothing tried  Ineffective treatments:  None tried  Associated symptoms: cough    Associated symptoms: no abdominal pain, no altered mental status, no back pain, no dizziness, no dysphagia, no fever, no headache, no lower extremity edema, no nausea, no shortness of breath and not vomiting    Cough:     Cough characteristics:  Non-productive    Sputum characteristics:  Nondescript    Severity:  Moderate    Onset quality:  Gradual    Duration:  1 day    Timing:  Intermittent    Progression:  Waxing and waning    Chronicity:  New  Risk factors comment:  Anxiety, Chest wall pain      Prior to Admission Medications   Prescriptions Last Dose Informant Patient Reported? Taking?    Cholecalciferol (VITAMIN D) 2000 units CAPS 6/15/2023 Self Yes Yes   Sig: Take 1 capsule by mouth daily   Glycerin-Polysorbate 80 (REFRESH DRY EYE THERAPY OP) Past Week Self Yes Yes   Sig: Apply to eye   albuterol (VENTOLIN HFA) 90 mcg/act inhaler  Self No Yes   Sig: Inhale 2 puffs every 6 (six) hours as needed for wheezing or shortness of breath   amLODIPine (NORVASC) 5 mg tablet 6/16/2023 Self No Yes   Sig: take 1 tablet by mouth once daily   aspirin 81 MG tablet 6/15/2023 Self Yes Yes   Sig: Take 81 mg by mouth daily   atorvastatin (LIPITOR) 80 mg tablet 6/15/2023 Self No Yes   Sig: Take 1 tablet (80 mg total) by mouth daily   citalopram (CeleXA) 40 mg tablet 6/15/2023 Self No Yes   Sig: Take 1 tablet (40 mg total) by mouth daily   docusate sodium (COLACE) 100 mg capsule 6/15/2023 Self No Yes   Sig: Take 1 capsule (100 mg total) by mouth 2 (two) times a day   ferrous sulfate 324 (65 Fe) mg 6/15/2023 Self No Yes   Sig: Take 1 tablet (324 mg total) by mouth 2 (two) times a day before meals   furosemide (LASIX) 20 mg tablet 6/15/2023  No Yes   Sig: take 1 tablet by mouth once daily   isosorbide mononitrate (IMDUR) 60 mg 24 hr tablet 6/16/2023  No Yes   Sig: take 1 tablet by mouth once daily   metoprolol tartrate (LOPRESSOR) 25 mg tablet 6/16/2023 Self No Yes   Sig: Take 1 tablet (25 mg total) by mouth every 12 (twelve) hours   montelukast (SINGULAIR) 10 mg tablet 6/16/2023 Self No Yes   Sig: Take 1 tablet (10 mg total) by mouth daily at bedtime   multivitamin (THERAGRAN) TABS 6/15/2023 Self Yes Yes   Sig: Take 1 tablet by mouth daily   omeprazole (PriLOSEC) 20 mg delayed release capsule 6/16/2023  No Yes   Sig: take 1 capsule by mouth once daily   rizatriptan (MAXALT) 10 mg tablet 6/15/2023 Self No Yes   Sig: Take 1 tablet (10 mg total) by mouth once as needed for migraine for up to 1 dose May repeat in 2 hours if needed   topiramate (TOPAMAX) 100 mg tablet 6/15/2023 Self No Yes   Sig: take 1 tablet by mouth once daily      Facility-Administered Medications: None       Past Medical History:   Diagnosis Date   • Anxiety    • Chest pain 08/12/2016   • Chest wall pain 07/15/2016   • Costochondritis 02/28/2014   • Dog bite 07/29/2014   • 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([facility] [address] )    • Fluid retention    • Gastroenteritis 05/06/2013   • Herpes zoster 07/08/2013   • History of chemotherapy 1986   • Migraine 01/19/2016   • Shortness of breath 08/12/2016   • Toxoplasmosis 08/28/2014       Past Surgical History:   Procedure Laterality Date   • CARDIAC CATHETERIZATION Left 3/2/2023    Procedure: Cardiac Left Heart Cath - Dr Tabitha Hernandez or Dr Emily Escamilla please;  Surgeon: Louisa Peña, ;  Location: [facility]; Service: Cardiology   • CARDIAC CATHETERIZATION N/A 3/2/2023    Procedure: Cardiac Coronary Angiogram;  Surgeon: Cricket Akins DO;  Location: [facility]; Service: Cardiology   • CARDIAC CATHETERIZATION  3/2/2023    Procedure: Cardiac catheterization;  Surgeon: Cricket Akins DO;  [facility]: [facility]; Service: Cardiology   • CHOLECYSTECTOMY     • COLONOSCOPY     • ESOPHAGOGASTRODUODENOSCOPY     • FL RETROGRADE PYELOGRAM  7/28/2022   • HYSTERECTOMY  1986   • KNEE ARTHROSCOPY      left shoulder   • NECK SURGERY      Lumpectomy   • OOPHORECTOMY Right 1986   • CT CYSTO/URETERO W/LITHOTRIPSY &INDWELL STENT INSRT Left 7/28/2022    Procedure: CYSTOSCOPY URETEROSCOPY WITH LITHOTRIPSY HOLMIUM LASER, RETROGRADE PYELOGRAM AND INSERTION STENT LEFT URETERAL;  Surgeon: Tone Love MD;  Location: [facility];  Service: Urology   • CT ESOPHAGOGASTRODUODENOSCOPY TRANSORAL DIAGNOSTIC N/A 11/30/2018    Procedure: EGD;  Surgeon: Eleonora Cherry MD;  Location: [facility]; Service: Gastroenterology       Family History   Problem Relation Age of Onset   • Diabetes Mother    • Heart disease Mother    • Heart disease Father    • No Known Problems Sister    • No Known Problems Sister    • No Known Problems Sister    • No Known Problems Daughter    • No Known Problems Daughter    • No Known Problems Maternal Grandmother    • No Known Problems Maternal Grandfather    • No Known Problems Paternal Grandmother    • No Known Problems Paternal Grandfather    • Breast cancer Maternal Aunt         age unknown   • No Known Problems Maternal Aunt    • Coronary artery disease Family      I have reviewed and agree with the history as documented      E-Cigarette/Vaping   • E-Cigarette Use Never User      E-Cigarette/Vaping Substances   • Nicotine No    • THC No    • CBD No    • Flavoring No    • Other No    • Unknown No      Social History     Tobacco Use   • Smoking status: Never   • Smokeless tobacco: Never   Vaping Use   • Vaping Use: Never used   Substance Use Topics   • Alcohol use: Yes     Comment: Occasionally   • Drug use: No       Review of Systems   Constitutional: Negative for chills and fever  HENT: Negative for facial swelling, sore throat and trouble swallowing  Eyes: Negative for pain and visual disturbance  Respiratory: Positive for cough and chest tightness  Negative for shortness of breath  Cardiovascular: Positive for chest pain  Negative for leg swelling  Gastrointestinal: Negative for abdominal pain, blood in stool, diarrhea, nausea and vomiting  Genitourinary: Negative for dysuria and flank pain  Musculoskeletal: Negative for back pain, neck pain and neck stiffness  Skin: Negative for pallor and rash  Allergic/Immunologic: Negative for environmental allergies and immunocompromised state  Neurological: Negative for dizziness and headaches  Hematological: Negative for adenopathy  Does not bruise/bleed easily  Psychiatric/Behavioral: Negative for agitation and behavioral problems  All other systems reviewed and are negative  Physical Exam  Physical Exam  Vitals and nursing note reviewed  Constitutional:       General: She is not in acute distress  Appearance: She is well-developed  HENT:      Head: Normocephalic and atraumatic  Eyes:      Extraocular Movements: Extraocular movements intact  Cardiovascular:      Rate and Rhythm: Normal rate and regular rhythm  Heart sounds: Normal heart sounds  Pulmonary:      Effort: Pulmonary effort is normal       Breath sounds: Normal breath sounds  Abdominal:      Palpations: Abdomen is soft  Tenderness: There is no abdominal tenderness  There is no guarding or rebound  Musculoskeletal:         General: Normal range of motion  Cervical back: Normal range of motion and neck supple  Skin:     General: Skin is warm and dry     Neurological:      General: No focal deficit present  Mental Status: She is alert and oriented to person, place, and time     Psychiatric:         Mood and Affect: Mood normal          Behavior: Behavior normal          Vital Signs  ED Triage Vitals   Temperature Pulse Respirations Blood Pressure SpO2   06/16/23 1235 06/16/23 1235 06/16/23 1235 06/16/23 1235 06/16/23 1235   (!) 97 2 °F (36 2 °C) 65 22 (!) 189/75 100 %      Temp Source Heart Rate Source Patient Position - Orthostatic VS BP Location FiO2 (%)   06/16/23 1235 06/16/23 1235 06/16/23 1235 06/16/23 1235 --   Oral Monitor Lying Right arm       Pain Score       06/16/23 1232       9           Vitals:    06/16/23 1235 06/16/23 1245 06/16/23 1415 06/16/23 1541   BP: (!) 189/75 154/67 148/71 127/58   Pulse: 65 60 62 72   Patient Position - Orthostatic VS: Lying Sitting Lying Lying         Visual Acuity      ED Medications  Medications   albuterol inhalation solution 2 5 mg (2 5 mg Nebulization Given 6/16/23 1415)       Diagnostic Studies  Results Reviewed     Procedure Component Value Units Date/Time    HS Troponin I 2hr [[id number]]  (Normal) Collected: 06/16/23 1437    Lab Status: Final result Specimen: Blood from Arm, Right Updated: 06/16/23 1512     hs TnI 2hr 3 ng/L      Delta 2hr hsTnI 0 ng/L     HS Troponin I 4hr [[id number]]     Lab Status: No result Specimen: Blood     HS Troponin 0hr (reflex protocol) [[id number]]  (Normal) Collected: 06/16/23 1237    Lab Status: Final result Specimen: Blood from Arm, Left Updated: 06/16/23 1314     hs TnI 0hr 3 ng/L     B-Type Natriuretic Peptide(BNP) [[id number]]  (Normal) Collected: 06/16/23 1237    Lab Status: Final result Specimen: Blood from Arm, Left Updated: 06/16/23 1314     BNP 88 pg/mL     Comprehensive metabolic panel [[id number]] Collected: 06/16/23 1237    Lab Status: Final result Specimen: Blood from Arm, Left Updated: 06/16/23 1309     Sodium 140 mmol/L      Potassium 3 8 mmol/L      Chloride 105 mmol/L      CO2 28 mmol/L      ANION GAP 7 mmol/L BUN 13 mg/dL      Creatinine 0 96 mg/dL      Glucose 97 mg/dL      Calcium 9 1 mg/dL      AST 14 U/L      ALT 13 U/L      Alkaline Phosphatase 89 U/L      Total Protein 7 4 g/dL      Albumin 4 0 g/dL      Total Bilirubin 0 32 mg/dL      eGFR 65 ml/min/1 73sq m     Narrative:      [place] guidelines for Chronic Kidney Disease (CKD):   •  Stage 1 with normal or high GFR (GFR > 90 mL/min/1 73 square meters)  •  Stage 2 Mild CKD (GFR = 60-89 mL/min/1 73 square meters)  •  Stage 3A Moderate CKD (GFR = 45-59 mL/min/1 73 square meters)  •  Stage 3B Moderate CKD (GFR = 30-44 mL/min/1 73 square meters)  •  Stage 4 Severe CKD (GFR = 15-29 mL/min/1 73 square meters)  •  Stage 5 End Stage CKD (GFR <15 mL/min/1 73 square meters)  Note: GFR calculation is accurate only with a steady state creatinine    CBC and differential [[id number]] Collected: 06/16/23 1237    Lab Status: Final result Specimen: Blood from Arm, Left Updated: 06/16/23 1243     WBC 9 54 Thousand/uL      RBC 4 22 Million/uL      Hemoglobin 12 1 g/dL      Hematocrit 38 4 %      MCV 91 fL      MCH 28 7 pg      MCHC 31 5 g/dL      RDW 14 3 %      MPV 9 7 fL      Platelets 045 Thousands/uL      nRBC 0 /100 WBCs      Neutrophils Relative 52 %      Immat GRANS % 0 %      Lymphocytes Relative 39 %      Monocytes Relative 7 %      Eosinophils Relative 2 %      Basophils Relative 0 %      Neutrophils Absolute 4 88 Thousands/µL      Immature Grans Absolute 0 04 Thousand/uL      Lymphocytes Absolute 3 72 Thousands/µL      Monocytes Absolute 0 70 Thousand/µL      Eosinophils Absolute 0 16 Thousand/µL      Basophils Absolute 0 04 Thousands/µL                  XR chest 1 view portable   Final Result by Rosana De La Cruz MD (06/16 0127)      No acute cardiopulmonary disease                 Workstation performed: [id number]                    Procedures  ECG 12 Lead Documentation Only    Date/Time: 6/16/2023 3:43 PM    Performed by: Raffi Owusu MD  Authorized by: Sahil Chanel MD    Indications / Diagnosis:  Chest pain  ECG reviewed by me, the ED Provider: yes    Patient location:  ED  Interpretation:     Interpretation: normal    Rate:     ECG rate assessment: normal    Rhythm:     Rhythm: sinus rhythm    Ectopy:     Ectopy: none    QRS:     QRS axis:  Normal  Conduction:     Conduction: normal    ST segments:     ST segments:  Normal  T waves:     T waves: normal               ED Course  ED Course as of 06/16/23 1547   Fri Jun 16, 2023   1324 WBC: 9 54   1324 Hemoglobin: 12 1   1324 Platelet Count: 237   1324 Sodium: 140   1324 Potassium: 3 8   1324 BUN: 13   1324 Creatinine: 0 96   1324 hs TnI 0hr: 3   1324 BNP: 88  Labs non-acute  1359 Chest x-ray independently reviewed, no significant acute dramality noted  1516 hs TnI 2hr: 3   1516 Delta 2hr hsTnI: 0  Delta Troponin negative  1540 Patient informed about the workup results, delta troponin negative  Stable for discharge, advised follow-up with cardiology, return to emergency for recurrent or worsening chest pains  HEART Risk Score    Flowsheet Row Most Recent Value   Heart Score Risk Calculator    History 1 Filed at: 06/16/2023 1546   ECG 0 Filed at: 06/16/2023 1546   Age 1 Filed at: 06/16/2023 1546   Risk Factors 1 Filed at: 06/16/2023 1546   Troponin 0 Filed at: 06/16/2023 1546   HEART Score 3 Filed at: 06/16/2023 1546                        SBIRT 22yo+    Flowsheet Row Most Recent Value   Initial Alcohol Screen: US AUDIT-C     1  How often do you have a drink containing alcohol? 1 Filed at: 06/16/2023 1426   2  How many drinks containing alcohol do you have on a typical day you are drinking? 1 Filed at: 06/16/2023 1426   3a  Male UNDER 65: How often do you have five or more drinks on one occasion? 0 Filed at: 06/16/2023 1426   3b  FEMALE Any Age, or MALE 65+: How often do you have 4 or more drinks on one occassion?  0 Filed at: 06/16/2023 1426   Audit-C Score 2 Filed at: 06/16/2023 1426   JUANJOSE: How many times in the past year have you    Used an illegal drug or used a prescription medication for non-medical reasons? Never Filed at: 06/16/2023 1426                    Medical Decision Making  Patient is a 59-year-old female, history of anxiety, chest wall pain, costochondritis, comes in with complaints of central chest pain, nonradiating, aching, associated with cough, no shortness of breath, no fever, no known sick contacts, no abdominal pain/back pain  Patient is conscious, alert, does not stable, no acute distress, lungs are clear, anterior chest wall tenderness is noted, heart is regular, pulses are equal bilaterally, abdomen is soft nontender, no peripheral edema, no calf tenderness or swelling  Differential diagnosis: Nonspecific chest pain, ACS, costochondritis, pneumonia, patient is not concerned about COVID/flu, does not want testing, will check labs, EKG, chest x-ray, give albuterol for cough  Chest pain, unspecified: acute illness or injury  Amount and/or Complexity of Data Reviewed  Labs: ordered  Decision-making details documented in ED Course  Radiology: ordered and independent interpretation performed  ECG/medicine tests: ordered and independent interpretation performed  Decision-making details documented in ED Course  Risk  Prescription drug management  Disposition  Final diagnoses:   Chest pain, unspecified     Time reflects when diagnosis was documented in both MDM as applicable and the Disposition within this note     Time User Action Codes Description Comment    6/16/2023  3:44 PM Raymundo Sibley Add [R07 9] Chest pain, unspecified       ED Disposition     ED Disposition   Discharge    Condition   Stable    Date/Time   Fri Jun 16, 2023  3:47 PM    Campbell Busby discharge to home/self care                 Follow-up Information    None         Patient's Medications   Discharge Prescriptions    No medications on file       No discharge procedures on file      PDMP Review     None          ED Provider  Electronically Signed by           Elena Berry MD  06/16/23 215 Juan Beckman MD  06/16/23 8222

## 2023-06-19 ENCOUNTER — VBI (OUTPATIENT)
Dept: ADMINISTRATIVE | Facility: OTHER | Age: 58
End: 2023-06-19

## 2023-06-19 NOTE — LETTER
Date: 06/20/23    [facility] [id number]    Dear Aparna Phelan:                                                                                                                              Thank you for choosing [facility]'s emergency department for care  Your primary care provider wants to make sure that your ongoing medical care is being addressed  If you require follow up care as a result of your emergency department visit, there are a few things the practice would like you to know  As part of the network's continuing commitment to caring for our patients, we have added more same day appointments and have extended office hours to meet your medical needs  After hours, on-call physicians are available via your primary care provider's main office line  We encourage you to contact our office prior to seeking treatment to discuss your symptoms with the medical staff  Together, we can determine the correct course of action  A majority of non-emergent conditions such as: common cold, flu-like symptoms, fevers, strains/sprains, dislocations, minor burns, cuts and animal bites can be treated at [facility] facilities  Diagnostic testing is available at some sites  Of course, if you are experiencing a life threatening medical emergency call 911 or proceed directly to the nearest emergency room      Your nearest [facility] is conveniently located at:    [facility] [address] [address]  [phone] [address] [facility] offered at most [address] your spot online at www [facility] [email or web address] or on the [facility] [address]    Sincerely,      [employer]

## 2023-06-19 NOTE — TELEPHONE ENCOUNTER
Andreas Macdonald    ED Visit Information     Ed visit date: 06/16/2023  Diagnosis Description: Chest pain unspecified  In Network? Yes Kassi Marker  Discharge status: Home  Discharged with meds ? No  Number of ED visits to date: 2  ED Severity:2     Outreach Information    Outreach successful: Yes 3  Date letter mailed:06/20/2023  Date Finalized:06/20/2023    Care Coordination    Follow up appointment with pcp: no no  Transportation issues ?  No    Value Base Outreach    Outreach type: 3 Day Outreach  Emergent necessity warranted by diagnosis: Yes  [facility] PCP: Yes  Transportation: Self Transport  06/19/2023 10:35 AM EDT by Leti Hill MA 06/19/2023 10:35 AM EDT by Leti Hill MA  Outgoing Kali Gipson (Self) [phone] (FCUGPQ)[phone] (Mobile)  [phone] (Mobile) Remove  - Left Message  06/19/2023 03:07 PM EDT by Leti Hill MA 06/19/2023 03:07 PM EDT by Leti Hill MA  Outgoing Kali Gipson (Self) [phone] (SYZIBF)[phone] (Mobile)  [phone] (Mobile) Remove  - Left Message  06/20/2023 11:28 AM EDT by Leti Hill MA 06/20/2023 11:28 AM EDT by Leti Hill MA  Outgoing Kali Gipson (Self) [phone] (Mobile)[phone] (Mobile)  [phone] (Mobile) Remove  - Left Message

## 2023-06-20 ENCOUNTER — OFFICE VISIT (OUTPATIENT)
Dept: OBGYN CLINIC | Facility: CLINIC | Age: 58
End: 2023-06-20
Payer: COMMERCIAL

## 2023-06-20 VITALS
DIASTOLIC BLOOD PRESSURE: 64 MMHG | HEIGHT: 60 IN | SYSTOLIC BLOOD PRESSURE: 112 MMHG | BODY MASS INDEX: 45.16 KG/M2 | WEIGHT: 230 LBS

## 2023-06-20 DIAGNOSIS — Z12.11 SCREEN FOR COLON CANCER: ICD-10-CM

## 2023-06-20 DIAGNOSIS — Z01.419 WELL WOMAN EXAM WITH ROUTINE GYNECOLOGICAL EXAM: Primary | ICD-10-CM

## 2023-06-20 DIAGNOSIS — Z85.42 HISTORY OF UTERINE CANCER: ICD-10-CM

## 2023-06-20 DIAGNOSIS — Z76.89 ENCOUNTER TO ESTABLISH CARE: ICD-10-CM

## 2023-06-20 PROCEDURE — S0610 ANNUAL GYNECOLOGICAL EXAMINA: HCPCS | Performed by: NURSE PRACTITIONER

## 2023-06-28 ENCOUNTER — OFFICE VISIT (OUTPATIENT)
Dept: PODIATRY | Facility: CLINIC | Age: 58
End: 2023-06-28
Payer: COMMERCIAL

## 2023-06-28 VITALS — HEIGHT: 59 IN | RESPIRATION RATE: 18 BRPM | BODY MASS INDEX: 46.45 KG/M2

## 2023-06-28 DIAGNOSIS — D17.20 LIPOMA OF LOWER EXTREMITY, UNSPECIFIED LATERALITY: Primary | ICD-10-CM

## 2023-06-28 DIAGNOSIS — G89.29 CHRONIC PAIN OF BOTH ANKLES: ICD-10-CM

## 2023-06-28 DIAGNOSIS — M25.572 CHRONIC PAIN OF BOTH ANKLES: ICD-10-CM

## 2023-06-28 DIAGNOSIS — M25.571 CHRONIC PAIN OF BOTH ANKLES: ICD-10-CM

## 2023-06-28 DIAGNOSIS — G43.909 MIGRAINE WITHOUT STATUS MIGRAINOSUS, NOT INTRACTABLE, UNSPECIFIED MIGRAINE TYPE: ICD-10-CM

## 2023-06-28 PROCEDURE — 99202 OFFICE O/P NEW SF 15 MIN: CPT | Performed by: PODIATRIST

## 2023-06-28 RX ORDER — MELOXICAM 15 MG/1
15 TABLET ORAL DAILY
Qty: 30 TABLET | Refills: 0 | Status: SHIPPED | OUTPATIENT
Start: 2023-06-28 | End: 2023-07-28

## 2023-06-28 NOTE — PROGRESS NOTES
"Assessment/Plan:    Explained to patient that her ankle pain is likely due to her job demands and overuse  She has lipomas on each ankle but they are generally asymptomatic  Advised patient to purchase new running shoes as her current shoes are falling apart  Also patient was told to discontinue ibuprofen  She was instead placed on meloxicam 15 mg daily  She will be reassessed in 5 weeks  No problem-specific Assessment & Plan notes found for this encounter  Diagnoses and all orders for this visit:    Lipoma of lower extremity, unspecified laterality    Chronic pain of both ankles  -     Ambulatory Referral to Podiatry  -     meloxicam (MOBIC) 15 mg tablet; Take 1 tablet (15 mg total) by mouth daily          Subjective:      Patient ID: Lamine Gonzalez is a 62 y o  female  HPI    Patient, a 63-year-old female presents with chronic bilateral ankle pain  Patient denies trauma to the ankle but notes that she works long hours on her feet  She typically wears an expensive sneaker while working  She states that by the end of the day multiple joints throb  She takes 4 tablets of over-the-counter Motrin for her discomfort  She also notes the presence of lumps at each ankle  They have been present for years  The following portions of the patient's history were reviewed and updated as appropriate: allergies, current medications, past family history, past medical history, past social history, past surgical history and problem list     Review of Systems   Cardiovascular:        History of coronary artery disease   Gastrointestinal:        GERD   Musculoskeletal: Positive for gait problem  Objective:      Resp 18   Ht 4' 11\" (1 499 m)   LMP 03/18/1997 (Exact Date)   BMI 46 45 kg/m²          Physical Exam  Constitutional:       Appearance: She is obese  Cardiovascular:      Pulses: Normal pulses  Musculoskeletal:         General: Tenderness and deformity present        Comments: Soft " tissue masses noted anterior lateral aspect each ankle  Good range of motion at each ankle  Skin:     General: Skin is warm  Neurological:      General: No focal deficit present  Mental Status: She is oriented to person, place, and time

## 2023-07-01 RX ORDER — RIZATRIPTAN BENZOATE 10 MG/1
10 TABLET ORAL ONCE AS NEEDED
Qty: 9 TABLET | Refills: 2 | OUTPATIENT
Start: 2023-07-01

## 2023-07-03 ENCOUNTER — TELEPHONE (OUTPATIENT)
Dept: FAMILY MEDICINE CLINIC | Facility: CLINIC | Age: 58
End: 2023-07-03

## 2023-07-03 DIAGNOSIS — R22.0 MASS OF SKIN OF HEAD: Primary | ICD-10-CM

## 2023-07-03 NOTE — TELEPHONE ENCOUNTER
Patient is scheduled for 7/13 at [facility] in [facility] with Ludmila Selby

## 2023-07-03 NOTE — TELEPHONE ENCOUNTER
----- Message from [facility], [facility] sent at 7/3/2023  8:29 AM EDT -----  Pls call and schedule appt with plastics. Referral is in. Pt has a mass on her forehead that has become painful. She reports she has tried to schedule with plastics, general surgery and dermatology, and that she has been unable to get an appt. Reports she is being told that " they don't do that". This really needs to be looked at. Pls call pt then with appt time.  Thank you

## 2023-07-10 DIAGNOSIS — G43.909 MIGRAINE WITHOUT STATUS MIGRAINOSUS, NOT INTRACTABLE, UNSPECIFIED MIGRAINE TYPE: ICD-10-CM

## 2023-07-11 RX ORDER — RIZATRIPTAN BENZOATE 10 MG/1
TABLET ORAL
Qty: 9 TABLET | Refills: 2 | Status: SHIPPED | OUTPATIENT
Start: 2023-07-11

## 2023-07-19 ENCOUNTER — HOSPITAL ENCOUNTER (OUTPATIENT)
Dept: MAMMOGRAPHY | Facility: MEDICAL CENTER | Age: 58
Discharge: HOME | End: 2023-07-19
Payer: COMMERCIAL

## 2023-07-19 VITALS — HEIGHT: 59 IN | BODY MASS INDEX: 46.22 KG/M2 | WEIGHT: 229.28 LBS

## 2023-07-19 DIAGNOSIS — Z12.31 ENCOUNTER FOR SCREENING MAMMOGRAM FOR BREAST CANCER: ICD-10-CM

## 2023-07-19 PROCEDURE — 77063 BREAST TOMOSYNTHESIS BI: CPT

## 2023-07-19 PROCEDURE — 77067 SCR MAMMO BI INCL CAD: CPT

## 2023-08-02 ENCOUNTER — CONSULT (OUTPATIENT)
Dept: PLASTIC SURGERY | Facility: CLINIC | Age: 58
End: 2023-08-02

## 2023-08-02 VITALS
WEIGHT: 228 LBS | SYSTOLIC BLOOD PRESSURE: 123 MMHG | TEMPERATURE: 97.1 F | HEART RATE: 68 BPM | DIASTOLIC BLOOD PRESSURE: 93 MMHG | HEIGHT: 59 IN | BODY MASS INDEX: 45.96 KG/M2

## 2023-08-02 DIAGNOSIS — R22.0 MASS OF SKIN OF HEAD: ICD-10-CM

## 2023-08-04 ENCOUNTER — PREP FOR PROCEDURE (OUTPATIENT)
Dept: PLASTIC SURGERY | Facility: CLINIC | Age: 58
End: 2023-08-04

## 2023-08-04 DIAGNOSIS — R22.0 MASS OF SKIN OF HEAD: Primary | ICD-10-CM

## 2023-08-04 NOTE — PROGRESS NOTES
Consult - Plastic Surgery   Brandy Ring 62 y.o. female MRN: [id number]  Unit/Bed#:  Encounter: [id number]      Consults     Assessment:  Forehead cyst    Plan:  Excision, complex closure. All risks, benefits, and options, including but not limited to, pain, scarring, bleeding, infection, asymmetry, contour deformity, damage to adjacent nerves, vessels, and organs, hematoma, seroma, paresthesias, anesthesia (temporary versus permanent), skin necrosis, fat necrosis, flap necrosis, wound dehiscence with need for healing by secondary intention and postoperative dressing changes, fistula formation,  hypertrophic and/or keloid scar formation, need for revision and/or reoperation were fully explained to the patient. Pt expressed understanding, would like to undergo the procedure, and signed the consent today. Principal Problem: Forehead cyst    HPI:   Brandy Ring is a 62y.o. year old female who presents with a forehead nodule that causes her pain. She thinks she has had it for 10 or so years, and is more recently causing her discomfort. It has not drained. She has a distant memory of trauma to the area. REVIEW OF SYSTEMS    GENERAL/CONSTITUTIONAL: The patient denies fever, fatigue, weakness, weight gain or weight loss. HEAD, EYES, EARS, NOSE AND THROAT: Eyes - The patient denies pain, redness, loss of vision, double or blurred vision and denies wearing glasses. The patient denies ringing in the ears, nosebleeds sinusitis, post nasal drip. Also denies frequent sore throats, hoarseness, painful swallowing. CARDIOVASCULAR: The patient denies chest pain, irregular heartbeats, palpitations, shortness of breath, heart murmurs, high blood pressure, cramps in his legs with walking, pain in his feet or toes at night or varicose veins. RESPIRATORY: The patient denies chronic cough, wheezing or night sweats.   GASTROINTESTINAL: The patient denies decreased appetite, nausea, vomiting, diarrhea, constipation, blood in the stools. GENITOURINARY: The patient denies difficult urination, pain or burning with urination, blood in the urine. MUSCULOSKELETAL: The patient denies arm, thigh or calf cramps. No joint or muscle pain. No muscle weakness or tenderness. No joint swelling, neck pain, back pain or major orthopedic injuries. SKIN AND BREASTS: The patient denies easy bruising, skin redness, skin rash, hives. NEUROLOGIC: The patient denies headache, dizziness, fainting, memory loss. PSYCHIATRIC: The patient denies depression anxiety. ENDOCRINE: The patient denies intolerance to hot or cold temperature, flushing, fingernail changes, increased thirst, increased salt intake or decreased sexual desire. HEMATOLOGIC/LYMPHATIC: The patient denies anemia, bleeding tendency or clotting tendency. ALLERGIC/IMMUNOLOGIC: The patient denies rhinitis, asthma, skin sensitivity, latex allergies or sensitivity. 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[facility])    • Fluid retention    • Gastroenteritis 05/06/2013   • Herpes zoster 07/08/2013   • History of chemotherapy 1986   • Migraine 01/19/2016   • Shortness of breath 08/12/2016   • Toxoplasmosis 08/28/2014     Past Surgical History:   Procedure Laterality Date   • CARDIAC CATHETERIZATION Left 03/02/2023    Procedure: Cardiac Left Heart Cath - Dr Nyla Khan or Dr Reza Busby please;  Surgeon: Nola Kern DO;  [facility]; Service: Cardiology   • CARDIAC CATHETERIZATION N/A 03/02/2023    Procedure: Cardiac Coronary Angiogram;  Surgeon: Nola Kern DO;  [facility]; Service: Cardiology   • CARDIAC CATHETERIZATION  03/02/2023    Procedure: Cardiac catheterization;  Surgeon: Nola Kern DO;  [facility];   Service: Cardiology   • CHOLECYSTECTOMY     • COLONOSCOPY     • ESOPHAGOGASTRODUODENOSCOPY     • FL RETROGRADE PYELOGRAM  07/28/2022   • HYSTERECTOMY  1986   • NECK SURGERY      Lumpectomy   • OOPHORECTOMY Right 1986   • [place] CYSTO/URETERO W/LITHOTRIPSY &INDWELL STENT INSRT Left 07/28/2022    Procedure: CYSTOSCOPY URETEROSCOPY WITH LITHOTRIPSY HOLMIUM LASER, RETROGRADE PYELOGRAM AND INSERTION STENT LEFT URETERAL;  Surgeon: Jesús Marie MD;  Location: [facility];  Service: Urology   • WA ESOPHAGOGASTRODUODENOSCOPY TRANSORAL DIAGNOSTIC N/A 11/30/2018    Procedure: EGD;  Surgeon: Tay Williamson MD;  Location: [facility];   Service: Gastroenterology   • SHOULDER ARTHROSCOPY Left      Social History   Social History     Substance and Sexual Activity   Alcohol Use Yes    Comment: Occasionally     Social History     Substance and Sexual Activity   Drug Use No     Social History     Tobacco Use   Smoking Status Never   Smokeless Tobacco Never     Family History:   Family History   Problem Relation Age of Onset   • Diabetes Mother    • Heart disease Mother    • Heart disease Father    • Arrhythmia Father         ICD placed   • Other Sister         vertigo   • Migraines Sister    • Bipolar disorder Sister    • Hearing loss Sister    • No Known Problems Daughter    • Thyroid disease Daughter         during pregnancy   • Diabetes Maternal Grandmother    • Heart disease Maternal Grandmother    • No Known Problems Maternal Grandfather    • Heart disease Paternal Grandmother    • Breast cancer Maternal Aunt         age unknown   • Parkinsonism Maternal Aunt    • Colon cancer Neg Hx    • Ovarian cancer Neg Hx        Meds/Allergies     Current Outpatient Medications:   •  albuterol (VENTOLIN HFA) 90 mcg/act inhaler, Inhale 2 puffs every 6 (six) hours as needed for wheezing or shortness of breath, Disp: 1 Inhaler, Rfl: 0  •  amLODIPine (NORVASC) 5 mg tablet, take 1 tablet by mouth once daily, Disp: 90 tablet, Rfl: 3  •  aspirin 81 MG tablet, Take 81 mg by mouth daily, Disp: , Rfl:   •  atorvastatin (LIPITOR) 80 mg tablet, Take 1 tablet (80 mg total) by mouth daily, Disp: 90 tablet, Rfl: 3  •  Cholecalciferol (VITAMIN D) 2000 units CAPS, Take 1 capsule by mouth daily, Disp: , Rfl:   •  citalopram (CeleXA) 40 mg tablet, Take 1 tablet (40 mg total) by mouth daily, Disp: 30 tablet, Rfl: 2  •  docusate sodium (COLACE) 100 mg capsule, Take 1 capsule (100 mg total) by mouth 2 (two) times a day, Disp: 180 capsule, Rfl: 3  •  ferrous sulfate 324 (65 Fe) mg, Take 1 tablet (324 mg total) by mouth 2 (two) times a day before meals, Disp: 180 tablet, Rfl: 3  •  furosemide (LASIX) 20 mg tablet, take 1 tablet by mouth once daily, Disp: 30 tablet, Rfl: 0  •  Glycerin-Polysorbate 80 (REFRESH DRY EYE THERAPY OP), Apply to eye, Disp: , Rfl:   •  isosorbide mononitrate (IMDUR) 60 mg 24 hr tablet, take 1 tablet by mouth once daily, Disp: 90 tablet, Rfl: 1  •  meloxicam (MOBIC) 15 mg tablet, Take 1 tablet (15 mg total) by mouth daily, Disp: 30 tablet, Rfl: 0  •  metoprolol tartrate (LOPRESSOR) 25 mg tablet, Take 1 tablet (25 mg total) by mouth every 12 (twelve) hours, Disp: 180 tablet, Rfl: 3  •  montelukast (SINGULAIR) 10 mg tablet, Take 1 tablet (10 mg total) by mouth daily at bedtime, Disp: 30 tablet, Rfl: 2  •  multivitamin (THERAGRAN) TABS, Take 1 tablet by mouth daily, Disp: , Rfl:   •  omeprazole (PriLOSEC) 20 mg delayed release capsule, take 1 capsule by mouth once daily, Disp: 30 capsule, Rfl: 5  •  rizatriptan (MAXALT) 10 mg tablet, take 1 tablet by mouth ONCE if needed for migraines may repeat in 2 hours if needed, Disp: 9 tablet, Rfl: 2  •  topiramate (TOPAMAX) 100 mg tablet, take 1 tablet by mouth once daily, Disp: 30 tablet, Rfl: 5      Objective     General appearance: alert and oriented, in no acute distress  Head: Normocephalic, without obvious abnormality, atraumatic, 3-4 mm raised solid painful, slightly mobile forehead nodule (see photo in media)  Eyes: negative findings: conjunctivae and sclerae normal  Neck: no adenopathy, no JVD, supple, symmetrical, trachea midline and thyroid not enlarged, symmetric, no tenderness/mass/nodules  Lungs: nonlabored, no wheezes  Breasts: normal appearance, no masses or tenderness  Heart: regular rate and rhythm  Abdomen: normal findings: soft, non-tender  Extremities: extremities normal, warm and well-perfused; no cyanosis, clubbing, or edema  Skin: no evidence of bleeding or bruising  Neurologic: Grossly normal    Lab Results:   Lab Results   Component Value Date    WBC 9.54 06/16/2023    HGB 12.1 06/16/2023    HCT 38.4 06/16/2023    MCV 91 06/16/2023     06/16/2023          No results found for: "TISSUECULT", "WOUNDCULT"      Imaging Studies:   No results found. EKG, Pathology, and Other Studies:   Lab Results   Component Value Date/Time    [facility]  04/13/2021 06:25 PM     A. Skin, Cyst/Tag/Debridement, right Abdominal, shave excision:  - Acrochordon/skin tag, fragmented.             [unfilled]    Invasive Devices     Drain  Duration           Ureteral Internal Stent Left ureter 6 Fr. 372 days                VTE Prophylaxis: Sequential compression device (Venodyne)

## 2023-08-09 ENCOUNTER — OFFICE VISIT (OUTPATIENT)
Dept: PODIATRY | Facility: CLINIC | Age: 58
End: 2023-08-09
Payer: COMMERCIAL

## 2023-08-09 VITALS
BODY MASS INDEX: 45.96 KG/M2 | HEIGHT: 59 IN | DIASTOLIC BLOOD PRESSURE: 66 MMHG | WEIGHT: 228 LBS | HEART RATE: 84 BPM | SYSTOLIC BLOOD PRESSURE: 128 MMHG

## 2023-08-09 DIAGNOSIS — M77.9 TENDONITIS: ICD-10-CM

## 2023-08-09 DIAGNOSIS — M25.571 CHRONIC PAIN OF BOTH ANKLES: Primary | ICD-10-CM

## 2023-08-09 DIAGNOSIS — M25.572 CHRONIC PAIN OF BOTH ANKLES: Primary | ICD-10-CM

## 2023-08-09 DIAGNOSIS — G89.29 CHRONIC PAIN OF BOTH ANKLES: Primary | ICD-10-CM

## 2023-08-09 PROCEDURE — 99212 OFFICE O/P EST SF 10 MIN: CPT | Performed by: PODIATRIST

## 2023-08-09 RX ORDER — MELOXICAM 15 MG/1
15 TABLET ORAL DAILY
Qty: 90 TABLET | Refills: 1 | Status: SHIPPED | OUTPATIENT
Start: 2023-08-09 | End: 2024-02-05

## 2023-08-09 NOTE — PROGRESS NOTES
Patient presents for assessment. Patient states that she is feeling much more comfortable. She purchased new running shoes for work and is very happy. She notes that she felt even more comfortable than on the meloxicam and desires to continue. She was given a 90-day supply with 1 refill. She was told to try to take the medication every other day if possible. She will be reassessed in 6 months.

## 2023-08-11 ENCOUNTER — VBI (OUTPATIENT)
Dept: ADMINISTRATIVE | Facility: OTHER | Age: 58
End: 2023-08-11

## 2023-08-23 DIAGNOSIS — G43.909 MIGRAINE WITHOUT STATUS MIGRAINOSUS, NOT INTRACTABLE, UNSPECIFIED MIGRAINE TYPE: ICD-10-CM

## 2023-08-23 DIAGNOSIS — F41.1 GENERALIZED ANXIETY DISORDER: ICD-10-CM

## 2023-08-23 RX ORDER — TOPIRAMATE 100 MG/1
100 TABLET, FILM COATED ORAL DAILY
Qty: 30 TABLET | Refills: 5 | Status: SHIPPED | OUTPATIENT
Start: 2023-08-23

## 2023-08-23 RX ORDER — CITALOPRAM 40 MG/1
40 TABLET ORAL DAILY
Qty: 30 TABLET | Refills: 2 | Status: SHIPPED | OUTPATIENT
Start: 2023-08-23

## 2023-09-12 ENCOUNTER — TELEPHONE (OUTPATIENT)
Age: 58
End: 2023-09-12

## 2023-09-27 NOTE — PRE-PROCEDURE INSTRUCTIONS
Pre-Surgery Instructions:   Medication Instructions   • albuterol (VENTOLIN HFA) 90 mcg/act inhaler Uses PRN- OK to take day of surgery   • amLODIPine (NORVASC) 5 mg tablet Take day of surgery. • aspirin 81 MG tablet Stop taking 7 days prior to surgery. • atorvastatin (LIPITOR) 80 mg tablet Take day of surgery. • Cholecalciferol (VITAMIN D) 2000 units CAPS Stop taking 7 days prior to surgery. • citalopram (CeleXA) 40 mg tablet Take day of surgery. • docusate sodium (COLACE) 100 mg capsule Hold day of surgery. • ferrous sulfate 324 (65 Fe) mg Hold day of surgery. • furosemide (LASIX) 20 mg tablet Hold day of surgery. • Glycerin-Polysorbate 80 (REFRESH DRY EYE THERAPY OP) Take day of surgery. • isosorbide mononitrate (IMDUR) 60 mg 24 hr tablet Take day of surgery. • meloxicam (MOBIC) 15 mg tablet Stop taking 3 days prior to surgery. • metoprolol tartrate (LOPRESSOR) 25 mg tablet Take day of surgery. • montelukast (SINGULAIR) 10 mg tablet Take night before surgery   • multivitamin (THERAGRAN) TABS Stop taking 7 days prior to surgery. • omeprazole (PriLOSEC) 20 mg delayed release capsule Take day of surgery. • rizatriptan (MAXALT) 10 mg tablet Uses PRN- OK to take day of surgery   • topiramate (TOPAMAX) 100 mg tablet Take day of surgery. • Zinc 20 MG CAPS Stop taking 7 days prior to surgery. Spoke with pt via phone. Medication instructions for day surgery reviewed. Please use only a sip of water to take your instructed medications. Avoid all over the counter vitamins, supplements and NSAIDS for one week prior to surgery per anesthesia guidelines. Tylenol is ok to take as needed. You will receive a call one business day prior to surgery with an arrival time and hospital directions. If your surgery is scheduled on a Monday, the hospital will be calling you on the Friday prior to your surgery.  If you have not heard from anyone by 8pm, please call the hospital supervisor through the hospital  at [phone]. Amaya Castillo [phone]). Do not eat or drink anything after midnight the night before your surgery, including candy, mints, lifesavers, or chewing gum. Do not drink alcohol 24hrs before your surgery. Try not to smoke at least 24hrs before your surgery. Follow the pre surgery showering instructions as listed in the [facility] Surgical Experience Booklet” or otherwise provided by your surgeon's office. Do not shave the surgical area 24 hours before surgery. Do not apply any lotions, creams, including makeup, cologne, deodorant, or perfumes after showering on the day of your surgery. No contact lenses, eye make-up, or artificial eyelashes. Remove nail polish, including gel polish, and any artificial, gel, or acrylic nails if possible. Remove all jewelry including rings and body piercing jewelry. Wear causal clothing that is easy to take on and off. Consider your type of surgery. Keep any valuables, jewelry, piercings at home. Please bring any specially ordered equipment (sling, braces) if indicated. Arrange for a responsible person to drive you to and from the hospital on the day of your surgery. Visitor Guidelines discussed. Call the surgeon's office with any new illnesses, exposures, or additional questions prior to surgery. Please reference your [facility] Surgical Experience Booklet” for additional information to prepare for your upcoming surgery.

## 2023-09-29 ENCOUNTER — APPOINTMENT (OUTPATIENT)
Dept: LAB | Facility: CLINIC | Age: 58
End: 2023-09-29
Payer: COMMERCIAL

## 2023-09-29 DIAGNOSIS — R22.0 MASS OF SKIN OF HEAD: ICD-10-CM

## 2023-09-29 LAB
ANION GAP SERPL CALCULATED.3IONS-SCNC: 9 MMOL/L
BASOPHILS # BLD AUTO: 0.04 THOUSANDS/ÂΜL (ref 0–0.1)
BASOPHILS NFR BLD AUTO: 0 % (ref 0–1)
BUN SERPL-MCNC: 19 MG/DL (ref 5–25)
CALCIUM SERPL-MCNC: 9.4 MG/DL (ref 8.4–10.2)
CHLORIDE SERPL-SCNC: 106 MMOL/L (ref 96–108)
CO2 SERPL-SCNC: 25 MMOL/L (ref 21–32)
CREAT SERPL-MCNC: 0.98 MG/DL (ref 0.6–1.3)
EOSINOPHIL # BLD AUTO: 0.11 THOUSAND/ÂΜL (ref 0–0.61)
EOSINOPHIL NFR BLD AUTO: 1 % (ref 0–6)
ERYTHROCYTE [DISTWIDTH] IN BLOOD BY AUTOMATED COUNT: 14.4 % (ref 11.6–15.1)
GFR SERPL CREATININE-BSD FRML MDRD: 63 ML/MIN/1.73SQ M
GLUCOSE P FAST SERPL-MCNC: 82 MG/DL (ref 65–99)
HCT VFR BLD AUTO: 36.6 % (ref 34.8–46.1)
HGB BLD-MCNC: 11.7 G/DL (ref 11.5–15.4)
IMM GRANULOCYTES # BLD AUTO: 0.03 THOUSAND/UL (ref 0–0.2)
IMM GRANULOCYTES NFR BLD AUTO: 0 % (ref 0–2)
LYMPHOCYTES # BLD AUTO: 3.36 THOUSANDS/ÂΜL (ref 0.6–4.47)
LYMPHOCYTES NFR BLD AUTO: 34 % (ref 14–44)
MCH RBC QN AUTO: 29 PG (ref 26.8–34.3)
MCHC RBC AUTO-ENTMCNC: 32 G/DL (ref 31.4–37.4)
MCV RBC AUTO: 91 FL (ref 82–98)
MONOCYTES # BLD AUTO: 0.74 THOUSAND/ÂΜL (ref 0.17–1.22)
MONOCYTES NFR BLD AUTO: 8 % (ref 4–12)
NEUTROPHILS # BLD AUTO: 5.53 THOUSANDS/ÂΜL (ref 1.85–7.62)
NEUTS SEG NFR BLD AUTO: 57 % (ref 43–75)
NRBC BLD AUTO-RTO: 0 /100 WBCS
PLATELET # BLD AUTO: 290 THOUSANDS/UL (ref 149–390)
PMV BLD AUTO: 11 FL (ref 8.9–12.7)
POTASSIUM SERPL-SCNC: 3.8 MMOL/L (ref 3.5–5.3)
RBC # BLD AUTO: 4.03 MILLION/UL (ref 3.81–5.12)
SODIUM SERPL-SCNC: 140 MMOL/L (ref 135–147)
WBC # BLD AUTO: 9.81 THOUSAND/UL (ref 4.31–10.16)

## 2023-09-29 PROCEDURE — 85025 COMPLETE CBC W/AUTO DIFF WBC: CPT

## 2023-09-29 PROCEDURE — 80048 BASIC METABOLIC PNL TOTAL CA: CPT

## 2023-09-29 PROCEDURE — 36415 COLL VENOUS BLD VENIPUNCTURE: CPT

## 2023-10-02 ENCOUNTER — APPOINTMENT (OUTPATIENT)
Dept: LAB | Facility: HOSPITAL | Age: 58
End: 2023-10-02
Payer: COMMERCIAL

## 2023-10-02 ENCOUNTER — ANESTHESIA EVENT (OUTPATIENT)
Dept: PERIOP | Facility: HOSPITAL | Age: 58
End: 2023-10-02
Payer: COMMERCIAL

## 2023-10-02 DIAGNOSIS — R22.0 MASS OF SKIN OF HEAD: ICD-10-CM

## 2023-10-02 LAB
ATRIAL RATE: 72 BPM
P AXIS: 46 DEGREES
PR INTERVAL: 168 MS
QRS AXIS: -20 DEGREES
QRSD INTERVAL: 92 MS
QT INTERVAL: 414 MS
QTC INTERVAL: 453 MS
T WAVE AXIS: 28 DEGREES
VENTRICULAR RATE: 72 BPM

## 2023-10-02 PROCEDURE — 93010 ELECTROCARDIOGRAM REPORT: CPT | Performed by: INTERNAL MEDICINE

## 2023-10-04 PROCEDURE — NC001 PR NO CHARGE: Performed by: PHYSICIAN ASSISTANT

## 2023-10-04 NOTE — H&P
H&P - Plastic Surgery   Herminio Luo 62 y.o. female MRN: [id number]  Unit/Bed#:  Encounter: [id number]           Assessment:  Mass of skin of head   Plan:  EXCISION OF FOREHEAD MASS WITH COMPLEX CLOSURE (Face)   Anesthesia type: General             HPI:   Herminio Luo is a 62y.o. year old female who presents with a forehead nodule that causes her pain. She thinks she has had it for 10 or so years, and is more recently causing her discomfort. It has not drained. She has a distant memory of trauma to the area. REVIEW OF SYSTEMS    GENERAL/CONSTITUTIONAL: The patient denies fever, fatigue, weakness, weight gain or weight loss. HEAD, EYES, EARS, NOSE AND THROAT: Eyes - The patient denies pain, redness, loss of vision, double or blurred vision and denies wearing glasses. The patient denies ringing in the ears, nosebleeds sinusitis, post nasal drip. Also denies frequent sore throats, hoarseness, painful swallowing. CARDIOVASCULAR: The patient denies chest pain, irregular heartbeats, palpitations, shortness of breath, heart murmurs, high blood pressure, cramps in his legs with walking, pain in his feet or toes at night or varicose veins. RESPIRATORY: The patient denies chronic cough, wheezing or night sweats. GASTROINTESTINAL: The patient denies decreased appetite, nausea, vomiting, diarrhea, constipation, blood in the stools. GENITOURINARY: The patient denies difficult urination, pain or burning with urination, blood in the urine. MUSCULOSKELETAL: The patient denies arm, thigh or calf cramps. No joint or muscle pain. No muscle weakness or tenderness. No joint swelling, neck pain, back pain or major orthopedic injuries. SKIN AND BREASTS: see hpi The patient denies easy bruising, skin redness, skin rash, hives. NEUROLOGIC: The patient denies headache, dizziness, fainting, memory loss. PSYCHIATRIC: The patient denies depression anxiety.   ENDOCRINE: The patient denies intolerance to hot or cold temperature, flushing, fingernail changes, increased thirst, increased salt intake or decreased sexual desire. HEMATOLOGIC/LYMPHATIC: The patient denies anemia, bleeding tendency or clotting tendency. ALLERGIC/IMMUNOLOGIC: The patient denies rhinitis, asthma, skin sensitivity, latex allergies or sensitivity. 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)    • Fluid retention    • Gastroenteritis 05/06/2013   • Herpes zoster 07/08/2013   • History of chemotherapy 1986   • Kidney stone    • Migraine 01/19/2016   • Shortness of breath 08/12/2016   • Toxoplasmosis 08/28/2014     Past Surgical History:   Procedure Laterality Date   • CARDIAC CATHETERIZATION Left 03/02/2023    Procedure: Cardiac Left Heart Cath - Dr Gloria Hurd or Dr Elaina Bailey please;  Surgeon: Leonarda Gonzalez DO;  [facility]; Service: Cardiology   • CARDIAC CATHETERIZATION N/A 03/02/2023    Procedure: Cardiac Coronary Angiogram;  Surgeon: Leonarda Gonzalez DO;  [facility]; Service: Cardiology   • CARDIAC CATHETERIZATION  03/02/2023    Procedure: Cardiac catheterization;  Surgeon: Leonarda Gonzalez DO;  [facility];   Service: Cardiology   • CHOLECYSTECTOMY     • COLONOSCOPY     • ESOPHAGOGASTRODUODENOSCOPY     • FL RETROGRADE PYELOGRAM  07/28/2022   • HYSTERECTOMY  1986   • NECK SURGERY      Lumpectomy   • OOPHORECTOMY Right 1986   • [place] CYSTO/URETERO W/LITHOTRIPSY &INDWELL STENT INSRT Left 07/28/2022    Procedure: CYSTOSCOPY URETEROSCOPY WITH LITHOTRIPSY HOLMIUM LASER, RETROGRADE PYELOGRAM AND INSERTION STENT LEFT URETERAL;  Surgeon: Radha Dhaliwal MD;  Location[facility];  Service: Urology   • KS ESOPHAGOGASTRODUODENOSCOPY TRANSORAL DIAGNOSTIC N/A 11/30/2018    Procedure: EGD;  Surgeon: Anup Mckeon MD;  Location: [facility]; Service: Gastroenterology   • SHOULDER ARTHROSCOPY Left      Social History   Social History     Substance and Sexual Activity   Alcohol Use Yes    Comment: Occasionally     Social History     Substance and Sexual Activity   Drug Use No     Social History     Tobacco Use   Smoking Status Never   Smokeless Tobacco Never     Family History:   Family History   Problem Relation Age of Onset   • Diabetes Mother    • Heart disease Mother    • Heart disease Father    • Arrhythmia Father         ICD placed   • Other Sister         vertigo   • Migraines Sister    • Bipolar disorder Sister    • Hearing loss Sister    • No Known Problems Daughter    • Thyroid disease Daughter         during pregnancy   • Diabetes Maternal Grandmother    • Heart disease Maternal Grandmother    • No Known Problems Maternal Grandfather    • Heart disease Paternal Grandmother    • Breast cancer Maternal Aunt         age unknown   • Parkinsonism Maternal Aunt    • Colon cancer Neg Hx    • Ovarian cancer Neg Hx        Meds/Allergies   No current facility-administered medications for this encounter.     Current Outpatient Medications:   •  albuterol (VENTOLIN HFA) 90 mcg/act inhaler, Inhale 2 puffs every 6 (six) hours as needed for wheezing or shortness of breath, Disp: 1 Inhaler, Rfl: 0  •  amLODIPine (NORVASC) 5 mg tablet, take 1 tablet by mouth once daily, Disp: 90 tablet, Rfl: 3  •  aspirin 81 MG tablet, Take 81 mg by mouth daily, Disp: , Rfl:   •  atorvastatin (LIPITOR) 80 mg tablet, Take 1 tablet (80 mg total) by mouth daily, Disp: 90 tablet, Rfl: 3  •  Cholecalciferol (VITAMIN D) 2000 units CAPS, Take 1 capsule by mouth daily, Disp: , Rfl:   •  citalopram (CeleXA) 40 mg tablet, Take 1 tablet (40 mg total) by mouth daily, Disp: 30 tablet, Rfl: 2  •  docusate sodium (COLACE) 100 mg capsule, Take 1 capsule (100 mg total) by mouth 2 (two) times a day, Disp: 180 capsule, Rfl: 3  •  ferrous sulfate 324 (65 Fe) mg, Take 1 tablet (324 mg total) by mouth 2 (two) times a day before meals, Disp: 180 tablet, Rfl: 3  •  furosemide (LASIX) 20 mg tablet, take 1 tablet by mouth once daily, Disp: 30 tablet, Rfl: 0  •  Glycerin-Polysorbate 80 (REFRESH DRY EYE THERAPY OP), Apply to eye, Disp: , Rfl:   •  isosorbide mononitrate (IMDUR) 60 mg 24 hr tablet, take 1 tablet by mouth once daily, Disp: 90 tablet, Rfl: 1  •  meloxicam (MOBIC) 15 mg tablet, Take 1 tablet (15 mg total) by mouth daily, Disp: 90 tablet, Rfl: 1  •  metoprolol tartrate (LOPRESSOR) 25 mg tablet, Take 1 tablet (25 mg total) by mouth every 12 (twelve) hours, Disp: 180 tablet, Rfl: 3  •  montelukast (SINGULAIR) 10 mg tablet, Take 1 tablet (10 mg total) by mouth daily at bedtime, Disp: 30 tablet, Rfl: 2  •  multivitamin (THERAGRAN) TABS, Take 1 tablet by mouth daily, Disp: , Rfl:   •  omeprazole (PriLOSEC) 20 mg delayed release capsule, take 1 capsule by mouth once daily, Disp: 30 capsule, Rfl: 5  •  rizatriptan (MAXALT) 10 mg tablet, take 1 tablet by mouth ONCE if needed for migraines may repeat in 2 hours if needed, Disp: 9 tablet, Rfl: 2  •  topiramate (TOPAMAX) 100 mg tablet, Take 1 tablet (100 mg total) by mouth daily, Disp: 30 tablet, Rfl: 5  •  Zinc 20 MG CAPS, Take by mouth, Disp: , Rfl:   •  meloxicam (MOBIC) 15 mg tablet, Take 1 tablet (15 mg total) by mouth daily, Disp: 30 tablet, Rfl: 0      Objective     General appearance: alert and oriented, in no acute distress  Head: Normocephalic, without obvious abnormality, atraumatic, 3-4 mm raised solid painful, slightly mobile forehead nodule (see photo in media)  Eyes: negative findings: conjunctivae and sclerae normal  Neck: no adenopathy, no JVD, supple, symmetrical, trachea midline and thyroid not enlarged, symmetric, no tenderness/mass/nodules  Lungs: nonlabored, no wheezes  Breasts: normal appearance, no masses or tenderness  Heart: regular rate and rhythm  Abdomen: normal findings: soft, non-tender  Extremities: extremities normal, warm and well-perfused; no cyanosis, clubbing, or edema  Skin: no evidence of bleeding or bruising  Neurologic: Grossly normal    Lab Results:   Lab Results   Component Value Date    WBC 9.81 09/29/2023    HGB 11.7 09/29/2023    HCT 36.6 09/29/2023    MCV 91 09/29/2023     09/29/2023          No results found for: "TISSUECULT", "WOUNDCULT"      Imaging Studies:   No results found. EKG, Pathology, and Other Studies:   Lab Results   Component Value Date/Time    [facility]  04/13/2021 06:25 PM     A. Skin, Cyst/Tag/Debridement, right Abdominal, shave excision:  - Acrochordon/skin tag, fragmented.             No intake or output data in the 24 hours ending 10/04/23 0902    Invasive Devices     Drain  Duration           Ureteral Internal Stent Left ureter 6 Fr. 432 days                VTE Prophylaxis: Sequential compression device (Venodyne)

## 2023-10-05 ENCOUNTER — ANESTHESIA (OUTPATIENT)
Dept: PERIOP | Facility: HOSPITAL | Age: 58
End: 2023-10-05
Payer: COMMERCIAL

## 2023-10-05 ENCOUNTER — HOSPITAL ENCOUNTER (OUTPATIENT)
Facility: HOSPITAL | Age: 58
Setting detail: OUTPATIENT SURGERY
Discharge: HOME | End: 2023-10-05
Attending: STUDENT IN AN ORGANIZED HEALTH CARE EDUCATION/TRAINING PROGRAM | Admitting: STUDENT IN AN ORGANIZED HEALTH CARE EDUCATION/TRAINING PROGRAM
Payer: COMMERCIAL

## 2023-10-05 VITALS
TEMPERATURE: 97.8 F | HEART RATE: 59 BPM | SYSTOLIC BLOOD PRESSURE: 114 MMHG | RESPIRATION RATE: 18 BRPM | OXYGEN SATURATION: 94 % | DIASTOLIC BLOOD PRESSURE: 74 MMHG

## 2023-10-05 DIAGNOSIS — R22.0 MASS OF SKIN OF HEAD: ICD-10-CM

## 2023-10-05 PROCEDURE — 21029 CONTOUR OF FACE BONE LESION: CPT | Performed by: PHYSICIAN ASSISTANT

## 2023-10-05 PROCEDURE — 21029 CONTOUR OF FACE BONE LESION: CPT | Performed by: STUDENT IN AN ORGANIZED HEALTH CARE EDUCATION/TRAINING PROGRAM

## 2023-10-05 PROCEDURE — 88307 TISSUE EXAM BY PATHOLOGIST: CPT | Performed by: PATHOLOGY

## 2023-10-05 RX ORDER — ACETAMINOPHEN 325 MG/1
975 TABLET ORAL ONCE
Status: DISCONTINUED | OUTPATIENT
Start: 2023-10-05 | End: 2023-10-05 | Stop reason: HOSPADM

## 2023-10-05 RX ORDER — MIDAZOLAM HYDROCHLORIDE 2 MG/2ML
INJECTION, SOLUTION INTRAMUSCULAR; INTRAVENOUS AS NEEDED
Status: DISCONTINUED | OUTPATIENT
Start: 2023-10-05 | End: 2023-10-05

## 2023-10-05 RX ORDER — SODIUM CHLORIDE, SODIUM LACTATE, POTASSIUM CHLORIDE, CALCIUM CHLORIDE 600; 310; 30; 20 MG/100ML; MG/100ML; MG/100ML; MG/100ML
125 INJECTION, SOLUTION INTRAVENOUS CONTINUOUS
Status: DISCONTINUED | OUTPATIENT
Start: 2023-10-05 | End: 2023-10-05 | Stop reason: HOSPADM

## 2023-10-05 RX ORDER — LIDOCAINE HYDROCHLORIDE 10 MG/ML
INJECTION, SOLUTION EPIDURAL; INFILTRATION; INTRACAUDAL; PERINEURAL AS NEEDED
Status: DISCONTINUED | OUTPATIENT
Start: 2023-10-05 | End: 2023-10-05

## 2023-10-05 RX ORDER — OXYCODONE HYDROCHLORIDE 5 MG/1
5 TABLET ORAL EVERY 4 HOURS PRN
Qty: 10 TABLET | Refills: 0 | Status: SHIPPED | OUTPATIENT
Start: 2023-10-05

## 2023-10-05 RX ORDER — HYDROMORPHONE HCL IN WATER/PF 6 MG/30 ML
0.2 PATIENT CONTROLLED ANALGESIA SYRINGE INTRAVENOUS
Status: DISCONTINUED | OUTPATIENT
Start: 2023-10-05 | End: 2023-10-05 | Stop reason: HOSPADM

## 2023-10-05 RX ORDER — LIDOCAINE HYDROCHLORIDE 10 MG/ML
0.5 INJECTION, SOLUTION EPIDURAL; INFILTRATION; INTRACAUDAL; PERINEURAL ONCE AS NEEDED
Status: DISCONTINUED | OUTPATIENT
Start: 2023-10-05 | End: 2023-10-05 | Stop reason: HOSPADM

## 2023-10-05 RX ORDER — OXYCODONE HYDROCHLORIDE 5 MG/1
5 TABLET ORAL EVERY 4 HOURS PRN
Status: DISCONTINUED | OUTPATIENT
Start: 2023-10-05 | End: 2023-10-05 | Stop reason: HOSPADM

## 2023-10-05 RX ORDER — LIDOCAINE HYDROCHLORIDE AND EPINEPHRINE 10; 10 MG/ML; UG/ML
INJECTION, SOLUTION INFILTRATION; PERINEURAL AS NEEDED
Status: DISCONTINUED | OUTPATIENT
Start: 2023-10-05 | End: 2023-10-05 | Stop reason: HOSPADM

## 2023-10-05 RX ORDER — FENTANYL CITRATE/PF 50 MCG/ML
25 SYRINGE (ML) INJECTION
Status: DISCONTINUED | OUTPATIENT
Start: 2023-10-05 | End: 2023-10-05 | Stop reason: HOSPADM

## 2023-10-05 RX ORDER — MAGNESIUM HYDROXIDE 1200 MG/15ML
LIQUID ORAL AS NEEDED
Status: DISCONTINUED | OUTPATIENT
Start: 2023-10-05 | End: 2023-10-05 | Stop reason: HOSPADM

## 2023-10-05 RX ORDER — ONDANSETRON 2 MG/ML
INJECTION INTRAMUSCULAR; INTRAVENOUS AS NEEDED
Status: DISCONTINUED | OUTPATIENT
Start: 2023-10-05 | End: 2023-10-05

## 2023-10-05 RX ORDER — DEXAMETHASONE SODIUM PHOSPHATE 10 MG/ML
INJECTION, SOLUTION INTRAMUSCULAR; INTRAVENOUS AS NEEDED
Status: DISCONTINUED | OUTPATIENT
Start: 2023-10-05 | End: 2023-10-05

## 2023-10-05 RX ORDER — CLINDAMYCIN PHOSPHATE 900 MG/50ML
900 INJECTION INTRAVENOUS ONCE
Status: COMPLETED | OUTPATIENT
Start: 2023-10-05 | End: 2023-10-05

## 2023-10-05 RX ORDER — ONDANSETRON 2 MG/ML
4 INJECTION INTRAMUSCULAR; INTRAVENOUS ONCE AS NEEDED
Status: DISCONTINUED | OUTPATIENT
Start: 2023-10-05 | End: 2023-10-05 | Stop reason: HOSPADM

## 2023-10-05 RX ORDER — PROPOFOL 10 MG/ML
INJECTION, EMULSION INTRAVENOUS AS NEEDED
Status: DISCONTINUED | OUTPATIENT
Start: 2023-10-05 | End: 2023-10-05

## 2023-10-05 RX ORDER — FENTANYL CITRATE 50 UG/ML
INJECTION, SOLUTION INTRAMUSCULAR; INTRAVENOUS AS NEEDED
Status: DISCONTINUED | OUTPATIENT
Start: 2023-10-05 | End: 2023-10-05

## 2023-10-05 RX ORDER — GABAPENTIN 300 MG/1
300 CAPSULE ORAL ONCE
Status: DISCONTINUED | OUTPATIENT
Start: 2023-10-05 | End: 2023-10-05 | Stop reason: HOSPADM

## 2023-10-05 RX ADMIN — MIDAZOLAM 2 MG: 1 INJECTION INTRAMUSCULAR; INTRAVENOUS at 10:25

## 2023-10-05 RX ADMIN — CLINDAMYCIN IN 5 PERCENT DEXTROSE 900 MG: 18 INJECTION, SOLUTION INTRAVENOUS at 10:31

## 2023-10-05 RX ADMIN — LIDOCAINE HYDROCHLORIDE 50 MG: 10 INJECTION, SOLUTION EPIDURAL; INFILTRATION; INTRACAUDAL at 10:31

## 2023-10-05 RX ADMIN — PROPOFOL 200 MG: 10 INJECTION, EMULSION INTRAVENOUS at 10:31

## 2023-10-05 RX ADMIN — DEXAMETHASONE SODIUM PHOSPHATE 10 MG: 10 INJECTION, SOLUTION INTRAMUSCULAR; INTRAVENOUS at 10:31

## 2023-10-05 RX ADMIN — ONDANSETRON 4 MG: 2 INJECTION INTRAMUSCULAR; INTRAVENOUS at 10:31

## 2023-10-05 RX ADMIN — PROPOFOL 50 MG: 10 INJECTION, EMULSION INTRAVENOUS at 11:02

## 2023-10-05 RX ADMIN — FENTANYL CITRATE 50 MCG: 50 INJECTION, SOLUTION INTRAMUSCULAR; INTRAVENOUS at 10:30

## 2023-10-05 RX ADMIN — SODIUM CHLORIDE, SODIUM LACTATE, POTASSIUM CHLORIDE, AND CALCIUM CHLORIDE: .6; .31; .03; .02 INJECTION, SOLUTION INTRAVENOUS at 10:00

## 2023-10-05 NOTE — OP NOTE
OPERATIVE REPORT  PATIENT NAME: Isabell Salinas    :  1965  MRN: [id number]  Pt Location:  OR ROOM 08    SURGERY DATE: 10/5/2023    Surgeon(s) and Role:     * Sasha Henderson MD - Primary     * Jose Mortimer, PA-C - Assisting    Preop Diagnosis:  Mass of skin of head [R22.0]    Post-Op Diagnosis Codes:     * Mass of skin of head [R22.0]    Procedure(s):  1. Removal contouring benign tumor facial bone (osteoma of forehead, CPT 88010) with intermediate closure (total length 2.7 cm)    Specimen(s):  ID Type Source Tests Collected by Time Destination   1 : forehead bone lesion Tissue Soft Tissue, Other TISSUE EXAM Sasha Henderson MD 10/5/2023 1053        Estimated Blood Loss:   1 mL    Drains:  Ureteral Internal Stent Left ureter 6 Fr. 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 After undergoing general anesthesia, a timeout was performed at which point all patient identifiers were deemed to be correct. The forehead was prepped and draped in the normal sterile fashion. I first began by making a linear incision in a forehead rhytid overlying the lesion. I then dissected through skin, subcutaneous tissue, muscle, periosteum until reaching the lesion which was bony and not a soft tissue lesion. The osteoma was excised enbloc with rongeur and sent for path. The skull surface was cauterized. The wound was irrigated and hemostasis was achieved with electrocautery. I then performed intermediate closure of the wound with closure of the muscle with 4-0 vicryl, dermis with 4-0 vicryl, and skin with 5-0 monocryl.  Exofin skin glue was applied to the incision. This concluded the procedure. Patient tolerated the procedure well without complications. At the end of the case, all sponge, needle, and instrument counts were correct. Patient was awakened from anesthesia and taken to the PACU in stable condition.     I was present for the entire procedure, A qualified resident physician was not available and A physician assistant was required during the procedure for retraction, tissue handling, dissection and suturing        Patient Disposition:  PACU     This procedure was not performed to treat primary cutaneous melanoma through wide local excision      SIGNATURE: Gio Cardenas MD  DATE: October 5, 2023  TIME: 11:01 AM

## 2023-10-05 NOTE — ANESTHESIA POSTPROCEDURE EVALUATION
Post-Op Assessment Note    CV Status:  Stable  Pain Score: 0    Pain management: adequate     Mental Status:  Alert and awake   Hydration Status:  Euvolemic   PONV Controlled:  None   Airway Patency:  Patent      Post Op Vitals Reviewed: Yes      Staff: CRNA         There were no known notable events for this encounter.     BP  123/78    Temp  97.8   Pulse 65   Resp 19   SpO2 99

## 2023-10-05 NOTE — DISCHARGE INSTR - AVS FIRST PAGE
Surgery Date: 10/5/2023                Patient: Paulino Claudia  Surgeon: Dr. Farheen Harrison     Postoperative Instructions for Outpatient Surgery  Excision of Lesion/Mass     Dressings:  [x] Skin glue was applied to your incision over absorbable sutures. You may feel small pieces of suture at the ends of your incision. [] Incision is closed with nonabsorbable sutures. [] Remove dressing the first morning following your surgery and bathe as directed. [x] No dressings are required but you may cover the incision with band-aid or gauze for comfort.  [] Apply bacitracin or other antibiotic ointment to incision and cover with band-aid or gauze. [] Leave dressing in place until your follow up appointment. [] Other instructions:      Bathing:  [x] Shower 24 hours after surgery. Allow soap and water to gently wash over the incision. No scrubbing. Gently pat dry and apply dressing as needed/instructed above.  [] Keep incision/dressing dry until your follow up appointment. [x] No submerging incision in bathtub, pool, hot tub and/or lake. Activity:  [x] No heavy lifting (> 10lbs). [x] No strenuous exercise. [x] Walking is permitted and encouraged. [x] Strict sun avoidance/protection of incision site. [x] Other instructions: Keep head elevated to minimize pressure on wound. Medication:  [x] Resume preoperative medications. [x] Ok to use Tylenol for pain control. You may also use ibuprofen 48 hours after surgery. Oxycodone as needed. [] Finish all antibiotics as prescribed. [x] You may not drive until off your pain medications. [x] Apply ice to area as needed for pain. Do not place ice directly on skin. [] Other instructions: It is expected to have some bruising, swelling and mild oozing at the incision site and the surrounding area. If there is more than you expect or you suspect an infection, please call the office. Some patients may experience a low-grade fever after surgery.   If it is above 100.4, please call the office. If you do not have a postoperative office appointment scheduled, please call the office today and let the staff know Dr. Kortney HUSTON needs to see you in 10-12  days. Please call [phone] with any questions, concerns or changes.

## 2023-10-05 NOTE — ANESTHESIA PREPROCEDURE EVALUATION
Procedure:  EXCISION OF FOREHEAD MASS WITH COMPLEX CLOSURE (Face)    Relevant Problems   ANESTHESIA (within normal limits)   (-) History of anesthesia complications      CARDIO   (+) Coronary artery disease involving native heart without angina pectoris   (+) Migraine      ENDO   (+) Subclinical hypothyroidism      GI/HEPATIC   (+) Gastroesophageal reflux disease (well controlled)      MUSCULOSKELETAL   (+) Fibromyalgia      NEURO/PSYCH   (+) Bilateral occipital neuralgia   (+) Fibromyalgia   (+) Generalized anxiety disorder   (+) Migraine      PULMONARY   (-) Shortness of breath   (-) URI (upper respiratory infection)      •  LM: short, large caliber vessel, bifurcates into LAD/LCX, with no angiographic evidence of significant obstructive CAD  •  LAD: type 3, large caliber vessel, with mild proximal disease, it gives off several small caliber DICK branches, with no angiographic evidence of significant obstructive CAD  •  LCX: dominant, large caliber vessel, with mild proximal disease, it gives off several large OM branches, with no angiographic evidence of significant obstructive CAD  •  RCA: nondominant, very small caliber vessel, with no minimla diffuse CAD  •  LVEDP is mildly elevated without gradient on LV-AO pullback  Recommendation  Physical Exam    Airway    Mallampati score: II  TM Distance: >3 FB  Neck ROM: full     Dental       Cardiovascular      Pulmonary      Other Findings        Anesthesia Plan  ASA Score- 3     Anesthesia Type- general with ASA Monitors. Additional Monitors:   Airway Plan: LMA. Plan Factors-Exercise tolerance (METS): >4 METS. Chart reviewed. EKG reviewed. Existing labs reviewed. Patient summary reviewed. Induction- intravenous. Postoperative Plan-     Informed Consent- Anesthetic plan and risks discussed with patient. I personally reviewed this patient with the CRNA. Discussed and agreed on the Anesthesia Plan with the CRNA. Francheska Sullivan

## 2023-10-09 ENCOUNTER — NURSE TRIAGE (OUTPATIENT)
Age: 58
End: 2023-10-09

## 2023-10-09 NOTE — TELEPHONE ENCOUNTER
Patient called to schedule post op (10/16). While on the phone she said she had swelling on her forehead and the swelling has now moved to her eyes. She is asking if this is normal.  Sx was 10/5, excision of mass on forehead.   Please advise

## 2023-10-09 NOTE — TELEPHONE ENCOUNTER
Called patient in regards to her facial swelling. I recomened she apply ice to the area 10-15 minutes 3-4 times a day and to rest and not to bend over to  heavy objects as this can add pressure to the affected area. Reason for Disposition  • Other post-op symptom or question    Answer Assessment - Initial Assessment Questions  1. SYMPTOM: "What's the main symptom you're concerned about?" (e.g., pain, fever, vomiting)      Swelling on forehead and eyes  2. ONSET: "When did   start?"      10/6/23  3. SURGERY: "What surgery was performed?"      Mass removal on forehead  4. DATE of SURGERY: "When was surgery performed?"       10/5/23  5. ANESTHESIA: " What type of anesthesia did you have?" (e.g., general, spinal, epidural, local)      general  6. PAIN: "Is there any pain?" If Yes, ask: "How bad is it?"  (Scale 1-10; or mild, moderate, severe)      No pain just swelling  7. FEVER: "Do you have a fever?" If Yes, ask: "What is your temperature, how was it measured, and when did it start?"      denies    10.  OTHER SYMPTOMS: "Do you have any other symptoms?" (e.g., drainage from wound, painful urination, constipation)     Just swelling that started on the forehead where the incision was and moved to the eye area    Protocols used: POST-OP SYMPTOMS AND QUESTIONS-ADULT-[place]

## 2023-10-10 PROCEDURE — 88307 TISSUE EXAM BY PATHOLOGIST: CPT | Performed by: PATHOLOGY

## 2023-10-16 ENCOUNTER — OFFICE VISIT (OUTPATIENT)
Dept: PLASTIC SURGERY | Facility: CLINIC | Age: 58
End: 2023-10-16

## 2023-10-16 DIAGNOSIS — D16.4 OSTEOMA OF SKULL: Primary | ICD-10-CM

## 2023-10-17 PROBLEM — D16.4 OSTEOMA OF SKULL: Status: ACTIVE | Noted: 2023-10-17

## 2023-10-17 NOTE — PROGRESS NOTES
Assessment/Plan:     Patient is a 51-year-old female who is status post excision of an osteoma of the forehead by Dr. Pina Daneil on 10/5/2023. Please see HPI. Patient presents to the office today for first postoperative visit and suture removal.  Patient has had no issues postoperatively. The patient will return to our office in approximately 4-6 weeks for a scar check. The patient will apply Aquaphor to the incision site for the next 4-5 days and then may begin silicone scar treatment. She was advised to avoid sun exposure and to wear an SPF of at least 30 at all times. Diagnoses and all orders for this visit:    Osteoma of skull          Subjective:     Patient ID: Len Reagan is a 62 y.o. female. HPI    Patient reports no issues or concerns postoperatively. Review of Systems    See HPI    Objective:     Physical Exam    All incisions and sutures are clean, dry and intact. Healing appropriately.

## 2023-11-02 ENCOUNTER — RA CDI HCC (OUTPATIENT)
Dept: OTHER | Facility: HOSPITAL | Age: 58
End: 2023-11-02

## 2023-11-02 NOTE — PROGRESS NOTES
[facility] [address] [facility] coding opportunities       Chart reviewed, no opportunity found: CHART REVIEWED, NO OPPORTUNITY FOUND        Patients Insurance        Commercial Insurance: [employer]

## 2023-11-09 ENCOUNTER — TELEPHONE (OUTPATIENT)
Dept: FAMILY MEDICINE CLINIC | Facility: CLINIC | Age: 58
End: 2023-11-09

## 2023-11-09 ENCOUNTER — OFFICE VISIT (OUTPATIENT)
Dept: FAMILY MEDICINE CLINIC | Facility: CLINIC | Age: 58
End: 2023-11-09
Payer: COMMERCIAL

## 2023-11-09 VITALS
HEIGHT: 60 IN | TEMPERATURE: 97.8 F | SYSTOLIC BLOOD PRESSURE: 118 MMHG | DIASTOLIC BLOOD PRESSURE: 80 MMHG | BODY MASS INDEX: 45.75 KG/M2 | WEIGHT: 233 LBS | OXYGEN SATURATION: 97 % | HEART RATE: 68 BPM

## 2023-11-09 DIAGNOSIS — K21.9 GASTROESOPHAGEAL REFLUX DISEASE: ICD-10-CM

## 2023-11-09 DIAGNOSIS — Z91.09 ENVIRONMENTAL ALLERGIES: ICD-10-CM

## 2023-11-09 DIAGNOSIS — I34.81 MITRAL VALVE ANNULAR CALCIFICATION: ICD-10-CM

## 2023-11-09 DIAGNOSIS — F41.1 GENERALIZED ANXIETY DISORDER: ICD-10-CM

## 2023-11-09 DIAGNOSIS — D16.4 OSTEOMA OF SKULL: ICD-10-CM

## 2023-11-09 DIAGNOSIS — G43.909 MIGRAINE WITHOUT STATUS MIGRAINOSUS, NOT INTRACTABLE, UNSPECIFIED MIGRAINE TYPE: ICD-10-CM

## 2023-11-09 DIAGNOSIS — I25.10 CORONARY ARTERY DISEASE INVOLVING NATIVE HEART WITHOUT ANGINA PECTORIS, UNSPECIFIED VESSEL OR LESION TYPE: Primary | ICD-10-CM

## 2023-11-09 DIAGNOSIS — M54.2 CERVICAL PAIN: ICD-10-CM

## 2023-11-09 DIAGNOSIS — H91.93 DECREASED HEARING OF BOTH EARS: Primary | ICD-10-CM

## 2023-11-09 DIAGNOSIS — I20.89 STABLE ANGINA PECTORIS: ICD-10-CM

## 2023-11-09 PROCEDURE — 99214 OFFICE O/P EST MOD 30 MIN: CPT | Performed by: NURSE PRACTITIONER

## 2023-11-14 ENCOUNTER — OFFICE VISIT (OUTPATIENT)
Dept: PLASTIC SURGERY | Facility: CLINIC | Age: 58
End: 2023-11-14
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
## 2023-11-14 DIAGNOSIS — D16.4 OSTEOMA OF SKULL: Primary | ICD-10-CM

## 2023-11-14 DIAGNOSIS — S20.162D TICK BITE OF LEFT BREAST, SUBSEQUENT ENCOUNTER: ICD-10-CM

## 2023-11-14 PROCEDURE — 99024 POSTOP FOLLOW-UP VISIT: CPT | Performed by: PHYSICIAN ASSISTANT
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
## 2023-11-15 NOTE — PROGRESS NOTES
Assessment/Plan:     Patient is a 15-year-old female who is status post excision of an osteoma of the forehead by Dr. Brent Singh on 10/5/2023. Please see HPI. Patient returns to the office today for a scar check. Incision has completely healed with minimal scarring. Of note, the patient reports that she has had the head of a tick "stuck" in the skin of her chest for the past 48-72 hours. This was removed in the office today and I strongly advised the patient to follow up immediately with her PCP. Patient will return to our office as needed with any questions or concerns. She will begin silicone scar treatment to her forehead scar. Diagnoses and all orders for this visit:    Osteoma of skull    Tick bite of left breast, subsequent encounter          Subjective:     Patient ID: Herminio Luo is a 62 y.o. female. HPI    Patient reports no issues or concerns with her incision site. She does report a tick bite to her chest as above. She states her  was able to remove the body but not the head. Reports no fever, chills, nausea, vomiting, pain, rash. Review of Systems    See HPI     Objective:     Physical Exam      Forehead incision is clean, dry and intact, minimal scarring. Tick head visible and embedded in skin of chest, surrounding area is irritated but no s/s of infection, no rash noted.

## 2023-11-18 DIAGNOSIS — I20.89 STABLE ANGINA PECTORIS: ICD-10-CM

## 2023-11-19 NOTE — PROGRESS NOTES
[facility]    ASSESSMENT AND PLAN     1. Coronary artery disease involving native heart without angina pectoris, unspecified vessel or lesion type  Assessment & Plan:  Managed by cardiology. BB, statin, ASA. Lab work in June   Total 182; LDL 86        2. Cervical pain  -     VAS carotid complete study; Future; Expected date: 11/09/2023    3. Migraine without status migrainosus, not intractable, unspecified migraine type  Assessment & Plan:  Remains fairly well controlled: Topamax daily - Rizatriptan prn. No change to characteristics when she does have a headache. No change to treatment. F/U 6 months - sooner with changes or concerns       4. Gastroesophageal reflux disease  Assessment & Plan:  Stable. Omeprazole 20 daily. Monitoring for and avoiding triggers. Over due for endoscopy   Encouraged GI follow-up      5. Environmental allergies  Assessment & Plan:  Stable on Singulair      6. Stable angina pectoris  Assessment & Plan:  Stable. Managed by cardiology. Imdur         7. Generalized anxiety disorder  Assessment & Plan:  Still with some occasional flares, but for the most part stable. No depression. Flares appears to be situational and stem around  and his chronic illness. Discussed again the importance of self care. Continue Citalopram 40       8. Mitral valve annular calcification  Assessment & Plan:  Managed by cardiology      9. Osteoma of skull  Assessment & Plan:  Removed by surgery  Reports doing well  No further pain             SUBJECTIVE       Patient ID: Virgie Fleming is a 62 y.o. female.     Chief Complaint   Patient presents with    Follow-up       HISTORY OF PRESENT ILLNESS    Presents today for routine checkup  No new healthcare concerns          The following portions of the patient's history were reviewed and updated as appropriate: allergies, current medications, past family history, past medical history, past social history, past surgical history, and problem list.    REVIEW OF SYSTEMS  Review of Systems   Constitutional: Negative. HENT: Negative. Respiratory: Negative. Cardiovascular: Negative. Gastrointestinal: Negative. Genitourinary: Negative. Musculoskeletal: Negative. Skin: Negative. Neurological: Negative. Psychiatric/Behavioral: Negative.          OBJECTIVE      VITAL SIGNS  /80 (BP Location: Left arm, Patient Position: Sitting, Cuff Size: Adult)   Pulse 68   Temp 97.8 °F (36.6 °C)   Ht 4' 11.5" (1.511 m)   Wt 106 kg (233 lb)   LMP 03/18/1997 (Exact Date)   SpO2 97%   BMI 46.27 kg/m²     CURRENT MEDICATIONS    Current Outpatient Medications:     albuterol (VENTOLIN HFA) 90 mcg/act inhaler, Inhale 2 puffs every 6 (six) hours as needed for wheezing or shortness of breath, Disp: 1 Inhaler, Rfl: 0    amLODIPine (NORVASC) 5 mg tablet, take 1 tablet by mouth once daily, Disp: 90 tablet, Rfl: 3    aspirin 81 MG tablet, Take 81 mg by mouth daily, Disp: , Rfl:     atorvastatin (LIPITOR) 80 mg tablet, Take 1 tablet (80 mg total) by mouth daily, Disp: 90 tablet, Rfl: 3    Cholecalciferol (VITAMIN D) 2000 units CAPS, Take 1 capsule by mouth daily, Disp: , Rfl:     citalopram (CeleXA) 40 mg tablet, Take 1 tablet (40 mg total) by mouth daily, Disp: 30 tablet, Rfl: 2    docusate sodium (COLACE) 100 mg capsule, Take 1 capsule (100 mg total) by mouth 2 (two) times a day, Disp: 180 capsule, Rfl: 3    ferrous sulfate 324 (65 Fe) mg, Take 1 tablet (324 mg total) by mouth 2 (two) times a day before meals, Disp: 180 tablet, Rfl: 3    furosemide (LASIX) 20 mg tablet, take 1 tablet by mouth once daily, Disp: 30 tablet, Rfl: 0    meloxicam (MOBIC) 15 mg tablet, Take 1 tablet (15 mg total) by mouth daily, Disp: 90 tablet, Rfl: 1    metoprolol tartrate (LOPRESSOR) 25 mg tablet, Take 1 tablet (25 mg total) by mouth every 12 (twelve) hours, Disp: 180 tablet, Rfl: 3    montelukast (SINGULAIR) 10 mg tablet, Take 1 tablet (10 mg total) by mouth daily at bedtime, Disp: 30 tablet, Rfl: 2    multivitamin (THERAGRAN) TABS, Take 1 tablet by mouth daily, Disp: , Rfl:     omeprazole (PriLOSEC) 20 mg delayed release capsule, take 1 capsule by mouth once daily, Disp: 30 capsule, Rfl: 5    rizatriptan (MAXALT) 10 mg tablet, take 1 tablet by mouth ONCE if needed for migraines may repeat in 2 hours if needed, Disp: 9 tablet, Rfl: 2    topiramate (TOPAMAX) 100 mg tablet, Take 1 tablet (100 mg total) by mouth daily, Disp: 30 tablet, Rfl: 5    Zinc 20 MG CAPS, Take by mouth, Disp: , Rfl:     Glycerin-Polysorbate 80 (REFRESH DRY EYE THERAPY OP), Apply to eye (Patient not taking: Reported on 11/9/2023), Disp: , Rfl:     isosorbide mononitrate (IMDUR) 60 mg 24 hr tablet, take 1 tablet by mouth once daily, Disp: 90 tablet, Rfl: 1    meloxicam (MOBIC) 15 mg tablet, Take 1 tablet (15 mg total) by mouth daily, Disp: 30 tablet, Rfl: 0      PHYSICAL EXAMINATION   Physical Exam  Vitals and nursing note reviewed. Constitutional:       General: She is not in acute distress. Appearance: Normal appearance. She is well-developed and well-groomed. She is not ill-appearing. HENT:      Head: Normocephalic and atraumatic. Right Ear: Tympanic membrane, ear canal and external ear normal.      Left Ear: Tympanic membrane, ear canal and external ear normal.      Mouth/Throat:      Mouth: Mucous membranes are moist.   Eyes:      Pupils: Pupils are equal, round, and reactive to light. Neck:      Vascular: No carotid bruit. Cardiovascular:      Rate and Rhythm: Normal rate and regular rhythm. Pulses:           Posterior tibial pulses are 2+ on the right side and 2+ on the left side. Heart sounds: Normal heart sounds. Pulmonary:      Effort: Pulmonary effort is normal. No respiratory distress. Breath sounds: Normal breath sounds and air entry. Musculoskeletal:      Right lower leg: No edema. Left lower leg: No edema.    Lymphadenopathy:      Cervical: No cervical adenopathy. Skin:     General: Skin is warm and dry. Neurological:      General: No focal deficit present. Mental Status: She is alert and oriented to person, place, and time. Psychiatric:         Attention and Perception: Attention normal.         Mood and Affect: Mood normal.         Speech: Speech normal.         Behavior: Behavior normal.         Thought Content:  Thought content normal.         Cognition and Memory: Cognition normal.         Judgment: Judgment normal.

## 2023-11-20 RX ORDER — ISOSORBIDE MONONITRATE 60 MG/1
TABLET, EXTENDED RELEASE ORAL
Qty: 90 TABLET | Refills: 1 | Status: SHIPPED | OUTPATIENT
Start: 2023-11-20

## 2023-11-21 NOTE — ASSESSMENT & PLAN NOTE
Remains fairly well controlled: Topamax daily - Rizatriptan prn. No change to characteristics when she does have a headache. No change to treatment.  F/U 6 months - sooner with changes or concerns

## 2023-11-21 NOTE — ASSESSMENT & PLAN NOTE
Still with some occasional flares, but for the most part stable. No depression. Flares appears to be situational and stem around  and his chronic illness. Discussed again the importance of self care.  Continue Citalopram 40

## 2023-11-21 NOTE — ASSESSMENT & PLAN NOTE
Stable. Omeprazole 20 daily. Monitoring for and avoiding triggers.    Over due for endoscopy   Encouraged GI follow-up

## 2023-12-13 NOTE — LETTER
Procedure Request Form: Hysterectomy 1986      Date Requested: 22  Patient: July Radha  Patient : 1965   Referring Provider: PASHA Acuna        Date of Procedure ______________________________       The above patient has informed us that they have completed their   most recent Hysterectomy 1986 at your facility  Please complete   this form and attach all corresponding procedure reports/results  Comments __________________________________________________________  ____________________________________________________________________  ____________________________________________________________________  ____________________________________________________________________    Facility Completing Procedure _________________________________________    Form Completed By (print name) _______________________________________      Signature __________________________________________________________      These reports are needed for  compliance  Please fax this completed form and a copy of the procedure report to our office located at [place] [address] as soon as possible to [phone] giselle Tanner: Phone [phone]    We thank you for your assistance in treating our mutual patient  To the ED with c.o headache since Saturday. States currently with sinus infection. States pain is constant and nausea with pain. On antibiotic for sinus infection. No numbness or tingling, speech or vision problems. Pt is awake, alert, speech clear and steady gait.

## 2024-01-24 DIAGNOSIS — F41.1 GENERALIZED ANXIETY DISORDER: ICD-10-CM

## 2024-01-24 RX ORDER — CITALOPRAM 40 MG/1
40 TABLET ORAL DAILY
Qty: 30 TABLET | Refills: 5 | Status: SHIPPED | OUTPATIENT
Start: 2024-01-24

## 2024-01-30 DIAGNOSIS — I20.89 STABLE ANGINA PECTORIS: ICD-10-CM

## 2024-01-30 DIAGNOSIS — R93.89 ABNORMAL COMPUTED TOMOGRAPHY ANGIOGRAPHY (CTA): ICD-10-CM

## 2024-02-28 DIAGNOSIS — G43.909 MIGRAINE WITHOUT STATUS MIGRAINOSUS, NOT INTRACTABLE, UNSPECIFIED MIGRAINE TYPE: ICD-10-CM

## 2024-02-28 RX ORDER — RIZATRIPTAN BENZOATE 10 MG/1
10 TABLET ORAL AS NEEDED
Qty: 9 TABLET | Refills: 2 | Status: SHIPPED | OUTPATIENT
Start: 2024-02-28

## 2024-03-04 DIAGNOSIS — K21.9 GASTROESOPHAGEAL REFLUX DISEASE: ICD-10-CM

## 2024-03-04 NOTE — TELEPHONE ENCOUNTER
Pt tripped at work striking head and nose--no LOC    small abraision at nose   no deviation   bleeding is very minimal hematoma at R side of head   no neck pain per pt    Received fax thru [employer] for med refill

## 2024-03-05 RX ORDER — OMEPRAZOLE 20 MG/1
20 CAPSULE, DELAYED RELEASE ORAL DAILY
Qty: 90 CAPSULE | Refills: 1 | Status: SHIPPED | OUTPATIENT
Start: 2024-03-05

## 2024-04-26 DIAGNOSIS — I20.89 STABLE ANGINA PECTORIS: ICD-10-CM

## 2024-04-26 DIAGNOSIS — R06.02 SOB (SHORTNESS OF BREATH) ON EXERTION: ICD-10-CM

## 2024-04-26 DIAGNOSIS — R07.9 CHEST PAIN, UNSPECIFIED TYPE: ICD-10-CM

## 2024-04-29 RX ORDER — AMLODIPINE BESYLATE 5 MG/1
TABLET ORAL
Qty: 90 TABLET | Refills: 3 | Status: SHIPPED | OUTPATIENT
Start: 2024-04-29

## 2024-04-29 RX ORDER — ISOSORBIDE MONONITRATE 60 MG/1
60 TABLET, EXTENDED RELEASE ORAL DAILY
Qty: 90 TABLET | Refills: 1 | Status: SHIPPED | OUTPATIENT
Start: 2024-04-29

## 2024-05-01 DIAGNOSIS — E61.1 IRON DEFICIENCY: ICD-10-CM

## 2024-05-03 RX ORDER — FERROUS SULFATE 324(65)MG
324 TABLET, DELAYED RELEASE (ENTERIC COATED) ORAL
Qty: 180 TABLET | Refills: 2 | Status: SHIPPED | OUTPATIENT
Start: 2024-05-03

## 2024-05-03 NOTE — TELEPHONE ENCOUNTER
Last seen 04.03.2023 no upcoming appointment. Please review and order if the refill is appropriate.   Refill must be reviewed and completed by the office or provider. The refill is unable to be approved or denied by the medication management team.

## 2024-05-09 ENCOUNTER — APPOINTMENT (OUTPATIENT)
Dept: RADIOLOGY | Facility: CLINIC | Age: 59
End: 2024-05-09
Payer: COMMERCIAL

## 2024-05-09 ENCOUNTER — OFFICE VISIT (OUTPATIENT)
Dept: FAMILY MEDICINE CLINIC | Facility: CLINIC | Age: 59
End: 2024-05-09
Payer: COMMERCIAL

## 2024-05-09 VITALS
OXYGEN SATURATION: 96 % | TEMPERATURE: 97.6 F | BODY MASS INDEX: 46.67 KG/M2 | HEART RATE: 64 BPM | DIASTOLIC BLOOD PRESSURE: 72 MMHG | SYSTOLIC BLOOD PRESSURE: 140 MMHG | WEIGHT: 235 LBS

## 2024-05-09 DIAGNOSIS — M25.561 CHRONIC PAIN OF BOTH KNEES: Primary | ICD-10-CM

## 2024-05-09 DIAGNOSIS — G43.909 MIGRAINE WITHOUT STATUS MIGRAINOSUS, NOT INTRACTABLE, UNSPECIFIED MIGRAINE TYPE: ICD-10-CM

## 2024-05-09 DIAGNOSIS — R22.42 LUMP OF SKIN OF LEFT LOWER EXTREMITY: ICD-10-CM

## 2024-05-09 DIAGNOSIS — M17.0 PRIMARY OSTEOARTHRITIS OF BOTH KNEES: ICD-10-CM

## 2024-05-09 DIAGNOSIS — G89.29 CHRONIC PAIN OF BOTH KNEES: ICD-10-CM

## 2024-05-09 DIAGNOSIS — F41.1 GENERALIZED ANXIETY DISORDER: ICD-10-CM

## 2024-05-09 DIAGNOSIS — K21.9 GASTROESOPHAGEAL REFLUX DISEASE, UNSPECIFIED WHETHER ESOPHAGITIS PRESENT: ICD-10-CM

## 2024-05-09 DIAGNOSIS — M25.562 CHRONIC PAIN OF BOTH KNEES: ICD-10-CM

## 2024-05-09 DIAGNOSIS — G25.81 RESTLESS LEG: ICD-10-CM

## 2024-05-09 DIAGNOSIS — Z13.1 SCREENING FOR DIABETES MELLITUS: ICD-10-CM

## 2024-05-09 DIAGNOSIS — M25.561 CHRONIC PAIN OF BOTH KNEES: ICD-10-CM

## 2024-05-09 DIAGNOSIS — I25.10 CORONARY ARTERY DISEASE INVOLVING NATIVE HEART WITHOUT ANGINA PECTORIS, UNSPECIFIED VESSEL OR LESION TYPE: Primary | ICD-10-CM

## 2024-05-09 DIAGNOSIS — M25.562 CHRONIC PAIN OF BOTH KNEES: Primary | ICD-10-CM

## 2024-05-09 DIAGNOSIS — Z12.31 ENCOUNTER FOR SCREENING MAMMOGRAM FOR MALIGNANT NEOPLASM OF BREAST: ICD-10-CM

## 2024-05-09 DIAGNOSIS — Z13.220 SCREENING FOR LIPID DISORDERS: ICD-10-CM

## 2024-05-09 DIAGNOSIS — I34.81 MITRAL VALVE ANNULAR CALCIFICATION: ICD-10-CM

## 2024-05-09 DIAGNOSIS — G89.29 CHRONIC PAIN OF BOTH KNEES: Primary | ICD-10-CM

## 2024-05-09 DIAGNOSIS — Z13.0 SCREENING, ANEMIA, DEFICIENCY, IRON: ICD-10-CM

## 2024-05-09 DIAGNOSIS — G56.03 BILATERAL CARPAL TUNNEL SYNDROME: ICD-10-CM

## 2024-05-09 DIAGNOSIS — E03.8 SUBCLINICAL HYPOTHYROIDISM: ICD-10-CM

## 2024-05-09 PROCEDURE — 99214 OFFICE O/P EST MOD 30 MIN: CPT | Performed by: NURSE PRACTITIONER

## 2024-05-09 PROCEDURE — 73562 X-RAY EXAM OF KNEE 3: CPT

## 2024-05-09 RX ORDER — GABAPENTIN 100 MG/1
100 CAPSULE ORAL
Qty: 90 CAPSULE | Refills: 0 | Status: SHIPPED | OUTPATIENT
Start: 2024-05-09

## 2024-05-09 RX ORDER — TOPIRAMATE 50 MG/1
50 TABLET, FILM COATED ORAL 2 TIMES DAILY
Qty: 30 TABLET | Refills: 5 | Status: SHIPPED | OUTPATIENT
Start: 2024-05-09

## 2024-05-09 RX ORDER — METOPROLOL SUCCINATE 25 MG/1
25 TABLET, EXTENDED RELEASE ORAL EVERY 12 HOURS
COMMUNITY
Start: 2024-02-04

## 2024-05-10 PROBLEM — R22.42 LUMP OF SKIN OF LEFT LOWER EXTREMITY: Status: ACTIVE | Noted: 2024-05-10

## 2024-05-10 NOTE — ASSESSMENT & PLAN NOTE
Still with some occasional flares, but for the most part stable. No depression - no SI/HI.  Flares appears to be situational and stem around  and his chronic illness.   Discussed again the importance of self care.   Continue Citalopram 40

## 2024-05-10 NOTE — ASSESSMENT & PLAN NOTE
Currently managed by cardiology.  BB, statin, ASA.  Last lab work in June 2023  Total 182; LDL 86  Repeat fasting lab work ordered

## 2024-05-10 NOTE — ASSESSMENT & PLAN NOTE
Symptoms reviewed  Gabapentin effective in the past  We will restart today-low-dose 100 at at bedtime  May slow increase to 300 if needed and tolerating  We will review at her 2-week follow-up

## 2024-05-10 NOTE — PROGRESS NOTES
[facility]    ASSESSMENT AND PLAN     1. Coronary artery disease involving native heart without angina pectoris, unspecified vessel or lesion type  Assessment & Plan:  Currently managed by cardiology.  BB, statin, ASA.  Last lab work in June 2023  Total 182; LDL 86  Repeat fasting lab work ordered      2. Migraine without status migrainosus, not intractable, unspecified migraine type  Assessment & Plan:  Will restart topiramate today  Was fairly well controlled in the past  Trial 50 twice daily for better coverage  Continue rizatriptan as needed  She will follow-up in office if her symptoms continue despite reinitiating treatment    Orders:  -     topiramate (Topamax) 50 MG tablet; Take 1 tablet (50 mg total) by mouth 2 (two) times a day    3. Restless leg  Assessment & Plan:  Symptoms reviewed  Gabapentin effective in the past  We will restart today-low-dose 100 at at bedtime  May slow increase to 300 if needed and tolerating  We will review at her 2-week follow-up    Orders:  -     gabapentin (Neurontin) 100 mg capsule; Take 1 capsule (100 mg total) by mouth daily at bedtime    4. Bilateral carpal tunnel syndrome  -     Ambulatory Referral to Orthopedic Surgery; Future    5. Subclinical hypothyroidism  Assessment & Plan:  Labs due    Orders:  -     TSH, 3rd generation with Free T4 reflex; Future    6. Screening for lipid disorders  -     Lipid panel; Future    7. Screening for diabetes mellitus  -     Comprehensive metabolic panel; Future    8. Screening, anemia, deficiency, iron  -     CBC and differential; Future    9. Encounter for screening mammogram for malignant neoplasm of breast  -     Mammo screening bilateral w 3d & cad; Future    10. Lump of skin of left lower extremity  Assessment & Plan:  Small lump noted under skin on left thigh.  Patient has been noting tenderness with touching  Suspect lipoma  Will check ultrasound    Orders:  -     US extremity soft tissue; Future; Expected date:  05/09/2024    11. Chronic pain of both knees  -     XR knee 3 vw right non injury; Future; Expected date: 05/09/2024  -     XR knee 3 vw left non injury; Future; Expected date: 05/09/2024    12. Mitral valve annular calcification  Assessment & Plan:  Managed by cardiology         13. Gastroesophageal reflux disease, unspecified whether esophagitis present  Assessment & Plan:  Stable.   Omeprazole 20 daily.   Monitoring for and avoiding triggers.   Over due for endoscopy   Encouraged GI follow-up      14. Generalized anxiety disorder  Assessment & Plan:  Still with some occasional flares, but for the most part stable. No depression - no SI/HI.  Flares appears to be situational and stem around  and his chronic illness.   Discussed again the importance of self care.   Continue Citalopram 40       Note: Will complete lab work within the next 3 weeks.  Follow-up for annual physical.  Will reassess restless leg and migraines since restarting gabapentin and topiramate.      SUBJECTIVE       Patient ID: Arabella Her is a 58 y.o. female.    Chief Complaint   Patient presents with    Follow-up     6 month f/u    Migraine     Currently experiencing  Pain level 10       HISTORY OF PRESENT ILLNESS    Routine follow-up  PCP manages migraines, hypothyroidism, blood pressure, reflux.  Patient follows with specialists: Cardiology, dermatology, gastroenterology and GYN.  Worsening migraines over the last few weeks-as she has run out of her Topamax due to a pharmacy issue.  She has not taken it in a few months.  Also complains of increasing restless leg movement at at bedtime-interrupting sleep.          The following portions of the patient's history were reviewed and updated as appropriate: allergies, current medications, past family history, past medical history, past social history, past surgical history, and problem list.    REVIEW OF SYSTEMS  Review of Systems   Constitutional: Negative.    Respiratory: Negative.      Cardiovascular: Negative.    Gastrointestinal: Negative.    Genitourinary: Negative.    Musculoskeletal:         Restless leg   Neurological:  Positive for headaches.   Psychiatric/Behavioral: Negative.         OBJECTIVE      VITAL SIGNS  /72 (BP Location: Left arm, Patient Position: Sitting, Cuff Size: Standard)   Pulse 64   Temp 97.6 °F (36.4 °C) (Temporal)   Wt 107 kg (235 lb)   LMP 03/18/1997 (Exact Date)   SpO2 96%   BMI 46.67 kg/m²     CURRENT MEDICATIONS    Current Outpatient Medications:     albuterol (VENTOLIN HFA) 90 mcg/act inhaler, Inhale 2 puffs every 6 (six) hours as needed for wheezing or shortness of breath, Disp: 1 Inhaler, Rfl: 0    amLODIPine (NORVASC) 5 mg tablet, TAKE 1 TABLET BY MOUTH ONCE DAILY, Disp: 90 tablet, Rfl: 3    aspirin 81 MG tablet, Take 81 mg by mouth daily, Disp: , Rfl:     atorvastatin (LIPITOR) 80 mg tablet, Take 1 tablet (80 mg total) by mouth daily, Disp: 90 tablet, Rfl: 3    Cholecalciferol (VITAMIN D) 2000 units CAPS, Take 1 capsule by mouth daily, Disp: , Rfl:     citalopram (CeleXA) 40 mg tablet, Take 1 tablet (40 mg total) by mouth daily, Disp: 30 tablet, Rfl: 5    docusate sodium (COLACE) 100 mg capsule, Take 1 capsule (100 mg total) by mouth 2 (two) times a day, Disp: 180 capsule, Rfl: 3    ferrous sulfate 324 (65 Fe) mg, TAKE 1 TABLET BY MOUTH TWICE A DAY BEFORE MEALS, Disp: 180 tablet, Rfl: 2    furosemide (LASIX) 20 mg tablet, take 1 tablet by mouth once daily, Disp: 30 tablet, Rfl: 0    gabapentin (Neurontin) 100 mg capsule, Take 1 capsule (100 mg total) by mouth daily at bedtime, Disp: 90 capsule, Rfl: 0    isosorbide mononitrate (IMDUR) 60 mg 24 hr tablet, TAKE 1 TABLET BY MOUTH EVERY DAY, Disp: 90 tablet, Rfl: 1    metoprolol succinate (TOPROL-XL) 25 mg 24 hr tablet, Take 25 mg by mouth every 12 (twelve) hours, Disp: , Rfl:     metoprolol tartrate (LOPRESSOR) 25 mg tablet, Take 1 tablet (25 mg total) by mouth every 12 (twelve) hours, Disp: 180  tablet, Rfl: 1    montelukast (SINGULAIR) 10 mg tablet, Take 1 tablet (10 mg total) by mouth daily at bedtime, Disp: 30 tablet, Rfl: 2    multivitamin (THERAGRAN) TABS, Take 1 tablet by mouth daily, Disp: , Rfl:     omeprazole (PriLOSEC) 20 mg delayed release capsule, Take 1 capsule (20 mg total) by mouth daily, Disp: 90 capsule, Rfl: 1    rizatriptan (MAXALT) 10 mg tablet, Take 1 tablet (10 mg total) by mouth as needed for migraine Take at the onset of migraine; if symptoms continue or return, may take another dose at least 2 hours after first dose. Take no more than 2 doses in a day., Disp: 9 tablet, Rfl: 2    topiramate (Topamax) 50 MG tablet, Take 1 tablet (50 mg total) by mouth 2 (two) times a day, Disp: 30 tablet, Rfl: 5    Zinc 20 MG CAPS, Take by mouth, Disp: , Rfl:     Glycerin-Polysorbate 80 (REFRESH DRY EYE THERAPY OP), Apply to eye (Patient not taking: Reported on 11/9/2023), Disp: , Rfl:     meloxicam (MOBIC) 15 mg tablet, Take 1 tablet (15 mg total) by mouth daily, Disp: 30 tablet, Rfl: 0    meloxicam (MOBIC) 15 mg tablet, Take 1 tablet (15 mg total) by mouth daily, Disp: 90 tablet, Rfl: 1      PHYSICAL EXAMINATION   Physical Exam  Vitals and nursing note reviewed.   Constitutional:       General: She is not in acute distress.     Appearance: Normal appearance. She is well-developed. She is not ill-appearing.   HENT:      Head: Normocephalic.   Neck:      Vascular: No carotid bruit.   Cardiovascular:      Rate and Rhythm: Normal rate and regular rhythm.   Pulmonary:      Effort: Pulmonary effort is normal. No respiratory distress.      Breath sounds: Normal breath sounds and air entry.   Musculoskeletal:      Right lower leg: No edema.      Left lower leg: No edema.   Skin:     General: Skin is warm and dry.          Neurological:      General: No focal deficit present.      Mental Status: She is alert and oriented to person, place, and time.   Psychiatric:         Attention and Perception: Attention  normal.         Mood and Affect: Mood normal.         Behavior: Behavior normal.         Thought Content: Thought content normal.         Judgment: Judgment normal.

## 2024-05-10 NOTE — ASSESSMENT & PLAN NOTE
Small lump noted under skin on left thigh.  Patient has been noting tenderness with touching  Suspect lipoma  Will check ultrasound

## 2024-05-10 NOTE — ASSESSMENT & PLAN NOTE
Will restart topiramate today  Was fairly well controlled in the past  Trial 50 twice daily for better coverage  Continue rizatriptan as needed  She will follow-up in office if her symptoms continue despite reinitiating treatment

## 2024-05-13 ENCOUNTER — APPOINTMENT (OUTPATIENT)
Dept: LAB | Facility: CLINIC | Age: 59
End: 2024-05-13
Payer: COMMERCIAL

## 2024-05-13 DIAGNOSIS — Z13.1 SCREENING FOR DIABETES MELLITUS: ICD-10-CM

## 2024-05-13 DIAGNOSIS — E03.8 SUBCLINICAL HYPOTHYROIDISM: ICD-10-CM

## 2024-05-13 DIAGNOSIS — Z13.0 SCREENING, ANEMIA, DEFICIENCY, IRON: ICD-10-CM

## 2024-05-13 DIAGNOSIS — Z13.220 SCREENING FOR LIPID DISORDERS: ICD-10-CM

## 2024-05-13 LAB
ALBUMIN SERPL BCP-MCNC: 3.8 G/DL (ref 3.5–5)
ALP SERPL-CCNC: 94 U/L (ref 34–104)
ALT SERPL W P-5'-P-CCNC: 21 U/L (ref 7–52)
ANION GAP SERPL CALCULATED.3IONS-SCNC: 10 MMOL/L (ref 4–13)
AST SERPL W P-5'-P-CCNC: 22 U/L (ref 13–39)
BASOPHILS # BLD AUTO: 0.04 THOUSANDS/ÂΜL (ref 0–0.1)
BASOPHILS NFR BLD AUTO: 1 % (ref 0–1)
BILIRUB SERPL-MCNC: 0.32 MG/DL (ref 0.2–1)
BUN SERPL-MCNC: 13 MG/DL (ref 5–25)
CALCIUM SERPL-MCNC: 9.1 MG/DL (ref 8.4–10.2)
CHLORIDE SERPL-SCNC: 102 MMOL/L (ref 96–108)
CHOLEST SERPL-MCNC: 209 MG/DL
CO2 SERPL-SCNC: 28 MMOL/L (ref 21–32)
CREAT SERPL-MCNC: 0.79 MG/DL (ref 0.6–1.3)
EOSINOPHIL # BLD AUTO: 0.23 THOUSAND/ÂΜL (ref 0–0.61)
EOSINOPHIL NFR BLD AUTO: 3 % (ref 0–6)
ERYTHROCYTE [DISTWIDTH] IN BLOOD BY AUTOMATED COUNT: 14.3 % (ref 11.6–15.1)
GFR SERPL CREATININE-BSD FRML MDRD: 82 ML/MIN/1.73SQ M
GLUCOSE P FAST SERPL-MCNC: 89 MG/DL (ref 65–99)
HCT VFR BLD AUTO: 38.6 % (ref 34.8–46.1)
HDLC SERPL-MCNC: 77 MG/DL
HGB BLD-MCNC: 12.1 G/DL (ref 11.5–15.4)
IMM GRANULOCYTES # BLD AUTO: 0.05 THOUSAND/UL (ref 0–0.2)
IMM GRANULOCYTES NFR BLD AUTO: 1 % (ref 0–2)
LDLC SERPL CALC-MCNC: 118 MG/DL (ref 0–100)
LYMPHOCYTES # BLD AUTO: 3 THOUSANDS/ÂΜL (ref 0.6–4.47)
LYMPHOCYTES NFR BLD AUTO: 38 % (ref 14–44)
MCH RBC QN AUTO: 29 PG (ref 26.8–34.3)
MCHC RBC AUTO-ENTMCNC: 31.3 G/DL (ref 31.4–37.4)
MCV RBC AUTO: 93 FL (ref 82–98)
MONOCYTES # BLD AUTO: 0.62 THOUSAND/ÂΜL (ref 0.17–1.22)
MONOCYTES NFR BLD AUTO: 8 % (ref 4–12)
NEUTROPHILS # BLD AUTO: 3.97 THOUSANDS/ÂΜL (ref 1.85–7.62)
NEUTS SEG NFR BLD AUTO: 49 % (ref 43–75)
NONHDLC SERPL-MCNC: 132 MG/DL
NRBC BLD AUTO-RTO: 0 /100 WBCS
PLATELET # BLD AUTO: 306 THOUSANDS/UL (ref 149–390)
PMV BLD AUTO: 10.8 FL (ref 8.9–12.7)
POTASSIUM SERPL-SCNC: 3.7 MMOL/L (ref 3.5–5.3)
PROT SERPL-MCNC: 7 G/DL (ref 6.4–8.4)
RBC # BLD AUTO: 4.17 MILLION/UL (ref 3.81–5.12)
SODIUM SERPL-SCNC: 140 MMOL/L (ref 135–147)
TRIGL SERPL-MCNC: 69 MG/DL
TSH SERPL DL<=0.05 MIU/L-ACNC: 4.01 UIU/ML (ref 0.45–4.5)
WBC # BLD AUTO: 7.91 THOUSAND/UL (ref 4.31–10.16)

## 2024-05-13 PROCEDURE — 84443 ASSAY THYROID STIM HORMONE: CPT

## 2024-05-13 PROCEDURE — 85025 COMPLETE CBC W/AUTO DIFF WBC: CPT

## 2024-05-13 PROCEDURE — 36415 COLL VENOUS BLD VENIPUNCTURE: CPT

## 2024-05-13 PROCEDURE — 80053 COMPREHEN METABOLIC PANEL: CPT

## 2024-05-13 PROCEDURE — 80061 LIPID PANEL: CPT

## 2024-05-15 DIAGNOSIS — R60.0 BILATERAL LOWER EXTREMITY EDEMA: ICD-10-CM

## 2024-05-15 DIAGNOSIS — E61.1 IRON DEFICIENCY: ICD-10-CM

## 2024-05-15 RX ORDER — FUROSEMIDE 20 MG/1
20 TABLET ORAL DAILY
Qty: 30 TABLET | Refills: 5 | Status: SHIPPED | OUTPATIENT
Start: 2024-05-15

## 2024-05-15 NOTE — TELEPHONE ENCOUNTER
Patient called in and stated that she needs a refill on the furosemide 20 mg. Patient has non left.    Please send to [facility] on file per patients request/.    Thank you.     Patient w AIDP leading to respiratory compromise, previous EMG w demyelinating polyneuropathy.  - C/w plan as per above problem 1 & 2  - Neurology following  - plasmapheresis as in problem #1

## 2024-05-15 NOTE — TELEPHONE ENCOUNTER
Patient would like referral  [id number]  for hearing printed out so that she can oick it up.    Please call patient when this is ready for  .    Thank you

## 2024-05-15 NOTE — TELEPHONE ENCOUNTER
Patient called in and stated that she keeps getting a call from [facility] physical therapy - patient stated that this never talked about her going to physical therapy.    Patient called in and wanted to know if she was to get a referral to a  cardiologist.     Patient is requesting a call back regarding this at .    Thank you

## 2024-05-20 ENCOUNTER — HOSPITAL ENCOUNTER (OUTPATIENT)
Dept: ULTRASOUND IMAGING | Facility: HOSPITAL | Age: 59
Discharge: HOME | End: 2024-05-20
Payer: COMMERCIAL

## 2024-05-20 ENCOUNTER — TELEPHONE (OUTPATIENT)
Dept: FAMILY MEDICINE CLINIC | Facility: CLINIC | Age: 59
End: 2024-05-20

## 2024-05-20 DIAGNOSIS — J20.9 ACUTE BRONCHITIS, UNSPECIFIED ORGANISM: ICD-10-CM

## 2024-05-20 DIAGNOSIS — R22.42 LUMP OF SKIN OF LEFT LOWER EXTREMITY: ICD-10-CM

## 2024-05-20 PROCEDURE — 76882 US LMTD JT/FCL EVL NVASC XTR: CPT

## 2024-05-20 RX ORDER — ALBUTEROL SULFATE 90 UG/1
2 AEROSOL, METERED RESPIRATORY (INHALATION) EVERY 6 HOURS PRN
Refills: 0 | OUTPATIENT
Start: 2024-05-20

## 2024-05-20 NOTE — TELEPHONE ENCOUNTER
I have never prescribed this for her. Unsure why she is asking for this. Schedule appt to discuss symptoms

## 2024-05-20 NOTE — TELEPHONE ENCOUNTER
Patient stated Jo Mckeon fills her medications.       Reason for call:   [x] Refill   [] Prior Auth  [] Other:     Office:   [x] PCP/Provider -   PASHA Delarosa     [] Specialty/Provider -     Medication: albuterol (VENTOLIN HFA) 90 mcg/act inhaler     Dose/Frequency: 2 puff, Inhalation, Every 6 hours PRN     Quantity: 1 inhaler    Pharmacy: [facility]/pharmacy #[id number] - [place] - [address] [phone]     Does the patient have enough for 3 days?   [] Yes   [x] No - Send as HP to POD

## 2024-05-20 NOTE — TELEPHONE ENCOUNTER
Patient called the RX Refill Line. Message is being forwarded to the office.     Patient is requesting Symbicort,patient did not have any other information on dosage. I did not see this on her medication list.     Please contact patient at  [phone]

## 2024-05-20 NOTE — TELEPHONE ENCOUNTER
Lvm asking pt to call back letting us know why she wants this prescription. Jennifer has never prescribed this

## 2024-05-22 DIAGNOSIS — G43.909 MIGRAINE WITHOUT STATUS MIGRAINOSUS, NOT INTRACTABLE, UNSPECIFIED MIGRAINE TYPE: ICD-10-CM

## 2024-05-22 RX ORDER — TOPIRAMATE 50 MG/1
50 TABLET, FILM COATED ORAL 2 TIMES DAILY
Qty: 180 TABLET | Refills: 1 | Status: SHIPPED | OUTPATIENT
Start: 2024-05-22

## 2024-05-31 DIAGNOSIS — R22.42 LUMP OF SKIN OF LEFT LOWER EXTREMITY: Primary | ICD-10-CM

## 2024-06-03 ENCOUNTER — EVALUATION (OUTPATIENT)
Dept: PHYSICAL THERAPY | Facility: CLINIC | Age: 59
End: 2024-06-03
Payer: COMMERCIAL

## 2024-06-03 DIAGNOSIS — M17.0 PRIMARY OSTEOARTHRITIS OF BOTH KNEES: ICD-10-CM

## 2024-06-03 DIAGNOSIS — M25.561 CHRONIC PAIN OF BOTH KNEES: ICD-10-CM

## 2024-06-03 DIAGNOSIS — G89.29 CHRONIC PAIN OF BOTH KNEES: ICD-10-CM

## 2024-06-03 DIAGNOSIS — M25.562 CHRONIC PAIN OF BOTH KNEES: ICD-10-CM

## 2024-06-03 PROCEDURE — 97110 THERAPEUTIC EXERCISES: CPT

## 2024-06-03 PROCEDURE — 97161 PT EVAL LOW COMPLEX 20 MIN: CPT

## 2024-06-03 NOTE — PROGRESS NOTES
PT Evaluation     Today's date: 6/3/2024  Patient name: Arabella Her  : 1965  MRN: [id number]  Referring provider: Jo Mckeon CRNP  Dx:   Encounter Diagnosis     ICD-10-CM    1. Chronic pain of both knees  M25.561 Ambulatory Referral to Physical Therapy    M25.562 PT plan of care cert/re-cert    G89.29       2. Primary osteoarthritis of both knees  M17.0 Ambulatory Referral to Physical Therapy     PT plan of care cert/re-cert          Start Time: 1620  Stop Time: 1700  Total time in clinic (min): 40 minutes    Assessment  Impairments: abnormal gait, abnormal muscle firing, abnormal muscle tone, abnormal or restricted ROM, abnormal movement, activity intolerance, impaired balance, impaired physical strength, lacks appropriate home exercise program, pain with function and weight-bearing intolerance  Functional limitations: decreased functional mobility, pain with weight bearing  Symptom irritability: high    Assessment details: Arabella Her is a 58 y.o. female presenting to physical therapy today for s/s consistent with bilat knee pain secondary to OA.    Current objective impairments: hypersensitivity to light touch bilaterally, decreased AROM bilaterally with pain, decreased strength bilaterally with gross LE strength including hip, knee and ankle, decreased standing tolerance overall. Functional deficits included excruciating pain with squat, however, no significant impairments noted through squat, inability to complete stairs stepping up with L or stepping down with R due to decreased concentric and eccentric quad control on L, lateral shift with stepping down turing to L    Response to today's tx: good, educated on HEP with pt understanding, reports of feeling less pain post tx    Pt will benefit from skilled physical therapy to address above listed impairments and return to PLOF.    Understanding of Dx/Px/POC: good     Prognosis: fair    Goals  STG (2-3 weeks):  1. Pt will demonstrate  "independence with HEP  2. Pt will improve recruitment of quad set from fair to good in order to better facilitate movement on LEs    LTG (6-8 weeks):  Pt will improve gross LE strength by 1 in each direction  2. Pt will have pain <3/10 with stairs in order to improve functional mobility  3. Pt will improve functional status measure from 45 to >58 to demonstrate improved completion of ADLs.  4. Pt will complete stairs reciprocally for improvement in community navigation        Plan  Patient would benefit from: skilled physical therapy  Planned modality interventions: low level laser therapy, TENS, unattended electrical stimulation, thermotherapy: hydrocollator packs and cryotherapy    Planned therapy interventions: joint mobilization, IASTM, kinesiology taping, manual therapy, muscle pump exercises, nerve gliding, neuromuscular re-education, patient education, strengthening, stretching, therapeutic activities, therapeutic exercise, home exercise program, gait training, functional ROM exercises, flexibility, balance/weight bearing training and balance    Frequency: 1x week  Duration in weeks: 8  Plan of Care beginning date: 6/3/2024  Plan of Care expiration date: 7/29/2024  Treatment plan discussed with: patient  Plan details: Pt will benefit from skilled physical therapy to treat aforementioned impairments, decrease pain and return to PLOF.          Subjective Evaluation    History of Present Illness  Date of onset: 6/3/2022  Mechanism of injury: Pt reports STEVE: insidious onset with no cartilage in knees caused tibias to \"grow outward\" - has to go down steps sideways due to pressure on knees going down forward. Thinks knees got worse due to inactivity due to heart condition     Previous tx to date: x-rays clear    24 hour pattern: activity dependent    Red Flags: clear          Recurrent probem    Quality of life: good    Patient Goals  Patient goals for therapy: increased strength and decreased pain    Pain  Current " pain ratin  At best pain ratin  At worst pain rating: 10  Location: starts at fibular head and bands across anterior knee L>R, bilat ankle pain  Quality: sharp and knife-like  Relieving factors: medications and rest (instantly relieves)  Aggravating factors: stair climbing, standing and walking (prolonged inactivity, pushes through pain with activity)  Progression: worsening    Social Support  Stairs in house: yes   10  Lives in: multiple-level home  Lives with: spouse    Employment status: working (full-time -  (sit))    Diagnostic Tests  X-ray: abnormal (bilat knee OA)  Treatments  Current treatment: physical therapy        Objective     Static Posture     Knee   Genu valgus.     Ankle/Foot   Ankle/Foot (Left): Pes planus.   Ankle/Foot (Right): Pes planus.     Tenderness     Additional Tenderness Details  TTP to hamstring insertions bilaterally  TTP lateral knee bilaterally prox to LCL    TTP inconsistent with amount of pressure applied - significant hypersensitivity    Active Range of Motion   Left Knee   Flexion: 118 degrees with pain  Extension: 5 degrees with pain    Right Knee   Flexion: 115 degrees with pain  Extension: 5 degrees     Mobility   Patellar Mobility:   Left Knee   WFL: superior and inferior.   Hypermobile: left medial and left lateral      Right Knee   WFL: medial, lateral and superior    Strength/Myotome Testing     Left Hip   Planes of Motion   Flexion: 4-  Extension: 4-  Abduction: 4  Adduction: 4+    Right Hip   Planes of Motion   Flexion: 4-  Extension: 4-  Abduction: 4  Adduction: 4+    Left Knee   Flexion: 4+  Extension: 4-    Right Knee   Flexion: 4+  Extension: 4-    Left Ankle/Foot   Dorsiflexion: 4  Plantar flexion: 4  Inversion: 4+  Eversion: 4+    Right Ankle/Foot   Dorsiflexion: 4  Plantar flexion: 4  Inversion: 4+  Eversion: 4+    Functional Assessment      Squat    Right within functional limits, pain, left tibial anterior translation beyond toes and right tibial  "anterior translation beyond toes.     Forward Step Up 6\"   Left Leg  Unable to perform.     Right Leg  Pain and increased contralateral push off.     Forward Step Down 6\"   Left Leg  Unable to perform.     Right Leg  Pain.     Single Leg Stance - Eyes Open   Left  Trial 1: 1 seconds    Right  Trial 1: 3 seconds             Precautions:   Patient Active Problem List   Diagnosis    Bilateral lower extremity edema    Bilateral occipital neuralgia    Fibromyalgia    Gastroesophageal reflux disease    Insomnia    Migraine    Restless leg    Vitamin D deficiency    Generalized anxiety disorder    Subclinical hypothyroidism    Hearing difficulty of left ear    Mass of skin of head    Post-menopausal    Mitral valve annular calcification    Ureterolithiasis    Stable angina pectoris    Decreased GFR    Coronary artery disease involving native heart without angina pectoris    Environmental allergies    Abnormal computed tomography angiography (CTA)    Chronic pain of both ankles    Osteoma of skull    Tick bite    Lump of skin of left lower extremity           Manuals 6/3            Knee PROM bilat             Bilat knee desensitization             Bilat knee laser                          Neuro Re-Ed             Weight shifting             HR/TR             Standing march             LAQ             DEANA                                       Ther Ex             Heels slides with strap 10x bilat            Quad set 3\" x10 bilat            SLR  10x bilat             4 way ankle             bike             Standing hip abduction             Standing hip extension                          Ther Activity                                       Gait Training                                       Modalities                                            "

## 2024-06-04 ENCOUNTER — OFFICE VISIT (OUTPATIENT)
Dept: OBGYN CLINIC | Facility: MEDICAL CENTER | Age: 59
End: 2024-06-04
Payer: COMMERCIAL

## 2024-06-04 VITALS
HEART RATE: 67 BPM | SYSTOLIC BLOOD PRESSURE: 129 MMHG | DIASTOLIC BLOOD PRESSURE: 83 MMHG | BODY MASS INDEX: 45.94 KG/M2 | WEIGHT: 234 LBS | HEIGHT: 60 IN

## 2024-06-04 DIAGNOSIS — M65.4 TENDINITIS, DE QUERVAIN'S: ICD-10-CM

## 2024-06-04 DIAGNOSIS — R20.2 NUMBNESS AND TINGLING IN BOTH HANDS: Primary | ICD-10-CM

## 2024-06-04 DIAGNOSIS — R20.0 NUMBNESS AND TINGLING IN BOTH HANDS: Primary | ICD-10-CM

## 2024-06-04 PROCEDURE — 99204 OFFICE O/P NEW MOD 45 MIN: CPT | Performed by: ORTHOPAEDIC SURGERY

## 2024-06-04 NOTE — PROGRESS NOTES
The HAND & UPPER EXTREMITY OFFICE VISIT   Referred By:  Thiago Delarosa  [address]  [address]  [place] [address]      Chief Complaint:       Right greater than left left hand numbness and tingling    History of Present Illness:   58 y.o., right hand dominant female presents with greater than 1 year of bilateral hand pain numbness and tingling worse on the right side.  Symptoms been progressively worsening over the last several months.  She has been mostly just dealing with her symptoms and taking ibuprofen and Tylenol as needed.  She has been trying to avoid activities that seem to make her symptoms worse.  No other formal treatments.  Evaluated by her PCP 5/9/2024 and that note was reviewed.  Referred for subspecialty evaluation.    She states that the symptoms are primarily in her thumb index and middle fingers bilaterally but have now begun to cause irritation and tingling in the small fingers as well.  She states that in the morning when she wakes up she has dense numbness to the bilateral thumb index and middle finger and they feel swollen and painful.  This also occasionally wakes her from sleep.  She states that she gets moving throughout the day and they do feel better by the end of the day they are very sore and painful especially at her radial wrist.  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  She states that also when her granddaughter will grab her thumb and pull it down she has severe pain on both sides.            Past Medical History:  Past Medical History:   Diagnosis Date    Anxiety     Chest pain 08/12/2016    Chest wall pain 07/15/2016    Costochondritis 02/28/2014    Dog bite 07/29/2014    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(HCC)     Fluid retention     Gastroenteritis 05/06/2013    Herpes zoster 07/08/2013    History of chemotherapy 1986    Kidney stone     Migraine 01/19/2016    Shortness of breath 08/12/2016     Toxoplasmosis 08/28/2014     Past Surgical History:   Procedure Laterality Date    CARDIAC CATHETERIZATION Left 03/02/2023    Procedure: Cardiac Left Heart Cath - Dr Carter or Dr Mccurdy please;  Surgeon: Cici Mccurdy DO;  Location: [facility];  Service: Cardiology    CARDIAC CATHETERIZATION N/A 03/02/2023    Procedure: Cardiac Coronary Angiogram;  Surgeon: Cici Mccurdy DO;  Location: [facility];  Service: Cardiology    CARDIAC CATHETERIZATION  03/02/2023    Procedure: Cardiac catheterization;  Surgeon: Cici Mccurdy DO;  Location: [facility];  Service: Cardiology    CHOLECYSTECTOMY      COLONOSCOPY      ESOPHAGOGASTRODUODENOSCOPY      FL RETROGRADE PYELOGRAM  07/28/2022    HYSTERECTOMY  1986    NECK SURGERY      Lumpectomy    OOPHORECTOMY Right 1986    NM CYSTO/URETERO W/LITHOTRIPSY &INDWELL STENT INSRT Left 07/28/2022    Procedure: CYSTOSCOPY URETEROSCOPY WITH LITHOTRIPSY HOLMIUM LASER, RETROGRADE PYELOGRAM AND INSERTION STENT LEFT URETERAL;  Surgeon: Frcaisco Mc MD;  Location: [facility];  Service: Urology    NM ESOPHAGOGASTRODUODENOSCOPY TRANSORAL DIAGNOSTIC N/A 11/30/2018    Procedure: EGD;  Surgeon: Al Schwarz MD;  Location: [facility];  Service: Gastroenterology    SHOULDER ARTHROSCOPY Left     SKIN LESION EXCISION N/A 10/5/2023    Procedure: EXCISION OF FOREHEAD MASS WITH COMPLEX CLOSURE;  Surgeon: Angelito Grant MD;  Location:  MAIN OR;  Service: Plastics     Family History   Problem Relation Age of Onset    Diabetes Mother     Heart disease Mother     Heart disease Father     Arrhythmia Father         ICD placed    Other Sister         vertigo    Migraines Sister     Bipolar disorder Sister     Hearing loss Sister     No Known Problems Daughter     Thyroid disease Daughter         during pregnancy    Diabetes Maternal Grandmother     Heart disease Maternal Grandmother     No Known Problems Maternal Grandfather     Heart disease Paternal Grandmother   "   Breast cancer Maternal Aunt         age unknown    Parkinsonism Maternal Aunt     Colon cancer Neg Hx     Ovarian cancer Neg Hx      Social History     Socioeconomic History    Marital status: /Civil Union     Spouse name: Not on file    Number of children: Not on file    Years of education: Not on file    Highest education level: Not on file   Occupational History    Not on file   Tobacco Use    Smoking status: Never    Smokeless tobacco: Never   Vaping Use    Vaping status: Never Used   Substance and Sexual Activity    Alcohol use: Yes     Comment: Occasionally    Drug use: No    Sexual activity: Yes     Partners: Male     Birth control/protection: Post-menopausal   Other Topics Concern    Not on file   Social History Narrative    Not on file     Social Determinants of Health     Financial Resource Strain: Not on file   Food Insecurity: Not on file   Transportation Needs: Not on file   Physical Activity: Not on file   Stress: Not on file   Social Connections: Not on file   Intimate Partner Violence: Not on file   Housing Stability: Not on file     Scheduled Meds:  Continuous Infusions:No current facility-administered medications for this visit.    PRN Meds:.  Allergies   Allergen Reactions    Azithromycin GI Intolerance     Yeast infection    Imitrex [Sumatriptan] GI Intolerance    Cephalexin     Chlorhexidine Rash    Wound Dressings Rash     Adhesive tape           Physical Examination:    /83   Pulse 67   Ht 4' 11.5\" (1.511 m)   Wt 106 kg (234 lb)   LMP 03/18/1997 (Exact Date)   BMI 46.47 kg/m²     Gen: A&Ox3, NAD  Cardiac: regular rate  Chest: non labored breathing  Abdomen: Non-distended    Cervcial Spine: Negative Spurling's, Neg Lhermite's    Right Upper Extremity:  Skin CDI, no thenar atrophy  No obvious deformity of the shoulder, arm, elbow, forearm, wrist, hand  Tender at the first dorsal compartment, mildly tender at the thumb CMC joint  Positive Finkelstein's  Positive pressure " shear test  Composite fist with full opposition of thumb to the base of the small finger with slight pain  Sensation intact to light touch in the axillary median, ulnar, and radial nerve distributions  5 mm 2-point discrimination in all fingers  Positive Durkan's, positive Tinel's at the wrist,   positive elbow flexion compression test, negative Tinel's at the elbow  5/5 motor thumb abduction, thumb opposition, interosseous, EPL  2+RP      Left Upper Extremity:  Skin CDI, no thenar atrophy  No obvious deformity of the shoulder, arm, elbow, forearm, wrist, hand  Tender at the first dorsal compartment, mildly tender at the thumb CMC joint  Positive Finkelstein's  Positive pressure shear test  Composite fist with full opposition of thumb to the base of the small finger with slight pain  Sensation intact to light touch in the axillary median, ulnar, and radial nerve distributions  5 mm 2-point discrimination in all fingers  Positive Durkan's, negative Tinel's at the wrist,   positive elbow flexion compression test, negative Tinel's at the elbow  5/5 motor thumb abduction, thumb opposition, interosseous, EPL  2+RP      Studies:  No imaging to review    Assessment and Plan:  1. Numbness and tingling in both hands  Ambulatory Referral to Orthopedic Surgery    US MSK limited    US MSK limited    Durable Medical Equipment      2. Tendinitis, de Quervain's  Durable Medical Equipment    bilateral          58 y.o. female presents with signs and symptoms consistent with the above diagnosis.  We discussed the natural history of this condition and its pathogenesis.  We discussed operative and nonoperative treatment options.    For her bilateral hand numbness and tingling most consistent with carpal tunnel syndrome and likely early cubital tunnel syndrome bilaterally.  She has not tried any treatments for this yet.  Will start by providing her wrist braces to wear at night.  In the meantime we will also get ultrasounds of her  bilateral wrist and elbows to evaluate for carpal and cubital tunnel syndrome.    For her bilateral de Quervain's tendinitis we discussed bracing versus injections versus surgical intervention.  She would like to trial bracing first.  The brace that she will be given will be thumb spica braces that will hopefully help her de Quervain's tendinitis as well as her carpal tunnel symptoms.  She can wear the braces as needed throughout the day for her de Quervain's tendinitis.  She does not need to work with them as she knows this will be difficult to do.    She also likely has some mild bilateral thumb CMC arthritis although I think her de Quervain's tendinitis is the more severe cause of symptoms in the radial wrist.  Will continue to observe this for now and hopefully she will get some relief with the thumb spica braces here as well.    She will follow-up after ultrasound testing for further evaluation and treatment options.    she expressed understanding of the plan and agreed. We encouraged them to contact our office with any questions or concerns.         Anselmo Lake MD  Hand and Upper Extremity Surgery        *This note was dictated using Dragon voice recognition software. Please excuse any word substitutions or errors.*

## 2024-06-05 DIAGNOSIS — R20.0 NUMBNESS AND TINGLING IN BOTH HANDS: ICD-10-CM

## 2024-06-05 DIAGNOSIS — R20.2 NUMBNESS AND TINGLING IN BOTH HANDS: ICD-10-CM

## 2024-06-05 DIAGNOSIS — M65.4 TENDINITIS, DE QUERVAIN'S: Primary | ICD-10-CM

## 2024-06-07 ENCOUNTER — OFFICE VISIT (OUTPATIENT)
Dept: FAMILY MEDICINE CLINIC | Facility: CLINIC | Age: 59
End: 2024-06-07
Payer: COMMERCIAL

## 2024-06-07 VITALS
OXYGEN SATURATION: 99 % | WEIGHT: 235 LBS | SYSTOLIC BLOOD PRESSURE: 130 MMHG | TEMPERATURE: 97.7 F | BODY MASS INDEX: 47.37 KG/M2 | HEIGHT: 59 IN | DIASTOLIC BLOOD PRESSURE: 80 MMHG | HEART RATE: 82 BPM

## 2024-06-07 DIAGNOSIS — Z00.00 ANNUAL PHYSICAL EXAM: Primary | ICD-10-CM

## 2024-06-07 DIAGNOSIS — F41.1 GENERALIZED ANXIETY DISORDER: ICD-10-CM

## 2024-06-07 DIAGNOSIS — R22.42 LUMP OF SKIN OF LEFT LOWER EXTREMITY: ICD-10-CM

## 2024-06-07 DIAGNOSIS — Z76.89 ENCOUNTER TO ESTABLISH CARE: ICD-10-CM

## 2024-06-07 DIAGNOSIS — J30.2 SEASONAL ALLERGIES: ICD-10-CM

## 2024-06-07 DIAGNOSIS — Z12.4 SCREENING FOR CERVICAL CANCER: ICD-10-CM

## 2024-06-07 PROCEDURE — 99396 PREV VISIT EST AGE 40-64: CPT | Performed by: NURSE PRACTITIONER

## 2024-06-07 RX ORDER — ALBUTEROL SULFATE 90 UG/1
2 AEROSOL, METERED RESPIRATORY (INHALATION) EVERY 6 HOURS PRN
Qty: 18 G | Refills: 1 | Status: SHIPPED | OUTPATIENT
Start: 2024-06-07

## 2024-06-07 RX ORDER — CITALOPRAM 40 MG/1
40 TABLET ORAL DAILY
Qty: 90 TABLET | Refills: 1 | Status: SHIPPED | OUTPATIENT
Start: 2024-06-07

## 2024-06-07 NOTE — PROGRESS NOTES
Adult Annual Physical  Name: Arabella Her      : 1965      MRN: [id number]  Encounter Provider: PASHA Delarosa  Encounter Date: 2024   Encounter department: [facility]    Assessment & Plan   1. Annual physical exam  2. Generalized anxiety disorder  -     citalopram (CeleXA) 40 mg tablet; Take 1 tablet (40 mg total) by mouth daily  3. Screening for cervical cancer  -     Ambulatory referral to Obstetrics / Gynecology; Future  4. Lump of skin of left lower extremity  Assessment & Plan:  Reviewed recent ultrasound and recommendations  Patient would prefer to rescan in 3 months and then follow-up with surgery if still indicated  Repeat imaging order placed  Orders:  -     US extremity soft tissue; Future; Expected date: 2024  5. Seasonal allergies  -     albuterol (Ventolin HFA) 90 mcg/act inhaler; Inhale 2 puffs every 6 (six) hours as needed for wheezing or shortness of breath  6. Encounter to establish care  -     Ambulatory referral to Obstetrics / Gynecology; Future    Immunizations and preventive care screenings were discussed with patient today. Appropriate education was printed on patient's after visit summary.    Counseling:  Alcohol/drug use: discussed moderation in alcohol intake, the recommendations for healthy alcohol use, and avoidance of illicit drug use.  Dental Health: discussed importance of regular tooth brushing, flossing, and dental visits.  Injury prevention: discussed safety/seat belts, safety helmets, smoke detectors, carbon dioxide detectors, and smoking near bedding or upholstery.  Sexual health: discussed sexually transmitted diseases, partner selection, use of condoms, avoidance of unintended pregnancy, and contraceptive alternatives.  Exercise: the importance of regular exercise/physical activity was discussed. Recommend exercise 3-5 times per week for at least 30 minutes.       Depression Screening and Follow-up Plan: Patient was screened  "for depression during today's encounter. They screened negative with a PHQ-2 score of 1.        History of Present Illness     Adult Annual Physical:  Patient presents for annual physical. Annual physical  Chronic conditions reviewed at visit 1 month ago  Reviewed recent lab work today  Reviewed extremity ultrasound  .     Diet and Physical Activity:  - Diet/Nutrition: well balanced diet. Working on healthy diet and portion control  - Exercise: walking. Encourage increasing daily exercise: Cardio/strengthening    Depression Screening:  - PHQ-2 Score: 1    General Health:  - Sleep: 4-6 hours of sleep on average. Restless leg at times-trialing gabapentin  - Hearing: normal hearing bilateral ears.  - Vision: vision problems. Reports seeing \"stacked\" vision with her left eye. Has seen an ophthalmologist-using refresh drops with no improvement.  Due for optometrist eval and likely new glasses.  Consider vision therapy. she will talk with her optometrist and get back to me  - Dental: regular dental visits.    /GYN Health:  - Follows with GYN: no.   - Menopause: postmenopausal.   - History of STDs: no  - Contraception:. Referral to establish with GYN-overdue for women's health      Advanced Care Planning:  - Has an advanced directive?: no    - Has a durable medical POA?: no    - ACP document given to patient?: no      Review of Systems   Constitutional: Negative.    HENT: Negative.     Eyes: Negative.         Left eye-\"stacked\" vision.  Has been ongoing for over 1 year.  Saw and ophthalmologist.  Using refresh drops with no relief.  Overdue for vision exam-admits to needing new glasses   Respiratory: Negative.     Cardiovascular: Negative.    Gastrointestinal: Negative.    Genitourinary: Negative.    Musculoskeletal: Negative.    Skin: Negative.    Neurological: Negative.  Headaches: controlled since restarting Topiramate.   Psychiatric/Behavioral: Negative.           Objective     /80 (BP Location: Left arm, " "Patient Position: Sitting, Cuff Size: Standard)   Pulse 82   Temp 97.7 °F (36.5 °C)   Ht 4' 11.45\" (1.51 m)   Wt 107 kg (235 lb)   LMP 03/18/1997 (Exact Date)   SpO2 99%   BMI 46.75 kg/m²     Physical Exam  Vitals and nursing note reviewed.   Constitutional:       General: She is not in acute distress.     Appearance: Normal appearance. She is well-developed and well-groomed. She is not ill-appearing.   HENT:      Head: Normocephalic.      Right Ear: Tympanic membrane, ear canal and external ear normal.      Left Ear: Tympanic membrane, ear canal and external ear normal.      Nose: Nose normal.      Mouth/Throat:      Mouth: Mucous membranes are moist.      Pharynx: Oropharynx is clear.   Eyes:      Conjunctiva/sclera: Conjunctivae normal.      Pupils: Pupils are equal, round, and reactive to light.   Neck:      Thyroid: No thyromegaly.      Vascular: No carotid bruit.   Cardiovascular:      Rate and Rhythm: Normal rate and regular rhythm.      Pulses:           Posterior tibial pulses are 2+ on the right side and 2+ on the left side.      Heart sounds: Normal heart sounds.   Pulmonary:      Effort: Pulmonary effort is normal. No respiratory distress.      Breath sounds: Normal breath sounds and air entry.   Musculoskeletal:      Right lower leg: No edema.      Left lower leg: No edema.   Lymphadenopathy:      Cervical: No cervical adenopathy.   Skin:     General: Skin is warm and dry.   Neurological:      General: No focal deficit present.      Mental Status: She is alert and oriented to person, place, and time.   Psychiatric:         Attention and Perception: Attention normal.         Mood and Affect: Mood normal.         Behavior: Behavior normal.         Thought Content: Thought content normal.         Judgment: Judgment normal.       Administrative Statements     "

## 2024-06-07 NOTE — ASSESSMENT & PLAN NOTE
Reviewed recent ultrasound and recommendations  Patient would prefer to rescan in 3 months and then follow-up with surgery if still indicated  Repeat imaging order placed

## 2024-06-10 ENCOUNTER — TELEPHONE (OUTPATIENT)
Age: 59
End: 2024-06-10

## 2024-06-10 DIAGNOSIS — R22.42 LUMP OF SKIN OF LEFT LOWER EXTREMITY: Primary | ICD-10-CM

## 2024-06-10 NOTE — TELEPHONE ENCOUNTER
Sona called back regarding order. She asked that in the order it states exactly where on the left lower leg the lump is. The techs need this in the comments

## 2024-06-10 NOTE — TELEPHONE ENCOUNTER
Last visit 06/07/2024    Sona, from [facility]'s procedure scheduling stated that they need the order for US extremity soft tissue to state where the lump is located. For further details, please contact Sona at her direct number [phone]. Please advise.

## 2024-06-12 ENCOUNTER — OFFICE VISIT (OUTPATIENT)
Dept: PHYSICAL THERAPY | Facility: CLINIC | Age: 59
End: 2024-06-12
Payer: COMMERCIAL

## 2024-06-12 DIAGNOSIS — M25.562 CHRONIC PAIN OF BOTH KNEES: Primary | ICD-10-CM

## 2024-06-12 DIAGNOSIS — M25.561 CHRONIC PAIN OF BOTH KNEES: Primary | ICD-10-CM

## 2024-06-12 DIAGNOSIS — M17.0 PRIMARY OSTEOARTHRITIS OF BOTH KNEES: ICD-10-CM

## 2024-06-12 DIAGNOSIS — G89.29 CHRONIC PAIN OF BOTH KNEES: Primary | ICD-10-CM

## 2024-06-12 PROCEDURE — 97140 MANUAL THERAPY 1/> REGIONS: CPT

## 2024-06-12 PROCEDURE — 97112 NEUROMUSCULAR REEDUCATION: CPT

## 2024-06-12 PROCEDURE — 97110 THERAPEUTIC EXERCISES: CPT

## 2024-06-12 NOTE — PROGRESS NOTES
Daily Note     Today's date: 2024  Patient name: Arabella Her  : 1965  MRN: [id number]  Referring provider: Jo Mckeon CRNP  Dx:   Encounter Diagnosis     ICD-10-CM    1. Chronic pain of both knees  M25.561     M25.562     G89.29       2. Primary osteoarthritis of both knees  M17.0           Start Time: 1615  Stop Time: 1658  Total time in clinic (min): 43 minutes    Subjective: Pt reports having parasite that is traveling through body and getting  after muscles - having more hand pain that limits ADLs overall. Can't do many of the knee exercises given the soreness.       Objective: See treatment diary below      Assessment: Tolerated treatment fair. Patient demonstrated fatigue post treatment and would benefit from continued PT. Pt with significant pain and hypersensitivity to bilat knees that limited exercise tolerance today. Severe pain with knee PROM bilaterally with empty end feel into flexion. Pt report of increased feeling of weakness in R sided hamstrings during hamstring set. Increased tingling shooting down shin into R foot promted fib head joint mobility assessment with hypomobility noted on R with reproduction of symptoms down shin into foot. Trialed laser for pain relief today with reports of decreased pain following.       Plan: Continue per plan of care.  Progress treatment as tolerated.       Precautions:   Patient Active Problem List   Diagnosis    Bilateral lower extremity edema    Bilateral occipital neuralgia    Fibromyalgia    Gastroesophageal reflux disease    Insomnia    Migraine    Restless leg    Vitamin D deficiency    Generalized anxiety disorder    Subclinical hypothyroidism    Hearing difficulty of left ear    Mass of skin of head    Post-menopausal    Mitral valve annular calcification    Ureterolithiasis    Stable angina pectoris    Decreased GFR    Coronary artery disease involving native heart without angina pectoris    Environmental allergies    Abnormal  "computed tomography angiography (CTA)    Chronic pain of both ankles    Osteoma of skull    Tick bite    Lump of skin of left lower extremity           Manuals 6/3 6/12           Knee PROM bilat  RC           Bilat knee desensitization  RC - the stick           Bilat knee laser  RC           R fib head mobs  RC gr 2                        Neuro Re-Ed             Weight shifting             HR/TR             Standing march             LAQ  2# 10x bilat            DEANA  GTB 10x bilat                                      Ther Ex             Heels slides with strap 10x bilat            Quad set 3\" x10 bilat 5\" x10 bilat            Hamstring set  5\" x10 bilat            SLR  10x bilat             4 way ankle             bike  3 min           Standing hip abduction  10x bilat            Standing hip extension  10x bilat                         Ther Activity                                       Gait Training                                       Modalities                                            "

## 2024-06-14 ENCOUNTER — HOSPITAL ENCOUNTER (OUTPATIENT)
Dept: ULTRASOUND IMAGING | Facility: HOSPITAL | Age: 59
Discharge: HOME | End: 2024-06-14
Attending: ORTHOPAEDIC SURGERY
Payer: COMMERCIAL

## 2024-06-14 DIAGNOSIS — R20.2 NUMBNESS AND TINGLING IN BOTH HANDS: ICD-10-CM

## 2024-06-14 DIAGNOSIS — R20.0 NUMBNESS AND TINGLING IN BOTH HANDS: ICD-10-CM

## 2024-06-14 PROCEDURE — 76882 US LMTD JT/FCL EVL NVASC XTR: CPT

## 2024-06-18 ENCOUNTER — TELEPHONE (OUTPATIENT)
Dept: FAMILY MEDICINE CLINIC | Facility: CLINIC | Age: 59
End: 2024-06-18

## 2024-06-19 ENCOUNTER — OFFICE VISIT (OUTPATIENT)
Dept: PHYSICAL THERAPY | Facility: CLINIC | Age: 59
End: 2024-06-19
Payer: COMMERCIAL

## 2024-06-19 DIAGNOSIS — M25.561 CHRONIC PAIN OF BOTH KNEES: Primary | ICD-10-CM

## 2024-06-19 DIAGNOSIS — M17.0 PRIMARY OSTEOARTHRITIS OF BOTH KNEES: ICD-10-CM

## 2024-06-19 DIAGNOSIS — G89.29 CHRONIC PAIN OF BOTH KNEES: Primary | ICD-10-CM

## 2024-06-19 DIAGNOSIS — M25.562 CHRONIC PAIN OF BOTH KNEES: Primary | ICD-10-CM

## 2024-06-19 PROCEDURE — 97112 NEUROMUSCULAR REEDUCATION: CPT

## 2024-06-19 PROCEDURE — 97140 MANUAL THERAPY 1/> REGIONS: CPT

## 2024-06-19 PROCEDURE — 97110 THERAPEUTIC EXERCISES: CPT

## 2024-06-19 NOTE — PROGRESS NOTES
Daily Note     Today's date: 2024  Patient name: Arabella Her  : 1965  MRN: [id number]  Referring provider: Jo Mckeon CRNP  Dx:   Encounter Diagnosis     ICD-10-CM    1. Chronic pain of both knees  M25.561     M25.562     G89.29       2. Primary osteoarthritis of both knees  M17.0                      Subjective: Both knees are feeling a little better with the warm weather and the nee exercises that she had last wk. Stair management is getting easier.       Objective: See treatment diary below      Assessment: Tolerated treatment well. Positive response to PROM and LLLT, patient reports improvement in knee mobility post. Increased work load with LAQ and DEANA, with generalized muscle fatigue. Good ROM and on RSB, with good tolerance to increased time and additional resistance. Continued PT would be beneficial to improve function.           Plan: Continue per plan of care.       Precautions:   Patient Active Problem List   Diagnosis    Bilateral lower extremity edema    Bilateral occipital neuralgia    Fibromyalgia    Gastroesophageal reflux disease    Insomnia    Migraine    Restless leg    Vitamin D deficiency    Generalized anxiety disorder    Subclinical hypothyroidism    Hearing difficulty of left ear    Mass of skin of head    Post-menopausal    Mitral valve annular calcification    Ureterolithiasis    Stable angina pectoris    Decreased GFR    Coronary artery disease involving native heart without angina pectoris    Environmental allergies    Abnormal computed tomography angiography (CTA)    Chronic pain of both ankles    Osteoma of skull    Tick bite    Lump of skin of left lower extremity           Manuals 6/3 6/12 6/19          Knee PROM bilat  RC MB          Bilat knee desensitization  RC - the stick           Bilat knee laser  RC MB          R fib head mobs  RC gr 2                        Neuro Re-Ed             Weight shifting             HR/TR             Standing march            "  LAQ  2# 10x bilat  2# 2x10 bilat          DEANA  GTB 10x bilat  GTB 2x10 bilat                                     Ther Ex             Heels slides with strap 10x bilat            Quad set 3\" x10 bilat 5\" x10 bilat  5\" x10 bilat           Hamstring set  5\" x10 bilat  5\" x10 bilat           SLR  10x bilat             4 way ankle             bike  3 min  6 min lvl 1          Standing hip abduction  10x bilat  10x bilat           Standing hip extension  10x bilat  10x bilat                        Ther Activity                                       Gait Training                                       Modalities                                              "

## 2024-06-24 ENCOUNTER — TELEPHONE (OUTPATIENT)
Age: 59
End: 2024-06-24

## 2024-06-24 ENCOUNTER — TELEPHONE (OUTPATIENT)
Dept: FAMILY MEDICINE CLINIC | Facility: CLINIC | Age: 59
End: 2024-06-24

## 2024-06-24 NOTE — TELEPHONE ENCOUNTER
Lmom for patient regarding FMLA paperwork. Paperwork is completed but patient needs to sign it before it can be faxed. Asked to stop in tomorrow to sign.

## 2024-06-25 NOTE — TELEPHONE ENCOUNTER
Patient stopped in and signed FMLA form and took the original with her. Scanned  paperwork into chart.

## 2024-06-27 ENCOUNTER — OFFICE VISIT (OUTPATIENT)
Dept: PHYSICAL THERAPY | Facility: CLINIC | Age: 59
End: 2024-06-27
Payer: COMMERCIAL

## 2024-06-27 DIAGNOSIS — M25.561 CHRONIC PAIN OF BOTH KNEES: Primary | ICD-10-CM

## 2024-06-27 DIAGNOSIS — M17.0 PRIMARY OSTEOARTHRITIS OF BOTH KNEES: ICD-10-CM

## 2024-06-27 DIAGNOSIS — G89.29 CHRONIC PAIN OF BOTH KNEES: Primary | ICD-10-CM

## 2024-06-27 DIAGNOSIS — M25.562 CHRONIC PAIN OF BOTH KNEES: Primary | ICD-10-CM

## 2024-06-27 PROCEDURE — 97110 THERAPEUTIC EXERCISES: CPT

## 2024-06-27 PROCEDURE — 97112 NEUROMUSCULAR REEDUCATION: CPT

## 2024-06-27 PROCEDURE — 97140 MANUAL THERAPY 1/> REGIONS: CPT

## 2024-06-27 NOTE — PROGRESS NOTES
Daily Note     Today's date: 2024  Patient name: Arabella Her  : 1965  MRN: [id number]  Referring provider: Jo Mckeon CRNP  Dx:   Encounter Diagnosis     ICD-10-CM    1. Chronic pain of both knees  M25.561     M25.562     G89.29       2. Primary osteoarthritis of both knees  M17.0           Start Time: 1622  Stop Time: 1700  Total time in clinic (min): 38 minutes    Subjective: Pt reports knees have been feeling better. Has allowed her improved mobility this week.       Objective: See treatment diary below      Assessment: Tolerated treatment well. Patient demonstrated fatigue post treatment and would benefit from continued PT. Pt with mild L knee pain during quad sets today. Pt with improved LE muscle activation today especially with DEANA due to decreased knee pain overall. Decreased pain also allowed for improved exercise tolerance today, added HR/TR for gross LE strength along with mini squats for eccentric quad control which was mildly painful with decreased excursion yet.       Plan: Continue per plan of care.  Progress treatment as tolerated.       Precautions:   Patient Active Problem List   Diagnosis    Bilateral lower extremity edema    Bilateral occipital neuralgia    Fibromyalgia    Gastroesophageal reflux disease    Insomnia    Migraine    Restless leg    Vitamin D deficiency    Generalized anxiety disorder    Subclinical hypothyroidism    Hearing difficulty of left ear    Mass of skin of head    Post-menopausal    Mitral valve annular calcification    Ureterolithiasis    Stable angina pectoris    Decreased GFR    Coronary artery disease involving native heart without angina pectoris    Environmental allergies    Abnormal computed tomography angiography (CTA)    Chronic pain of both ankles    Osteoma of skull    Tick bite    Lump of skin of left lower extremity           Manuals 6/3 6/12 6/19 6/27         Knee PROM bilat  RC MB RC         Bilat knee desensitization  RC - the stick  "          Bilat knee laser  RC MB RC         R fib head mobs  RC gr 2                        Neuro Re-Ed             Weight shifting             HR/TR    20x ea         Standing march             LAQ  2# 10x bilat  2# 2x10 bilat 3# 2x10 bilat          DEANA  GTB 10x bilat  GTB 2x10 bilat  GTB 2x10 bilat                                    Ther Ex             Heels slides with strap 10x bilat            Quad set 3\" x10 bilat 5\" x10 bilat  5\" x10 bilat  5\" x15 bilat         Hamstring set  5\" x10 bilat  5\" x10 bilat  5\" x15 bilat          SLR  10x bilat    15x bilat         4 way ankle             bike  3 min  6 min lvl 1 5 min L1         Mini squat    10x          Standing hip abduction  10x bilat  10x bilat  15x bilat         Standing hip extension  10x bilat  10x bilat  15x bilat                      Ther Activity                                       Gait Training                                       Modalities                                                "

## 2024-07-05 DIAGNOSIS — R60.0 BILATERAL LOWER EXTREMITY EDEMA: ICD-10-CM

## 2024-07-05 RX ORDER — FUROSEMIDE 20 MG/1
20 TABLET ORAL DAILY
Qty: 30 TABLET | Refills: 5 | Status: SHIPPED | OUTPATIENT
Start: 2024-07-05

## 2024-07-08 DIAGNOSIS — G43.909 MIGRAINE WITHOUT STATUS MIGRAINOSUS, NOT INTRACTABLE, UNSPECIFIED MIGRAINE TYPE: ICD-10-CM

## 2024-07-08 RX ORDER — RIZATRIPTAN BENZOATE 10 MG/1
TABLET ORAL
Qty: 9 TABLET | Refills: 5 | Status: SHIPPED | OUTPATIENT
Start: 2024-07-08

## 2024-07-10 ENCOUNTER — OFFICE VISIT (OUTPATIENT)
Dept: PHYSICAL THERAPY | Facility: CLINIC | Age: 59
End: 2024-07-10
Payer: COMMERCIAL

## 2024-07-10 DIAGNOSIS — M25.561 CHRONIC PAIN OF BOTH KNEES: Primary | ICD-10-CM

## 2024-07-10 DIAGNOSIS — M25.562 CHRONIC PAIN OF BOTH KNEES: Primary | ICD-10-CM

## 2024-07-10 DIAGNOSIS — G89.29 CHRONIC PAIN OF BOTH KNEES: Primary | ICD-10-CM

## 2024-07-10 DIAGNOSIS — M17.0 PRIMARY OSTEOARTHRITIS OF BOTH KNEES: ICD-10-CM

## 2024-07-10 PROCEDURE — 97140 MANUAL THERAPY 1/> REGIONS: CPT

## 2024-07-10 PROCEDURE — 97110 THERAPEUTIC EXERCISES: CPT

## 2024-07-10 PROCEDURE — 97112 NEUROMUSCULAR REEDUCATION: CPT

## 2024-07-10 NOTE — PROGRESS NOTES
"Daily Note     Today's date: 7/10/2024  Patient name: Arabella Her  : 1965  MRN: [id number]  Referring provider: Jo Mckeon CRNP  Dx:   Encounter Diagnosis     ICD-10-CM    1. Chronic pain of both knees  M25.561     M25.562     G89.29       2. Primary osteoarthritis of both knees  M17.0           Start Time: 1615  Stop Time: 1700  Total time in clinic (min): 45 minutes    Subjective: Pt reports knees \"feel arthritic\" today.       Objective: See treatment diary below      Assessment: Tolerated treatment well. Patient demonstrated fatigue post treatment and would benefit from continued PT. Pt with improved hamstring activation on RLE today with better eccentric control noted during DEANA as well. Decreased pain with standing exercises with continued difficulty with CKC knee flexion due to increased stress through bilat knees causing mild pain.       Plan: Continue per plan of care.  Progress treatment as tolerated.       Precautions:   Patient Active Problem List   Diagnosis    Bilateral lower extremity edema    Bilateral occipital neuralgia    Fibromyalgia    Gastroesophageal reflux disease    Insomnia    Migraine    Restless leg    Vitamin D deficiency    Generalized anxiety disorder    Subclinical hypothyroidism    Hearing difficulty of left ear    Mass of skin of head    Post-menopausal    Mitral valve annular calcification    Ureterolithiasis    Stable angina pectoris    Decreased GFR    Coronary artery disease involving native heart without angina pectoris    Environmental allergies    Abnormal computed tomography angiography (CTA)    Chronic pain of both ankles    Osteoma of skull    Tick bite    Lump of skin of left lower extremity           Manuals 6/3 6/12 6/19 6/27 7/10        Knee PROM bilat  RC MB RC RC        Bilat knee desensitization  RC - the stick           Bilat knee laser  RC MB RC RC        R fib head mobs  RC gr 2                        Neuro Re-Ed             Weight shifting     " "        HR/TR    20x ea 20x ea        Standing march             Lateral step up     4\" 20x bilat         LAQ  2# 10x bilat  2# 2x10 bilat 3# 2x10 bilat          DEANA  GTB 10x bilat  GTB 2x10 bilat  GTB 2x10 bilat  GTB 2x10 bilat                                   Ther Ex             Heels slides with strap 10x bilat            Quad set 3\" x10 bilat 5\" x10 bilat  5\" x10 bilat  5\" x15 bilat         Hamstring set  5\" x10 bilat  5\" x10 bilat  5\" x15 bilat  5\" x15 bilat         SLR  10x bilat    15x bilat 15x bilat         4 way ankle             bike  3 min  6 min lvl 1 5 min L1 5 min L1        Mini squat    10x  15x         Standing hip abduction  10x bilat  10x bilat  15x bilat Matrix 25# 15x bilat         Standing hip extension  10x bilat  10x bilat  15x bilat Matrix 25# 15x bilat                      Ther Activity                                       Gait Training                                       Modalities                                                  "

## 2024-07-15 ENCOUNTER — EVALUATION (OUTPATIENT)
Dept: PHYSICAL THERAPY | Facility: CLINIC | Age: 59
End: 2024-07-15
Payer: COMMERCIAL

## 2024-07-15 DIAGNOSIS — M25.561 CHRONIC PAIN OF BOTH KNEES: Primary | ICD-10-CM

## 2024-07-15 DIAGNOSIS — M17.0 PRIMARY OSTEOARTHRITIS OF BOTH KNEES: ICD-10-CM

## 2024-07-15 DIAGNOSIS — G89.29 CHRONIC PAIN OF BOTH KNEES: Primary | ICD-10-CM

## 2024-07-15 DIAGNOSIS — M25.562 CHRONIC PAIN OF BOTH KNEES: Primary | ICD-10-CM

## 2024-07-15 PROCEDURE — 97140 MANUAL THERAPY 1/> REGIONS: CPT

## 2024-07-15 PROCEDURE — 97112 NEUROMUSCULAR REEDUCATION: CPT

## 2024-07-15 PROCEDURE — 97110 THERAPEUTIC EXERCISES: CPT

## 2024-07-15 NOTE — PROGRESS NOTES
PT Evaluation     Today's date: 7/15/2024  Patient name: Arabella Her  : 1965  MRN: [id number]  Referring provider: Jo Mckeon CRNP  Dx:   Encounter Diagnosis     ICD-10-CM    1. Chronic pain of both knees  M25.561 PT plan of care cert/re-cert    M25.562     G89.29       2. Primary osteoarthritis of both knees  M17.0 PT plan of care cert/re-cert            Start Time: 1700  Stop Time: 1745  Total time in clinic (min): 45 minutes    Assessment  Impairments: abnormal gait, abnormal muscle firing, abnormal muscle tone, abnormal or restricted ROM, abnormal movement, activity intolerance, impaired balance, impaired physical strength, lacks appropriate home exercise program, pain with function and weight-bearing intolerance  Functional limitations: decreased functional mobility, pain with weight bearing  Symptom irritability: high    Assessment details: Re-eval 7/15: Pt is a 60 yo who has consistently attended 6 OPPT visits for bilateral knee pain due to OA. Pt has demonstrated significant improvements in bilateral knee strength overall with significant increase in knee extension due to decreased pain overall. Improvements in functional squat with significantly improved excursion today along with improvement in stairs as she was unable to complete during IE. Pt continues to be limited with stair mobility at home na prolonged walking due to continued weakness overall in Les. Pt will continued to benefit from skilled PT to address remaining impairments.     IE 6/3: Arabella Her is a 58 y.o. female presenting to physical therapy today for s/s consistent with bilat knee pain secondary to OA.    Current objective impairments: hypersensitivity to light touch bilaterally, decreased AROM bilaterally with pain, decreased strength bilaterally with gross LE strength including hip, knee and ankle, decreased standing tolerance overall. Functional deficits included excruciating pain with squat, however, no  significant impairments noted through squat, inability to complete stairs stepping up with L or stepping down with R due to decreased concentric and eccentric quad control on L, lateral shift with stepping down turing to L    Response to today's tx: good, educated on HEP with pt understanding, reports of feeling less pain post tx    Pt will benefit from skilled physical therapy to address above listed impairments and return to PLOF.    Understanding of Dx/Px/POC: good     Prognosis: fair    Goals  STG (2-3 weeks):  1. Pt will demonstrate independence with HEP PROGRESSING  2. Pt will improve recruitment of quad set from fair to good in order to better facilitate movement on Les PROGRESSING    LTG (6-8 weeks):  Pt will improve gross LE strength by 1 in each direction MET  2. Pt will have pain <3/10 with stairs in order to improve functional mobility PROGRESSING  3. Pt will improve functional status measure from 45 to >58 to demonstrate improved completion of ADLs. MET  4. Pt will complete stairs reciprocally for improvement in community navigation PROGRESSING        Plan  Patient would benefit from: skilled physical therapy  Planned modality interventions: low level laser therapy, TENS, unattended electrical stimulation, thermotherapy: hydrocollator packs and cryotherapy    Planned therapy interventions: joint mobilization, IASTM, kinesiology taping, manual therapy, muscle pump exercises, nerve gliding, neuromuscular re-education, patient education, strengthening, stretching, therapeutic activities, therapeutic exercise, home exercise program, gait training, functional ROM exercises, flexibility, balance/weight bearing training and balance    Frequency: 1x week  Duration in weeks: 6  Plan of Care beginning date: 7/15/2024  Plan of Care expiration date: 8/26/2024  Treatment plan discussed with: patient  Plan details: Pt will benefit from skilled physical therapy to treat aforementioned impairments, decrease pain and  "return to PLOF.          Subjective Evaluation    History of Present Illness  Date of onset: 6/3/2022  Mechanism of injury: Re-eval 7/15: pt reports knees have overall been better but stepped in a hole and had L ankle overturn causing more knee pain since . Pt reports 75% improvement since SOC. Pt reports difficulty yet with navigating stairs and leads with LLE going down because she cannot bend knee under pressure.     IE 6/3: Pt reports STEVE: insidious onset with no cartilage in knees caused tibias to \"grow outward\" - has to go down steps sideways due to pressure on knees going down forward. Thinks knees got worse due to inactivity due to heart condition     Previous tx to date: x-rays clear    24 hour pattern: activity dependent    Red Flags: clear          Recurrent probem    Quality of life: good    Patient Goals  Patient goals for therapy: increased strength and decreased pain    Pain  Current pain ratin  At best pain rating: 3  At worst pain ratin  Location: starts at fibular head and bands across anterior knee L>R, bilat ankle pain  Quality: sharp and knife-like  Relieving factors: medications and rest (instantly relieves)  Aggravating factors: stair climbing, standing and walking (prolonged inactivity, pushes through pain with activity)  Progression: worsening    Social Support  Stairs in house: yes   10  Lives in: multiple-level home  Lives with: spouse    Employment status: working (full-time -  (sit))    Diagnostic Tests  X-ray: abnormal (bilat knee OA)  Treatments  Current treatment: physical therapy        Objective     Static Posture     Knee   Genu valgus.     Ankle/Foot   Ankle/Foot (Left): Pes planus.   Ankle/Foot (Right): Pes planus.     Tenderness     Additional Tenderness Details  TTP to hamstring insertions bilaterally  TTP lateral knee R only  prox to LCL    TTP inconsistent with amount of pressure applied - significant hypersensitivity    Active Range of Motion   Left " "Knee   Flexion: 118 degrees with pain  Extension: 2 degrees with pain    Right Knee   Flexion: 121 degrees   Extension: 0 degrees     Mobility   Patellar Mobility:   Left Knee   WFL: superior and inferior.   Hypermobile: left medial and left lateral      Right Knee   WFL: medial, lateral and superior    Strength/Myotome Testing     Left Hip   Planes of Motion   Flexion: 4-  Extension: 4-  Abduction: 4  Adduction: 4+    Right Hip   Planes of Motion   Flexion: 4-  Extension: 4-  Abduction: 4  Adduction: 4+    Left Knee   Flexion: 5  Extension: 4+    Right Knee   Flexion: 5  Extension: 4+    Left Ankle/Foot   Dorsiflexion: 4  Plantar flexion: 4  Inversion: 4+  Eversion: 4+    Right Ankle/Foot   Dorsiflexion: 4  Plantar flexion: 4  Inversion: 4+  Eversion: 4+    Functional Assessment      Squat    Right within functional limits, pain, left tibial anterior translation beyond toes, sitting toward right side and right tibial anterior translation beyond toes.     Forward Step Up 6\"   Left Leg  Pain and increased contralateral push off.     Right Leg  Pain and increased contralateral push off.     Forward Step Down 6\"   Left Leg  Pain and contralateral toe touch first.     Right Leg  Pain.     Single Leg Stance - Eyes Open   Left  Trial 1: 12 seconds    Right  Trial 1: 12 seconds             Precautions:   Patient Active Problem List   Diagnosis    Bilateral lower extremity edema    Bilateral occipital neuralgia    Fibromyalgia    Gastroesophageal reflux disease    Insomnia    Migraine    Restless leg    Vitamin D deficiency    Generalized anxiety disorder    Subclinical hypothyroidism    Hearing difficulty of left ear    Mass of skin of head    Post-menopausal    Mitral valve annular calcification    Ureterolithiasis    Stable angina pectoris    Decreased GFR    Coronary artery disease involving native heart without angina pectoris    Environmental allergies    Abnormal computed tomography angiography (CTA)    Chronic pain " "of both ankles    Osteoma of skull    Tick bite    Lump of skin of left lower extremity    Tendinitis, de Quervain's    Numbness and tingling in both hands       Manuals 6/3 6/12 6/19 6/27 7/10 7/15       Knee PROM bilat  RC MB RC RC        Bilat knee desensitization  RC - the stick           Bilat knee laser  RC MB RC RC RC       R fib head mobs  RC gr 2                        Neuro Re-Ed             Re-eval      RC       Weight shifting             HR/TR    20x ea 20x ea 20x ea       Standing march             Lat tap down      4\" 15x bilat        Lateral step up     4\" 20x bilat         LAQ  2# 10x bilat  2# 2x10 bilat 3# 2x10 bilat   4# 2x10 R    3# L        DEANA  GTB 10x bilat  GTB 2x10 bilat  GTB 2x10 bilat  GTB 2x10 bilat  GTB 2x10 bilat                                 Ther Ex             Heels slides with strap 10x bilat            Quad set 3\" x10 bilat 5\" x10 bilat  5\" x10 bilat  5\" x15 bilat         Hamstring set  5\" x10 bilat  5\" x10 bilat  5\" x15 bilat  5\" x15 bilat         SLR  10x bilat    15x bilat 15x bilat         4 way ankle             bike  3 min  6 min lvl 1 5 min L1 5 min L1 5 min L1       Mini squat    10x  15x  15x        Standing hip abduction  10x bilat  10x bilat  15x bilat Matrix 25# 15x bilat         Standing hip extension  10x bilat  10x bilat  15x bilat Matrix 25# 15x bilat                      Ther Activity                                       Gait Training                                       Modalities                                           "

## 2024-07-17 ENCOUNTER — OFFICE VISIT (OUTPATIENT)
Dept: OBGYN CLINIC | Facility: MEDICAL CENTER | Age: 59
End: 2024-07-17
Payer: COMMERCIAL

## 2024-07-17 VITALS
WEIGHT: 232 LBS | SYSTOLIC BLOOD PRESSURE: 121 MMHG | HEART RATE: 78 BPM | DIASTOLIC BLOOD PRESSURE: 79 MMHG | BODY MASS INDEX: 46.77 KG/M2 | HEIGHT: 59 IN

## 2024-07-17 DIAGNOSIS — G56.23 CUBITAL TUNNEL SYNDROME, BILATERAL: ICD-10-CM

## 2024-07-17 DIAGNOSIS — M65.4 TENDINITIS, DE QUERVAIN'S: ICD-10-CM

## 2024-07-17 DIAGNOSIS — M18.0 ARTHRITIS OF CARPOMETACARPAL (CMC) JOINTS OF BOTH THUMBS: ICD-10-CM

## 2024-07-17 DIAGNOSIS — R22.32 MASS OF FOREARM, LEFT: Primary | ICD-10-CM

## 2024-07-17 DIAGNOSIS — G56.03 CARPAL TUNNEL SYNDROME, BILATERAL: ICD-10-CM

## 2024-07-17 PROCEDURE — 20600 DRAIN/INJ JOINT/BURSA W/O US: CPT | Performed by: ORTHOPAEDIC SURGERY

## 2024-07-17 PROCEDURE — 99213 OFFICE O/P EST LOW 20 MIN: CPT | Performed by: ORTHOPAEDIC SURGERY

## 2024-07-17 RX ORDER — ROPIVACAINE HYDROCHLORIDE 5 MG/ML
0.5 INJECTION, SOLUTION EPIDURAL; INFILTRATION; PERINEURAL
Status: COMPLETED | OUTPATIENT
Start: 2024-07-17 | End: 2024-07-17

## 2024-07-17 RX ORDER — BETAMETHASONE SODIUM PHOSPHATE AND BETAMETHASONE ACETATE 3; 3 MG/ML; MG/ML
3 INJECTION, SUSPENSION INTRA-ARTICULAR; INTRALESIONAL; INTRAMUSCULAR; SOFT TISSUE
Status: COMPLETED | OUTPATIENT
Start: 2024-07-17 | End: 2024-07-17

## 2024-07-17 RX ADMIN — ROPIVACAINE HYDROCHLORIDE 0.5 ML: 5 INJECTION, SOLUTION EPIDURAL; INFILTRATION; PERINEURAL at 15:45

## 2024-07-17 RX ADMIN — BETAMETHASONE SODIUM PHOSPHATE AND BETAMETHASONE ACETATE 3 MG: 3; 3 INJECTION, SUSPENSION INTRA-ARTICULAR; INTRALESIONAL; INTRAMUSCULAR; SOFT TISSUE at 15:45

## 2024-07-17 NOTE — PROGRESS NOTES
HAND & UPPER EXTREMITY OFFICE VISIT   Referred By:  No referring provider defined for this encounter.      Chief Complaint:     Bilateral hand pain, numbness, and tingling    Previous History:   Previously seen on 6/4/24. At that point LUIS US was ordered to evaluate for carpal and cubital tunnel syndrome. She elected to begin with a period of bracing for her bilateral wrist pain.     Interval History:  Since the last visit she reports her symptoms are about the same. She has had difficulty wearing the wrist braces at night due to worsening numbness when she wears them. She reports her numbness and tingling continues to occur intermittently throughout the day and at night. Today she is most bothered by the pain in her left thumb. She reports significant discomfort at the base of the thumb when grabbing objects for work. She also notes multiple small masses throughout the left forearm which are very painful to the touch. She reports having multiple similar bumps throughout her body which frequently 'pop-up'. She recently completed an US for a mass on her leg which was consistent with a lipoma.     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 She is RHD.    Past Medical History:  Past Medical History:   Diagnosis Date    Anxiety     Chest pain 08/12/2016    Chest wall pain 07/15/2016    Costochondritis 02/28/2014    Dog bite 07/29/2014    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(HCC)     Fluid retention     Gastroenteritis 05/06/2013    Herpes zoster 07/08/2013    History of chemotherapy 1986    Kidney stone     Migraine 01/19/2016    Shortness of breath 08/12/2016    Toxoplasmosis 08/28/2014     Past Surgical History:   Procedure Laterality Date    CARDIAC CATHETERIZATION Left 03/02/2023    Procedure: Cardiac Left Heart Cath - Dr Carter or Dr Mccurdy please;  Surgeon: Cici Mccurdy DO;  Location: [facility];  Service: Cardiology    CARDIAC  CATHETERIZATION N/A 03/02/2023    Procedure: Cardiac Coronary Angiogram;  Surgeon: Cici Mccurdy DO;  Location: [facility];  Service: Cardiology    CARDIAC CATHETERIZATION  03/02/2023    Procedure: Cardiac catheterization;  Surgeon: Cici Mccurdy DO;  Location: [facility];  Service: Cardiology    CHOLECYSTECTOMY      COLONOSCOPY      ESOPHAGOGASTRODUODENOSCOPY      FL RETROGRADE PYELOGRAM  07/28/2022    HYSTERECTOMY  1986    NECK SURGERY      Lumpectomy    OOPHORECTOMY Right 1986    PA CYSTO/URETERO W/LITHOTRIPSY &INDWELL STENT INSRT Left 07/28/2022    Procedure: CYSTOSCOPY URETEROSCOPY WITH LITHOTRIPSY HOLMIUM LASER, RETROGRADE PYELOGRAM AND INSERTION STENT LEFT URETERAL;  Surgeon: Fracisco Mc MD;  Location: [facility];  Service: Urology    PA ESOPHAGOGASTRODUODENOSCOPY TRANSORAL DIAGNOSTIC N/A 11/30/2018    Procedure: EGD;  Surgeon: Al Schwarz MD;  Location: [facility];  Service: Gastroenterology    SHOULDER ARTHROSCOPY Left     SKIN LESION EXCISION N/A 10/5/2023    Procedure: EXCISION OF FOREHEAD MASS WITH COMPLEX CLOSURE;  Surgeon: Angelito Grant MD;  Location:  MAIN OR;  Service: Plastics     Family History   Problem Relation Age of Onset    Diabetes Mother     Heart disease Mother     Heart disease Father     Arrhythmia Father         ICD placed    Other Sister         vertigo    Migraines Sister     Bipolar disorder Sister     Hearing loss Sister     No Known Problems Daughter     Thyroid disease Daughter         during pregnancy    Diabetes Maternal Grandmother     Heart disease Maternal Grandmother     No Known Problems Maternal Grandfather     Heart disease Paternal Grandmother     Breast cancer Maternal Aunt         age unknown    Parkinsonism Maternal Aunt     Colon cancer Neg Hx     Ovarian cancer Neg Hx      Social History     Socioeconomic History    Marital status: /Civil Union     Spouse name: Not on file    Number of children: Not on file    Years of  "education: Not on file    Highest education level: Not on file   Occupational History    Not on file   Tobacco Use    Smoking status: Never    Smokeless tobacco: Never   Vaping Use    Vaping status: Never Used   Substance and Sexual Activity    Alcohol use: Yes     Comment: Occasionally    Drug use: No    Sexual activity: Yes     Partners: Male     Birth control/protection: Post-menopausal   Other Topics Concern    Not on file   Social History Narrative    Not on file     Social Determinants of Health     Financial Resource Strain: Not on file   Food Insecurity: Not on file   Transportation Needs: Not on file   Physical Activity: Not on file   Stress: Not on file   Social Connections: Not on file   Intimate Partner Violence: Not on file   Housing Stability: Not on file     Scheduled Meds:  Continuous Infusions:No current facility-administered medications for this visit.    PRN Meds:.  Allergies   Allergen Reactions    Azithromycin GI Intolerance     Yeast infection    Imitrex [Sumatriptan] GI Intolerance    Cephalexin     Chlorhexidine Rash    Wound Dressings Rash     Adhesive tape       Physical Examination:    /79   Pulse 78   Ht 4' 11\" (1.499 m)   Wt 105 kg (232 lb)   LMP 03/18/1997 (Exact Date)   BMI 46.86 kg/m²     Gen: A&Ox3, NAD  Cardiac: regular rate  Chest: non labored breathing  Abdomen: Non-distended      Right Upper Extremity:  Skin CDI, no thenar atrophy  No obvious deformity of the shoulder, arm, elbow, forearm, wrist, hand  Swelling Negative  Mildly TTP thumb CMC  Positive pressure shear test  Non-tender first dorsal compartment  Sensation intact to light touch in the axillary median, ulnar, and radial nerve distributions  Composite fist with full opposition of thumb to the base of the small finger with slight pain   5 mm 2-point discrimination in all fingers  Positive Durkan's, positive Tinel's at the wrist,   positive elbow flexion compression test, negative Tinel's at the elbow  5/5 " motor thumb abduction, thumb opposition, interosseous, EPL  2+RP      Left Upper Extremity:  Skin CDI  No obvious deformity of the shoulder, arm, elbow, forearm, wrist, hand  Small, palpable masses along the radial aspect of the forearm along the course of the radial sensory nerve. Significantly tender to light touch  with +Tinel's over each mass  Swelling Negative  Severe TTP thumb CMC, mildly over first dorsal compartment  Positive Finkelstein's  Positive pressure shear test  Composite fist with full opposition of thumb to the base of the small finger with slight pain   Sensation intact to light touch in the axillary median, ulnar, and radial nerve distributions  5 mm 2-point discrimination in all fingers  Positive Durkan's, negative Tinel's at the wrist,   positive elbow flexion compression test, negative Tinel's at the elbow  5/5 motor thumb abduction, thumb opposition, interosseous, EPL  2+RP      Studies:  6/14/24: [facility] US bilateral wrists and elbows demonstrate evidence of bilateral carpal and cubital tunnel syndrome.     RIGHT SIDE:  Median nerve cross sectional area distal forearm (CSAp): 7.8 sq mm. Maximum median nerve cross sectional area within carpal tunnel (CSAc): 11.5 sq mm. Delta CSA (CSAc-CSAp): 3.7 sq mm. Wrist to Forearm ratio (WF ratio) (CSAc/CSAp): 1.5. No hypervascularity.    Ulnar nerve cross sectional area proximally: 7.1 sq mm. Maximum ulnar nerve cross sectional area within or near the cubital tunnel: 10.8 sq mm. Ulnar nerve cross sectional area distal to cubital tunnel: 6 sq mm. Maximal ulnar nerve/proximal cross sectional ratio: 1.5. No evidence of ulnar nerve dislocation from the cubital tunnel on dynamic flexion-extension evaluation. No accessory anconeus epitrochlearis muscle.       LEFT SIDE:  Median nerve cross sectional area distal forearm (CSAp): 8.2 sq mm. Maximum median nerve cross sectional area within carpal tunnel (CSAc): 12.1 sq mm. Delta CSA (CSAc-CSAp): 3.9 sq mm. Wrist to  Forearm ratio (WF ratio) (CSAc/CSAp): 1.5. No hypervascularity.    Ulnar nerve cross sectional area proximally: 8.1 sq mm. Maximum ulnar nerve cross sectional area within or near the cubital tunnel: 10.9 sq mm.   Ulnar nerve cross sectional area distal to cubital tunnel: 7.8 sq mm. Maximal ulnar nerve/proximal cross sectional ratio: 1.3. No evidence of ulnar nerve dislocation from the cubital tunnel on dynamic flexion-extension evaluation. No accessory anconeus epitrochlearis muscle.    Assessment and Plan:  1. Mass of forearm, left  US extremity soft tissue      2. Carpal tunnel syndrome, bilateral        3. Cubital tunnel syndrome, bilateral        4. Arthritis of carpometacarpal (CMC) joints of both thumbs  Small joint arthrocentesis: L thumb CMC      5. Tendinitis, de Quervain's            59 y.o. female presents in follow up for the above diagnosis. US results reviewed and discussed with the patient today. We reviewed management options going forward including continued bracing, CSI, or surgery. She reports difficulty with nighttime bracing due to worsening numbness with the braces in place. We reviewed the details regarding carpal and cubital tunnel release surgery, and the anticipated recovery period. She would like to think about her options, she is the only one working in her household right now and is unsure how much time she'd be able to miss from work for this financially. She will discuss with her  and let us know.    We also discussed potential excision of her forearm masses during the same procedure. However, due to her significant sensitivity and associated positive tinel's, I would like to order an US for further evaluation of the forearm masses prior to proceeding with surgery (to r/o schwannoma as opposed to lipoma).     CSI were discussed and offered for the patient today for her bilateral wrist pain. On exam today, her pain is most significant at the thumb CMC as opposed to the first  dorsal compartment. Her left thumb is most bothersome today, and the right side has been manageable. Risks, benefits and alternative treatments were discussed with the patient. These risks of injection include but are not limited to: bleeding, infection, allergic reaction to agents, possible increase in pain, and/or elevation in blood sugar. Patient understands and would like to proceed with the proposed procedure. Patient tolerated the procedure well without any complications. See procedure note for details. she may take NSAIDs/Tylenol or apply ice to the area as needed for post-injection discomfort.     It is recommended she return to the office following the US to review the results and discuss further treatment options.     she expressed understanding of the plan and agreed. We encouraged them to contact our office with any questions or concerns.       Anselmo Lake MD  Hand and Upper Extremity Surgery      *This note was dictated using Dragon voice recognition software. Please excuse any word substitutions or errors.*     Small joint arthrocentesis: L thumb CMC  [facility] Protocol:  Consent: Verbal consent obtained.  Risks and benefits: risks, benefits and alternatives were discussed  Consent given by: patient  Patient understanding: patient states understanding of the procedure being performed  Site marked: the operative site was marked  Radiology Images displayed and confirmed. If images not available, report reviewed: imaging studies available  Required items: required blood products, implants, devices, and special equipment available  Patient identity confirmed: verbally with patient  Supporting Documentation  Indications: pain   Procedure Details  Location: thumb - L thumb CMC  Preparation: Patient was prepped and draped in the usual sterile fashion  Needle size: 25 G  Ultrasound guidance: no  Approach: dorsal  Medications administered: 3 mg betamethasone acetate-betamethasone sodium phosphate 6 (3-3)  mg/mL; 0.5 mL ropivacaine 0.5 %    Patient tolerance: patient tolerated the procedure well with no immediate complications  Dressing:  Sterile dressing applied

## 2024-07-20 ENCOUNTER — HOSPITAL ENCOUNTER (OUTPATIENT)
Dept: MAMMOGRAPHY | Facility: MEDICAL CENTER | Age: 59
Discharge: HOME | End: 2024-07-20
Payer: COMMERCIAL

## 2024-07-20 VITALS — WEIGHT: 232 LBS | BODY MASS INDEX: 46.77 KG/M2 | HEIGHT: 59 IN

## 2024-07-20 DIAGNOSIS — Z12.31 ENCOUNTER FOR SCREENING MAMMOGRAM FOR MALIGNANT NEOPLASM OF BREAST: ICD-10-CM

## 2024-07-20 PROCEDURE — 77063 BREAST TOMOSYNTHESIS BI: CPT

## 2024-07-20 PROCEDURE — 77067 SCR MAMMO BI INCL CAD: CPT

## 2024-07-29 ENCOUNTER — HOSPITAL ENCOUNTER (OUTPATIENT)
Dept: ULTRASOUND IMAGING | Facility: MEDICAL CENTER | Age: 59
Discharge: HOME | End: 2024-07-29
Payer: COMMERCIAL

## 2024-07-29 DIAGNOSIS — R22.32 MASS OF FOREARM, LEFT: ICD-10-CM

## 2024-07-29 PROCEDURE — 76882 US LMTD JT/FCL EVL NVASC XTR: CPT

## 2024-07-31 ENCOUNTER — OFFICE VISIT (OUTPATIENT)
Dept: PHYSICAL THERAPY | Facility: CLINIC | Age: 59
End: 2024-07-31
Payer: COMMERCIAL

## 2024-07-31 DIAGNOSIS — M25.561 CHRONIC PAIN OF BOTH KNEES: Primary | ICD-10-CM

## 2024-07-31 DIAGNOSIS — M25.562 CHRONIC PAIN OF BOTH KNEES: Primary | ICD-10-CM

## 2024-07-31 DIAGNOSIS — M17.0 PRIMARY OSTEOARTHRITIS OF BOTH KNEES: ICD-10-CM

## 2024-07-31 DIAGNOSIS — G89.29 CHRONIC PAIN OF BOTH KNEES: Primary | ICD-10-CM

## 2024-07-31 PROCEDURE — 97110 THERAPEUTIC EXERCISES: CPT

## 2024-07-31 PROCEDURE — 97112 NEUROMUSCULAR REEDUCATION: CPT

## 2024-07-31 PROCEDURE — 97140 MANUAL THERAPY 1/> REGIONS: CPT

## 2024-07-31 NOTE — PROGRESS NOTES
Daily Note     Today's date: 2024  Patient name: Arabella Her  : 1965  MRN: [id number]  Referring provider: Jo Mckeon CRNP  Dx:   Encounter Diagnosis     ICD-10-CM    1. Chronic pain of both knees  M25.561     M25.562     G89.29       2. Primary osteoarthritis of both knees  M17.0           Start Time: 1615  Stop Time: 1700  Total time in clinic (min): 45 minutes    Subjective: Pt reports knees are feeling great. Was very busy with her family last week walking a lot every day, didn't feel limited in any way due to knees.       Objective: See treatment diary below      Assessment: Tolerated treatment well. Patient demonstrated fatigue post treatment. Pt demonstrating improved exercise and standing tolerance overall with no pain reproduced throughout session. Pt with no limitations in ADLs overall and only pain with pressure to posterior knees bilaterally. Due to pt progress, pt will be placed on hold for 30 days due to independence with HEP. Pt in agreement with this change in POC.       Plan: Continue per plan of care.  Progress treatment as tolerated.       Precautions:   Patient Active Problem List   Diagnosis    Bilateral lower extremity edema    Bilateral occipital neuralgia    Fibromyalgia    Gastroesophageal reflux disease    Insomnia    Migraine    Restless leg    Vitamin D deficiency    Generalized anxiety disorder    Subclinical hypothyroidism    Hearing difficulty of left ear    Mass of skin of head    Post-menopausal    Mitral valve annular calcification    Ureterolithiasis    Stable angina pectoris    Decreased GFR    Coronary artery disease involving native heart without angina pectoris    Environmental allergies    Abnormal computed tomography angiography (CTA)    Chronic pain of both ankles    Osteoma of skull    Tick bite    Lump of skin of left lower extremity    Tendinitis, de Quervain's    Numbness and tingling in both hands       Manuals 6/3 6/12 6/19 6/27 7/10 7/15 7/31   "    Knee PROM bilat  RC MB RC RC        Bilat knee desensitization  RC - the stick           Bilat knee laser  RC MB RC RC RC RC      R fib head mobs  RC gr 2                        Neuro Re-Ed             Re-eval      RC       Weight shifting             HR/TR    20x ea 20x ea 20x ea 20x ea      Standing march             Lat tap down      4\" 15x bilat  4\" 15x bilat      Lateral step up     4\" 20x bilat   6\" 15x bilat      LAQ  2# 10x bilat  2# 2x10 bilat 3# 2x10 bilat   4# 2x10 R    3# L  4# 2x10 bilat       DEANA  GTB 10x bilat  GTB 2x10 bilat  GTB 2x10 bilat  GTB 2x10 bilat  GTB 2x10 bilat BTB 15x bilat                                Ther Ex             Heels slides with strap 10x bilat            Quad set 3\" x10 bilat 5\" x10 bilat  5\" x10 bilat  5\" x15 bilat         Hamstring set  5\" x10 bilat  5\" x10 bilat  5\" x15 bilat  5\" x15 bilat         SLR  10x bilat    15x bilat 15x bilat         4 way ankle             bike  3 min  6 min lvl 1 5 min L1 5 min L1 5 min L1 5 min L1      Mini squat    10x  15x  15x  15x       Leg press       105# 2x10      Standing hip abduction  10x bilat  10x bilat  15x bilat Matrix 25# 15x bilat   Matrix 25# 15x bilat      Standing hip extension  10x bilat  10x bilat  15x bilat Matrix 25# 15x bilat   Matrix 25# 15x bilat                   Ther Activity                                       Gait Training                                       Modalities                                                "

## 2024-08-06 ENCOUNTER — TELEPHONE (OUTPATIENT)
Age: 59
End: 2024-08-06

## 2024-08-06 ENCOUNTER — OFFICE VISIT (OUTPATIENT)
Dept: OBGYN CLINIC | Facility: MEDICAL CENTER | Age: 59
End: 2024-08-06
Payer: COMMERCIAL

## 2024-08-06 VITALS
BODY MASS INDEX: 48.18 KG/M2 | HEIGHT: 59 IN | DIASTOLIC BLOOD PRESSURE: 88 MMHG | HEART RATE: 71 BPM | SYSTOLIC BLOOD PRESSURE: 145 MMHG | WEIGHT: 239 LBS

## 2024-08-06 DIAGNOSIS — G56.03 CARPAL TUNNEL SYNDROME, BILATERAL: Primary | ICD-10-CM

## 2024-08-06 DIAGNOSIS — R22.32 FOREARM MASS, LEFT: ICD-10-CM

## 2024-08-06 DIAGNOSIS — G56.23 CUBITAL TUNNEL SYNDROME OF BOTH UPPER EXTREMITIES: ICD-10-CM

## 2024-08-06 PROCEDURE — 99214 OFFICE O/P EST MOD 30 MIN: CPT | Performed by: ORTHOPAEDIC SURGERY

## 2024-08-06 RX ORDER — ACETAMINOPHEN 325 MG/1
975 TABLET ORAL ONCE
OUTPATIENT
Start: 2024-08-06 | End: 2024-08-06

## 2024-08-06 RX ORDER — CLINDAMYCIN PHOSPHATE 900 MG/50ML
900 INJECTION, SOLUTION INTRAVENOUS ONCE
OUTPATIENT
Start: 2024-08-06 | End: 2024-08-06

## 2024-08-06 NOTE — H&P
HAND & UPPER EXTREMITY OFFICE VISIT   Referred By:  No referring provider defined for this encounter.      Chief Complaint:     Bilateral hand pain, numbness and tingling    Previous History:   Previously seen 7/17/2024 to review her [facility] ultrasound results of her carpal and cubital tunnel syndrome bilaterally.  She proceeded with bracing.  She also complained of small masses in her forearm which are very tender and ultrasound was ordered of those.    Interval History:  She returns today for ultrasound follow-up.  She states her symptoms are the same as they were last evaluation.  No significant improvement with bracing.    She is right-hand dominant but her symptoms are worse on the left side.    Past Medical History:  Past Medical History:   Diagnosis Date    Anxiety     Chest pain 08/12/2016    Chest wall pain 07/15/2016    Costochondritis 02/28/2014    Dog bite 07/29/2014    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(HCC)     Fluid retention     Gastroenteritis 05/06/2013    Herpes zoster 07/08/2013    History of chemotherapy 1986    Kidney stone     Migraine 01/19/2016    Shortness of breath 08/12/2016    Toxoplasmosis 08/28/2014     Past Surgical History:   Procedure Laterality Date    CARDIAC CATHETERIZATION Left 03/02/2023    Procedure: Cardiac Left Heart Cath - Dr Carter or Dr Mccurdy please;  Surgeon: Cici Mccurdy DO;  [facility]: [facility];  Service: Cardiology    CARDIAC CATHETERIZATION N/A 03/02/2023    Procedure: Cardiac Coronary Angiogram;  Surgeon: Cici Mccurdy DO;  [facility]: [facility];  Service: Cardiology    CARDIAC CATHETERIZATION  03/02/2023    Procedure: Cardiac catheterization;  Surgeon: Cici Mccurdy DO;  [facility]: [facility];  Service: Cardiology    CHOLECYSTECTOMY      COLONOSCOPY      ESOPHAGOGASTRODUODENOSCOPY      FL RETROGRADE PYELOGRAM  07/28/2022    HYSTERECTOMY  1986    NECK SURGERY      Lumpectomy    OOPHORECTOMY Right 1986    [place]  CYSTO/URETERO W/LITHOTRIPSY &INDWELL STENT INSRT Left 07/28/2022    Procedure: CYSTOSCOPY URETEROSCOPY WITH LITHOTRIPSY HOLMIUM LASER, RETROGRADE PYELOGRAM AND INSERTION STENT LEFT URETERAL;  Surgeon: Fracisco Mc MD;  Location: [facility];  Service: Urology    NE ESOPHAGOGASTRODUODENOSCOPY TRANSORAL DIAGNOSTIC N/A 11/30/2018    Procedure: EGD;  Surgeon: Al Schwarz MD;  Location: [facility];  Service: Gastroenterology    SHOULDER ARTHROSCOPY Left     SKIN LESION EXCISION N/A 10/5/2023    Procedure: EXCISION OF FOREHEAD MASS WITH COMPLEX CLOSURE;  Surgeon: Angelito Grant MD;  Location:  MAIN OR;  Service: Plastics     Family History   Problem Relation Age of Onset    Diabetes Mother     Heart disease Mother     Heart disease Father     Arrhythmia Father         ICD placed    Other Sister         vertigo    Migraines Sister     Bipolar disorder Sister     Hearing loss Sister     No Known Problems Daughter     Thyroid disease Daughter         during pregnancy    Diabetes Maternal Grandmother     Heart disease Maternal Grandmother     No Known Problems Maternal Grandfather     Heart disease Paternal Grandmother     Breast cancer Maternal Aunt         age unknown    Parkinsonism Maternal Aunt     Colon cancer Neg Hx     Ovarian cancer Neg Hx      Social History     Socioeconomic History    Marital status: /Civil Union     Spouse name: Not on file    Number of children: Not on file    Years of education: Not on file    Highest education level: Not on file   Occupational History    Not on file   Tobacco Use    Smoking status: Never    Smokeless tobacco: Never   Vaping Use    Vaping status: Never Used   Substance and Sexual Activity    Alcohol use: Yes     Comment: Occasionally    Drug use: No    Sexual activity: Yes     Partners: Male     Birth control/protection: Post-menopausal   Other Topics Concern    Not on file   Social History Narrative    Not on file     Social Determinants of Health  "    Financial Resource Strain: Not on file   Food Insecurity: Not on file   Transportation Needs: Not on file   Physical Activity: Not on file   Stress: Not on file   Social Connections: Not on file   Intimate Partner Violence: Not on file   Housing Stability: Not on file     Scheduled Meds:  Continuous Infusions:No current facility-administered medications for this visit.    PRN Meds:.  Allergies   Allergen Reactions    Azithromycin GI Intolerance     Yeast infection    Imitrex [Sumatriptan] GI Intolerance    Cephalexin     Chlorhexidine Rash    Wound Dressings Rash     Adhesive tape       Physical Examination:    /88   Pulse 71   Ht 4' 11\" (1.499 m)   Wt 108 kg (239 lb)   LMP 03/18/1997 (Exact Date)   BMI 48.27 kg/m²     Gen: A&Ox3, NAD  Cardiac: regular rate  Chest: non labored breathing  Abdomen: Non-distended    Cervcial Spine: Negative Spurling's    Right Upper Extremity:  Skin CDI, no thenar atrophy  No obvious deformity of the shoulder, arm, elbow, forearm, wrist, hand  Swelling Negative  Mildly TTP thumb CMC  Positive pressure shear test  Non-tender first dorsal compartment  Sensation intact to light touch in the axillary median, ulnar, and radial nerve distributions  Composite fist with full opposition of thumb to the base of the small finger with slight pain   5 mm 2-point discrimination in all fingers  Positive Durkan's, positive Tinel's at the wrist,   positive elbow flexion compression test, negative Tinel's at the elbow  5/5 motor thumb abduction, thumb opposition, interosseous, EPL  2+RP        Left Upper Extremity:  Skin CDI  No obvious deformity of the shoulder, arm, elbow, forearm, wrist, hand  Small, palpable masses along the radial aspect of the forearm along the course of the radial sensory nerve. Significantly tender to light touch  with +Tinel's over each mass  Swelling Negative  Severe TTP thumb CMC, mildly over first dorsal compartment  Positive Finkelstein's  Positive pressure " shear test  Composite fist with full opposition of thumb to the base of the small finger with slight pain   Sensation intact to light touch in the axillary median, ulnar, and radial nerve distributions  5 mm 2-point discrimination in all fingers  Positive Durkan's, negative Tinel's at the wrist,   positive elbow flexion compression test, negative Tinel's at the elbow  5/5 motor thumb abduction, thumb opposition, interosseous, EPL  2+RP    Studies:  6/14/24: MS US bilateral wrists and elbows demonstrate evidence of bilateral carpal and cubital tunnel syndrome.     7/29/2024: Soft tissue ultrasound of the left upper extremity revealed small mildly echogenic foci consistent with small subcutaneous lipomas.      Assessment and Plan:  1. Carpal tunnel syndrome, bilateral  Case request operating room: RELEASE CARPAL TUNNEL - Left, RELEASE CUBITAL TUNNEL - left with possible anterior transposition, EXCISION BIOPSY TISSUE LESION/MASS Left forearm    Case request operating room: RELEASE CARPAL TUNNEL - Left, RELEASE CUBITAL TUNNEL - left with possible anterior transposition, EXCISION BIOPSY TISSUE LESION/MASS Left forearm      2. Cubital tunnel syndrome of both upper extremities  Case request operating room: RELEASE CARPAL TUNNEL - Left, RELEASE CUBITAL TUNNEL - left with possible anterior transposition, EXCISION BIOPSY TISSUE LESION/MASS Left forearm    Case request operating room: RELEASE CARPAL TUNNEL - Left, RELEASE CUBITAL TUNNEL - left with possible anterior transposition, EXCISION BIOPSY TISSUE LESION/MASS Left forearm      3. Forearm mass, left  Case request operating room: RELEASE CARPAL TUNNEL - Left, RELEASE CUBITAL TUNNEL - left with possible anterior transposition, EXCISION BIOPSY TISSUE LESION/MASS Left forearm    Case request operating room: RELEASE CARPAL TUNNEL - Left, RELEASE CUBITAL TUNNEL - left with possible anterior transposition, EXCISION BIOPSY TISSUE LESION/MASS Left forearm          59 y.o. female  presents in follow up for the above diagnosis.  Her symptoms are similar despite nonoperative management with bracing and activity modification.  She states that the symptoms are still significantly affecting her activities of daily living and quality of life.  After discussion of further treatment options including continued bracing, corticosteroid injections and surgery, she would like to proceed with surgical intervention for left carpal tunnel release, left cubital tunnel release and left forearm lipoma excisions.  We discussed the risks of surgery include but limited to infection, bleeding, pillar pain, wound healing complications, incomplete relief of symptoms, recurrence, stiffness, complex regional pain syndrome, anesthetic complications.  Patient understands these risks and wishes to proceed with surgery.  We discussed the expected perioperative and postoperative treatment course and outcomes.  All questions answered.    Left carpal tunnel release, left cubital tunnel release with possible anterior transposition, left forearm masses excision  Regional anesthesia with sedation    she expressed understanding of the plan and agreed. We encouraged them to contact our office with any questions or concerns.       Anselmo Lake MD  Hand and Upper Extremity Surgery      *This note was dictated using Dragon voice recognition software. Please excuse any word substitutions or errors.*

## 2024-08-06 NOTE — TELEPHONE ENCOUNTER
Caller: Patient    Doctor: Jaya    Reason for call:     Patient is calling to cancel her surgery for left wrist release at the [facility] on 11/14/24.  She will be out of town and needs to rescheduled her surgery.  Please call her back.    Call back#: [phone]

## 2024-08-06 NOTE — PROGRESS NOTES
HAND & UPPER EXTREMITY OFFICE VISIT   Referred By:  No referring provider defined for this encounter.      Chief Complaint:     Bilateral hand pain, numbness and tingling    Previous History:   Previously seen 7/17/2024 to review her MSK ultrasound results of her carpal and cubital tunnel syndrome bilaterally.  She proceeded with bracing.  She also complained of small masses in her forearm which are very tender and ultrasound was ordered of those.    Interval History:  She returns today for ultrasound follow-up.  She states her symptoms are the same as they were last evaluation.  No significant improvement with bracing.    She is right-hand dominant but her symptoms are worse on the left side.    Past Medical History:  Past Medical History:   Diagnosis Date    Anxiety     Chest pain 08/12/2016    Chest wall pain 07/15/2016    Costochondritis 02/28/2014    Dog bite 07/29/2014    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(HCC)     Fluid retention     Gastroenteritis 05/06/2013    Herpes zoster 07/08/2013    History of chemotherapy 1986    Kidney stone     Migraine 01/19/2016    Shortness of breath 08/12/2016    Toxoplasmosis 08/28/2014     Past Surgical History:   Procedure Laterality Date    CARDIAC CATHETERIZATION Left 03/02/2023    Procedure: Cardiac Left Heart Cath - Dr Carter or Dr Mccurdy please;  Surgeon: Cici Mccurdy DO;  [facility];  Service: Cardiology    CARDIAC CATHETERIZATION N/A 03/02/2023    Procedure: Cardiac Coronary Angiogram;  Surgeon: Cici Mccurdy DO;  [facility];  Service: Cardiology    CARDIAC CATHETERIZATION  03/02/2023    Procedure: Cardiac catheterization;  Surgeon: Cici Mccurdy DO;  [facility];  Service: Cardiology    CHOLECYSTECTOMY      COLONOSCOPY      ESOPHAGOGASTRODUODENOSCOPY      FL RETROGRADE PYELOGRAM  07/28/2022    HYSTERECTOMY  1986    NECK SURGERY      Lumpectomy    OOPHORECTOMY Right 1986    [place]  CYSTO/URETERO W/LITHOTRIPSY &INDWELL STENT INSRT Left 07/28/2022    Procedure: CYSTOSCOPY URETEROSCOPY WITH LITHOTRIPSY HOLMIUM LASER, RETROGRADE PYELOGRAM AND INSERTION STENT LEFT URETERAL;  Surgeon: Fracisco Mc MD;  [facility];  Service: Urology    ND ESOPHAGOGASTRODUODENOSCOPY TRANSORAL DIAGNOSTIC N/A 11/30/2018    Procedure: EGD;  Surgeon: Al Schwarz MD;  [facility];  Service: Gastroenterology    SHOULDER ARTHROSCOPY Left     SKIN LESION EXCISION N/A 10/5/2023    Procedure: EXCISION OF FOREHEAD MASS WITH COMPLEX CLOSURE;  Surgeon: Angelito Grant MD;  Location:  MAIN OR;  Service: Plastics     Family History   Problem Relation Age of Onset    Diabetes Mother     Heart disease Mother     Heart disease Father     Arrhythmia Father         ICD placed    Other Sister         vertigo    Migraines Sister     Bipolar disorder Sister     Hearing loss Sister     No Known Problems Daughter     Thyroid disease Daughter         during pregnancy    Diabetes Maternal Grandmother     Heart disease Maternal Grandmother     No Known Problems Maternal Grandfather     Heart disease Paternal Grandmother     Breast cancer Maternal Aunt         age unknown    Parkinsonism Maternal Aunt     Colon cancer Neg Hx     Ovarian cancer Neg Hx      Social History     Socioeconomic History    Marital status: /Civil Union     Spouse name: Not on file    Number of children: Not on file    Years of education: Not on file    Highest education level: Not on file   Occupational History    Not on file   Tobacco Use    Smoking status: Never    Smokeless tobacco: Never   Vaping Use    Vaping status: Never Used   Substance and Sexual Activity    Alcohol use: Yes     Comment: Occasionally    Drug use: No    Sexual activity: Yes     Partners: Male     Birth control/protection: Post-menopausal   Other Topics Concern    Not on file   Social History Narrative    Not on file     Social Determinants of Health  "    Financial Resource Strain: Not on file   Food Insecurity: Not on file   Transportation Needs: Not on file   Physical Activity: Not on file   Stress: Not on file   Social Connections: Not on file   Intimate Partner Violence: Not on file   Housing Stability: Not on file     Scheduled Meds:  Continuous Infusions:No current facility-administered medications for this visit.    PRN Meds:.  Allergies   Allergen Reactions    Azithromycin GI Intolerance     Yeast infection    Imitrex [Sumatriptan] GI Intolerance    Cephalexin     Chlorhexidine Rash    Wound Dressings Rash     Adhesive tape       Physical Examination:    /88   Pulse 71   Ht 4' 11\" (1.499 m)   Wt 108 kg (239 lb)   LMP 03/18/1997 (Exact Date)   BMI 48.27 kg/m²     Gen: A&Ox3, NAD  Cardiac: regular rate  Chest: non labored breathing  Abdomen: Non-distended    Cervcial Spine: Negative Spurling's    Right Upper Extremity:  Skin CDI, no thenar atrophy  No obvious deformity of the shoulder, arm, elbow, forearm, wrist, hand  Swelling Negative  Mildly TTP thumb CMC  Positive pressure shear test  Non-tender first dorsal compartment  Sensation intact to light touch in the axillary median, ulnar, and radial nerve distributions  Composite fist with full opposition of thumb to the base of the small finger with slight pain   5 mm 2-point discrimination in all fingers  Positive Durkan's, positive Tinel's at the wrist,   positive elbow flexion compression test, negative Tinel's at the elbow  5/5 motor thumb abduction, thumb opposition, interosseous, EPL  2+RP        Left Upper Extremity:  Skin CDI  No obvious deformity of the shoulder, arm, elbow, forearm, wrist, hand  Small, palpable masses along the radial aspect of the forearm along the course of the radial sensory nerve. Significantly tender to light touch  with +Tinel's over each mass  Swelling Negative  Severe TTP thumb CMC, mildly over first dorsal compartment  Positive Finkelstein's  Positive pressure " shear test  Composite fist with full opposition of thumb to the base of the small finger with slight pain   Sensation intact to light touch in the axillary median, ulnar, and radial nerve distributions  5 mm 2-point discrimination in all fingers  Positive Durkan's, negative Tinel's at the wrist,   positive elbow flexion compression test, negative Tinel's at the elbow  5/5 motor thumb abduction, thumb opposition, interosseous, EPL  2+RP    Studies:  6/14/24: MS US bilateral wrists and elbows demonstrate evidence of bilateral carpal and cubital tunnel syndrome.     7/29/2024: Soft tissue ultrasound of the left upper extremity revealed small mildly echogenic foci consistent with small subcutaneous lipomas.      Assessment and Plan:  1. Carpal tunnel syndrome, bilateral  Case request operating room: RELEASE CARPAL TUNNEL - Left, RELEASE CUBITAL TUNNEL - left with possible anterior transposition, EXCISION BIOPSY TISSUE LESION/MASS Left forearm    Case request operating room: RELEASE CARPAL TUNNEL - Left, RELEASE CUBITAL TUNNEL - left with possible anterior transposition, EXCISION BIOPSY TISSUE LESION/MASS Left forearm      2. Cubital tunnel syndrome of both upper extremities  Case request operating room: RELEASE CARPAL TUNNEL - Left, RELEASE CUBITAL TUNNEL - left with possible anterior transposition, EXCISION BIOPSY TISSUE LESION/MASS Left forearm    Case request operating room: RELEASE CARPAL TUNNEL - Left, RELEASE CUBITAL TUNNEL - left with possible anterior transposition, EXCISION BIOPSY TISSUE LESION/MASS Left forearm      3. Forearm mass, left  Case request operating room: RELEASE CARPAL TUNNEL - Left, RELEASE CUBITAL TUNNEL - left with possible anterior transposition, EXCISION BIOPSY TISSUE LESION/MASS Left forearm    Case request operating room: RELEASE CARPAL TUNNEL - Left, RELEASE CUBITAL TUNNEL - left with possible anterior transposition, EXCISION BIOPSY TISSUE LESION/MASS Left forearm          59 y.o. female  presents in follow up for the above diagnosis.  Her symptoms are similar despite nonoperative management with bracing and activity modification.  She states that the symptoms are still significantly affecting her activities of daily living and quality of life.  After discussion of further treatment options including continued bracing, corticosteroid injections and surgery, she would like to proceed with surgical intervention for left carpal tunnel release, left cubital tunnel release and left forearm lipoma excisions.  We discussed the risks of surgery include but limited to infection, bleeding, pillar pain, wound healing complications, incomplete relief of symptoms, recurrence, stiffness, complex regional pain syndrome, anesthetic complications.  Patient understands these risks and wishes to proceed with surgery.  We discussed the expected perioperative and postoperative treatment course and outcomes.  All questions answered.    Left carpal tunnel release, left cubital tunnel release with possible anterior transposition, left forearm masses excision  Regional anesthesia with sedation    she expressed understanding of the plan and agreed. We encouraged them to contact our office with any questions or concerns.       Anselmo Lake MD  Hand and Upper Extremity Surgery      *This note was dictated using Dragon voice recognition software. Please excuse any word substitutions or errors.*

## 2024-08-09 DIAGNOSIS — I20.89 STABLE ANGINA PECTORIS: ICD-10-CM

## 2024-08-09 DIAGNOSIS — R93.89 ABNORMAL COMPUTED TOMOGRAPHY ANGIOGRAPHY (CTA): ICD-10-CM

## 2024-08-09 RX ORDER — METOPROLOL SUCCINATE 25 MG/1
25 TABLET, EXTENDED RELEASE ORAL EVERY 12 HOURS
OUTPATIENT
Start: 2024-08-09

## 2024-08-10 DIAGNOSIS — R60.0 BILATERAL LOWER EXTREMITY EDEMA: ICD-10-CM

## 2024-08-10 RX ORDER — FUROSEMIDE 20 MG/1
20 TABLET ORAL DAILY
Qty: 90 TABLET | Refills: 2 | Status: SHIPPED | OUTPATIENT
Start: 2024-08-10

## 2024-08-17 ENCOUNTER — APPOINTMENT (OUTPATIENT)
Dept: LAB | Facility: CLINIC | Age: 59
End: 2024-08-17
Payer: COMMERCIAL

## 2024-08-17 DIAGNOSIS — D37.030 NEOPLASM OF UNCERTAIN BEHAVIOR OF PAROTID GLAND: ICD-10-CM

## 2024-08-17 LAB
BUN SERPL-MCNC: 15 MG/DL (ref 5–25)
CREAT SERPL-MCNC: 0.79 MG/DL (ref 0.6–1.3)
GFR SERPL CREATININE-BSD FRML MDRD: 82 ML/MIN/1.73SQ M

## 2024-08-17 PROCEDURE — 36415 COLL VENOUS BLD VENIPUNCTURE: CPT

## 2024-08-17 PROCEDURE — 84520 ASSAY OF UREA NITROGEN: CPT

## 2024-08-17 PROCEDURE — 82565 ASSAY OF CREATININE: CPT

## 2024-09-21 ENCOUNTER — HOSPITAL ENCOUNTER (OUTPATIENT)
Dept: CT IMAGING | Facility: HOSPITAL | Age: 59
Discharge: HOME | End: 2024-09-21
Attending: OTOLARYNGOLOGY
Payer: COMMERCIAL

## 2024-09-21 DIAGNOSIS — D37.030 NEOPLASM OF UNCERTAIN BEHAVIOR OF PAROTID GLAND: ICD-10-CM

## 2024-09-21 PROCEDURE — 70491 CT SOFT TISSUE NECK W/DYE: CPT

## 2024-09-21 RX ADMIN — IOHEXOL 85 ML: 350 INJECTION, SOLUTION INTRAVENOUS at 08:35

## 2024-10-08 ENCOUNTER — VBI (OUTPATIENT)
Dept: ADMINISTRATIVE | Facility: OTHER | Age: 59
End: 2024-10-08

## 2024-10-08 NOTE — TELEPHONE ENCOUNTER
10/08/24 2:07 PM     Chart reviewed for CRC: Colonoscopy and Pap Smear (HPV) aka Cervical Cancer Screening was/were not submitted to the patient's insurance.     Edelmira Austin MA   [facility]

## 2024-10-10 DIAGNOSIS — M25.572 CHRONIC PAIN OF BOTH ANKLES: ICD-10-CM

## 2024-10-10 DIAGNOSIS — F41.1 GENERALIZED ANXIETY DISORDER: ICD-10-CM

## 2024-10-10 DIAGNOSIS — M25.571 CHRONIC PAIN OF BOTH ANKLES: ICD-10-CM

## 2024-10-10 DIAGNOSIS — M77.9 TENDONITIS: ICD-10-CM

## 2024-10-10 DIAGNOSIS — G89.29 CHRONIC PAIN OF BOTH ANKLES: ICD-10-CM

## 2024-10-10 RX ORDER — CITALOPRAM HYDROBROMIDE 40 MG/1
40 TABLET ORAL DAILY
Qty: 90 TABLET | Refills: 1 | Status: SHIPPED | OUTPATIENT
Start: 2024-10-10

## 2024-10-10 RX ORDER — MELOXICAM 15 MG/1
15 TABLET ORAL DAILY
Qty: 90 TABLET | Refills: 1 | Status: SHIPPED | OUTPATIENT
Start: 2024-10-10 | End: 2025-04-08

## 2024-11-25 ENCOUNTER — TELEPHONE (OUTPATIENT)
Age: 59
End: 2024-11-25

## 2024-11-25 NOTE — TELEPHONE ENCOUNTER
Caller: Patient    Doctor: Jaya    Reason for call: Patient called requested FMLA forms faxed to fax# [phone].    Call back#: [phone]

## 2024-11-25 NOTE — TELEPHONE ENCOUNTER
Caller: Patient     Doctor: Jaya    Reason for call: Patient called with questions regarding medications prior to surgery scheduled 12/19. Please advise.    Call back#: [phone]

## 2024-11-25 NOTE — TELEPHONE ENCOUNTER
Spoke with the patient, let her know that the nurses from pre-admission would reach out to go over her medications prior to surgery.

## 2024-12-05 ENCOUNTER — TELEPHONE (OUTPATIENT)
Dept: OBGYN CLINIC | Facility: HOSPITAL | Age: 59
End: 2024-12-05

## 2024-12-05 ENCOUNTER — APPOINTMENT (OUTPATIENT)
Dept: PREADMISSION TESTING | Facility: HOSPITAL | Age: 59
End: 2024-12-05
Payer: COMMERCIAL

## 2024-12-05 ENCOUNTER — ANESTHESIA EVENT (OUTPATIENT)
Age: 59
End: 2024-12-05

## 2024-12-05 ENCOUNTER — ANESTHESIA (OUTPATIENT)
Age: 59
End: 2024-12-05

## 2024-12-05 ENCOUNTER — ANESTHESIA EVENT (OUTPATIENT)
Age: 59
End: 2024-12-05
Payer: COMMERCIAL

## 2024-12-05 NOTE — TELEPHONE ENCOUNTER
Caller: Patient    Doctor: Jaya    Reason for call: [employer] will be sending Disability paperwork to the office to be filled out. I did advise turn around time is 10-14 business days.    Call back#: [phone]

## 2024-12-10 NOTE — PRE-PROCEDURE INSTRUCTIONS
Pre-Surgery Instructions:   Medication Instructions    amLODIPine (NORVASC) 5 mg tablet Take day of surgery.    aspirin 81 MG tablet Stop taking 7 days prior to surgery.    atorvastatin (LIPITOR) 80 mg tablet Take day of surgery.    Cholecalciferol (VITAMIN D) 2000 units CAPS Hold day of surgery.    citalopram (CeleXA) 40 mg tablet Take day of surgery.    furosemide (LASIX) 20 mg tablet Hold day of surgery.    isosorbide mononitrate (IMDUR) 60 mg 24 hr tablet Take day of surgery.    meloxicam (MOBIC) 15 mg tablet Stop taking 3 days prior to surgery.    metoprolol tartrate (LOPRESSOR) 25 mg tablet Take day of surgery.    omeprazole (PriLOSEC) 20 mg delayed release capsule Take day of surgery.    topiramate (TOPAMAX) 50 MG tablet Take day of surgery.      Medication instructions for day surgery reviewed. Please use only a sip of water to take your instructed medications. Avoid all over the counter vitamins, supplements and NSAIDS for one week prior to surgery per anesthesia guidelines. Tylenol is ok to take as needed.     You will receive a call one business day prior to surgery with an arrival time and hospital directions. If your surgery is scheduled on a Monday, the hospital will be calling you on the Friday prior to your surgery. If you have not heard from anyone by 8pm, please call the hospital supervisor through the hospital  at [phone]. ([place] [phone] or [place] [phone]).    Do not eat or drink anything after midnight the night before your surgery, including candy, mints, lifesavers, or chewing gum. Do not drink alcohol 24hrs before your surgery. Try not to smoke at least 24hrs before your surgery.       Follow the pre surgery showering instructions as listed in the “My Surgical Experience Booklet” or otherwise provided by your surgeon's office. Do not use a blade to shave the surgical area 1 week before surgery. It is okay to use a clean electric clippers up to 24 hours before  surgery. Do not apply any lotions, creams, including makeup, cologne, deodorant, or perfumes after showering on the day of your surgery. Do not use dry shampoo, hair spray, hair gel, or any type of hair products.     No contact lenses, eye make-up, or artificial eyelashes. Remove nail polish, including gel polish, and any artificial, gel, or acrylic nails if possible. Remove all jewelry including rings and body piercing jewelry.     Wear causal clothing that is easy to take on and off. Consider your type of surgery.    Keep any valuables, jewelry, piercings at home. Please bring any specially ordered equipment (sling, braces) if indicated.    Arrange for a responsible person to drive you to and from the hospital on the day of your surgery. Please confirm the visitor policy for the day of your procedure when you receive your phone call with an arrival time.     Call the surgeon's office with any new illnesses, exposures, or additional questions prior to surgery.    Please reference your “My Surgical Experience Booklet” for additional information to prepare for your upcoming surgery.

## 2024-12-10 NOTE — TELEPHONE ENCOUNTER
Patient following up on [place] paperwork being received. Advised patient of 10-14 days turnaround. Patient understands and just would like to make sure it was received. Patient would like a call if paperwork has not been received yet from [employer].     #[phone]

## 2024-12-17 ENCOUNTER — TELEPHONE (OUTPATIENT)
Dept: OBGYN CLINIC | Facility: HOSPITAL | Age: 59
End: 2024-12-17

## 2024-12-17 NOTE — TELEPHONE ENCOUNTER
Caller: Patient    Doctor: Jaya    Reason for call: Advised her disability paperwork was faxed to [employer] on 12/11/24. Also confirmed her surgery is at [facility]    Call back#: n/a

## 2024-12-18 RX ORDER — SODIUM CHLORIDE, SODIUM LACTATE, POTASSIUM CHLORIDE, CALCIUM CHLORIDE 600; 310; 30; 20 MG/100ML; MG/100ML; MG/100ML; MG/100ML
125 INJECTION, SOLUTION INTRAVENOUS CONTINUOUS
Status: CANCELLED | OUTPATIENT
Start: 2024-12-18

## 2024-12-19 ENCOUNTER — ANESTHESIA (OUTPATIENT)
Age: 59
End: 2024-12-19
Payer: COMMERCIAL

## 2024-12-19 ENCOUNTER — HOSPITAL ENCOUNTER (OUTPATIENT)
Age: 59
Setting detail: OUTPATIENT SURGERY
Discharge: HOME | End: 2024-12-19
Attending: ORTHOPAEDIC SURGERY | Admitting: ORTHOPAEDIC SURGERY
Payer: COMMERCIAL

## 2024-12-19 VITALS
OXYGEN SATURATION: 99 % | RESPIRATION RATE: 14 BRPM | WEIGHT: 239 LBS | HEIGHT: 59 IN | BODY MASS INDEX: 48.18 KG/M2 | HEART RATE: 65 BPM | SYSTOLIC BLOOD PRESSURE: 122 MMHG | DIASTOLIC BLOOD PRESSURE: 74 MMHG | TEMPERATURE: 97.7 F

## 2024-12-19 DIAGNOSIS — Z47.89 AFTERCARE FOLLOWING SURGERY OF THE MUSCULOSKELETAL SYSTEM: ICD-10-CM

## 2024-12-19 DIAGNOSIS — G56.23 CUBITAL TUNNEL SYNDROME OF BOTH UPPER EXTREMITIES: ICD-10-CM

## 2024-12-19 DIAGNOSIS — R22.32 FOREARM MASS, LEFT: ICD-10-CM

## 2024-12-19 DIAGNOSIS — G56.03 CARPAL TUNNEL SYNDROME, BILATERAL: Primary | ICD-10-CM

## 2024-12-19 PROBLEM — E66.01 CLASS 3 SEVERE OBESITY IN ADULT (HCC): Status: ACTIVE | Noted: 2024-12-19

## 2024-12-19 PROBLEM — E66.813 CLASS 3 SEVERE OBESITY IN ADULT (HCC): Status: ACTIVE | Noted: 2024-12-19

## 2024-12-19 PROCEDURE — 64721 CARPAL TUNNEL SURGERY: CPT

## 2024-12-19 PROCEDURE — 25075 EXC FOREARM LES SC < 3 CM: CPT | Performed by: ORTHOPAEDIC SURGERY

## 2024-12-19 PROCEDURE — 64718 REVISE ULNAR NERVE AT ELBOW: CPT | Performed by: ORTHOPAEDIC SURGERY

## 2024-12-19 PROCEDURE — 25071 EXC FOREARM LES SC 3 CM/>: CPT

## 2024-12-19 PROCEDURE — NC001 PR NO CHARGE: Performed by: ORTHOPAEDIC SURGERY

## 2024-12-19 PROCEDURE — 88304 TISSUE EXAM BY PATHOLOGIST: CPT | Performed by: PATHOLOGY

## 2024-12-19 PROCEDURE — 64721 CARPAL TUNNEL SURGERY: CPT | Performed by: ORTHOPAEDIC SURGERY

## 2024-12-19 PROCEDURE — 64718 REVISE ULNAR NERVE AT ELBOW: CPT

## 2024-12-19 PROCEDURE — 25075 EXC FOREARM LES SC < 3 CM: CPT

## 2024-12-19 PROCEDURE — 25071 EXC FOREARM LES SC 3 CM/>: CPT | Performed by: ORTHOPAEDIC SURGERY

## 2024-12-19 RX ORDER — ACETAMINOPHEN 500 MG
1000 TABLET ORAL EVERY 8 HOURS PRN
Qty: 60 TABLET | Refills: 0 | Status: SHIPPED | OUTPATIENT
Start: 2024-12-19

## 2024-12-19 RX ORDER — LIDOCAINE HYDROCHLORIDE 10 MG/ML
INJECTION, SOLUTION EPIDURAL; INFILTRATION; INTRACAUDAL; PERINEURAL AS NEEDED
Status: DISCONTINUED | OUTPATIENT
Start: 2024-12-19 | End: 2024-12-19

## 2024-12-19 RX ORDER — IBUPROFEN 600 MG/1
600 TABLET, FILM COATED ORAL EVERY 8 HOURS PRN
Qty: 30 TABLET | Refills: 0 | Status: CANCELLED | OUTPATIENT
Start: 2024-12-19

## 2024-12-19 RX ORDER — SODIUM CHLORIDE, SODIUM LACTATE, POTASSIUM CHLORIDE, CALCIUM CHLORIDE 600; 310; 30; 20 MG/100ML; MG/100ML; MG/100ML; MG/100ML
100 INJECTION, SOLUTION INTRAVENOUS CONTINUOUS
Status: DISCONTINUED | OUTPATIENT
Start: 2024-12-19 | End: 2024-12-19 | Stop reason: HOSPADM

## 2024-12-19 RX ORDER — MAGNESIUM HYDROXIDE 1200 MG/15ML
LIQUID ORAL AS NEEDED
Status: DISCONTINUED | OUTPATIENT
Start: 2024-12-19 | End: 2024-12-19 | Stop reason: HOSPADM

## 2024-12-19 RX ORDER — ROPIVACAINE HYDROCHLORIDE 5 MG/ML
INJECTION, SOLUTION EPIDURAL; INFILTRATION; PERINEURAL
Status: COMPLETED | OUTPATIENT
Start: 2024-12-19 | End: 2024-12-19

## 2024-12-19 RX ORDER — HYDRALAZINE HYDROCHLORIDE 20 MG/ML
5 INJECTION INTRAMUSCULAR; INTRAVENOUS
Status: DISCONTINUED | OUTPATIENT
Start: 2024-12-19 | End: 2024-12-19 | Stop reason: HOSPADM

## 2024-12-19 RX ORDER — PROPOFOL 10 MG/ML
INJECTION, EMULSION INTRAVENOUS CONTINUOUS PRN
Status: DISCONTINUED | OUTPATIENT
Start: 2024-12-19 | End: 2024-12-19

## 2024-12-19 RX ORDER — IBUPROFEN 600 MG/1
600 TABLET, FILM COATED ORAL EVERY 6 HOURS PRN
Status: DISCONTINUED | OUTPATIENT
Start: 2024-12-19 | End: 2024-12-19 | Stop reason: HOSPADM

## 2024-12-19 RX ORDER — ALBUTEROL SULFATE 0.83 MG/ML
2.5 SOLUTION RESPIRATORY (INHALATION) ONCE AS NEEDED
Status: DISCONTINUED | OUTPATIENT
Start: 2024-12-19 | End: 2024-12-19 | Stop reason: HOSPADM

## 2024-12-19 RX ORDER — MIDAZOLAM HYDROCHLORIDE 2 MG/2ML
INJECTION, SOLUTION INTRAMUSCULAR; INTRAVENOUS
Status: COMPLETED | OUTPATIENT
Start: 2024-12-19 | End: 2024-12-19

## 2024-12-19 RX ORDER — PROMETHAZINE HYDROCHLORIDE 25 MG/ML
12.5 INJECTION, SOLUTION INTRAMUSCULAR; INTRAVENOUS ONCE AS NEEDED
Status: DISCONTINUED | OUTPATIENT
Start: 2024-12-19 | End: 2024-12-19 | Stop reason: HOSPADM

## 2024-12-19 RX ORDER — OXYCODONE HYDROCHLORIDE 5 MG/1
5 TABLET ORAL EVERY 6 HOURS PRN
Qty: 5 TABLET | Refills: 0 | Status: SHIPPED | OUTPATIENT
Start: 2024-12-19 | End: 2024-12-29

## 2024-12-19 RX ORDER — CLINDAMYCIN PHOSPHATE 900 MG/50ML
900 INJECTION, SOLUTION INTRAVENOUS ONCE
Status: COMPLETED | OUTPATIENT
Start: 2024-12-19 | End: 2024-12-19

## 2024-12-19 RX ORDER — HYDROMORPHONE HCL/PF 1 MG/ML
0.5 SYRINGE (ML) INJECTION
Status: DISCONTINUED | OUTPATIENT
Start: 2024-12-19 | End: 2024-12-19 | Stop reason: HOSPADM

## 2024-12-19 RX ORDER — LABETALOL HYDROCHLORIDE 5 MG/ML
10 INJECTION, SOLUTION INTRAVENOUS
Status: DISCONTINUED | OUTPATIENT
Start: 2024-12-19 | End: 2024-12-19 | Stop reason: HOSPADM

## 2024-12-19 RX ORDER — METOCLOPRAMIDE HYDROCHLORIDE 5 MG/ML
10 INJECTION INTRAMUSCULAR; INTRAVENOUS ONCE AS NEEDED
Status: DISCONTINUED | OUTPATIENT
Start: 2024-12-19 | End: 2024-12-19 | Stop reason: HOSPADM

## 2024-12-19 RX ORDER — GLYCOPYRROLATE 0.2 MG/ML
INJECTION INTRAMUSCULAR; INTRAVENOUS AS NEEDED
Status: DISCONTINUED | OUTPATIENT
Start: 2024-12-19 | End: 2024-12-19

## 2024-12-19 RX ORDER — ACETAMINOPHEN 325 MG/1
650 TABLET ORAL EVERY 6 HOURS PRN
Status: DISCONTINUED | OUTPATIENT
Start: 2024-12-19 | End: 2024-12-19 | Stop reason: HOSPADM

## 2024-12-19 RX ORDER — DEXAMETHASONE SODIUM PHOSPHATE 4 MG/ML
INJECTION, SOLUTION INTRA-ARTICULAR; INTRALESIONAL; INTRAMUSCULAR; INTRAVENOUS; SOFT TISSUE
Status: COMPLETED | OUTPATIENT
Start: 2024-12-19 | End: 2024-12-19

## 2024-12-19 RX ORDER — FENTANYL CITRATE/PF 50 MCG/ML
25 SYRINGE (ML) INJECTION
Status: DISCONTINUED | OUTPATIENT
Start: 2024-12-19 | End: 2024-12-19 | Stop reason: HOSPADM

## 2024-12-19 RX ORDER — ONDANSETRON 2 MG/ML
4 INJECTION INTRAMUSCULAR; INTRAVENOUS ONCE AS NEEDED
Status: DISCONTINUED | OUTPATIENT
Start: 2024-12-19 | End: 2024-12-19 | Stop reason: HOSPADM

## 2024-12-19 RX ORDER — KETOROLAC TROMETHAMINE 30 MG/ML
INJECTION, SOLUTION INTRAMUSCULAR; INTRAVENOUS AS NEEDED
Status: DISCONTINUED | OUTPATIENT
Start: 2024-12-19 | End: 2024-12-19

## 2024-12-19 RX ORDER — ONDANSETRON 4 MG/1
4 TABLET, FILM COATED ORAL EVERY 8 HOURS PRN
Qty: 20 TABLET | Refills: 0 | Status: SHIPPED | OUTPATIENT
Start: 2024-12-19

## 2024-12-19 RX ORDER — DOCUSATE SODIUM 100 MG/1
100 CAPSULE, LIQUID FILLED ORAL DAILY PRN
Qty: 10 CAPSULE | Refills: 0 | Status: SHIPPED | OUTPATIENT
Start: 2024-12-19

## 2024-12-19 RX ORDER — SODIUM CHLORIDE, SODIUM LACTATE, POTASSIUM CHLORIDE, CALCIUM CHLORIDE 600; 310; 30; 20 MG/100ML; MG/100ML; MG/100ML; MG/100ML
INJECTION, SOLUTION INTRAVENOUS CONTINUOUS PRN
Status: DISCONTINUED | OUTPATIENT
Start: 2024-12-19 | End: 2024-12-19

## 2024-12-19 RX ORDER — OXYCODONE HYDROCHLORIDE 5 MG/1
5 TABLET ORAL EVERY 6 HOURS PRN
Status: DISCONTINUED | OUTPATIENT
Start: 2024-12-19 | End: 2024-12-19 | Stop reason: HOSPADM

## 2024-12-19 RX ORDER — ACETAMINOPHEN 325 MG/1
975 TABLET ORAL ONCE
Status: COMPLETED | OUTPATIENT
Start: 2024-12-19 | End: 2024-12-19

## 2024-12-19 RX ORDER — HYDROMORPHONE HCL IN WATER/PF 6 MG/30 ML
0.2 PATIENT CONTROLLED ANALGESIA SYRINGE INTRAVENOUS
Status: DISCONTINUED | OUTPATIENT
Start: 2024-12-19 | End: 2024-12-19 | Stop reason: HOSPADM

## 2024-12-19 RX ORDER — ONDANSETRON 2 MG/ML
INJECTION INTRAMUSCULAR; INTRAVENOUS AS NEEDED
Status: DISCONTINUED | OUTPATIENT
Start: 2024-12-19 | End: 2024-12-19

## 2024-12-19 RX ADMIN — GLYCOPYRROLATE 2 MCG: 0.2 INJECTION INTRAMUSCULAR; INTRAVENOUS at 13:52

## 2024-12-19 RX ADMIN — KETOROLAC TROMETHAMINE 15 MG: 30 INJECTION, SOLUTION INTRAMUSCULAR at 13:54

## 2024-12-19 RX ADMIN — PROPOFOL 20 MG: 10 INJECTION, EMULSION INTRAVENOUS at 14:00

## 2024-12-19 RX ADMIN — LIDOCAINE HYDROCHLORIDE 50 MG: 10 SOLUTION INTRAVENOUS at 13:51

## 2024-12-19 RX ADMIN — PROPOFOL 20 MG: 10 INJECTION, EMULSION INTRAVENOUS at 13:57

## 2024-12-19 RX ADMIN — SODIUM CHLORIDE, SODIUM LACTATE, POTASSIUM CHLORIDE, AND CALCIUM CHLORIDE 100 ML/HR: .6; .31; .03; .02 INJECTION, SOLUTION INTRAVENOUS at 12:07

## 2024-12-19 RX ADMIN — PROPOFOL 75 MCG/KG/MIN: 10 INJECTION, EMULSION INTRAVENOUS at 13:51

## 2024-12-19 RX ADMIN — DEXAMETHASONE SODIUM PHOSPHATE 4 MG: 4 INJECTION INTRA-ARTICULAR; INTRALESIONAL; INTRAMUSCULAR; INTRAVENOUS; SOFT TISSUE at 12:37

## 2024-12-19 RX ADMIN — ACETAMINOPHEN 325MG 975 MG: 325 TABLET ORAL at 12:13

## 2024-12-19 RX ADMIN — CLINDAMYCIN PHOSPHATE 900 MG: 900 INJECTION, SOLUTION INTRAVENOUS at 13:50

## 2024-12-19 RX ADMIN — MIDAZOLAM 2 MG: 1 INJECTION INTRAMUSCULAR; INTRAVENOUS at 12:35

## 2024-12-19 RX ADMIN — ONDANSETRON 4 MG: 2 INJECTION INTRAMUSCULAR; INTRAVENOUS at 13:52

## 2024-12-19 RX ADMIN — SODIUM CHLORIDE, SODIUM LACTATE, POTASSIUM CHLORIDE, AND CALCIUM CHLORIDE: .6; .31; .03; .02 INJECTION, SOLUTION INTRAVENOUS at 13:52

## 2024-12-19 RX ADMIN — ROPIVACAINE HYDROCHLORIDE 30 ML: 5 INJECTION EPIDURAL; INFILTRATION; PERINEURAL at 12:37

## 2024-12-19 NOTE — INTERVAL H&P NOTE
H&P reviewed. After examining the patient I find no changes in the patients condition since the H&P had been written.    Vitals:    12/19/24 1159   BP: 160/79   Pulse: 81   Resp: 20   Temp: 98.3 °F (36.8 °C)   SpO2: 97%

## 2024-12-19 NOTE — ANESTHESIA PREPROCEDURE EVALUATION
Procedure:  RELEASE CARPAL TUNNEL - Left (Left: Wrist)  RELEASE CUBITAL TUNNEL - left with possible anterior transposition (Left: Elbow)  EXCISION BIOPSY TISSUE LESION/MASS Left forearm (Left: Arm Lower)    Relevant Problems   ANESTHESIA (within normal limits)      CARDIO   (+) Coronary artery disease involving native heart without angina pectoris   (+) Migraine   (+) Mitral valve annular calcification   (+) Stable angina pectoris (HCC)      ENDO   (+) Subclinical hypothyroidism      GI/HEPATIC   (+) Gastroesophageal reflux disease      MUSCULOSKELETAL   (+) Fibromyalgia      NEURO/PSYCH   (+) Bilateral occipital neuralgia   (+) Fibromyalgia   (+) Generalized anxiety disorder   (+) Migraine   (+) Numbness and tingling in both hands   (+) Stable angina pectoris (HCC)      Orthopedic/Musculoskeletal   (+) Carpal tunnel syndrome, bilateral   (+) Cubital tunnel syndrome of both upper extremities      Other   (+) Class 3 severe obesity in adult (HCC)   (+) Forearm mass, left   Normal sinus rhythm  Low voltage QRS  Nonspecific ST-t wave changes  Abnormal ECG  When compared with ECG of 16-JUN-2023 14:35,  Questionable change in QRS duration    Impression         LM: short, large caliber vessel, bifurcates into LAD/LCX, with no angiographic evidence of significant obstructive CAD    LAD: type 3, large caliber vessel, with mild proximal disease, it gives off several small caliber DICK branches, with no angiographic evidence of significant obstructive CAD    LCX: dominant, large caliber vessel, with mild proximal disease, it gives off several large OM branches, with no angiographic evidence of significant obstructive CAD    RCA: nondominant, very small caliber vessel, with no minimla diffuse CAD    LVEDP is mildly elevated without gradient on LV-AO pullback              Recommendation    Procedure Details Cont GDMT optimization for CAD  Aggressive risk factor reduction   on diet/lifestyle modification  Pulm rehab upon  discharge for likely OHVS with deconditioning  SL Weight management referral as well, discussed with patient  Patient with noted increased home stressors with ailing spouse, Psycotherapy referral as well, discussed with patient        Physical Exam    Airway    Mallampati score: II  TM Distance: >3 FB  Neck ROM: full     Dental   No notable dental hx     Cardiovascular  Rhythm: regular, Rate: normal, Cardiovascular exam normal    Pulmonary  Pulmonary exam normal Breath sounds clear to auscultation    Other Findings  post-pubertal.      Anesthesia Plan  ASA Score- 3     Anesthesia Type- regional with ASA Monitors.         Additional Monitors:     Airway Plan:            Plan Factors-Exercise tolerance (METS): >4 METS.    Chart reviewed. EKG reviewed.  Existing labs reviewed. Patient summary reviewed.    Patient is not a current smoker.      Obstructive sleep apnea risk education given perioperatively.        Induction-     Postoperative Plan-         Informed Consent- Anesthetic plan and risks discussed with patient.  I personally reviewed this patient with the CRNA. Discussed and agreed on the Anesthesia Plan with the CRNA..

## 2024-12-19 NOTE — ANESTHESIA POSTPROCEDURE EVALUATION
Post-Op Assessment Note    Last Filed PACU Vitals:  Vitals Value Taken Time   Temp 97.8 °F (36.6 °C) 12/19/24 1515   Pulse 63 12/19/24 1519   /66 12/19/24 1517   Resp 18 12/19/24 1519   SpO2 95 % 12/19/24 1519   Vitals shown include unfiled device data.    Modified Shahzad:  Activity: 2 (12/19/2024  3:15 PM)  Respiration: 2 (12/19/2024  3:15 PM)  Circulation: 2 (12/19/2024  3:15 PM)  Consciousness: 1 (12/19/2024  3:15 PM)  Oxygen Saturation: 2 (12/19/2024  3:15 PM)  Modified Shahzad Score: 9 (12/19/2024  3:15 PM)

## 2024-12-19 NOTE — ANESTHESIA POSTPROCEDURE EVALUATION
Post-Op Assessment Note    CV Status:  Stable  Pain Score: 0    Pain management: adequate       Mental Status:  Alert and awake   Hydration Status:  Euvolemic   PONV Controlled:  Controlled   Airway Patency:  Patent  Two or more mitigation strategies used for obstructive sleep apnea   Post Op Vitals Reviewed: Yes    No anethesia notable event occurred.    Staff: CRNA           Last Filed PACU Vitals:  Vitals Value Taken Time   Temp     Pulse     BP     Resp     SpO2         Modified Shahzad:  No data recorded

## 2024-12-19 NOTE — H&P
HAND & UPPER EXTREMITY OFFICE VISIT   Referred By:  No referring provider defined for this encounter.      Chief Complaint:     Bilateral hand pain, numbness and tingling    Previous History:   Previously seen 7/17/2024 to review her [facility] ultrasound results of her carpal and cubital tunnel syndrome bilaterally.  She proceeded with bracing.  She also complained of small masses in her forearm which are very tender and ultrasound was ordered of those.    Interval History:  She returns today for ultrasound follow-up.  She states her symptoms are the same as they were last evaluation.  No significant improvement with bracing.    She is right-hand dominant but her symptoms are worse on the left side.    Past Medical History:  Past Medical History:   Diagnosis Date    Anxiety     Chest pain 08/12/2016    Chest wall pain 07/15/2016    Costochondritis 02/28/2014    Dog bite 07/29/2014    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(HCC)     Fluid retention     Gastroenteritis 05/06/2013    Herpes zoster 07/08/2013    History of chemotherapy 1986    Kidney stone     Migraine 01/19/2016    Shortness of breath 08/12/2016    Toxoplasmosis 08/28/2014     Past Surgical History:   Procedure Laterality Date    CARDIAC CATHETERIZATION Left 03/02/2023    Procedure: Cardiac Left Heart Cath - Dr Carter or Dr Mccurdy please;  Surgeon: Cici Mccurdy DO;  Location: [facility];  Service: Cardiology    CARDIAC CATHETERIZATION N/A 03/02/2023    Procedure: Cardiac Coronary Angiogram;  Surgeon: Cici Mccurdy DO;  Location: [facility];  Service: Cardiology    CARDIAC CATHETERIZATION  03/02/2023    Procedure: Cardiac catheterization;  Surgeon: Cici Mccurdy DO;  Location: [facility];  Service: Cardiology    CHOLECYSTECTOMY      COLONOSCOPY      ESOPHAGOGASTRODUODENOSCOPY      FL RETROGRADE PYELOGRAM  07/28/2022    HYSTERECTOMY  1986    NECK SURGERY      Lumpectomy    OOPHORECTOMY Right 1986    DC  CYSTO/URETERO W/LITHOTRIPSY &INDWELL STENT INSRT Left 07/28/2022    Procedure: CYSTOSCOPY URETEROSCOPY WITH LITHOTRIPSY HOLMIUM LASER, RETROGRADE PYELOGRAM AND INSERTION STENT LEFT URETERAL;  Surgeon: Fracisco Mc MD;  Location: [facility];  Service: Urology    SD ESOPHAGOGASTRODUODENOSCOPY TRANSORAL DIAGNOSTIC N/A 11/30/2018    Procedure: EGD;  Surgeon: Al Schwarz MD;  Location: [facility];  Service: Gastroenterology    SHOULDER ARTHROSCOPY Left     SKIN LESION EXCISION N/A 10/5/2023    Procedure: EXCISION OF FOREHEAD MASS WITH COMPLEX CLOSURE;  Surgeon: Angelito Grant MD;  Location:  MAIN OR;  Service: Plastics     Family History   Problem Relation Age of Onset    Diabetes Mother     Heart disease Mother     Heart disease Father     Arrhythmia Father         ICD placed    Other Sister         vertigo    Migraines Sister     Bipolar disorder Sister     Hearing loss Sister     No Known Problems Daughter     Thyroid disease Daughter         during pregnancy    Diabetes Maternal Grandmother     Heart disease Maternal Grandmother     No Known Problems Maternal Grandfather     Heart disease Paternal Grandmother     Breast cancer Maternal Aunt         age unknown    Parkinsonism Maternal Aunt     Colon cancer Neg Hx     Ovarian cancer Neg Hx      Social History     Socioeconomic History    Marital status: /Civil Union     Spouse name: Not on file    Number of children: Not on file    Years of education: Not on file    Highest education level: Not on file   Occupational History    Not on file   Tobacco Use    Smoking status: Never    Smokeless tobacco: Never   Vaping Use    Vaping status: Never Used   Substance and Sexual Activity    Alcohol use: Yes     Comment: Occasionally    Drug use: No    Sexual activity: Yes     Partners: Male     Birth control/protection: Post-menopausal   Other Topics Concern    Not on file   Social History Narrative    Not on file     Social Determinants of Health  "    Financial Resource Strain: Not on file   Food Insecurity: Not on file   Transportation Needs: Not on file   Physical Activity: Not on file   Stress: Not on file   Social Connections: Not on file   Intimate Partner Violence: Not on file   Housing Stability: Not on file     Scheduled Meds:  Continuous Infusions:No current facility-administered medications for this visit.    PRN Meds:.  Allergies   Allergen Reactions    Azithromycin GI Intolerance     Yeast infection    Imitrex [Sumatriptan] GI Intolerance    Cephalexin     Chlorhexidine Rash    Wound Dressings Rash     Adhesive tape       Physical Examination:    /88   Pulse 71   Ht 4' 11\" (1.499 m)   Wt 108 kg (239 lb)   LMP 03/18/1997 (Exact Date)   BMI 48.27 kg/m²     Gen: A&Ox3, NAD  Cardiac: regular rate  Chest: non labored breathing  Abdomen: Non-distended    Cervcial Spine: Negative Spurling's    Right Upper Extremity:  Skin CDI, no thenar atrophy  No obvious deformity of the shoulder, arm, elbow, forearm, wrist, hand  Swelling Negative  Mildly TTP thumb CMC  Positive pressure shear test  Non-tender first dorsal compartment  Sensation intact to light touch in the axillary median, ulnar, and radial nerve distributions  Composite fist with full opposition of thumb to the base of the small finger with slight pain   5 mm 2-point discrimination in all fingers  Positive Durkan's, positive Tinel's at the wrist,   positive elbow flexion compression test, negative Tinel's at the elbow  5/5 motor thumb abduction, thumb opposition, interosseous, EPL  2+RP        Left Upper Extremity:  Skin CDI  No obvious deformity of the shoulder, arm, elbow, forearm, wrist, hand  Small, palpable masses along the radial aspect of the forearm along the course of the radial sensory nerve. Significantly tender to light touch  with +Tinel's over each mass  Swelling Negative  Severe TTP thumb CMC, mildly over first dorsal compartment  Positive Finkelstein's  Positive pressure " shear test  Composite fist with full opposition of thumb to the base of the small finger with slight pain   Sensation intact to light touch in the axillary median, ulnar, and radial nerve distributions  5 mm 2-point discrimination in all fingers  Positive Durkan's, negative Tinel's at the wrist,   positive elbow flexion compression test, negative Tinel's at the elbow  5/5 motor thumb abduction, thumb opposition, interosseous, EPL  2+RP    Studies:  6/14/24: MS US bilateral wrists and elbows demonstrate evidence of bilateral carpal and cubital tunnel syndrome.     7/29/2024: Soft tissue ultrasound of the left upper extremity revealed small mildly echogenic foci consistent with small subcutaneous lipomas.      Assessment and Plan:  1. Carpal tunnel syndrome, bilateral  Case request operating room: RELEASE CARPAL TUNNEL - Left, RELEASE CUBITAL TUNNEL - left with possible anterior transposition, EXCISION BIOPSY TISSUE LESION/MASS Left forearm    Case request operating room: RELEASE CARPAL TUNNEL - Left, RELEASE CUBITAL TUNNEL - left with possible anterior transposition, EXCISION BIOPSY TISSUE LESION/MASS Left forearm      2. Cubital tunnel syndrome of both upper extremities  Case request operating room: RELEASE CARPAL TUNNEL - Left, RELEASE CUBITAL TUNNEL - left with possible anterior transposition, EXCISION BIOPSY TISSUE LESION/MASS Left forearm    Case request operating room: RELEASE CARPAL TUNNEL - Left, RELEASE CUBITAL TUNNEL - left with possible anterior transposition, EXCISION BIOPSY TISSUE LESION/MASS Left forearm      3. Forearm mass, left  Case request operating room: RELEASE CARPAL TUNNEL - Left, RELEASE CUBITAL TUNNEL - left with possible anterior transposition, EXCISION BIOPSY TISSUE LESION/MASS Left forearm    Case request operating room: RELEASE CARPAL TUNNEL - Left, RELEASE CUBITAL TUNNEL - left with possible anterior transposition, EXCISION BIOPSY TISSUE LESION/MASS Left forearm          59 y.o. female  presents in follow up for the above diagnosis.  Her symptoms are similar despite nonoperative management with bracing and activity modification.  She states that the symptoms are still significantly affecting her activities of daily living and quality of life.  After discussion of further treatment options including continued bracing, corticosteroid injections and surgery, she would like to proceed with surgical intervention for left carpal tunnel release, left cubital tunnel release and left forearm lipoma excisions.  We discussed the risks of surgery include but limited to infection, bleeding, pillar pain, wound healing complications, incomplete relief of symptoms, recurrence, stiffness, complex regional pain syndrome, anesthetic complications.  Patient understands these risks and wishes to proceed with surgery.  We discussed the expected perioperative and postoperative treatment course and outcomes.  All questions answered.    Left carpal tunnel release, left cubital tunnel release with possible anterior transposition, left forearm masses excision  Regional anesthesia with sedation    she expressed understanding of the plan and agreed. We encouraged them to contact our office with any questions or concerns.       Anselmo Lake MD  Hand and Upper Extremity Surgery      *This note was dictated using Dragon voice recognition software. Please excuse any word substitutions or errors.*

## 2024-12-19 NOTE — OP NOTE
OPERATIVE REPORT  PATIENT NAME: Arabella Her    :  1965  MRN: [id number]  Pt Location: [facility] ROOM 06    SURGERY DATE: 2024    Surgeons and Role:     * Anselmo Lake MD - Primary     * Marta Chan PA-C - Assisting    Preop Diagnosis:  Carpal tunnel syndrome, bilateral [G56.03]  Cubital tunnel syndrome of both upper extremities [G56.23]  Forearm mass, left [R22.32]    Post-Op Diagnosis Codes:     * Carpal tunnel syndrome, bilateral [G56.03]     * Cubital tunnel syndrome of both upper extremities [G56.23]     * Forearm mass, left [R22.32]    Procedure(s):  Left - RELEASE CARPAL TUNNEL   Left - RELEASE CUBITAL TUNNEL  Left - EXCISION BIOPSY TISSUE LESION/MASS Left forearm x2    Specimen(s):  ID Type Source Tests Collected by Time Destination   1 : Distal Forearm mass Tissue Soft Tissue, Other TISSUE EXAM Anselmo Lake MD 2024  2:16 PM    2 : Proximal Forearm mass Tissue Soft Tissue, Other TISSUE EXAM Anselmo Lake MD 2024  2:19 PM        Estimated Blood Loss:   Minimal    Drains:  Ureteral Internal Stent Left ureter 6 Fr. (Active)   Number of days: 875       Anesthesia Type:   Regional with Sedation    Operative Indications:  Carpal tunnel syndrome, bilateral [G56.03]  Cubital tunnel syndrome of both upper extremities [G56.23]  Forearm mass, left [R22.32]    59-year-old female with carpal tunnel and cubital tunnel syndrome bilaterally worse on the left side.  Also has 2 small forearm masses in the left side which are very painful and sensitive to touch.  Ultrasound examination is consistent with a lipoma.  After failure of appropriate nonoperative management and continued symptoms which are affecting her quality of life she elected to proceed with surgical intervention.    Operative Findings:  Compression of the ulnar nerve at Chacko's ligament  Thickened transverse carpal ligament  2 small masses on the radial aspect of the forearm approximately 8 cm and 12 cm  proximal to the radial styloid, measuring 6 mm x 7 mm each in diameter, consistent with a lipoma clinically.  The more distal mass was directly abutting the radial sensory nerve.      Complications:   None    Procedure and Technique:  On the day of surgery, I met the patient in the pre-operative area. The patient was identified by name and . Once again the risks benefits and alternatives of carpal tunnel release and cubital tunnel release were discussed with the patient. she elected to proceed with surgery.     Patient underwent an anesthetic block. Patient was brought to the OR. They underwent anesthetic sedation. The operative extremity was prepped and draped in a standard fashion, using betadine base prep due to her chlorhexidine allergy. A timeout was performed prior to incision identifying the name and laterality of the procedure. Preoperative antibiotics, 900 mg clindamycin, were administered.      The operative limb was exsanguinated with a Hemoclear tourniquet.    We started with a carpal tunnel release.  A 2 cm incision was carried out at the intersection of Mott's cardinal line and the radial aspect of the ring finger proximally towards the wrist flexion crease. Skin, subcutaneous tissue, and palmar fascia was incised. The transverse carpal ligament was identified. The distal extent was identified by palmar adipose tissue.    The release began at the distal extent and was carried out sharply through the operative field of view. We continued distally, carefully dissecting and incising the TCL until the palmar arch was identified.     We then used the blunt tipped scissors to dissect and isolate the proximal extent of the TCL and the antebrachial fascia, and performed a smooth release proximally. We directly visualized the intact median nerve at the proximal extent of the release. We checked our release proximally and distally and noted no further compressing fascial or ligamentous bands.     We then  turned our attention to the elbow. An 6 cm curvilinear incision was carried out just dorsal to and centered over the medial epicondyle. The subcutaneous tissue was carefully dissected to identify the medial antebrachial cutaneous nerve. This was carefully preserved. The ulnar nerve was then identified proximally at it's exit from the medial intermuscular septum into the posterior compartment.  The septum was then incised completely freeing the proximal aspect of the nerve. We then carefully dissected along the nerve distally, releasing the overlying fascia, including Chacko's ligament. We then moved distally, incising the overlying FCU fascia. The two heads of the FCU were gently split and the underlying deep fascia over the nerve was also released. Motor branches to the FCU were preserved. The point of maximal compression was at Chacko's ligament.     The elbow was flexed and extended through a full ROM and the nerve did not perch or dislocate anteriorly.        We then proceeded with the forearm mass excisions.  We palpated the 2 masses again on the radial aspect of the forearm.  The more distal 1 was approximately 8 cm proximal to the radial styloid tip.  The more proximal 1 was 12 cm proximal.  We made a small longitudinal incision over the distal 1 first, and dissected through subcutaneous tissue and a lipomatous appearing mass was identified 6 x 7 mm in diameter.  It was superficial to the forearm fascia.  It was directly abutting the radial sensory nerve.  We peeled it off the radial sensory nerve and sent it to pathology.  This was marginally excised with tenotomy scissors.    We then proceeded with the excision of the more proximal mass.  A longitudinal incision was made over this mass and dissected through subcutaneous tissue using tenotomy scissors.  The 6 x 7 mm diameter lipomatous appearing mass was then marginally excised using tenotomy scissors.  We took a second look and no further masses were  identified visually or palpably at the distal or proximal incision.    The tourniquet was released and hemostasis was achieved with bipolar electrocautery. We thoroughly irrigated the wounds.      The elbow incision was closed in a layered fashion, with 3-0 vicryl for the subcutaneous tissue and 4-0 nylon for the skin. We closed the hand incision with 4-0 nylon suture.      Sterile dressing was applied and sling.      Post operatively the patient will be allowed weight bearing on the operative extremity for light activities and elbow range of motion as tolerated. They will follow up as planned within 2 weeks.      I was present for the entire procedure., A qualified resident physician was not available., and A physician assistant was required during the procedure for retraction, tissue handling, dissection and suturing.    Patient Disposition:  PACU              SIGNATURE: Anselmo Lake MD  DATE: December 19, 2024  TIME: 3:26 PM

## 2024-12-19 NOTE — ANESTHESIA PROCEDURE NOTES
Peripheral Block    Patient location during procedure: holding area  Start time: 12/19/2024 12:37 PM  Reason for block: at surgeon's request and post-op pain management  Staffing  Performed by: Manjula Irvin MD  Authorized by: Manjula Irvin MD    Preanesthetic Checklist  Completed: patient identified, IV checked, site marked, risks and benefits discussed, surgical consent, monitors and equipment checked, pre-op evaluation and timeout performed  Peripheral Block  Patient position: supine  Prep: ChloraPrep  Patient monitoring: frequent blood pressure checks, continuous pulse oximetry and heart rate  Block type: Supraclavicular  Laterality: left  Injection technique: single-shot  Procedures: ultrasound guided, Ultrasound guidance required for the procedure to increase accuracy and safety of medication placement and decrease risk of complications.  Ultrasound permanent image saved  ropivacaine (NAROPIN) 0.5 % injection 20 mL - Perineural   30 mL - 12/19/2024 12:37:00 PM  dexamethasone (DECADRON) injection 4 mg - Perineural   4 mg - 12/19/2024 12:37:00 PM  midazolam (VERSED) injection 0.5 mg - Intravenous   2 mg - 12/19/2024 12:35:00 PM  Needle  Needle type: Stimuplex   Needle gauge: 20 G  Needle length: 4 in  Needle localization: anatomical landmarks and ultrasound guidance  Assessment  Injection assessment: incremental injection, frequent aspiration, injected with ease, negative aspiration, negative for heart rate change, no paresthesia on injection, no symptoms of intraneural/intravenous injection and needle tip visualized at all times  Paresthesia pain: none  Post-procedure:  site cleaned  patient tolerated the procedure well with no immediate complications

## 2024-12-19 NOTE — LETTER
[facility] ORTHOPEDIC [facility] OPERATING ROOM  [address] [address]  Dept: [phone]    December 19, 2024     Patient: Arabella Her   YOB: 1965   Date of Visit: 12/19/2024       To Whom it May Concern:    Arabella Her is under my professional care. She had surgery on 12/19/2024. She should remain out of work until her follow up visit in 2 weeks to recover from her procedure.    If you have any questions or concerns, please don't hesitate to call.         Sincerely,          Marta Chan PA-C

## 2024-12-19 NOTE — DISCHARGE INSTR - AVS FIRST PAGE
POST-OPERATIVE INSTRUCTIONS  CARPAL AND CUBITAL TUNNEL RELEASE WITH MASS EXCISION    You have just undergone a carpal and cubital tunnel release with mass excision by Dr. Lake in the operating room.  It is our wish that your postoperative recovery be as quick and comfortable as possible.  Please carefully review the following items that are important in your recovery.    Pain Control:  After any operation, a certain degree of pain is to be expected.  A prescription for narcotic pain medication as well as acetaminophen and/or ibuprofen has been electronically sent to your pharmacy. Do not exceed 3000 mg of Tylenol per day. This medication will relieve most of your pain but may not relieve all of the pain. Some pain is normal post operatively.     When you go home, please keep your operated arm elevated at all times (above the level of your heart.)  If you do this, your swelling will be diminished and your pain will be diminished as well.    Finally, it is generally expected that night time symptoms of pain and numbness should be improved within the first 1-2 weeks after surgery. It may take nine to twelve months for a full recovery of your carpal or cubital tunnel, such that the numbness in your fingertips will totally go away.     Dressing Care:  After surgery, make sure that your dressing is kept dry.  You can take a shower if you cover your arm with a plastic bag, such as a newspaper bag, and use tape or rubber bands. If your dressing gets wet you may take it off, place Band-Aids on the wound and re-cover it with sterile dressings which you can obtain at your local drug store.    Please remove your dressing down to the incision 5 days after your surgery. For example, if your had surgery on a Monday, do this on Saturday.  If your surgery was on Thursday, do this on Tuesday.   If your dressing gets wet prior to removing it, please take if off and place something clean, or bandaids, on the wound.    Weight  Bearing:  You should NOT bear weight >5lbs through your operative extremity. Do not push off using your operative extremity.     It is ok to move your fingers as tolerated to prevent stiffness. You may also use your operative hand to assist with light activities of daily living such as putting on clothes, brushing your teeth and eating as tolerated    Please work on these finger range of motions exercises between now and you follow up visit:           Follow Up:  If you don't already have a scheduled postoperative appointment, please call our office for a follow-up appointment. It is best to call the day after surgery to make an appointment in 10-14 days.  At your first postoperative visit, you will be seen by either Dr. Lake, his PA; or one of their associates. The sutures will be removed and you may be asked to see a hand therapist to optimize your functional result. Each of the hand therapists that you will be referred to have received special training in the care of the hand and upper extremity.    When to Call:  It is normal to see minor staining on the hand surgery dressing after surgery. If there is significant bleeding, you are advised to call the office during regular office hours to have this checked.     If you feel that you have a surgical emergency postoperatively that requires immediate attention, please call [phone]. In addition, any emergency can also be handled by the emergency room.      If you have questions regarding your surgery postop that you feel requires attention, please call the office.   If this occurs after our regular office hours, there is a message with instructions to talk to the physician on call.

## 2024-12-24 PROCEDURE — 88304 TISSUE EXAM BY PATHOLOGIST: CPT | Performed by: PATHOLOGY

## 2024-12-31 ENCOUNTER — OFFICE VISIT (OUTPATIENT)
Dept: OBGYN CLINIC | Facility: MEDICAL CENTER | Age: 59
End: 2024-12-31

## 2024-12-31 VITALS — HEIGHT: 59 IN | WEIGHT: 243 LBS | BODY MASS INDEX: 48.99 KG/M2

## 2024-12-31 DIAGNOSIS — Z98.890 S/P CUBITAL TUNNEL RELEASE: ICD-10-CM

## 2024-12-31 DIAGNOSIS — Z98.890 S/P CARPAL TUNNEL RELEASE: Primary | ICD-10-CM

## 2024-12-31 PROCEDURE — 99024 POSTOP FOLLOW-UP VISIT: CPT | Performed by: ORTHOPAEDIC SURGERY

## 2024-12-31 NOTE — PROGRESS NOTES
HAND & UPPER EXTREMITY OFFICE VISIT   Referred By:  No referring provider defined for this encounter.      Chief Complaint:     Post op visit    Surgery:  Surgery Date: 12/19/2024 - RELEASE CARPAL TUNNEL - Left - Left, Release Cubital Tunnel - Left, and EXCISION BIOPSY TISSUE LESION/MASS Left forearm - Left     History of Present Illness:   Patient presents now 12 days status post the above surgery. she reports continued numbness along the radial aspect of the forearm and into the thumb. She denies any numbness or tingling in the other fingers. The hand still feels stiff and tight. She has been working on her exercises and using the hand for light activities as tolerated.    Past Medical History:  Past Medical History:   Diagnosis Date    Anxiety     Chest pain 08/12/2016    Chest wall pain 07/15/2016    Costochondritis 02/28/2014    Dog bite 07/29/2014    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(HCC)     Fluid retention     Gastroenteritis 05/06/2013    Herpes zoster 07/08/2013    History of chemotherapy 1986    Kidney stone     Migraine 01/19/2016    Shortness of breath 08/12/2016    Toxoplasmosis 08/28/2014     Past Surgical History:   Procedure Laterality Date    CARDIAC CATHETERIZATION Left 03/02/2023    Procedure: Cardiac Left Heart Cath - Dr Carter or Dr Mccurdy please;  Surgeon: Cici Mccurdy DO;  [facility];  Service: Cardiology    CARDIAC CATHETERIZATION N/A 03/02/2023    Procedure: Cardiac Coronary Angiogram;  Surgeon: Cici Mccurdy DO;  [facility];  Service: Cardiology    CARDIAC CATHETERIZATION  03/02/2023    Procedure: Cardiac catheterization;  Surgeon: Cici Mccurdy DO;  [facility];  Service: Cardiology    CHOLECYSTECTOMY      COLONOSCOPY      ESOPHAGOGASTRODUODENOSCOPY      FL RETROGRADE PYELOGRAM  07/28/2022    HYSTERECTOMY  1986    NECK SURGERY      Lumpectomy    OOPHORECTOMY Right 1986    [place] CYSTO/URETERO W/LITHOTRIPSY &INDWELL  STENT INSRT Left 07/28/2022    Procedure: CYSTOSCOPY URETEROSCOPY WITH LITHOTRIPSY HOLMIUM LASER, RETROGRADE PYELOGRAM AND INSERTION STENT LEFT URETERAL;  Surgeon: Fracisco Mc MD;  [facility];  Service: Urology    OK ESOPHAGOGASTRODUODENOSCOPY TRANSORAL DIAGNOSTIC N/A 11/30/2018    Procedure: EGD;  Surgeon: Al Schwarz MD;  [facility]: [facility];  Service: Gastroenterology    OK EXC TUMOR SOFT TISSUE FOREARM &/WRIST SUBQ <3CM Left 12/19/2024    Procedure: EXCISION BIOPSY TISSUE LESION/MASS Left forearm;  Surgeon: Anselmo Lake MD;  [facility];  Service: Orthopedics    OK NEUROPLASTY &/TRANSPOS MEDIAN NRV CARPAL TUNNE Left 12/19/2024    Procedure: RELEASE CARPAL TUNNEL - Left;  Surgeon: Anselmo Lake MD;  [facility];  Service: Orthopedics    OK NEUROPLASTY &/TRANSPOSITION ULNAR NERVE ELBOW Left 12/19/2024    Procedure: RELEASE CUBITAL TUNNEL;  Surgeon: Anselmo Lake MD;  [facility];  Service: Orthopedics    SHOULDER ARTHROSCOPY Left     SKIN LESION EXCISION N/A 10/5/2023    Procedure: EXCISION OF FOREHEAD MASS WITH COMPLEX CLOSURE;  Surgeon: Angelito Grant MD;  Location:  MAIN OR;  Service: Plastics     Family History   Problem Relation Age of Onset    Diabetes Mother     Heart disease Mother     Heart disease Father     Arrhythmia Father         ICD placed    Other Sister         vertigo    Migraines Sister     Bipolar disorder Sister     Hearing loss Sister     No Known Problems Daughter     Thyroid disease Daughter         during pregnancy    Diabetes Maternal Grandmother     Heart disease Maternal Grandmother     No Known Problems Maternal Grandfather     Heart disease Paternal Grandmother     Breast cancer Maternal Aunt         age unknown    Parkinsonism Maternal Aunt     Colon cancer Neg Hx     Ovarian cancer Neg Hx      Social History     Socioeconomic History    Marital status: /Civil Union     Spouse name: Not on file     "Number of children: Not on file    Years of education: Not on file    Highest education level: Not on file   Occupational History    Not on file   Tobacco Use    Smoking status: Never    Smokeless tobacco: Never   Vaping Use    Vaping status: Never Used   Substance and Sexual Activity    Alcohol use: Yes     Comment: Occasionally 1-2 month    Drug use: No    Sexual activity: Yes     Partners: Male     Birth control/protection: Post-menopausal   Other Topics Concern    Not on file   Social History Narrative    Not on file     Social Drivers of Health     Financial Resource Strain: Not on file   Food Insecurity: Not on file   Transportation Needs: Not on file   Physical Activity: Not on file   Stress: Not on file   Social Connections: Not on file   Intimate Partner Violence: Not on file   Housing Stability: Not on file     Scheduled Meds:  Continuous Infusions:No current facility-administered medications for this visit.    PRN Meds:.  Allergies   Allergen Reactions    Azithromycin GI Intolerance     Yeast infection    Imitrex [Sumatriptan] GI Intolerance    Cephalexin     Chlorhexidine Rash    Wound Dressings Rash     Adhesive tape       Physical Examination:    Ht 4' 11\" (1.499 m)   Wt 110 kg (243 lb)   LMP 03/18/1997 (Exact Date)   BMI 49.08 kg/m²     Gen: A&Ox3, NAD    Left Upper Extremity:  Incision healing well without signs of infection   Sutures intact, removed  Mild swelling and erythema around incision sites consistent with suture irritation. No drainage or fluctuance concerning for infection  Positive swelling and bruising of volar wrist, proximal to palmar incision site  Hypersensitivity to light touch along radial sensory nerve over radial forearm and dorsal thumb. Sensation intact to light touch in the axillary median, ulnar nerve distributions  Able to form loose composite fist  Warm, well-perfused digits  Cap refill <2s      Studies:  12/19/24: Intraoperative pathology report demonstrates:   A. Soft " tissue mass (distal forearm):  - Benign mature fibroadipose tissue consistent with lipoma      B. Soft tissue mass (proximal forearm):  - Angiolipoma     Assessment and Plan:  1. S/P carpal tunnel release        2. S/P cubital tunnel release            59 y.o. female presents 12 days status post RELEASE CARPAL TUNNEL - Left - Left, Release Cubital Tunnel - Left, and EXCISION BIOPSY TISSUE LESION/MASS Left forearm - Left. Pathology report reviewed and discussed with the patient. Sutures removed in the office today. she may continue regular hygiene and apply lotions/oils and perform scar massage as needed. she may participate in all activities as tolerated without further restrictions. Encouraged patient to continue working on ROM exercises as tolerated.     For her numbness in the wrist and thumb, we discussed that she likely has some continued irritation of the radial sensory nerve from her mass excision and dissection.  She was having issues with numbness and sensitivity prior to surgery and it has NOT worsened postoperatively.  We will continue to monitor progression of her symptoms and sensitivity over the wrist.        It is recommended she return to the office in 4 weeks, or sooner should symptoms worsen.    she expressed understanding of the plan and agreed. We encouraged them to contact our office with any questions or concerns.         Anselmo Lake MD  Hand and Upper Extremity Surgery          *This note was dictated using Dragon voice recognition software. Please excuse any word substitutions or errors.*      Scribe Attestation      I,:  Marta Chan PA-C am acting as a scribe while in the presence of the attending physician.:       I,:  Anselmo Lake MD personally performed the services described in this documentation    as scribed in my presence.:

## 2025-01-10 ENCOUNTER — TELEPHONE (OUTPATIENT)
Age: 60
End: 2025-01-10

## 2025-01-10 NOTE — TELEPHONE ENCOUNTER
Caller: Patient    Doctor/Office: Jaya Rich    #: [phone]    Work note: return to work on 1/17/25    Fax #: Attn: Roma Bello at [phone]    Is there any restrictions that need to be updated? If so, what?     Patient needs new updated work note to return to work, she needs to know if there are any restrictions she needs to follow.  Her next appointment is on 1/28/25.   Please advise advise.

## 2025-01-28 ENCOUNTER — OFFICE VISIT (OUTPATIENT)
Dept: OBGYN CLINIC | Facility: MEDICAL CENTER | Age: 60
End: 2025-01-28

## 2025-01-28 VITALS — HEIGHT: 59 IN | WEIGHT: 246 LBS | BODY MASS INDEX: 49.59 KG/M2

## 2025-01-28 DIAGNOSIS — Z98.890 S/P CARPAL TUNNEL RELEASE: Primary | ICD-10-CM

## 2025-01-28 DIAGNOSIS — R22.32 FOREARM MASS, LEFT: ICD-10-CM

## 2025-01-28 DIAGNOSIS — Z98.890 S/P CUBITAL TUNNEL RELEASE: ICD-10-CM

## 2025-01-28 PROCEDURE — 99024 POSTOP FOLLOW-UP VISIT: CPT | Performed by: ORTHOPAEDIC SURGERY

## 2025-01-28 NOTE — PROGRESS NOTES
HAND & UPPER EXTREMITY OFFICE VISIT   Referred By:  No referring provider defined for this encounter.      Chief Complaint:     Post op visit    Surgery:  Surgery Date: 12/19/2024 - RELEASE CARPAL TUNNEL - Left - Left, Release Cubital Tunnel - Left, and EXCISION BIOPSY TISSUE LESION/MASS Left forearm - Left     History of Present Illness:   Patient presents now 40 days status post the above surgery. she reports her hand is painful today from her arthritis as well as the cold weather.    She states overall she is doing well.  She is back to work with no issues    She has some continued numbness on the radial side of her thumb, like when your hand falls asleep. Her medial elbow is  and there is some numbness just distal to her medial elbow incision as well.  Her palmar and volar forearm incisions are red and she is not sure this is normal.      Past Medical History:  Past Medical History:   Diagnosis Date    Anxiety     Chest pain 08/12/2016    Chest wall pain 07/15/2016    Costochondritis 02/28/2014    Dog bite 07/29/2014    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(HCC)     Fluid retention     Gastroenteritis 05/06/2013    Herpes zoster 07/08/2013    History of chemotherapy 1986    Kidney stone     Migraine 01/19/2016    Shortness of breath 08/12/2016    Toxoplasmosis 08/28/2014     Past Surgical History:   Procedure Laterality Date    CARDIAC CATHETERIZATION Left 03/02/2023    Procedure: Cardiac Left Heart Cath - Dr Carter or Dr Mccurdy please;  Surgeon: Cici Mccurdy DO;  [facility];  Service: Cardiology    CARDIAC CATHETERIZATION N/A 03/02/2023    Procedure: Cardiac Coronary Angiogram;  Surgeon: Cici Mccurdy DO;  [facility];  Service: Cardiology    CARDIAC CATHETERIZATION  03/02/2023    Procedure: Cardiac catheterization;  Surgeon: Cici Mccurdy DO;  [facility];  Service: Cardiology    CHOLECYSTECTOMY      COLONOSCOPY       ESOPHAGOGASTRODUODENOSCOPY      FL RETROGRADE PYELOGRAM  07/28/2022    HYSTERECTOMY  1986    NECK SURGERY      Lumpectomy    OOPHORECTOMY Right 1986    [place] CYSTO/URETERO W/LITHOTRIPSY &INDWELL STENT INSRT Left 07/28/2022    Procedure: CYSTOSCOPY URETEROSCOPY WITH LITHOTRIPSY HOLMIUM LASER, RETROGRADE PYELOGRAM AND INSERTION STENT LEFT URETERAL;  Surgeon: Fracisco Mc MD;  [facility];  Service: Urology    PA ESOPHAGOGASTRODUODENOSCOPY TRANSORAL DIAGNOSTIC N/A 11/30/2018    Procedure: EGD;  Surgeon: Al Schwarz MD;  [facility];  Service: Gastroenterology    PA EXC TUMOR SOFT TISSUE FOREARM &/WRIST SUBQ <3CM Left 12/19/2024    Procedure: EXCISION BIOPSY TISSUE LESION/MASS Left forearm;  Surgeon: Anselmo Lake MD;  [facility];  Service: Orthopedics    PA NEUROPLASTY &/TRANSPOS MEDIAN NRV CARPAL TUNNE Left 12/19/2024    Procedure: RELEASE CARPAL TUNNEL - Left;  Surgeon: Anselmo Lake MD;  [facility];  Service: Orthopedics    PA NEUROPLASTY &/TRANSPOSITION ULNAR NERVE ELBOW Left 12/19/2024    Procedure: RELEASE CUBITAL TUNNEL;  Surgeon: Anselmo Lake MD;  [facility];  Service: Orthopedics    SHOULDER ARTHROSCOPY Left     SKIN LESION EXCISION N/A 10/5/2023    Procedure: EXCISION OF FOREHEAD MASS WITH COMPLEX CLOSURE;  Surgeon: Angelito Grant MD;  Location:  [facility];  Service: Plastics     Family History   Problem Relation Age of Onset    Diabetes Mother     Heart disease Mother     Heart disease Father     Arrhythmia Father         ICD placed    Other Sister         vertigo    Migraines Sister     Bipolar disorder Sister     Hearing loss Sister     No Known Problems Daughter     Thyroid disease Daughter         during pregnancy    Diabetes Maternal Grandmother     Heart disease Maternal Grandmother     No Known Problems Maternal Grandfather     Heart disease Paternal Grandmother     Breast cancer Maternal Aunt         age unknown     "Parkinsonism Maternal Aunt     Colon cancer Neg Hx     Ovarian cancer Neg Hx      Social History     Socioeconomic History    Marital status: /Civil Union     Spouse name: Not on file    Number of children: Not on file    Years of education: Not on file    Highest education level: Not on file   Occupational History    Not on file   Tobacco Use    Smoking status: Never    Smokeless tobacco: Never   Vaping Use    Vaping status: Never Used   Substance and Sexual Activity    Alcohol use: Yes     Comment: Occasionally 1-2 month    Drug use: No    Sexual activity: Yes     Partners: Male     Birth control/protection: Post-menopausal   Other Topics Concern    Not on file   Social History Narrative    Not on file     Social Drivers of Health     Financial Resource Strain: Not on file   Food Insecurity: Not on file   Transportation Needs: Not on file   Physical Activity: Not on file   Stress: Not on file   Social Connections: Not on file   Intimate Partner Violence: Not on file   Housing Stability: Not on file     Scheduled Meds:  Continuous Infusions:No current facility-administered medications for this visit.    PRN Meds:.  Allergies   Allergen Reactions    Azithromycin GI Intolerance     Yeast infection    Imitrex [Sumatriptan] GI Intolerance    Cephalexin     Chlorhexidine Rash    Wound Dressings Rash     Adhesive tape       Physical Examination:    Ht 4' 11\" (1.499 m)   Wt 112 kg (246 lb)   LMP 03/18/1997 (Exact Date)   BMI 49.69 kg/m²     Gen: A&Ox3, NAD    Left Upper Extremity:  -Incision healing well without signs of infection   -Mild swelling and erythema around incision sites consistent with hyper vascularization and normal healing process  -Paresthesias in the [facility] and radial thumb branch of radial sensory nerve, but sensation is present to light touch.   -Otherwise sensation intact to light touch in the axillary median, ulnar nerve distributions  -Able to form composite fist, full elbow ROM  -Warm, " well-perfused digits  -Cap refill <2s      Studies:  12/19/24: Intraoperative pathology report demonstrates:   A. Soft tissue mass (distal forearm):  - Benign mature fibroadipose tissue consistent with lipoma      B. Soft tissue mass (proximal forearm):  - Angiolipoma     Assessment and Plan:  1. S/P carpal tunnel release        2. S/P cubital tunnel release        3. Forearm mass, left              59 y.o. female presents 40 days status post RELEASE CARPAL TUNNEL - Left - Left, Release Cubital Tunnel - Left, and EXCISION BIOPSY TISSUE LESION/MASS Left forearm - Left.   We discussed her radial thumb and medial elbow parestheisas are likely due to nerve irritation from moving those nerves during surgery. She does have some sensation in those locations so I am hopeful this should all resolve over time. The pre op carpal and cubital tunnel sxs have resolved and she is happy and back to work.     The redness at her incision sites appears to be normal healing without signs of infection. This should fade with time.     She should continue massage of her scars at her palm and medial elbow to reduce pain as well a break up scar formation.  She will wait for any surgery discussion on her Right hand for later in the year     It is recommended she return to the office in 6 weeks, or sooner should symptoms worsen.    she expressed understanding of the plan and agreed. We encouraged them to contact our office with any questions or concerns.         Anselmo Lake MD  Hand and Upper Extremity Surgery          *This note was dictated using Dragon voice recognition software. Please excuse any word substitutions or errors.*      Scribe Attestation      I,:  Tommy Carrillo am acting as a scribe while in the presence of the attending physician.:       I,:  Anselmo Lake MD personally performed the services described in this documentation    as scribed in my presence.:

## 2025-02-05 ENCOUNTER — OFFICE VISIT (OUTPATIENT)
Dept: FAMILY MEDICINE CLINIC | Facility: CLINIC | Age: 60
End: 2025-02-05
Payer: COMMERCIAL

## 2025-02-05 VITALS
SYSTOLIC BLOOD PRESSURE: 140 MMHG | TEMPERATURE: 98.7 F | HEART RATE: 82 BPM | WEIGHT: 243.4 LBS | BODY MASS INDEX: 47.78 KG/M2 | HEIGHT: 60 IN | OXYGEN SATURATION: 97 % | DIASTOLIC BLOOD PRESSURE: 90 MMHG

## 2025-02-05 DIAGNOSIS — G43.909 MIGRAINE WITHOUT STATUS MIGRAINOSUS, NOT INTRACTABLE, UNSPECIFIED MIGRAINE TYPE: ICD-10-CM

## 2025-02-05 DIAGNOSIS — J01.90 ACUTE SINUSITIS, RECURRENCE NOT SPECIFIED, UNSPECIFIED LOCATION: Primary | ICD-10-CM

## 2025-02-05 LAB
SARS-COV-2 AG UPPER RESP QL IA: NEGATIVE
VALID CONTROL: NORMAL

## 2025-02-05 PROCEDURE — 87811 SARS-COV-2 COVID19 W/OPTIC: CPT | Performed by: NURSE PRACTITIONER

## 2025-02-05 PROCEDURE — 99213 OFFICE O/P EST LOW 20 MIN: CPT | Performed by: NURSE PRACTITIONER

## 2025-02-05 RX ORDER — DOXYCYCLINE 100 MG/1
100 CAPSULE ORAL EVERY 12 HOURS SCHEDULED
Qty: 10 CAPSULE | Refills: 0 | Status: SHIPPED | OUTPATIENT
Start: 2025-02-05 | End: 2025-02-10

## 2025-02-05 RX ORDER — RIZATRIPTAN BENZOATE 10 MG/1
10 TABLET ORAL AS NEEDED
Qty: 9 TABLET | Refills: 5 | Status: SHIPPED | OUTPATIENT
Start: 2025-02-05

## 2025-02-05 NOTE — ASSESSMENT & PLAN NOTE
Orders:    rizatriptan (MAXALT) 10 mg tablet; Take 1 tablet (10 mg total) by mouth as needed for migraine Take at the onset of migraine; if symptoms continue or return, may take another dose at least 2 hours after first dose. Take no more than 2 doses in a day.

## 2025-02-05 NOTE — LETTER
February 5, 2025     Patient: Arabella Her  YOB: 1965  Date of Visit: 2/5/2025      To Whom it May Concern:    Arabella Her is under my professional care. Arabella was seen in my office on 2/5/2025. Please excuse from work 2/3 through 2/5/2025. Arabella may return to work on 2/6/2025 .    If you have any questions or concerns, please don't hesitate to call.         Sincerely,          PASHA Delarosa        CC: No Recipients

## 2025-02-07 ENCOUNTER — TELEPHONE (OUTPATIENT)
Age: 60
End: 2025-02-07

## 2025-02-07 NOTE — TELEPHONE ENCOUNTER
Spoke with pt and she stated she is still sick. Would like letter extended for her to return to [facility] on Mon 2/10/24. Ok to write?

## 2025-02-07 NOTE — TELEPHONE ENCOUNTER
Patient would like the provider to change her work note to read 2/3 thru 2/7 as she is still sick. She will come in to get the note as soon as she sees it in her MyChart.

## 2025-02-19 ENCOUNTER — OFFICE VISIT (OUTPATIENT)
Dept: FAMILY MEDICINE CLINIC | Facility: CLINIC | Age: 60
End: 2025-02-19
Payer: COMMERCIAL

## 2025-02-19 VITALS
HEIGHT: 59 IN | SYSTOLIC BLOOD PRESSURE: 132 MMHG | OXYGEN SATURATION: 98 % | WEIGHT: 243.6 LBS | TEMPERATURE: 97.6 F | BODY MASS INDEX: 49.11 KG/M2 | DIASTOLIC BLOOD PRESSURE: 84 MMHG | HEART RATE: 95 BPM

## 2025-02-19 DIAGNOSIS — H93.8X2 SENSATION OF PLUGGED EAR ON LEFT SIDE: Primary | ICD-10-CM

## 2025-02-19 PROCEDURE — 99213 OFFICE O/P EST LOW 20 MIN: CPT | Performed by: NURSE PRACTITIONER

## 2025-02-19 NOTE — PROGRESS NOTES
"Name: Arabella Her      : 1965      MRN: [id number]  Encounter Provider: PASHA Delarosa  Encounter Date: 2025   Encounter department: [facility]  :  Assessment & Plan  Sensation of plugged ear on left side  Rubber hearing aid dome removed from left ear canal without issue  Tolerated well; no pain, reported relief of fullness sensation and improved hearing  Advised to follow-up with audiologist for hearing aid repair              History of Present Illness   Acute visit  Presents today with sensation of ear fullness.  Reports removing her hearing aids yesterday, and the plastic dome was not attached.  She searched her floor, and assumes it is likely still stuck in her ear.  She has a fullness but denies pain.  Some muffled hearing      Review of Systems   HENT:          Sensation of ear fullness as in HPI       Objective   /84 (BP Location: Left arm, Patient Position: Sitting, Cuff Size: Large)   Pulse 95   Temp 97.6 °F (36.4 °C) (Temporal)   Ht 4' 11\" (1.499 m)   Wt 110 kg (243 lb 9.6 oz)   LMP 1997 (Exact Date)   SpO2 98%   BMI 49.20 kg/m²      Physical Exam  Vitals and nursing note reviewed.   Constitutional:       General: She is not in acute distress.     Appearance: Normal appearance. She is well-developed. She is not ill-appearing.   HENT:      Ears:      Comments: Rubber dome removed from left ear canal with tweezers.  Dome intact  Tolerated well without issue-no pain, redness, bleeding or inflammation  Pulmonary:      Effort: Pulmonary effort is normal. No respiratory distress.   Neurological:      Mental Status: She is alert and oriented to person, place, and time.   Psychiatric:         Attention and Perception: Attention normal.         Mood and Affect: Mood normal.         Behavior: Behavior normal.         "

## 2025-05-09 DIAGNOSIS — R07.9 CHEST PAIN, UNSPECIFIED TYPE: ICD-10-CM

## 2025-05-09 DIAGNOSIS — R06.02 SOB (SHORTNESS OF BREATH) ON EXERTION: ICD-10-CM

## 2025-05-09 RX ORDER — AMLODIPINE BESYLATE 5 MG/1
5 TABLET ORAL DAILY
Qty: 90 TABLET | Refills: 3 | Status: SHIPPED | OUTPATIENT
Start: 2025-05-09

## 2025-05-15 DIAGNOSIS — R93.89 ABNORMAL COMPUTED TOMOGRAPHY ANGIOGRAPHY (CTA): ICD-10-CM

## 2025-05-15 DIAGNOSIS — I20.89 STABLE ANGINA PECTORIS (HCC): ICD-10-CM

## 2025-05-16 RX ORDER — METOPROLOL TARTRATE 25 MG/1
25 TABLET, FILM COATED ORAL EVERY 12 HOURS SCHEDULED
Qty: 180 TABLET | Refills: 1 | Status: SHIPPED | OUTPATIENT
Start: 2025-05-16

## 2025-06-03 ENCOUNTER — RA CDI HCC (OUTPATIENT)
Dept: OTHER | Facility: HOSPITAL | Age: 60
End: 2025-06-03

## 2025-06-12 ENCOUNTER — OFFICE VISIT (OUTPATIENT)
Dept: FAMILY MEDICINE CLINIC | Facility: CLINIC | Age: 60
End: 2025-06-12
Payer: COMMERCIAL

## 2025-06-12 VITALS
TEMPERATURE: 98.1 F | OXYGEN SATURATION: 98 % | HEIGHT: 59 IN | SYSTOLIC BLOOD PRESSURE: 152 MMHG | BODY MASS INDEX: 49.76 KG/M2 | WEIGHT: 246.8 LBS | DIASTOLIC BLOOD PRESSURE: 94 MMHG | HEART RATE: 84 BPM

## 2025-06-12 DIAGNOSIS — K62.5 BRBPR (BRIGHT RED BLOOD PER RECTUM): ICD-10-CM

## 2025-06-12 DIAGNOSIS — R19.7 DIARRHEA, UNSPECIFIED TYPE: ICD-10-CM

## 2025-06-12 DIAGNOSIS — Z13.1 SCREENING FOR DIABETES MELLITUS: ICD-10-CM

## 2025-06-12 DIAGNOSIS — E03.8 SUBCLINICAL HYPOTHYROIDISM: ICD-10-CM

## 2025-06-12 DIAGNOSIS — I25.10 CORONARY ARTERY DISEASE INVOLVING NATIVE HEART WITHOUT ANGINA PECTORIS, UNSPECIFIED VESSEL OR LESION TYPE: ICD-10-CM

## 2025-06-12 DIAGNOSIS — Z00.00 ANNUAL PHYSICAL EXAM: Primary | ICD-10-CM

## 2025-06-12 DIAGNOSIS — I25.10 CORONARY ARTERY DISEASE DUE TO LIPID RICH PLAQUE: ICD-10-CM

## 2025-06-12 DIAGNOSIS — G43.909 MIGRAINE WITHOUT STATUS MIGRAINOSUS, NOT INTRACTABLE, UNSPECIFIED MIGRAINE TYPE: ICD-10-CM

## 2025-06-12 DIAGNOSIS — Z13.0 SCREENING, ANEMIA, DEFICIENCY, IRON: ICD-10-CM

## 2025-06-12 DIAGNOSIS — F41.1 GENERALIZED ANXIETY DISORDER: ICD-10-CM

## 2025-06-12 DIAGNOSIS — I25.83 CORONARY ARTERY DISEASE DUE TO LIPID RICH PLAQUE: ICD-10-CM

## 2025-06-12 DIAGNOSIS — Z12.11 SCREEN FOR COLON CANCER: ICD-10-CM

## 2025-06-12 DIAGNOSIS — R07.89 SENSATION OF CHEST TIGHTNESS: ICD-10-CM

## 2025-06-12 DIAGNOSIS — Z23 ENCOUNTER FOR IMMUNIZATION: ICD-10-CM

## 2025-06-12 DIAGNOSIS — Z12.31 ENCOUNTER FOR SCREENING MAMMOGRAM FOR BREAST CANCER: ICD-10-CM

## 2025-06-12 PROCEDURE — 90471 IMMUNIZATION ADMIN: CPT

## 2025-06-12 PROCEDURE — 99214 OFFICE O/P EST MOD 30 MIN: CPT | Performed by: NURSE PRACTITIONER

## 2025-06-12 PROCEDURE — 90715 TDAP VACCINE 7 YRS/> IM: CPT

## 2025-06-12 PROCEDURE — 93000 ELECTROCARDIOGRAM COMPLETE: CPT | Performed by: NURSE PRACTITIONER

## 2025-06-12 PROCEDURE — 99396 PREV VISIT EST AGE 40-64: CPT | Performed by: NURSE PRACTITIONER

## 2025-06-12 NOTE — PATIENT INSTRUCTIONS
"Patient Education     Routine physical for adults   The Basics   Written by the doctors and editors at [facility]   What is a physical? -- A physical is a routine visit, or \"check-up,\" with your doctor. You might also hear it called a \"wellness visit\" or \"preventive visit.\"  During each visit, the doctor will:   Ask about your physical and mental health   Ask about your habits, behaviors, and lifestyle   Do an exam   Give you vaccines if needed   Talk to you about any medicines you take   Give advice about your health   Answer your questions  Getting regular check-ups is an important part of taking care of your health. It can help your doctor find and treat any problems you have. But it's also important for preventing health problems.  A routine physical is different from a \"sick visit.\" A sick visit is when you see a doctor because of a health concern or problem. Since physicals are scheduled ahead of time, you can think about what you want to ask the doctor.  How often should I get a physical? -- It depends on your age and health. In general, for people age 21 years and older:   If you are younger than 50 years, you might be able to get a physical every 3 years.   If you are 50 years or older, your doctor might recommend a physical every year.  If you have an ongoing health condition, like diabetes or high blood pressure, your doctor will probably want to see you more often.  What happens during a physical? -- In general, each visit will include:   Physical exam - The doctor or nurse will check your height, weight, heart rate, and blood pressure. They will also look at your eyes and ears. They will ask about how you are feeling and whether you have any symptoms that bother you.   Medicines - It's a good idea to bring a list of all the medicines you take to each doctor visit. Your doctor will talk to you about your medicines and answer any questions. Tell them if you are having any side effects that bother you. You " "should also tell them if you are having trouble paying for any of your medicines.   Habits and behaviors - This includes:   Your diet   Your exercise habits   Whether you smoke, drink alcohol, or use drugs   Whether you are sexually active   Whether you feel safe at home  Your doctor will talk to you about things you can do to improve your health and lower your risk of health problems. They will also offer help and support. For example, if you want to quit smoking, they can give you advice and might prescribe medicines. If you want to improve your diet or get more physical activity, they can help you with this, too.   Lab tests, if needed - The tests you get will depend on your age and situation. For example, your doctor might want to check your:   Cholesterol   Blood sugar   Iron level   Vaccines - The recommended vaccines will depend on your age, health, and what vaccines you already had. Vaccines are very important because they can prevent certain serious or deadly infections.   Discussion of screening - \"Screening\" means checking for diseases or other health problems before they cause symptoms. Your doctor can recommend screening based on your age, risk, and preferences. This might include tests to check for:   Cancer, such as breast, prostate, cervical, ovarian, colorectal, prostate, lung, or skin cancer   Sexually transmitted infections, such as chlamydia and gonorrhea   Mental health conditions like depression and anxiety  Your doctor will talk to you about the different types of screening tests. They can help you decide which screenings to have. They can also explain what the results might mean.   Answering questions - The physical is a good time to ask the doctor or nurse questions about your health. If needed, they can refer you to other doctors or specialists, too.  Adults older than 65 years often need other care, too. As you get older, your doctor will talk to you about:   How to prevent falling at " home   Hearing or vision tests   Memory testing   How to take your medicines safely   Making sure that you have the help and support you need at home  All topics are updated as new evidence becomes available and our peer review process is complete.  This topic retrieved from [employer] on: May 02, 2024.  Topic 995014 Version 1.0  Release: 32.4.3 - C32.122  © 2024 UpToDate, Inc. and/or its affiliates. All rights reserved.  Consumer Information Use and Disclaimer   Disclaimer: This generalized information is a limited summary of diagnosis, treatment, and/or medication information. It is not meant to be comprehensive and should be used as a tool to help the user understand and/or assess potential diagnostic and treatment options. It does NOT include all information about conditions, treatments, medications, side effects, or risks that may apply to a specific patient. It is not intended to be medical advice or a substitute for the medical advice, diagnosis, or treatment of a health care provider based on the health care provider's examination and assessment of a patient's specific and unique circumstances. Patients must speak with a health care provider for complete information about their health, medical questions, and treatment options, including any risks or benefits regarding use of medications. This information does not endorse any treatments or medications as safe, effective, or approved for treating a specific patient. UpToDate, Inc. and its affiliates disclaim any warranty or liability relating to this information or the use thereof.The use of this information is governed by the Terms of Use, available at https://www.woltersLema21uwer.com/en/know/clinical-effectiveness-terms. 2024© UpToDate, Inc. and its affiliates and/or licensors. All rights reserved.  Copyright   © 2024 UpToDate, Inc. and/or its affiliates. All rights reserved.    Patient Education     Mediterranean diet   The Basics   Written by the doctors and  editors at [employer]   What is a Mediterranean diet? -- A Mediterranean diet is a heart-healthy way of eating. It includes foods and cooking styles from many countries around the [place], like [place] and [place]. The exact foods included vary from place to place.  A Mediterranean diet involves eating a lot of fruits, vegetables, nuts, and whole grains. It uses olive oil instead of other fats. It also includes some fish, poultry, and dairy products, but not a lot of red meat.  Wine is often thought of as part of a Mediterranean diet. It is not needed, and you might choose not to include it. If you do drink alcohol, limit the amount to:   For females, no more than 1 drink a day   For males, no more than 2 drinks a day  What are the benefits of a Mediterranean diet? -- A Mediterranean diet can help you:   Improve your overall health, and help you lose weight   Lower your risk of stroke   Lower your risk of heart problems such as a heart attack   Manage your blood sugar if you have diabetes  What can I eat and drink on a Mediterranean diet? -- A Mediterranean diet is more of an eating pattern than a strict diet. Try to cover two-thirds of your plate with fresh fruits and vegetables. Some examples of foods that are often part of this pattern:   Grains - Whole grains like whole-grain bread and pasta, oats, couscous, brown rice, barley, and orzo.   Fruits - Many kinds and colors of fresh, frozen, dried, or canned fruits. Frozen or canned fruits with 100% fruit juice or water (without added sugar). Examples include apples, pears, berries, melons, bananas, plums, raisins, figs, and peaches.   Vegetables - Many kinds and colors of fresh, frozen, or canned vegetables. If canned, low sodium or salt free. If frozen, without added fat and sodium. Examples include avocados, peppers, tomatoes, spinach, kale, beans, carrots, peas, olives, cucumbers, hummus, soybeans, lentils, and kidney beans.   Dairy - Low-fat milk, cheese,  and other dairy products. Greek yogurt, kefir, and plant-based milk alternatives like soy milk.   Lean meats, poultry, seafood, and proteins - [place], tuna, cod, and other fish. Shrimp, clams, scallops, and mussels. White meat chicken and turkey, eggs, dried beans, lentils, and tofu. Nuts such as walnuts, almonds, pecans, hazelnuts, cashews, peanuts, and nut butters. Seeds such as pumpkin, sesame, flax, and sunflower seeds.   Fats, oils, and other foods - Foods with healthy fats found in fish, nuts, and avocados. Olive oil or vegetable oils such as canola oil. Use onions, garlic, spices, and herbs to season food.  What foods and drinks should I avoid or limit on a Mediterranean diet? -- A Mediterranean diet involves avoiding or limiting certain types of foods. Try to avoid foods with additives like artificial sweeteners. Avoid foods that are processed, refined, or preserved. These are often foods with a very long shelf life.   Grains to avoid - White bread, pasta, white rice, crackers, and biscuits.   Fruits to avoid - Fruits canned or frozen with extra sugar.   Vegetables to avoid - Commercially prepared potatoes and vegetable mixes, regular canned vegetables and juices, and vegetables frozen with sauce or pickled vegetables.   Dairy to avoid - Whole-fat dairy products like cheese, ice cream, whole milk, cream, and buttermilk.   Lean meats, poultry, seafood, and proteins to avoid - Red meat such as beef, pork, and lamb. Processed meats such as sausages, deli meats, salami, hot dogs, and oliver.   Fats, oils, and other foods to avoid - Butter, margarine, lard, gravies, sauces, and salad dressing. Cookies, cakes, candy, doughnuts, muffins, and other sweets.  All topics are updated as new evidence becomes available and our peer review process is complete.  This topic retrieved from [employer] on: Apr 04, 2024.  Topic 520633 Version 2.0  Release: 32.2.4 - C32.93  © 2024 UpToDate, Inc. and/or its affiliates. All rights  reserved.  Consumer Information Use and Disclaimer   Disclaimer: This generalized information is a limited summary of diagnosis, treatment, and/or medication information. It is not meant to be comprehensive and should be used as a tool to help the user understand and/or assess potential diagnostic and treatment options. It does NOT include all information about conditions, treatments, medications, side effects, or risks that may apply to a specific patient. It is not intended to be medical advice or a substitute for the medical advice, diagnosis, or treatment of a health care provider based on the health care provider's examination and assessment of a patient's specific and unique circumstances. Patients must speak with a health care provider for complete information about their health, medical questions, and treatment options, including any risks or benefits regarding use of medications. This information does not endorse any treatments or medications as safe, effective, or approved for treating a specific patient. UpToDate, Inc. and its affiliates disclaim any warranty or liability relating to this information or the use thereof.The use of this information is governed by the Terms of Use, available at https://www.Icera.com/en/know/clinical-effectiveness-terms. 2024© UpToDate, Inc. and its affiliates and/or licensors. All rights reserved.  Copyright   © 2024 UpToDate, Inc. and/or its affiliates. All rights reserved.    Patient Education     High cholesterol   The Basics   Written by the doctors and editors at [facility]   What is cholesterol? -- Cholesterol is a substance found in blood. Everyone has some. It is needed for good health. But people sometimes have too much cholesterol.  Compared with people with normal cholesterol, people with high cholesterol have a higher risk of heart attack, stroke, and other health problems. The higher your cholesterol, the higher your risk of these problems.  Are there  "different types of cholesterol? -- Yes, there are a few different types. If you get a cholesterol test, you might hear your doctor or nurse talk about:   Total cholesterol   LDL cholesterol - Some people call this the \"bad\" cholesterol. That's because having high LDL levels raises your risk of heart attack, stroke, and other health problems.   HDL cholesterol - Some people call this the \"good\" cholesterol. That's because people with high HDL levels tend to have a lower risk of heart attack, stroke, and other health problems.   Non-HDL cholesterol - Non-HDL cholesterol is your total cholesterol minus your HDL cholesterol.   Triglycerides - Triglycerides are not cholesterol. They are another type of fat. But they often get measured when cholesterol is measured. (Having high triglycerides also seems to increase the risk of heart attack and stroke.)  What should my numbers be? -- Ask your doctor or nurse what your numbers should be. Different people need different goals. If you live outside of the [place], see the table (table 1).  In general, people who do not already have heart disease should aim for:   Total cholesterol below 200   LDL cholesterol below 130, or much lower if they are at risk of heart attack or stroke   HDL cholesterol above 60   Non-HDL cholesterol below 160, or lower if they are at risk of heart attack or stroke   Triglycerides below 150  Remember, though, that many people who cannot meet these goals still have a low risk of heart attack and stroke.  What should I do if I have high cholesterol? -- Ask your doctor what your overall risk of heart attack and stroke is. Just having high cholesterol is not always a reason to worry. Having high cholesterol is just one of many things that can increase your risk of heart attack and stroke.  Other things that increase your risk include:   Smoking   High blood pressure   Having a parent or sibling who got heart disease at a young age - Young, in this case, means " "younger than 55 for males and younger than 65 for females.   A diet that is not heart healthy - A \"heart-healthy\" diet includes lots of fruits and vegetables, fiber, and healthy fats (like those found in fish, nuts, and certain oils). It also means limiting sugar and unhealthy fats.   Older age  If you are at high risk of heart attack and stroke, having high cholesterol is a problem. But if you are at low risk, high cholesterol might not need treatment.  Should I take medicine to lower cholesterol? -- Not everyone who has high cholesterol needs medicines. Your doctor or nurse will decide if you need them based on your age, family history, and other health concerns.  There are many different medicines used to lower cholesterol (table 2). Some help your body make less cholesterol. Some keep your body from absorbing cholesterol from foods. Some help your body get rid of cholesterol faster. The medicines most often used to treat high cholesterol are called \"statins.\"  You should probably take a statin if you:   Already had a heart attack or stroke   Have known heart disease   Have diabetes   Have a condition called \"peripheral artery disease,\" which makes it painful to walk, and happens when the arteries in your legs get clogged with fatty deposits   Have an \"abdominal aortic aneurysm,\" which is a widening of the main artery in the belly  Most people with any of the conditions listed above should take a statin no matter what their cholesterol level is. If your doctor or nurse prescribes a statin, it's important to keep taking it. The medicine might not make you feel any different. But it can help prevent heart attack, stroke, and death.  If your doctor or nurse recommends taking medicine to help lower your cholesterol, make sure that you know what it is called. Follow all the instructions for how to take it. For example, some medicines work better when you take them in the evening. Some need to be taken with food.  Tell " "your doctor or nurse if your medicine causes any side effects that bother you. They might be able to switch you to a different medicine.  Can I lower my cholesterol without medicines? -- Yes. You can help lower your cholesterol by doing these things:   You can lower your LDL, or \"bad,\" cholesterol by avoiding red meat, butter, fried foods, cheese, and other foods that have a lot of saturated fat.   You can lower triglycerides by avoiding sugary foods, fried foods, and excess alcohol.   If you have excess weight, it can help to lose weight. Your doctor or nurse can help you do this in a healthy way.   Try to get regular physical activity. Even gentle forms of exercise, like walking, are good for your health.  Even if these steps don't change your cholesterol very much, they can improve your health in many other ways.  All topics are updated as new evidence becomes available and our peer review process is complete.  This topic retrieved from [employer] on: Mar 01, 2024.  Topic 91198 Version 25.0  Release: 32.2.4 - C32.59  © 2024 UpToDate, Inc. and/or its affiliates. All rights reserved.  table 1: Cholesterol and triglyceride measurements in the [place] and elsewhere     Measurement used within the [place] Milligrams/deciliter (mg/dL)  Measurement used most places outside of the [place] Millimoles/liter (mmol/Liter)     Level to aim for  Level to aim for    Total cholesterol  Below 200 Below 5.17   LDL cholesterol  Below 130, or much lower if at risk of heart attack and stroke Below 3.36, or much lower if at risk of heart attack and stroke   HDL cholesterol  Above 60 Above 1.55   Triglycerides  Below 150 Below 1.7   Cholesterol is measured differently in the [place] than it is in most other countries. This table shows values used within and outside of the [place]. It includes the cholesterol and triglyceride levels that most people who do not have heart disease should aim for.  Graphic 37297 Version 5.0  table 2: Lipid-lowering " medicines  Generic name  Brand name    Statins    Atorvastatin Lipitor   Fluvastatin Lescol, Lescol XL   Lovastatin Mevacor, Altoprev   Pitavastatin Livalo   Pravastatin Pravachol   Rosuvastatin Crestor   Simvastatin Zocor   PCSK9 inhibitors    Alirocumab Praluent   Evolocumab Repatha, Repatha SureClick   Cholesterol absorption inhibitors    Ezetimibe Zetia   Bile acid sequestrants    Cholestyramine Prevalite, Questran, Questran Light   Colesevelam Welchol   Colestipol Colestid   Niacin (nicotinic acid)    Niacin immediate release     Niacin extended release Niaspan   Fibrates    Fenofibrate Fenoglide, Tricor, Triglide, others   Gemfibrozil Lopid   Brand names listed are for medicines available in the [place] and some other countries.  [employer] 91306 Version 7.0  Consumer Information Use and Disclaimer   Disclaimer: This generalized information is a limited summary of diagnosis, treatment, and/or medication information. It is not meant to be comprehensive and should be used as a tool to help the user understand and/or assess potential diagnostic and treatment options. It does NOT include all information about conditions, treatments, medications, side effects, or risks that may apply to a specific patient. It is not intended to be medical advice or a substitute for the medical advice, diagnosis, or treatment of a health care provider based on the health care provider's examination and assessment of a patient's specific and unique circumstances. Patients must speak with a health care provider for complete information about their health, medical questions, and treatment options, including any risks or benefits regarding use of medications. This information does not endorse any treatments or medications as safe, effective, or approved for treating a specific patient. UpToDate, Inc. and its affiliates disclaim any warranty or liability relating to this information or the use thereof.The use of this information is governed by  the Terms of Use, available at https://www.woltersDoocumentsuwer.com/en/know/clinical-effectiveness-terms. 2024© [employer] and its affiliates and/or licensors. All rights reserved.  Copyright   © 2024 [employer] and/or its affiliates. All rights reserved.

## 2025-06-12 NOTE — ASSESSMENT & PLAN NOTE
Fairly well stable on current Celexa  Still with home stressors-sick  (chronically)  Still do encourage therapy  Continue current treatment: 40 mg daily  Follow-up 6 months-sooner with problems or concerns  ER precaution with any acute mental health change

## 2025-06-12 NOTE — PROGRESS NOTES
Adult Annual Physical  Name: Arabella Her      : 1965      MRN: [id number]  Encounter Provider: PASHA Delarosa  Encounter Date: 2025   Encounter department: [facility]    :  Assessment & Plan  Encounter for screening mammogram for breast cancer  Due in July-order placed    Orders:    Mammo screening bilateral w 3d and cad; Future    Encounter for immunization  Tdap dated today    Orders:    TDAP VACCINE GREATER THAN OR EQUAL TO 8YO IM    Annual physical exam         Migraine without status migrainosus, not intractable, unspecified migraine type  Stable on Topamax  Tolerating without side effect  Will continue 50 mg twice daily         Generalized anxiety disorder  Fairly well stable on current Celexa  Still with home stressors-sick  (chronically)  Still do encourage therapy  Continue current treatment: 40 mg daily  Follow-up 6 months-sooner with problems or concerns  ER precaution with any acute mental health change       Coronary artery disease due to lipid rich plaque  Lipid panel due-fasting orders placed  Has been off statin-recommend restarting  Overdue for cardiology follow-up (last seen )  Stressed absolute importance of maintaining routine follow-up with her specialist  Reports she does still get intermittent chest tightness  EKG today: Normal  Referral back to cardiology for her to reestablish  Will likely need further cardiac testing  Strict ER precautions with any chest pain    Orders:    Lipid panel; Future    Ambulatory Referral to Cardiology; Future    POCT ECG    Screening, anemia, deficiency, iron    Orders:    CBC and differential; Future    Screening for diabetes mellitus    Orders:    Comprehensive metabolic panel; Future    Coronary artery disease involving native heart without angina pectoris, unspecified vessel or lesion type    Orders:    CBC and differential; Future    Ambulatory Referral to Cardiology; Future    POCT  ECG    Subclinical hypothyroidism    Orders:    TSH, 3rd generation with Free T4 reflex; Future    BRBPR (bright red blood per rectum)  Pt with recent blood with BM  Describes passing large clot on Sunday  Has had episodes in the past show over the last few months-sometimes bright red blood ,sometimes pink  Has not had any further episode since Sunday  Reports no history hemorrhoids  Was to have colonoscopy 2018 - procedure stopped d/t complication with EGD  Has not had any GI follow up since  Refer today  Check CBC    Orders:    Ambulatory Referral to Gastroenterology; Future    Screen for colon cancer  Over due  Many years since last colonoscopy  Referral to establish with GI for bright red blood as above, and colon screening    Orders:    Ambulatory Referral to Gastroenterology; Future    Diarrhea, unspecified type  Chronic since gall bladder removed  Referral to GI today as above    Orders:    Ambulatory Referral to Gastroenterology; Future    Sensation of chest tightness  Patient with recurrent episodes of chest tightness  Reports worse when in stressful situations  Overdue for cardiology follow-up-last seen in 2023  Referral today to reestablish  EKG normal in office  Strict ER precautions with any further chest pain prior to specialist appointment.    Orders:    POCT ECG        Preventive Screenings:  - Diabetes Screening: orders placed  - Cholesterol Screening: orders placed   - Hepatitis C screening: screening up-to-date   - HIV screening: screening not indicated   - Cervical cancer screening: orders placed   - Breast cancer screening: orders placed   - Colon cancer screening: screening up-to-date   - Lung cancer screening: screening not indicated     Lab work orders placed     Immunizations:  - Immunizations due: Prevnar 20 and Zoster (Shingrix)    Counseling/Anticipatory Guidance:  - Alcohol: discussed moderation in alcohol intake and recommendations for healthy alcohol use.   - Drug use: discussed harms  of illicit drug use and how it can negatively impact mental/physical health.   - Tobacco use: discussed harms of tobacco use and management options for quitting.   - Dental health: discussed importance of regular tooth brushing, flossing, and dental visits.   - Sexual health: discussed sexually transmitted diseases, partner selection, use of condoms, avoidance of unintended pregnancy, and contraceptive alternatives.   - Diet: discussed recommendations for a healthy/well-balanced diet.   - Exercise: the importance of regular exercise/physical activity was discussed. Recommend exercise 3-5 times per week for at least 30 minutes.   - Injury prevention: discussed safety/seat belts, safety helmets, smoke detectors, carbon monoxide detectors, and smoking near bedding or upholstery.          History of Present Illness     Adult Annual Physical:  Patient presents for annual physical. Pleasant 59-year-old female presents today for an annual wellness exam/chronic conditions checkup  Age-appropriate preventative care/recommended screenings reviewed    Note: Patient is overdue for preventative care and follow-up with her specialists.  She is currently primary caregiver for her -chronic medical conditions.  We discussed today the absolute importance of good self-care.  And the importance of follow-up with her specialists.     Diet and Physical Activity:  - Diet/Nutrition: no special diet. Recommend Mediterranean style diet  - Exercise: no formal exercise. Recommend 20/30 minutes of cardiovascular and/or strengthening exercise daily    General Health:  - Sleep: sleeps poorly and unrefreshing sleep.  - Hearing: requires use of hearing aids. reports she can hear without hearing aids but helps to enhance  - Vision: wears glasses.  - Dental: no dental visits for > 1 year. encourage 6 month check ups    /GYN Health:  - Follows with GYN: no.   - Menopause: postmenopausal.   - History of STDs: no  - Contraception:. Hysterectomy  "1986; last women's health exam 2 years ago; due for mammo-order placed      Advanced Care Planning:  - Has an advanced directive?: no    - Has a durable medical POA?: no    - ACP document given to patient?: no      Review of Systems   Constitutional: Negative.    HENT: Negative.     Respiratory: Negative.     Cardiovascular:  Positive for chest pain (periodic episodes) and palpitations (occasional). Negative for leg swelling.   Gastrointestinal: Negative.    Genitourinary: Negative.    Musculoskeletal: Negative.    Skin: Negative.    Neurological: Negative.    Psychiatric/Behavioral: Negative.           Objective   /94 (BP Location: Left arm, Patient Position: Sitting, Cuff Size: Large)   Pulse 84   Temp 98.1 °F (36.7 °C) (Temporal)   Ht 4' 11\" (1.499 m)   Wt 112 kg (246 lb 12.8 oz)   LMP 03/18/1997 (Exact Date)   SpO2 98%   BMI 49.85 kg/m²     Physical Exam  Vitals and nursing note reviewed.   Constitutional:       General: She is not in acute distress.     Appearance: Normal appearance. She is well-developed and well-groomed. She is not ill-appearing.   HENT:      Head: Normocephalic.      Right Ear: Tympanic membrane, ear canal and external ear normal.      Left Ear: Tympanic membrane, ear canal and external ear normal.      Nose: Nose normal.      Mouth/Throat:      Mouth: Mucous membranes are moist.      Pharynx: Oropharynx is clear.     Eyes:      Conjunctiva/sclera: Conjunctivae normal.      Pupils: Pupils are equal, round, and reactive to light.     Neck:      Thyroid: No thyromegaly.      Vascular: No carotid bruit.     Cardiovascular:      Rate and Rhythm: Normal rate and regular rhythm.      Pulses:           Posterior tibial pulses are 2+ on the right side and 2+ on the left side.      Heart sounds: Normal heart sounds.   Pulmonary:      Effort: Pulmonary effort is normal. No respiratory distress.      Breath sounds: Normal breath sounds and air entry.   Abdominal:      General: Bowel sounds " are normal.      Palpations: Abdomen is soft.      Tenderness: There is no abdominal tenderness.     Musculoskeletal:      Right lower leg: No edema.      Left lower leg: No edema.   Lymphadenopathy:      Cervical: No cervical adenopathy.     Skin:     General: Skin is warm and dry.     Neurological:      General: No focal deficit present.      Mental Status: She is alert and oriented to person, place, and time.     Psychiatric:         Attention and Perception: Attention normal.         Mood and Affect: Mood normal.         Behavior: Behavior normal.         Thought Content: Thought content normal.         Judgment: Judgment normal.

## 2025-06-12 NOTE — ASSESSMENT & PLAN NOTE
Orders:    CBC and differential; Future    Ambulatory Referral to Cardiology; Future    POCT ECG

## 2025-06-16 ENCOUNTER — APPOINTMENT (OUTPATIENT)
Dept: LAB | Facility: CLINIC | Age: 60
End: 2025-06-16
Payer: COMMERCIAL

## 2025-06-16 DIAGNOSIS — Z13.0 SCREENING, ANEMIA, DEFICIENCY, IRON: ICD-10-CM

## 2025-06-16 DIAGNOSIS — I25.10 CORONARY ARTERY DISEASE INVOLVING NATIVE HEART WITHOUT ANGINA PECTORIS, UNSPECIFIED VESSEL OR LESION TYPE: ICD-10-CM

## 2025-06-16 DIAGNOSIS — E03.8 SUBCLINICAL HYPOTHYROIDISM: ICD-10-CM

## 2025-06-16 DIAGNOSIS — I25.83 CORONARY ARTERY DISEASE DUE TO LIPID RICH PLAQUE: ICD-10-CM

## 2025-06-16 DIAGNOSIS — I25.10 CORONARY ARTERY DISEASE DUE TO LIPID RICH PLAQUE: ICD-10-CM

## 2025-06-16 DIAGNOSIS — Z13.1 SCREENING FOR DIABETES MELLITUS: ICD-10-CM

## 2025-06-16 LAB
ALBUMIN SERPL BCG-MCNC: 3.8 G/DL (ref 3.5–5)
ALP SERPL-CCNC: 98 U/L (ref 34–104)
ALT SERPL W P-5'-P-CCNC: 14 U/L (ref 7–52)
ANION GAP SERPL CALCULATED.3IONS-SCNC: 8 MMOL/L (ref 4–13)
AST SERPL W P-5'-P-CCNC: 20 U/L (ref 13–39)
BASOPHILS # BLD AUTO: 0.04 THOUSANDS/ÂΜL (ref 0–0.1)
BASOPHILS NFR BLD AUTO: 1 % (ref 0–1)
BILIRUB SERPL-MCNC: 0.26 MG/DL (ref 0.2–1)
BUN SERPL-MCNC: 12 MG/DL (ref 5–25)
CALCIUM SERPL-MCNC: 8.9 MG/DL (ref 8.4–10.2)
CHLORIDE SERPL-SCNC: 104 MMOL/L (ref 96–108)
CHOLEST SERPL-MCNC: 203 MG/DL (ref ?–200)
CO2 SERPL-SCNC: 27 MMOL/L (ref 21–32)
CREAT SERPL-MCNC: 0.81 MG/DL (ref 0.6–1.3)
EOSINOPHIL # BLD AUTO: 0.38 THOUSAND/ÂΜL (ref 0–0.61)
EOSINOPHIL NFR BLD AUTO: 5 % (ref 0–6)
ERYTHROCYTE [DISTWIDTH] IN BLOOD BY AUTOMATED COUNT: 14.2 % (ref 11.6–15.1)
GFR SERPL CREATININE-BSD FRML MDRD: 79 ML/MIN/1.73SQ M
GLUCOSE P FAST SERPL-MCNC: 78 MG/DL (ref 65–99)
HCT VFR BLD AUTO: 39.4 % (ref 34.8–46.1)
HDLC SERPL-MCNC: 68 MG/DL
HGB BLD-MCNC: 12.7 G/DL (ref 11.5–15.4)
IMM GRANULOCYTES # BLD AUTO: 0.03 THOUSAND/UL (ref 0–0.2)
IMM GRANULOCYTES NFR BLD AUTO: 0 % (ref 0–2)
LDLC SERPL CALC-MCNC: 120 MG/DL (ref 0–100)
LYMPHOCYTES # BLD AUTO: 2.67 THOUSANDS/ÂΜL (ref 0.6–4.47)
LYMPHOCYTES NFR BLD AUTO: 36 % (ref 14–44)
MCH RBC QN AUTO: 28.9 PG (ref 26.8–34.3)
MCHC RBC AUTO-ENTMCNC: 32.2 G/DL (ref 31.4–37.4)
MCV RBC AUTO: 90 FL (ref 82–98)
MONOCYTES # BLD AUTO: 0.64 THOUSAND/ÂΜL (ref 0.17–1.22)
MONOCYTES NFR BLD AUTO: 9 % (ref 4–12)
NEUTROPHILS # BLD AUTO: 3.63 THOUSANDS/ÂΜL (ref 1.85–7.62)
NEUTS SEG NFR BLD AUTO: 49 % (ref 43–75)
NONHDLC SERPL-MCNC: 135 MG/DL
NRBC BLD AUTO-RTO: 0 /100 WBCS
PLATELET # BLD AUTO: 293 THOUSANDS/UL (ref 149–390)
PMV BLD AUTO: 11 FL (ref 8.9–12.7)
POTASSIUM SERPL-SCNC: 3.5 MMOL/L (ref 3.5–5.3)
PROT SERPL-MCNC: 7.1 G/DL (ref 6.4–8.4)
RBC # BLD AUTO: 4.39 MILLION/UL (ref 3.81–5.12)
SODIUM SERPL-SCNC: 139 MMOL/L (ref 135–147)
T4 FREE SERPL-MCNC: 0.66 NG/DL (ref 0.61–1.12)
TRIGL SERPL-MCNC: 75 MG/DL (ref ?–150)
TSH SERPL DL<=0.05 MIU/L-ACNC: 8.85 UIU/ML (ref 0.45–4.5)
WBC # BLD AUTO: 7.39 THOUSAND/UL (ref 4.31–10.16)

## 2025-06-16 PROCEDURE — 80061 LIPID PANEL: CPT

## 2025-06-16 PROCEDURE — 36415 COLL VENOUS BLD VENIPUNCTURE: CPT

## 2025-06-16 PROCEDURE — 80053 COMPREHEN METABOLIC PANEL: CPT

## 2025-06-16 PROCEDURE — 85025 COMPLETE CBC W/AUTO DIFF WBC: CPT

## 2025-06-16 PROCEDURE — 84439 ASSAY OF FREE THYROXINE: CPT

## 2025-06-16 PROCEDURE — 84443 ASSAY THYROID STIM HORMONE: CPT

## 2025-06-17 ENCOUNTER — TELEPHONE (OUTPATIENT)
Age: 60
End: 2025-06-17

## 2025-06-17 ENCOUNTER — RESULTS FOLLOW-UP (OUTPATIENT)
Dept: FAMILY MEDICINE CLINIC | Facility: CLINIC | Age: 60
End: 2025-06-17

## 2025-06-17 DIAGNOSIS — E03.9 HYPOTHYROIDISM, UNSPECIFIED TYPE: Primary | ICD-10-CM

## 2025-06-17 NOTE — TELEPHONE ENCOUNTER
"Hello,    The following message was sent via e-mail to the leadership team:     Please advise if you can help facilitate the following overbook request:    Patient Name: Arabella (patient)    Patient MRN: [id number]    Call back #: [phone]    Insurance: [facility]    Department:Cardiology    Speciality: General Cardiology    Reason for overbook request: STAT REFERRAL    Comments (Write \"N/a\" if no comments): ASAP referral by Jo Mckeon NP ([facility]) for Coronary artery disease due to lipid rich plaque. Coronary artery disease involving native heart without angina pectoris, unspecified vessel or lesion type. Note: 6/17/25 patient called to schedule first available appt in [place] location specifically (no other location desired) with Dr. London or advanced practitioner. Scheduled first appt with Alvina Funes 9/12/25 and added to waitlist.     Requested doctor and location: Dr. London or AP. [place] location only. NOTE: last seen by Dr. London 4/3/23 - Shortness of Breath.    Date of current appointment: 9/12/25    Thank you.  "

## 2025-06-24 ENCOUNTER — TELEPHONE (OUTPATIENT)
Dept: CARDIOLOGY CLINIC | Facility: CLINIC | Age: 60
End: 2025-06-24

## 2025-06-24 PROBLEM — I10 PRIMARY HYPERTENSION: Status: ACTIVE | Noted: 2025-06-24

## 2025-06-24 PROBLEM — E78.5 HYPERLIPIDEMIA: Status: ACTIVE | Noted: 2025-06-24

## 2025-06-24 NOTE — TELEPHONE ENCOUNTER
LM for pt for sooner appt with vanessa 6/25 at 2pm, didn't take other appt out under I hear from her

## 2025-06-25 ENCOUNTER — VBI (OUTPATIENT)
Dept: ADMINISTRATIVE | Facility: OTHER | Age: 60
End: 2025-06-25

## 2025-06-25 NOTE — TELEPHONE ENCOUNTER
06/25/25 12:55 PM     Chart reviewed for CRC: Colonoscopy was/were submitted to the patient's insurance.     Edelmira Austin MA   [facility]

## 2025-07-13 DIAGNOSIS — I20.89 STABLE ANGINA PECTORIS (HCC): ICD-10-CM

## 2025-07-14 DIAGNOSIS — G43.909 MIGRAINE WITHOUT STATUS MIGRAINOSUS, NOT INTRACTABLE, UNSPECIFIED MIGRAINE TYPE: ICD-10-CM

## 2025-07-15 RX ORDER — ISOSORBIDE MONONITRATE 60 MG/1
60 TABLET, EXTENDED RELEASE ORAL DAILY
Qty: 90 TABLET | Refills: 1 | Status: SHIPPED | OUTPATIENT
Start: 2025-07-15 | End: 2025-07-25 | Stop reason: SDUPTHER

## 2025-07-16 RX ORDER — TOPIRAMATE 50 MG/1
50 TABLET, FILM COATED ORAL 2 TIMES DAILY
Qty: 180 TABLET | Refills: 1 | Status: SHIPPED | OUTPATIENT
Start: 2025-07-16

## 2025-07-23 NOTE — PROGRESS NOTES
Arabella Her  1965  [id number]  [facility] CARDIOLOGY [place]  [address] [facility] [address] [phone]    1. Coronary artery disease involving native heart without angina pectoris, unspecified vessel or lesion type  Ambulatory Referral to Cardiology    Echo complete w/ contrast if indicated      2. Primary hypertension        3. Mixed hyperlipidemia        4. Class 3 severe obesity in adult        5. Coronary artery disease due to lipid rich plaque  Ambulatory Referral to Cardiology    atorvastatin (LIPITOR) 80 mg tablet      6. Stable angina pectoris (HCC)  isosorbide mononitrate (IMDUR) 60 mg 24 hr tablet    metoprolol tartrate (LOPRESSOR) 25 mg tablet      7. Chest pain, unspecified type  amLODIPine (NORVASC) 5 mg tablet      8. SOB (shortness of breath) on exertion  amLODIPine (NORVASC) 5 mg tablet    Echo complete w/ contrast if indicated      9. Abnormal computed tomography angiography (CTA)  metoprolol tartrate (LOPRESSOR) 25 mg tablet        Assessment/Plan  TODAY (07/25/25):   Suspect the source of her chest pain may be related to the stress of her . Will start with an echo and look at wall motion and function. Advised to also follow up with her PCP regarding stress and see if she can change her medications. Once she is back on her imdur and other meds and if she continues to have chest pain would check a cardiac CTA.  LDL not quite at goal. Encouraged diet and exercise. Would repeat this in 3 months.   BP well controlled, continue current regimen  RTO 11/11/25 with me    Coronary artery disease   - [facility] 03/02/23: 45% stenosis in the pLAD with mild disease in the rest of the vessel, 40% stenosis in the pLcx with mild disease in the rest of the vessel  - current rx: ASA 81 mg daily, imdur 60 mg daily, lopressor 25 mg BID  Hypertension   - BP in office: 130/82 mmHg  - current rx: amlodipine 5 mg daily, imdur 60 mg daily, lopressor 25 mg BID, lasix 20 mg daily,  lopressor 25 mg BID  Hyperlipidemia  - lipid panel 06/16/25: cholesterol 203, triglycerides 75, HDL 68,   - current rx: atorvastatin 80 mg daily  Morbid obesity   Hypothyroidism  Anxiety     Interval History:   This is a 60 y/o female with a PMH of non-obstructive CAD, HTN, HLD, obesity hypothyroidism, and anxiety who is presenting today for follow up. She was last seen in the office 04/03/23 by Dr. London. At this visit, he recommended sleep study, pulm rehab, and wt management although unsure if she was able to complete this given her home situation with here disabled . Per chart review, she was seen by her PCP 06/12/25 and had stopped her statin and admitted to intermittent chest tightness.    Arabella states for the past month or so she has noticed worsening chest tightness and SOB mainly at rest. Does still get SOB with activity as well. Along with this, she ran out of some of her medications including imdur. Admits she is under a lot of stress as she is the caretaker for her . Notices she cannot talk to long about him as the same pain will come back. Currently taking celexa for anxiety but feels like it may not be working as well as it used to. After the bright chest tightness (about 30 seconds) she feels like she cannot take a deep breath in. Still taking both ASA and atorvastatin. Denies any tobacco use, alcohol, and drug use. Family history of bypass in both parents.    Past Medical History[1]  Social History     Socioeconomic History   • Marital status: /Civil Union     Spouse name: Not on file   • Number of children: Not on file   • Years of education: Not on file   • Highest education level: Not on file   Occupational History   • Not on file   Tobacco Use   • Smoking status: Never     Passive exposure: Never   • Smokeless tobacco: Never   Vaping Use   • Vaping status: Never Used   Substance and Sexual Activity   • Alcohol use: Yes     Comment: Occasionally 1-2 month   • Drug  use: No   • Sexual activity: Yes     Partners: Male     Birth control/protection: Post-menopausal   Other Topics Concern   • Not on file   Social History Narrative   • Not on file     Social Drivers of Health     Financial Resource Strain: Not on file   Food Insecurity: No Food Insecurity (6/12/2025)    Nursing - Inadequate Food Risk Classification    • Worried About Running Out of Food in the Last Year: Never true    • Ran Out of Food in the Last Year: Never true    • Ran Out of Food in the Last Year: Not on file   Transportation Needs: No Transportation Needs (6/12/2025)    PRAPARE - Transportation    • Lack of Transportation (Medical): No    • Lack of Transportation (Non-Medical): No   Physical Activity: Not on file   Stress: Not on file   Social Connections: Not on file   Intimate Partner Violence: Not on file   Housing Stability: Low Risk  (6/12/2025)    Housing Stability Vital Sign    • Unable to Pay for Housing in the Last Year: No    • Number of Times Moved in the Last Year: 0    • Homeless in the Last Year: No      Family History[1]  Past Surgical History[1]  Current Medications[1]  Allergies   Allergen Reactions   • Azithromycin GI Intolerance     Yeast infection   • Imitrex [Sumatriptan] GI Intolerance   • Cephalexin    • Chlorhexidine Rash   • Wound Dressings Rash     Adhesive tape       Labs:     Chemistry        Component Value Date/Time     06/05/2017 0709    K 3.5 06/16/2025 0708    K 4.6 06/15/2018 0746     06/16/2025 0708     06/15/2018 0746    CO2 27 06/16/2025 0708    CO2 24 06/15/2018 0746    BUN 12 06/16/2025 0708    BUN 12 06/15/2018 0746    CREATININE 0.81 06/16/2025 0708    CREATININE 0.92 06/05/2017 0709        Component Value Date/Time    CALCIUM 8.9 06/16/2025 0708    CALCIUM 9.1 06/15/2018 0746    ALKPHOS 98 06/16/2025 0708    ALKPHOS 78 06/15/2018 0746    AST 20 06/16/2025 0708    AST 14 06/15/2018 0746    ALT 14 06/16/2025 0708    ALT 9 06/15/2018 0746    BILITOT 0.3  "06/05/2017 0709          Lab Results   Component Value Date    HDL 68 06/16/2025    HDL 77 05/13/2024    HDL 84 06/05/2023     Lab Results   Component Value Date    LDLCALC 120 (H) 06/16/2025    LDLCALC 118 (H) 05/13/2024    LDLCALC 86 06/05/2023     Lab Results   Component Value Date    TRIG 75 06/16/2025    TRIG 69 05/13/2024    TRIG 59 06/05/2023   Imaging: No results found.    Review of Systems   Constitutional: Negative for chills, diaphoresis, fever, malaise/fatigue and weight gain.   Cardiovascular:  Positive for chest pain. Negative for dyspnea on exertion, irregular heartbeat, leg swelling, near-syncope, orthopnea, palpitations, paroxysmal nocturnal dyspnea and syncope.   Respiratory:  Positive for shortness of breath. Negative for cough, sleep disturbances due to breathing, snoring and wheezing.    Skin:  Negative for rash.   Gastrointestinal:  Negative for bloating, abdominal pain and nausea.   Neurological:  Negative for dizziness and light-headedness.       Vitals:    07/25/25 0821   BP: 130/82   Pulse: 61   SpO2: 97%     Vitals:    07/25/25 0821   Weight: 110 kg (242 lb)     Height: 4' 11\" (149.9 cm)   Body mass index is 48.88 kg/m².    Physical Exam  Constitutional:       General: She is not in acute distress.     Appearance: Normal appearance. She is not ill-appearing.   HENT:      Head: Normocephalic and atraumatic.      Nose: Nose normal.      Mouth/Throat:      Mouth: Mucous membranes are moist.     Eyes:      General: No scleral icterus.      Cardiovascular:      Rate and Rhythm: Normal rate and regular rhythm.      Pulses:           Radial pulses are 2+ on the right side and 2+ on the left side.      Heart sounds: Heart sounds are distant. No murmur heard.     No friction rub. No gallop. No S3 or S4 sounds.   Pulmonary:      Effort: Pulmonary effort is normal. No respiratory distress.      Breath sounds: Normal breath sounds. No stridor. No wheezing, rhonchi or rales.   Abdominal:      General: " Abdomen is flat.     Musculoskeletal:         General: No swelling.      Right lower leg: No edema.      Left lower leg: No edema.     Skin:     General: Skin is warm.      Capillary Refill: Capillary refill takes less than 2 seconds.     Neurological:      Mental Status: She is alert. Mental status is at baseline.     Psychiatric:         Thought Content: Thought content normal.                  [1]  Past Medical History:  Diagnosis Date   • Anxiety    • Chest pain 08/12/2016   • Chest wall pain 07/15/2016   • Costochondritis 02/28/2014   • Dog bite 07/29/2014   • 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(HCC)    • Fluid retention    • Gastroenteritis 05/06/2013   • Herpes zoster 07/08/2013   • History of chemotherapy 1986   • Kidney stone    • Migraine 01/19/2016   • Shortness of breath 08/12/2016   • Toxoplasmosis 08/28/2014   [1]  Family History  Problem Relation Name Age of Onset   • Diabetes Mother     • Heart disease Mother     • Heart disease Father     • Arrhythmia Father          ICD placed   • Other Sister          vertigo   • Migraines Sister     • Bipolar disorder Sister     • Hearing loss Sister     • No Known Problems Daughter adopted out    • Thyroid disease Daughter          during pregnancy   • Diabetes Maternal Grandmother     • Heart disease Maternal Grandmother     • No Known Problems Maternal Grandfather     • Heart disease Paternal Grandmother     • Breast cancer Maternal Aunt          age unknown   • Parkinsonism Maternal Aunt     • Colon cancer Neg Hx     • Ovarian cancer Neg Hx     [1]  Past Surgical History:  Procedure Laterality Date   • CARDIAC CATHETERIZATION Left 03/02/2023    Procedure: Cardiac Left Heart Cath - Dr Carter or Dr Mccurdy please;  Surgeon: Cici Mccurdy DO;  Location: [facility];  Service: Cardiology   • CARDIAC CATHETERIZATION N/A 03/02/2023    Procedure: Cardiac Coronary Angiogram;  Surgeon: Cici Mccurdy DO;  Location: [facility];  Service:  Cardiology   • CARDIAC CATHETERIZATION  03/02/2023    Procedure: Cardiac catheterization;  Surgeon: Cici Mccurdy DO;  Location: [facility];  Service: Cardiology   • CHOLECYSTECTOMY     • COLONOSCOPY     • ESOPHAGOGASTRODUODENOSCOPY     • FL RETROGRADE PYELOGRAM  07/28/2022   • HYSTERECTOMY  1986   • NECK SURGERY      Lumpectomy   • OOPHORECTOMY Right 1986   • GA CYSTO/URETERO W/LITHOTRIPSY &INDWELL STENT INSRT Left 07/28/2022    Procedure: CYSTOSCOPY URETEROSCOPY WITH LITHOTRIPSY HOLMIUM LASER, RETROGRADE PYELOGRAM AND INSERTION STENT LEFT URETERAL;  Surgeon: Fracisco Mc MD;  Location: [facility] Main OR;  Service: Urology   • GA ESOPHAGOGASTRODUODENOSCOPY TRANSORAL DIAGNOSTIC N/A 11/30/2018    Procedure: EGD;  Surgeon: Al Schwarz MD;  Location: [facility] GI LAB;  Service: Gastroenterology   • GA EXC TUMOR SOFT TISSUE FOREARM &/WRIST SUBQ <3CM Left 12/19/2024    Procedure: EXCISION BIOPSY TISSUE LESION/MASS Left forearm;  Surgeon: Anselmo Lake MD;  Location:  MAIN OR;  Service: Orthopedics   • GA NEUROPLASTY &/TRANSPOS MEDIAN NRV CARPAL TUNNE Left 12/19/2024    Procedure: RELEASE CARPAL TUNNEL - Left;  Surgeon: Anselmo Lake MD;  Location:  MAIN OR;  Service: Orthopedics   • GA NEUROPLASTY &/TRANSPOSITION ULNAR NERVE ELBOW Left 12/19/2024    Procedure: RELEASE CUBITAL TUNNEL;  Surgeon: Anselmo Lake MD;  Location:  MAIN OR;  Service: Orthopedics   • SHOULDER ARTHROSCOPY Left    • SKIN LESION EXCISION N/A 10/5/2023    Procedure: EXCISION OF FOREHEAD MASS WITH COMPLEX CLOSURE;  Surgeon: Angelito Grant MD;  Location:  MAIN OR;  Service: Plastics   [1]    Current Outpatient Medications:   •  acetaminophen (TYLENOL) 500 mg tablet, Take 2 tablets (1,000 mg total) by mouth every 8 (eight) hours as needed for mild pain or moderate pain, Disp: 60 tablet, Rfl: 0  •  albuterol (Ventolin HFA) 90 mcg/act inhaler, Inhale 2 puffs every 6 (six) hours as needed for wheezing or  shortness of breath, Disp: 18 g, Rfl: 1  •  amLODIPine (NORVASC) 5 mg tablet, Take 1 tablet (5 mg total) by mouth in the morning, Disp: 90 tablet, Rfl: 3  •  aspirin 81 MG tablet, Take 81 mg by mouth in the morning., Disp: , Rfl:   •  atorvastatin (LIPITOR) 80 mg tablet, Take 1 tablet (80 mg total) by mouth daily, Disp: 90 tablet, Rfl: 3  •  Cholecalciferol (VITAMIN D) 2000 units CAPS, Take 1 capsule by mouth in the morning., Disp: , Rfl:   •  citalopram (CeleXA) 40 mg tablet, Take 1 tablet (40 mg total) by mouth daily, Disp: 90 tablet, Rfl: 1  •  docusate sodium (COLACE) 100 mg capsule, Take 1 capsule (100 mg total) by mouth daily as needed for constipation, Disp: 10 capsule, Rfl: 0  •  ferrous sulfate 324 (65 Fe) mg, TAKE 1 TABLET BY MOUTH TWICE A DAY BEFORE MEALS, Disp: 180 tablet, Rfl: 2  •  furosemide (LASIX) 20 mg tablet, TAKE 1 TABLET BY MOUTH EVERY DAY, Disp: 90 tablet, Rfl: 2  •  Glycerin-Polysorbate 80 (REFRESH DRY EYE THERAPY OP), Apply to eye, Disp: , Rfl:   •  isosorbide mononitrate (IMDUR) 60 mg 24 hr tablet, Take 1 tablet (60 mg total) by mouth daily, Disp: 90 tablet, Rfl: 2  •  meloxicam (MOBIC) 15 mg tablet, Take 1 tablet (15 mg total) by mouth daily, Disp: 90 tablet, Rfl: 1  •  metoprolol tartrate (LOPRESSOR) 25 mg tablet, Take 1 tablet (25 mg total) by mouth every 12 (twelve) hours, Disp: 180 tablet, Rfl: 1  •  multivitamin (THERAGRAN) TABS, Take 1 tablet by mouth in the morning., Disp: , Rfl:   •  omeprazole (PriLOSEC) 20 mg delayed release capsule, Take 1 capsule (20 mg total) by mouth daily, Disp: 90 capsule, Rfl: 1  •  rizatriptan (MAXALT) 10 mg tablet, Take 1 tablet (10 mg total) by mouth as needed for migraine Take at the onset of migraine; if symptoms continue or return, may take another dose at least 2 hours after first dose. Take no more than 2 doses in a day., Disp: 9 tablet, Rfl: 5  •  topiramate (TOPAMAX) 50 MG tablet, Take 1 tablet (50 mg total) by mouth 2 (two) times a day, Disp: 180  tablet, Rfl: 1  •  Zinc 20 MG CAPS, Take by mouth, Disp: , Rfl:   •  montelukast (SINGULAIR) 10 mg tablet, Take 1 tablet (10 mg total) by mouth daily at bedtime (Patient not taking: Reported on 6/7/2024), Disp: 30 tablet, Rfl: 2

## 2025-07-25 ENCOUNTER — OFFICE VISIT (OUTPATIENT)
Dept: CARDIOLOGY CLINIC | Facility: CLINIC | Age: 60
End: 2025-07-25
Attending: NURSE PRACTITIONER
Payer: COMMERCIAL

## 2025-07-25 VITALS
SYSTOLIC BLOOD PRESSURE: 130 MMHG | BODY MASS INDEX: 48.79 KG/M2 | WEIGHT: 242 LBS | HEART RATE: 61 BPM | DIASTOLIC BLOOD PRESSURE: 82 MMHG | HEIGHT: 59 IN | OXYGEN SATURATION: 97 %

## 2025-07-25 DIAGNOSIS — I10 PRIMARY HYPERTENSION: ICD-10-CM

## 2025-07-25 DIAGNOSIS — E78.2 MIXED HYPERLIPIDEMIA: ICD-10-CM

## 2025-07-25 DIAGNOSIS — R07.9 CHEST PAIN, UNSPECIFIED TYPE: ICD-10-CM

## 2025-07-25 DIAGNOSIS — R06.02 SOB (SHORTNESS OF BREATH) ON EXERTION: ICD-10-CM

## 2025-07-25 DIAGNOSIS — I25.83 CORONARY ARTERY DISEASE DUE TO LIPID RICH PLAQUE: ICD-10-CM

## 2025-07-25 DIAGNOSIS — E66.813 CLASS 3 SEVERE OBESITY IN ADULT: ICD-10-CM

## 2025-07-25 DIAGNOSIS — R93.89 ABNORMAL COMPUTED TOMOGRAPHY ANGIOGRAPHY (CTA): ICD-10-CM

## 2025-07-25 DIAGNOSIS — I20.89 STABLE ANGINA PECTORIS (HCC): ICD-10-CM

## 2025-07-25 DIAGNOSIS — I25.10 CORONARY ARTERY DISEASE INVOLVING NATIVE HEART WITHOUT ANGINA PECTORIS, UNSPECIFIED VESSEL OR LESION TYPE: Primary | ICD-10-CM

## 2025-07-25 DIAGNOSIS — I25.10 CORONARY ARTERY DISEASE DUE TO LIPID RICH PLAQUE: ICD-10-CM

## 2025-07-25 PROCEDURE — 99214 OFFICE O/P EST MOD 30 MIN: CPT

## 2025-07-25 RX ORDER — ATORVASTATIN CALCIUM 80 MG/1
80 TABLET, FILM COATED ORAL DAILY
Qty: 90 TABLET | Refills: 3 | Status: SHIPPED | OUTPATIENT
Start: 2025-07-25

## 2025-07-25 RX ORDER — AMLODIPINE BESYLATE 5 MG/1
5 TABLET ORAL DAILY
Qty: 90 TABLET | Refills: 3 | Status: SHIPPED | OUTPATIENT
Start: 2025-07-25

## 2025-07-25 RX ORDER — ISOSORBIDE MONONITRATE 60 MG/1
60 TABLET, EXTENDED RELEASE ORAL DAILY
Qty: 90 TABLET | Refills: 2 | Status: SHIPPED | OUTPATIENT
Start: 2025-07-25

## 2025-07-25 RX ORDER — METOPROLOL TARTRATE 25 MG/1
25 TABLET, FILM COATED ORAL EVERY 12 HOURS SCHEDULED
Qty: 180 TABLET | Refills: 1 | Status: SHIPPED | OUTPATIENT
Start: 2025-07-25

## 2025-08-16 ENCOUNTER — HOSPITAL ENCOUNTER (OUTPATIENT)
Dept: MAMMOGRAPHY | Facility: MEDICAL CENTER | Age: 60
Discharge: HOME | End: 2025-08-16
Attending: NURSE PRACTITIONER
Payer: COMMERCIAL

## 2025-08-16 VITALS — HEIGHT: 59 IN | BODY MASS INDEX: 49.8 KG/M2 | WEIGHT: 247 LBS

## 2025-08-16 DIAGNOSIS — Z12.31 ENCOUNTER FOR SCREENING MAMMOGRAM FOR BREAST CANCER: ICD-10-CM

## 2025-08-16 PROCEDURE — 77067 SCR MAMMO BI INCL CAD: CPT

## 2025-08-16 PROCEDURE — 77063 BREAST TOMOSYNTHESIS BI: CPT

## (undated) DEVICE — NEEDLE 25G X 1 1/2

## (undated) DEVICE — TIBURON SPLIT SHEET: Brand: CONVERTORS

## (undated) DEVICE — TUBING SUCTION 5MM X 12 FT

## (undated) DEVICE — SKIN MARKER DUAL TIP WITH RULER CAP, FLEXIBLE RULER AND LABELS: Brand: DEVON

## (undated) DEVICE — INTENDED FOR TISSUE SEPARATION, AND OTHER PROCEDURES THAT REQUIRE A SHARP SURGICAL BLADE TO PUNCTURE OR CUT.: Brand: BARD-PARKER ® CARBON RIB-BACK BLADES

## (undated) DEVICE — GLOVE SRG BIOGEL 7.5

## (undated) DEVICE — SINGLE-USE BIOPSY FORCEPS: Brand: RADIAL JAW 4

## (undated) DEVICE — ELECTRODE NEEDLE MOD E-Z CLEAN 2.75IN 7CM -0013M

## (undated) DEVICE — INTENDED FOR TISSUE SEPARATION, AND OTHER PROCEDURES THAT REQUIRE A SHARP SURGICAL BLADE TO PUNCTURE OR CUT.: Brand: BARD-PARKER ® SAFETYLOCK CARBON RIB-BACK BLADES

## (undated) DEVICE — FIBER STD QUANTA 272 MICRON

## (undated) DEVICE — DISPOSABLE OR TOWEL: Brand: CARDINAL HEALTH

## (undated) DEVICE — NEPTUNE E-SEP SMOKE EVACUATION PENCIL, COATED, 70MM BLADE, PUSH BUTTON SWITCH: Brand: NEPTUNE E-SEP

## (undated) DEVICE — GAUZE SPONGES,16 PLY: Brand: CURITY

## (undated) DEVICE — SPONGE 4 X 4 XRAY 16 PLY STRL LF RFD

## (undated) DEVICE — PREP PAD BNS: Brand: CONVERTORS

## (undated) DEVICE — CATH URETERAL 5FR X 70 CM FLEX TIP POLYUR BARD

## (undated) DEVICE — GLOVE SRG LF STRL BGL SKNSNS 6.5 PF

## (undated) DEVICE — PROXIMATE SKIN STAPLERS (35 WIDE) CONTAINS 35 STAINLESS STEEL STAPLES (FIXED HEAD): Brand: PROXIMATE

## (undated) DEVICE — URETEROSCOPE DIGITAL FLEXIBLE RVS DEFLECTION SNGL USE WISCOPE

## (undated) DEVICE — LIGHT GLOVE GREEN

## (undated) DEVICE — SCD SEQUENTIAL COMPRESSION COMFORT SLEEVE MEDIUM KNEE LENGTH: Brand: KENDALL SCD

## (undated) DEVICE — GLIDESHEATH SLENDER STAINLESS STEEL KIT: Brand: GLIDESHEATH SLENDER

## (undated) DEVICE — BOWL: 16OZ PEELPOUCH 75/CS 16/PLT: Brand: MEDEGEN MEDICAL PRODUCTS, LLC

## (undated) DEVICE — GLOVE PI ULTRA TOUCH SZ.8.0

## (undated) DEVICE — BASIC SINGLE BASIN-LF: Brand: MEDLINE INDUSTRIES, INC.

## (undated) DEVICE — GLOVE INDICATOR PI UNDERGLOVE SZ 8 BLUE

## (undated) DEVICE — STERILE BETHLEHEM PLASTIC HAND: Brand: CARDINAL HEALTH

## (undated) DEVICE — RADIFOCUS OPTITORQUE ANGIOGRAPHIC CATHETER: Brand: OPTITORQUE

## (undated) DEVICE — CAST PADDING 4 IN SYNTHETIC NON-STRL

## (undated) DEVICE — ADHESIVE SKIN HIGH VISCOSITY EXOFIN 1ML

## (undated) DEVICE — Device

## (undated) DEVICE — GUIDEWIRE STRGHT TIP 0.035 IN  SOLO PLUS

## (undated) DEVICE — BASKET SPECIMEN RETRIVAL 1.9FR 120CM

## (undated) DEVICE — ARM SLING: Brand: DEROYAL

## (undated) DEVICE — SUT VICRYL 4-0 P-3 18 IN J494G

## (undated) DEVICE — PAD CAST 4 IN COTTON NON STERILE

## (undated) DEVICE — SUT VICRYL 3-0 SH 27 IN J416H

## (undated) DEVICE — SYRINGE 30ML LL

## (undated) DEVICE — SUT MONOCRYL 4-0 P-3 18 IN Y494G

## (undated) DEVICE — GUIDEWIRE WHOLEY HI TORQUE INTERM MOD J .035 145CM

## (undated) DEVICE — PREMIUM DRY TRAY LF: Brand: MEDLINE INDUSTRIES, INC.

## (undated) DEVICE — DECANTER: Brand: UNBRANDED

## (undated) DEVICE — STERILE POLYISOPRENE POWDER-FREE SURGICAL GLOVES: Brand: PROTEXIS

## (undated) DEVICE — BULB SYRINGE,IRRIGATION WITH PROTECTIVE CAP: Brand: DOVER

## (undated) DEVICE — PACK TUR

## (undated) DEVICE — CHLORHEXIDINE 4PCT 4 OZ

## (undated) DEVICE — 3000CC GUARDIAN II: Brand: GUARDIAN

## (undated) DEVICE — STERILE POLYISOPRENE POWDER-FREE SURGICAL GLOVES WITH EMOLLIENT COATING: Brand: PROTEXIS

## (undated) DEVICE — SUT ETHILON 4-0 PS-2 18 IN 1667H

## (undated) DEVICE — TR BAND RADIAL ARTERY COMPRESSION DEVICE: Brand: TR BAND

## (undated) DEVICE — GLOVE INDICATOR PI UNDERGLOVE SZ 6.5 BLUE

## (undated) DEVICE — 1820 FOAM BLOCK NEEDLE COUNTER: Brand: DEVON

## (undated) DEVICE — STERILE SYNTHETIC NEOPRENE POWDER-FREE SURGICAL GLOVES WITH NITRILE COATING, SMOOTH FINISH, STRAIGHT FINGER: Brand: PROTEXIS

## (undated) DEVICE — KNIFE LIGHT 10,PK: Brand: KNIFELIGHT

## (undated) DEVICE — GLOVE SRG BIOGEL 6.5

## (undated) DEVICE — UROCATCH BAG

## (undated) DEVICE — BASIC PACK: Brand: CONVERTORS

## (undated) DEVICE — SYRINGE 10ML LL CONTROL TOP